# Patient Record
Sex: MALE | Race: WHITE | NOT HISPANIC OR LATINO | Employment: OTHER | URBAN - METROPOLITAN AREA
[De-identification: names, ages, dates, MRNs, and addresses within clinical notes are randomized per-mention and may not be internally consistent; named-entity substitution may affect disease eponyms.]

---

## 2017-01-12 ENCOUNTER — ALLSCRIPTS OFFICE VISIT (OUTPATIENT)
Dept: OTHER | Facility: OTHER | Age: 67
End: 2017-01-12

## 2017-01-16 ENCOUNTER — ALLSCRIPTS OFFICE VISIT (OUTPATIENT)
Dept: OTHER | Facility: OTHER | Age: 67
End: 2017-01-16

## 2017-01-23 ENCOUNTER — ALLSCRIPTS OFFICE VISIT (OUTPATIENT)
Dept: OTHER | Facility: OTHER | Age: 67
End: 2017-01-23

## 2017-01-23 DIAGNOSIS — M25.50 PAIN IN JOINT: ICD-10-CM

## 2017-01-30 ENCOUNTER — APPOINTMENT (OUTPATIENT)
Dept: LAB | Facility: CLINIC | Age: 67
End: 2017-01-30
Payer: MEDICARE

## 2017-01-30 DIAGNOSIS — M25.50 PAIN IN JOINT: ICD-10-CM

## 2017-01-30 LAB
CK SERPL-CCNC: 103 U/L (ref 39–308)
CRP SERPL QL: 40.3 MG/L
ERYTHROCYTE [SEDIMENTATION RATE] IN BLOOD: 81 MM/HOUR (ref 0–10)
URATE SERPL-MCNC: 6.1 MG/DL (ref 4.2–8)

## 2017-01-30 PROCEDURE — 84550 ASSAY OF BLOOD/URIC ACID: CPT

## 2017-01-30 PROCEDURE — 86430 RHEUMATOID FACTOR TEST QUAL: CPT

## 2017-01-30 PROCEDURE — 86140 C-REACTIVE PROTEIN: CPT

## 2017-01-30 PROCEDURE — 86038 ANTINUCLEAR ANTIBODIES: CPT

## 2017-01-30 PROCEDURE — 82550 ASSAY OF CK (CPK): CPT

## 2017-01-30 PROCEDURE — 85652 RBC SED RATE AUTOMATED: CPT

## 2017-01-30 PROCEDURE — 36415 COLL VENOUS BLD VENIPUNCTURE: CPT

## 2017-01-30 PROCEDURE — 86618 LYME DISEASE ANTIBODY: CPT

## 2017-01-31 LAB — RHEUMATOID FACT SER QL LA: NEGATIVE

## 2017-02-01 LAB
B BURGDOR IGG SER IA-ACNC: 0.61
B BURGDOR IGM SER IA-ACNC: 0.14
RYE IGE QN: NEGATIVE

## 2017-02-06 ENCOUNTER — ALLSCRIPTS OFFICE VISIT (OUTPATIENT)
Dept: OTHER | Facility: OTHER | Age: 67
End: 2017-02-06

## 2017-02-20 ENCOUNTER — ALLSCRIPTS OFFICE VISIT (OUTPATIENT)
Dept: OTHER | Facility: OTHER | Age: 67
End: 2017-02-20

## 2017-03-24 ENCOUNTER — ALLSCRIPTS OFFICE VISIT (OUTPATIENT)
Dept: OTHER | Facility: OTHER | Age: 67
End: 2017-03-24

## 2017-03-24 ENCOUNTER — GENERIC CONVERSION - ENCOUNTER (OUTPATIENT)
Dept: FAMILY MEDICINE CLINIC | Facility: CLINIC | Age: 67
End: 2017-03-24

## 2017-03-31 ENCOUNTER — ALLSCRIPTS OFFICE VISIT (OUTPATIENT)
Dept: OTHER | Facility: OTHER | Age: 67
End: 2017-03-31

## 2017-03-31 DIAGNOSIS — J20.9 ACUTE BRONCHITIS: ICD-10-CM

## 2017-06-14 ENCOUNTER — ALLSCRIPTS OFFICE VISIT (OUTPATIENT)
Dept: OTHER | Facility: OTHER | Age: 67
End: 2017-06-14

## 2017-06-14 DIAGNOSIS — I10 ESSENTIAL (PRIMARY) HYPERTENSION: ICD-10-CM

## 2017-06-14 DIAGNOSIS — M35.3 POLYMYALGIA RHEUMATICA (HCC): ICD-10-CM

## 2017-06-14 DIAGNOSIS — Z79.52 LONG TERM CURRENT USE OF SYSTEMIC STEROIDS: ICD-10-CM

## 2017-06-14 DIAGNOSIS — M15.0 PRIMARY GENERALIZED (OSTEO)ARTHRITIS: ICD-10-CM

## 2017-06-15 ENCOUNTER — TRANSCRIBE ORDERS (OUTPATIENT)
Dept: LAB | Facility: CLINIC | Age: 67
End: 2017-06-15

## 2017-06-15 ENCOUNTER — APPOINTMENT (OUTPATIENT)
Dept: LAB | Facility: CLINIC | Age: 67
End: 2017-06-15
Payer: MEDICARE

## 2017-06-15 DIAGNOSIS — I10 ESSENTIAL HYPERTENSION, BENIGN: ICD-10-CM

## 2017-06-15 DIAGNOSIS — M35.3 POLYMYALGIA RHEUMATICA (HCC): ICD-10-CM

## 2017-06-15 DIAGNOSIS — M15.0 PRIMARY GENERALIZED HYPERTROPHIC OSTEOARTHROSIS: ICD-10-CM

## 2017-06-15 DIAGNOSIS — M35.3 POLYMYALGIA RHEUMATICA (HCC): Primary | ICD-10-CM

## 2017-06-15 DIAGNOSIS — Z79.52 LONG TERM CURRENT USE OF SYSTEMIC STEROIDS: ICD-10-CM

## 2017-06-15 PROCEDURE — 80053 COMPREHEN METABOLIC PANEL: CPT | Performed by: INTERNAL MEDICINE

## 2017-06-15 PROCEDURE — 86140 C-REACTIVE PROTEIN: CPT | Performed by: INTERNAL MEDICINE

## 2017-06-15 PROCEDURE — 85652 RBC SED RATE AUTOMATED: CPT | Performed by: INTERNAL MEDICINE

## 2017-06-15 PROCEDURE — 84443 ASSAY THYROID STIM HORMONE: CPT | Performed by: INTERNAL MEDICINE

## 2017-06-15 PROCEDURE — 36415 COLL VENOUS BLD VENIPUNCTURE: CPT | Performed by: INTERNAL MEDICINE

## 2017-06-15 PROCEDURE — 82306 VITAMIN D 25 HYDROXY: CPT | Performed by: INTERNAL MEDICINE

## 2017-06-15 PROCEDURE — 85025 COMPLETE CBC W/AUTO DIFF WBC: CPT | Performed by: INTERNAL MEDICINE

## 2017-07-14 ENCOUNTER — APPOINTMENT (OUTPATIENT)
Dept: LAB | Facility: CLINIC | Age: 67
End: 2017-07-14
Payer: MEDICARE

## 2017-07-14 ENCOUNTER — TRANSCRIBE ORDERS (OUTPATIENT)
Dept: LAB | Facility: CLINIC | Age: 67
End: 2017-07-14

## 2017-07-14 DIAGNOSIS — M35.3 POLYMYALGIA RHEUMATICA (HCC): ICD-10-CM

## 2017-07-14 DIAGNOSIS — Z79.52 LONG TERM CURRENT USE OF SYSTEMIC STEROIDS: ICD-10-CM

## 2017-07-14 LAB
CRP SERPL QL: <3 MG/L
ERYTHROCYTE [SEDIMENTATION RATE] IN BLOOD: 11 MM/HOUR (ref 0–10)

## 2017-07-14 PROCEDURE — 36415 COLL VENOUS BLD VENIPUNCTURE: CPT

## 2017-07-14 PROCEDURE — 86140 C-REACTIVE PROTEIN: CPT

## 2017-07-14 PROCEDURE — 85652 RBC SED RATE AUTOMATED: CPT

## 2017-07-17 DIAGNOSIS — Z79.52 LONG TERM CURRENT USE OF SYSTEMIC STEROIDS: ICD-10-CM

## 2017-07-17 DIAGNOSIS — R01.1 CARDIAC MURMUR: ICD-10-CM

## 2017-07-17 DIAGNOSIS — M35.3 POLYMYALGIA RHEUMATICA (HCC): ICD-10-CM

## 2017-07-24 ENCOUNTER — ALLSCRIPTS OFFICE VISIT (OUTPATIENT)
Dept: OTHER | Facility: OTHER | Age: 67
End: 2017-07-24

## 2017-07-26 ENCOUNTER — GENERIC CONVERSION - ENCOUNTER (OUTPATIENT)
Dept: OTHER | Facility: OTHER | Age: 67
End: 2017-07-26

## 2017-08-15 ENCOUNTER — ALLSCRIPTS OFFICE VISIT (OUTPATIENT)
Dept: OTHER | Facility: OTHER | Age: 67
End: 2017-08-15

## 2017-08-18 ENCOUNTER — GENERIC CONVERSION - ENCOUNTER (OUTPATIENT)
Dept: OTHER | Facility: OTHER | Age: 67
End: 2017-08-18

## 2017-08-24 ENCOUNTER — ALLSCRIPTS OFFICE VISIT (OUTPATIENT)
Dept: OTHER | Facility: OTHER | Age: 67
End: 2017-08-24

## 2017-08-24 DIAGNOSIS — I25.5 ISCHEMIC CARDIOMYOPATHY: ICD-10-CM

## 2017-08-25 ENCOUNTER — GENERIC CONVERSION - ENCOUNTER (OUTPATIENT)
Dept: OTHER | Facility: OTHER | Age: 67
End: 2017-08-25

## 2017-09-01 ENCOUNTER — APPOINTMENT (OUTPATIENT)
Dept: LAB | Facility: CLINIC | Age: 67
End: 2017-09-01
Payer: MEDICARE

## 2017-09-01 ENCOUNTER — TRANSCRIBE ORDERS (OUTPATIENT)
Dept: LAB | Facility: CLINIC | Age: 67
End: 2017-09-01

## 2017-09-01 DIAGNOSIS — M35.3 POLYMYALGIA RHEUMATICA (HCC): ICD-10-CM

## 2017-09-01 DIAGNOSIS — M15.0 PRIMARY GENERALIZED (OSTEO)ARTHRITIS: ICD-10-CM

## 2017-09-01 DIAGNOSIS — Z79.52 LONG TERM CURRENT USE OF SYSTEMIC STEROIDS: ICD-10-CM

## 2017-09-01 LAB
CRP SERPL QL: 3.3 MG/L
ERYTHROCYTE [SEDIMENTATION RATE] IN BLOOD: 11 MM/HOUR (ref 0–10)

## 2017-09-01 PROCEDURE — 85652 RBC SED RATE AUTOMATED: CPT

## 2017-09-01 PROCEDURE — 36415 COLL VENOUS BLD VENIPUNCTURE: CPT

## 2017-09-01 PROCEDURE — 86140 C-REACTIVE PROTEIN: CPT

## 2017-09-06 ENCOUNTER — TELEPHONE (OUTPATIENT)
Dept: INPATIENT UNIT | Facility: HOSPITAL | Age: 67
End: 2017-09-06

## 2017-09-06 DIAGNOSIS — I35.0 NONRHEUMATIC AORTIC VALVE STENOSIS: ICD-10-CM

## 2017-09-06 PROBLEM — I25.5 CARDIOMYOPATHY, ISCHEMIC: Chronic | Status: ACTIVE | Noted: 2017-09-06

## 2017-09-06 PROBLEM — R06.00 DOE (DYSPNEA ON EXERTION): Chronic | Status: ACTIVE | Noted: 2017-09-06

## 2017-09-06 RX ORDER — CLOPIDOGREL BISULFATE 75 MG/1
600 TABLET ORAL ONCE
Status: CANCELLED | OUTPATIENT
Start: 2017-09-06 | End: 2017-09-06

## 2017-09-06 RX ORDER — SODIUM CHLORIDE 9 MG/ML
125 INJECTION, SOLUTION INTRAVENOUS CONTINUOUS
Status: CANCELLED | OUTPATIENT
Start: 2017-09-06

## 2017-09-06 RX ORDER — ASPIRIN 81 MG/1
324 TABLET, CHEWABLE ORAL ONCE
Status: CANCELLED | OUTPATIENT
Start: 2017-09-06 | End: 2017-09-06

## 2017-09-07 ENCOUNTER — GENERIC CONVERSION - ENCOUNTER (OUTPATIENT)
Dept: OTHER | Facility: OTHER | Age: 67
End: 2017-09-07

## 2017-09-07 ENCOUNTER — HOSPITAL ENCOUNTER (OUTPATIENT)
Dept: NON INVASIVE DIAGNOSTICS | Facility: HOSPITAL | Age: 67
Discharge: HOME | End: 2017-09-07
Attending: INTERNAL MEDICINE | Admitting: INTERNAL MEDICINE
Payer: MEDICARE

## 2017-09-07 VITALS
DIASTOLIC BLOOD PRESSURE: 57 MMHG | TEMPERATURE: 98 F | SYSTOLIC BLOOD PRESSURE: 98 MMHG | WEIGHT: 174 LBS | OXYGEN SATURATION: 96 % | RESPIRATION RATE: 18 BRPM | HEIGHT: 68 IN | BODY MASS INDEX: 26.37 KG/M2 | HEART RATE: 62 BPM

## 2017-09-07 DIAGNOSIS — I25.5 ISCHEMIC CARDIOMYOPATHY: ICD-10-CM

## 2017-09-07 LAB
ANION GAP SERPL CALCULATED.3IONS-SCNC: 9 MMOL/L (ref 4–13)
BUN SERPL-MCNC: 14 MG/DL (ref 5–25)
CALCIUM SERPL-MCNC: 9.3 MG/DL (ref 8.3–10.1)
CHLORIDE SERPL-SCNC: 104 MMOL/L (ref 100–108)
CHOLEST SERPL-MCNC: 147 MG/DL (ref 50–200)
CO2 SERPL-SCNC: 27 MMOL/L (ref 21–32)
CREAT SERPL-MCNC: 0.72 MG/DL (ref 0.6–1.3)
ERYTHROCYTE [DISTWIDTH] IN BLOOD BY AUTOMATED COUNT: 12.9 % (ref 11.6–15.1)
GFR SERPL CREATININE-BSD FRML MDRD: 95 ML/MIN/1.73SQ M
GLUCOSE P FAST SERPL-MCNC: 107 MG/DL (ref 65–99)
GLUCOSE SERPL-MCNC: 107 MG/DL (ref 65–140)
HCT VFR BLD AUTO: 42.3 % (ref 36.5–49.3)
HDLC SERPL-MCNC: 62 MG/DL (ref 40–60)
HGB BLD-MCNC: 14.8 G/DL (ref 12–17)
INR PPP: 0.91 (ref 0.86–1.16)
LDLC SERPL CALC-MCNC: 60 MG/DL (ref 0–100)
MAGNESIUM SERPL-MCNC: 2.4 MG/DL (ref 1.6–2.6)
MCH RBC QN AUTO: 31.8 PG (ref 26.8–34.3)
MCHC RBC AUTO-ENTMCNC: 35 G/DL (ref 31.4–37.4)
MCV RBC AUTO: 91 FL (ref 82–98)
PLATELET # BLD AUTO: 200 THOUSANDS/UL (ref 149–390)
PMV BLD AUTO: 9.6 FL (ref 8.9–12.7)
POTASSIUM SERPL-SCNC: 3.3 MMOL/L (ref 3.5–5.3)
PROTHROMBIN TIME: 12.2 SECONDS (ref 12.1–14.4)
RBC # BLD AUTO: 4.65 MILLION/UL (ref 3.88–5.62)
SODIUM SERPL-SCNC: 140 MMOL/L (ref 136–145)
TRIGL SERPL-MCNC: 124 MG/DL
WBC # BLD AUTO: 7.06 THOUSAND/UL (ref 4.31–10.16)

## 2017-09-07 PROCEDURE — C1769 GUIDE WIRE: HCPCS | Performed by: INTERNAL MEDICINE

## 2017-09-07 PROCEDURE — 83735 ASSAY OF MAGNESIUM: CPT | Performed by: INTERNAL MEDICINE

## 2017-09-07 PROCEDURE — 85610 PROTHROMBIN TIME: CPT | Performed by: INTERNAL MEDICINE

## 2017-09-07 PROCEDURE — 99152 MOD SED SAME PHYS/QHP 5/>YRS: CPT | Performed by: INTERNAL MEDICINE

## 2017-09-07 PROCEDURE — 99153 MOD SED SAME PHYS/QHP EA: CPT | Performed by: INTERNAL MEDICINE

## 2017-09-07 PROCEDURE — 80061 LIPID PANEL: CPT | Performed by: INTERNAL MEDICINE

## 2017-09-07 PROCEDURE — 80048 BASIC METABOLIC PNL TOTAL CA: CPT | Performed by: INTERNAL MEDICINE

## 2017-09-07 PROCEDURE — C1894 INTRO/SHEATH, NON-LASER: HCPCS | Performed by: INTERNAL MEDICINE

## 2017-09-07 PROCEDURE — 85027 COMPLETE CBC AUTOMATED: CPT | Performed by: INTERNAL MEDICINE

## 2017-09-07 PROCEDURE — 93005 ELECTROCARDIOGRAM TRACING: CPT | Performed by: INTERNAL MEDICINE

## 2017-09-07 PROCEDURE — 93458 L HRT ARTERY/VENTRICLE ANGIO: CPT | Performed by: INTERNAL MEDICINE

## 2017-09-07 RX ORDER — PREDNISONE 2.5 MG
5 TABLET ORAL DAILY
COMMUNITY
End: 2018-11-19 | Stop reason: SDUPTHER

## 2017-09-07 RX ORDER — AMLODIPINE BESYLATE 10 MG/1
10 TABLET ORAL DAILY
COMMUNITY
End: 2018-03-05 | Stop reason: SDUPTHER

## 2017-09-07 RX ORDER — CLOPIDOGREL BISULFATE 75 MG/1
600 TABLET ORAL ONCE
Status: COMPLETED | OUTPATIENT
Start: 2017-09-07 | End: 2017-09-07

## 2017-09-07 RX ORDER — TRIAMTERENE AND HYDROCHLOROTHIAZIDE 37.5; 25 MG/1; MG/1
1 CAPSULE ORAL DAILY
COMMUNITY
End: 2018-05-02 | Stop reason: SDUPTHER

## 2017-09-07 RX ORDER — HEPARIN SODIUM 1000 [USP'U]/ML
INJECTION, SOLUTION INTRAVENOUS; SUBCUTANEOUS CODE/TRAUMA/SEDATION MEDICATION
Status: COMPLETED | OUTPATIENT
Start: 2017-09-07 | End: 2017-09-07

## 2017-09-07 RX ORDER — AMLODIPINE BESYLATE 10 MG/1
10 TABLET ORAL DAILY
Status: DISCONTINUED | OUTPATIENT
Start: 2017-09-08 | End: 2017-09-07 | Stop reason: HOSPADM

## 2017-09-07 RX ORDER — MULTIVITAMIN
1 TABLET ORAL DAILY
COMMUNITY

## 2017-09-07 RX ORDER — CHOLECALCIFEROL (VITAMIN D3) 125 MCG
2000 CAPSULE ORAL DAILY
COMMUNITY

## 2017-09-07 RX ORDER — NITROGLYCERIN 20 MG/100ML
INJECTION INTRAVENOUS CODE/TRAUMA/SEDATION MEDICATION
Status: COMPLETED | OUTPATIENT
Start: 2017-09-07 | End: 2017-09-07

## 2017-09-07 RX ORDER — TRIAMTERENE AND HYDROCHLOROTHIAZIDE 37.5; 25 MG/1; MG/1
1 TABLET ORAL DAILY
Status: DISCONTINUED | OUTPATIENT
Start: 2017-09-08 | End: 2017-09-07 | Stop reason: HOSPADM

## 2017-09-07 RX ORDER — SODIUM CHLORIDE 9 MG/ML
125 INJECTION, SOLUTION INTRAVENOUS CONTINUOUS
Status: DISCONTINUED | OUTPATIENT
Start: 2017-09-07 | End: 2017-09-07

## 2017-09-07 RX ORDER — ASPIRIN 81 MG/1
324 TABLET, CHEWABLE ORAL ONCE
Status: COMPLETED | OUTPATIENT
Start: 2017-09-07 | End: 2017-09-07

## 2017-09-07 RX ORDER — ASPIRIN 325 MG
325 TABLET ORAL
Status: ON HOLD | COMMUNITY
End: 2019-08-15

## 2017-09-07 RX ORDER — FENTANYL CITRATE 50 UG/ML
INJECTION, SOLUTION INTRAMUSCULAR; INTRAVENOUS CODE/TRAUMA/SEDATION MEDICATION
Status: COMPLETED | OUTPATIENT
Start: 2017-09-07 | End: 2017-09-07

## 2017-09-07 RX ORDER — LIDOCAINE HYDROCHLORIDE 10 MG/ML
INJECTION, SOLUTION INFILTRATION; PERINEURAL CODE/TRAUMA/SEDATION MEDICATION
Status: COMPLETED | OUTPATIENT
Start: 2017-09-07 | End: 2017-09-07

## 2017-09-07 RX ORDER — ROSUVASTATIN CALCIUM 10 MG/1
10 TABLET, COATED ORAL
COMMUNITY
End: 2018-12-12

## 2017-09-07 RX ORDER — SODIUM CHLORIDE 9 MG/ML
100 INJECTION, SOLUTION INTRAVENOUS CONTINUOUS
Status: DISCONTINUED | OUTPATIENT
Start: 2017-09-07 | End: 2017-09-07 | Stop reason: HOSPADM

## 2017-09-07 RX ORDER — MIDAZOLAM HYDROCHLORIDE 1 MG/ML
INJECTION INTRAMUSCULAR; INTRAVENOUS CODE/TRAUMA/SEDATION MEDICATION
Status: COMPLETED | OUTPATIENT
Start: 2017-09-07 | End: 2017-09-07

## 2017-09-07 RX ADMIN — SODIUM CHLORIDE 125 ML/HR: 0.9 INJECTION, SOLUTION INTRAVENOUS at 09:48

## 2017-09-07 RX ADMIN — LIDOCAINE HYDROCHLORIDE 0.1 ML: 10 INJECTION, SOLUTION INFILTRATION; PERINEURAL at 13:43

## 2017-09-07 RX ADMIN — ASPIRIN 81 MG 324 MG: 81 TABLET ORAL at 09:38

## 2017-09-07 RX ADMIN — CLOPIDOGREL BISULFATE 600 MG: 75 TABLET ORAL at 09:38

## 2017-09-07 RX ADMIN — IOHEXOL 95 ML: 350 INJECTION, SOLUTION INTRAVENOUS at 14:29

## 2017-09-07 RX ADMIN — MIDAZOLAM 2 MG: 1 INJECTION INTRAMUSCULAR; INTRAVENOUS at 13:41

## 2017-09-07 RX ADMIN — MIDAZOLAM 1 MG: 1 INJECTION INTRAMUSCULAR; INTRAVENOUS at 14:18

## 2017-09-07 RX ADMIN — NITROGLYCERIN 200 MCG: 20 INJECTION INTRAVENOUS at 13:46

## 2017-09-07 RX ADMIN — FENTANYL CITRATE 50 MCG: 50 INJECTION, SOLUTION INTRAMUSCULAR; INTRAVENOUS at 13:41

## 2017-09-07 RX ADMIN — HEPARIN SODIUM 4000 UNITS: 1000 INJECTION INTRAVENOUS; SUBCUTANEOUS at 13:46

## 2017-09-07 RX ADMIN — FENTANYL CITRATE 25 MCG: 50 INJECTION, SOLUTION INTRAMUSCULAR; INTRAVENOUS at 14:17

## 2017-09-08 LAB
ATRIAL RATE: 70 BPM
P AXIS: 58 DEGREES
PR INTERVAL: 162 MS
QRS AXIS: -30 DEGREES
QRSD INTERVAL: 142 MS
QT INTERVAL: 418 MS
QTC INTERVAL: 451 MS
T WAVE AXIS: 67 DEGREES
VENTRICULAR RATE: 70 BPM

## 2017-09-14 ENCOUNTER — HOSPITAL ENCOUNTER (OUTPATIENT)
Dept: NON INVASIVE DIAGNOSTICS | Facility: HOSPITAL | Age: 67
Discharge: HOME | End: 2017-09-14
Attending: INTERNAL MEDICINE
Payer: MEDICARE

## 2017-09-14 ENCOUNTER — ANESTHESIA (OUTPATIENT)
Dept: NON INVASIVE DIAGNOSTICS | Facility: HOSPITAL | Age: 67
End: 2017-09-14

## 2017-09-14 ENCOUNTER — ANESTHESIA EVENT (OUTPATIENT)
Dept: NON INVASIVE DIAGNOSTICS | Facility: HOSPITAL | Age: 67
End: 2017-09-14

## 2017-09-14 ENCOUNTER — GENERIC CONVERSION - ENCOUNTER (OUTPATIENT)
Dept: OTHER | Facility: OTHER | Age: 67
End: 2017-09-14

## 2017-09-14 DIAGNOSIS — I35.0 NONRHEUMATIC AORTIC VALVE STENOSIS: ICD-10-CM

## 2017-09-14 PROCEDURE — 93312 ECHO TRANSESOPHAGEAL: CPT

## 2017-09-14 PROCEDURE — 76377 3D RENDER W/INTRP POSTPROCES: CPT

## 2017-09-14 RX ORDER — SODIUM CHLORIDE 9 MG/ML
INJECTION, SOLUTION INTRAVENOUS CONTINUOUS PRN
Status: DISCONTINUED | OUTPATIENT
Start: 2017-09-14 | End: 2017-09-14 | Stop reason: SURG

## 2017-09-14 RX ORDER — PROPOFOL 10 MG/ML
INJECTION, EMULSION INTRAVENOUS AS NEEDED
Status: DISCONTINUED | OUTPATIENT
Start: 2017-09-14 | End: 2017-09-14 | Stop reason: SURG

## 2017-09-14 RX ADMIN — TOPICAL ANESTHETIC 1 SPRAY: 200 SPRAY DENTAL; PERIODONTAL at 11:36

## 2017-09-14 RX ADMIN — PROPOFOL 50 MG: 10 INJECTION, EMULSION INTRAVENOUS at 11:38

## 2017-09-14 RX ADMIN — PROPOFOL 30 MG: 10 INJECTION, EMULSION INTRAVENOUS at 11:55

## 2017-09-14 RX ADMIN — SODIUM CHLORIDE: 0.9 INJECTION, SOLUTION INTRAVENOUS at 10:45

## 2017-09-14 RX ADMIN — PROPOFOL 30 MG: 10 INJECTION, EMULSION INTRAVENOUS at 11:42

## 2017-09-14 RX ADMIN — PROPOFOL 30 MG: 10 INJECTION, EMULSION INTRAVENOUS at 11:45

## 2017-09-14 RX ADMIN — PROPOFOL 30 MG: 10 INJECTION, EMULSION INTRAVENOUS at 11:52

## 2017-09-14 RX ADMIN — PROPOFOL 30 MG: 10 INJECTION, EMULSION INTRAVENOUS at 11:40

## 2017-09-14 RX ADMIN — PROPOFOL 30 MG: 10 INJECTION, EMULSION INTRAVENOUS at 11:49

## 2017-09-25 ENCOUNTER — ALLSCRIPTS OFFICE VISIT (OUTPATIENT)
Dept: OTHER | Facility: OTHER | Age: 67
End: 2017-09-25

## 2017-09-26 ENCOUNTER — TRANSCRIBE ORDERS (OUTPATIENT)
Dept: ADMINISTRATIVE | Facility: HOSPITAL | Age: 67
End: 2017-09-26

## 2017-09-26 DIAGNOSIS — I25.5 ISCHEMIC CARDIOMYOPATHY: Primary | ICD-10-CM

## 2017-10-05 ENCOUNTER — GENERIC CONVERSION - ENCOUNTER (OUTPATIENT)
Dept: OTHER | Facility: OTHER | Age: 67
End: 2017-10-05

## 2017-10-12 ENCOUNTER — ALLSCRIPTS OFFICE VISIT (OUTPATIENT)
Dept: OTHER | Facility: OTHER | Age: 67
End: 2017-10-12

## 2017-10-12 ENCOUNTER — APPOINTMENT (OUTPATIENT)
Dept: LAB | Facility: CLINIC | Age: 67
End: 2017-10-12
Payer: MEDICARE

## 2017-10-12 ENCOUNTER — HOSPITAL ENCOUNTER (OUTPATIENT)
Dept: MRI IMAGING | Facility: HOSPITAL | Age: 67
Discharge: HOME | End: 2017-10-12
Attending: INTERNAL MEDICINE
Payer: MEDICARE

## 2017-10-12 ENCOUNTER — TRANSCRIBE ORDERS (OUTPATIENT)
Dept: LAB | Facility: CLINIC | Age: 67
End: 2017-10-12

## 2017-10-12 DIAGNOSIS — Z79.52 LONG TERM CURRENT USE OF SYSTEMIC STEROIDS: ICD-10-CM

## 2017-10-12 DIAGNOSIS — M15.0 PRIMARY GENERALIZED (OSTEO)ARTHRITIS: ICD-10-CM

## 2017-10-12 DIAGNOSIS — I25.5 ISCHEMIC CARDIOMYOPATHY: ICD-10-CM

## 2017-10-12 DIAGNOSIS — M35.3 POLYMYALGIA RHEUMATICA (HCC): ICD-10-CM

## 2017-10-12 LAB
ALBUMIN SERPL BCP-MCNC: 4.1 G/DL (ref 3.5–5)
ALP SERPL-CCNC: 61 U/L (ref 46–116)
ALT SERPL W P-5'-P-CCNC: 24 U/L (ref 12–78)
ANION GAP SERPL CALCULATED.3IONS-SCNC: 6 MMOL/L (ref 4–13)
AST SERPL W P-5'-P-CCNC: 20 U/L (ref 5–45)
BASOPHILS # BLD AUTO: 0.04 THOUSANDS/ΜL (ref 0–0.1)
BASOPHILS NFR BLD AUTO: 1 % (ref 0–1)
BILIRUB SERPL-MCNC: 0.44 MG/DL (ref 0.2–1)
BUN SERPL-MCNC: 14 MG/DL (ref 5–25)
CALCIUM SERPL-MCNC: 9.7 MG/DL (ref 8.3–10.1)
CHLORIDE SERPL-SCNC: 100 MMOL/L (ref 100–108)
CO2 SERPL-SCNC: 28 MMOL/L (ref 21–32)
CREAT SERPL-MCNC: 0.7 MG/DL (ref 0.6–1.3)
CRP SERPL QL: <3 MG/L
EOSINOPHIL # BLD AUTO: 0.02 THOUSAND/ΜL (ref 0–0.61)
EOSINOPHIL NFR BLD AUTO: 0 % (ref 0–6)
ERYTHROCYTE [DISTWIDTH] IN BLOOD BY AUTOMATED COUNT: 12.9 % (ref 11.6–15.1)
ERYTHROCYTE [SEDIMENTATION RATE] IN BLOOD: 20 MM/HOUR (ref 0–10)
GFR SERPL CREATININE-BSD FRML MDRD: 96 ML/MIN/1.73SQ M
GLUCOSE SERPL-MCNC: 122 MG/DL (ref 65–140)
HCT VFR BLD AUTO: 43.3 % (ref 36.5–49.3)
HGB BLD-MCNC: 15.1 G/DL (ref 12–17)
LYMPHOCYTES # BLD AUTO: 1.16 THOUSANDS/ΜL (ref 0.6–4.47)
LYMPHOCYTES NFR BLD AUTO: 14 % (ref 14–44)
MCH RBC QN AUTO: 31.6 PG (ref 26.8–34.3)
MCHC RBC AUTO-ENTMCNC: 34.9 G/DL (ref 31.4–37.4)
MCV RBC AUTO: 91 FL (ref 82–98)
MONOCYTES # BLD AUTO: 0.41 THOUSAND/ΜL (ref 0.17–1.22)
MONOCYTES NFR BLD AUTO: 5 % (ref 4–12)
NEUTROPHILS # BLD AUTO: 6.45 THOUSANDS/ΜL (ref 1.85–7.62)
NEUTS SEG NFR BLD AUTO: 80 % (ref 43–75)
NRBC BLD AUTO-RTO: 0 /100 WBCS
PLATELET # BLD AUTO: 261 THOUSANDS/UL (ref 149–390)
PMV BLD AUTO: 10.1 FL (ref 8.9–12.7)
POTASSIUM SERPL-SCNC: 4.1 MMOL/L (ref 3.5–5.3)
PROT SERPL-MCNC: 7.8 G/DL (ref 6.4–8.2)
RBC # BLD AUTO: 4.78 MILLION/UL (ref 3.88–5.62)
SODIUM SERPL-SCNC: 134 MMOL/L (ref 136–145)
WBC # BLD AUTO: 8.11 THOUSAND/UL (ref 4.31–10.16)

## 2017-10-12 PROCEDURE — 36415 COLL VENOUS BLD VENIPUNCTURE: CPT

## 2017-10-12 PROCEDURE — 86140 C-REACTIVE PROTEIN: CPT

## 2017-10-12 PROCEDURE — 85652 RBC SED RATE AUTOMATED: CPT

## 2017-10-12 PROCEDURE — 80053 COMPREHEN METABOLIC PANEL: CPT

## 2017-10-12 PROCEDURE — 75561 CARDIAC MRI FOR MORPH W/DYE: CPT

## 2017-10-12 PROCEDURE — 85025 COMPLETE CBC W/AUTO DIFF WBC: CPT

## 2017-10-12 PROCEDURE — A9585 GADOBUTROL INJECTION: HCPCS | Performed by: INTERNAL MEDICINE

## 2017-10-12 RX ADMIN — GADOBUTROL 14 ML: 604.72 INJECTION INTRAVENOUS at 09:49

## 2017-10-23 ENCOUNTER — GENERIC CONVERSION - ENCOUNTER (OUTPATIENT)
Dept: OTHER | Facility: OTHER | Age: 67
End: 2017-10-23

## 2017-10-24 NOTE — PROGRESS NOTES
Assessment  Assessed    1  Ischemic cardiomyopathy (414 8) (I25 5)   2  Aortic stenosis (424 1) (I35 0)   3  Benign essential hypertension (401 1) (I10)   4  CAD (coronary artery disease) (414 00) (I25 10)    Plan  Ischemic cardiomyopathy    · MRI CARDIAC  W WO CONTRAST; Status:Need Information - Financial Authorization; Requested ZXZ:[id number];    Perform:[facility] Radiology; Order Comments:assess viability - specifically anterior wall; Due:51Zia0475; Last Updated By:Missael Cote; 9/25/2017 4:06:11 PM;Ordered; For:Ischemic cardiomyopathy; Ordered By:Moises Palacio;   · Follow-up Visit in 4 Weeks Evaluation and Treatment  Follow-up  Status: Complete   Done: 50WPP0281   Ordered; For: Ischemic cardiomyopathy; Ordered By: Ayala Nielson Performed:  Due: 04JQE2284; Last Updated By: Betito Demarco; 9/25/2017 4:06:11 PM    Discussion/Summary  Cardiology Discussion Summary Free Text Note Form [facility]:   He will need a cardiac MRI to evaluate the anterior wall for viability  If there is viability, I would recommend CABG and probably AVR  If no viability is seen, then PCI of the RCA would be an option  I have reviewed his medications and made no changes  I explained the situation to him in detail  Await cardiac MRI for further recommednations  Chief Complaint  Chief Complaint Free Text Note Form: Clarke Arellano is here for hospital f/u  He said he tires very easily and gets dizzy more often  He said with lots of exertion, like cutting the grass, he gets pain in his chest, almost like heartburn  He has numbness in his right hand all the time and gets it in his left hand on and off  History of Present Illness  Cardiology HPI Free Text Note Form [facility]: He underwent recent SANAZ and cardiac cath  EF is 30%  He has 100% LAD stenosis with collaterals to the distal vessel  There is also a critical distal RCA stenosis  SANAZ showed mild AS  continues to get exertional heartburn and SOB  He tires very easily        Review of Systems  Cardiology Male ROS:     Cardiac: as noted in HPI  Skin: No complaints of nonhealing sores or skin rash  Genitourinary: No complaints of recurrent urinary tract infections, frequent urination at night, difficult urination, blood in urine, kidney stones, loss of bladder control, no kidney or prostate problems, no erectile dysfunction  Psychological: No complaints of feeling depressed, anxiety, panic attacks, or difficulty concentrating  General: as noted in HPI  Respiratory: as noted in HPI  HEENT: No complaints of serious problems, hearing problems, nose problems, throat problems, or snoring  Gastrointestinal: No complaints of liver problems, nausea, vomiting, heartburn, constipation, bloody stools, diarrhea, problems swallowing, adbominal pain, or rectal bleeding  Hematologic: No complaints of bleeding disorders, anemia, blood clots, or excessive brusing  Neurological: No complaints of numbness, tingling, dizziness, weakness, seizures, headaches, syncope or fainting, AM fatigue, daytime sleepiness, no witnessed apnea episodes  Musculoskeletal: No complaints of arthritis, back pain, or painfull swelling  ROS Reviewed:   ROS reviewed  Active Problems   Problems    1  Abnormal stress echo (794 39) (R94 39)   2  Cardiac murmur (785 2) (R01 1)   3  Circulation problem (459 9) (I99 9)   4  Depression with anxiety (300 4) (F41 8)   5  RODRIGUEZ (dyspnea on exertion) (786 09) (R06 09)   6   Hearing problem (V41 2) (H91 90)    Benign essential hypertension (401 1) (I10)       Hypercholesterolemia (272 0) (E78 00)       Polymyalgia rheumatica (725) (M35 3)       Long term (current) use of systemic steroids (V58 65) (Z79 52)       Primary generalized (osteo)arthritis (715 09) (M15 0)       Aortic stenosis (424 1) (I35 0)       Cardiomyopathy, ischemic (414 8) (I25 5)       Activity intolerance related to fatigue (780 79) (R53 83)       Heart burn (787 1) (R12)       CAD (coronary artery disease) (414 00) (I25 10)          Past Medical History  Problems    1  History of Acute frontal sinusitis (461 1) (J01 10)   2  History of Diffuse arthralgia (719 40) (M25 50)   3  History of acute bronchitis (V12 69) (Z87 09)   4  History of back pain (V13 59) (Z87 39)   5  History of hyperlipidemia (V12 29) (Z86 39)   6  History of influenza vaccination (V49 89) (Z92 29)   7  History of Lyme disease (V12 09) (Z86 19)   8  History of rheumatic fever (V12 09) (Z86 79)   9  History of Screening for colon cancer (V76 51) (Z12 11)  Active Problems And Past Medical History Reviewed: The active problems and past medical history were reviewed and updated today  Surgical History  Problems    1  History of Tonsillectomy With Adenoidectomy  Surgical History Reviewed: The surgical history was reviewed and updated today  Family History  Mother    1  Family history of malignant neoplasm of cervix (V16 49) (Z80 49)  Father    2  Family history of CAD (coronary artery disease)   3  Family history of coronary artery disease (V17 3) (Z82 49)   4  Family history of myocardial infarction (V17 3) (Z82 49)  Brother    5  Family history of malignant neoplasm (V16 9) (Z80 9)  Grandfather    6  Family history of CAD (coronary artery disease)   7  Family history of coronary artery disease (V17 3) (Z82 49)  Paternal Grandfather    8  Family history of cerebrovascular accident (CVA) (V17 1) (Z82 3)   9  Family history of myocardial infarction (V17 3) (Z82 49)  Spouse    10  Family history of CAD (coronary artery disease)   6  Family history of coronary artery disease (V17 3) (Z82 49)  Family History Reviewed: The family history was reviewed and updated today  Social History  Problems    · Employed   · 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   · Former smoker (M41 30) (N53 027)   ·    · Social drinker  Social History Reviewed: The social history was reviewed and updated today  The social history was reviewed and is unchanged        Current Meds   1  AmLODIPine Besylate 10 MG Oral Tablet; take 1 tablet by mouth once daily; Therapy: 46ENJ6500 to (Reinier Eckert)  Requested for: 83Qht9050 Recorded   2  Aspirin 325 MG Oral Tablet; TAKE 1 TABLET EVERY MORNING; Therapy: (Recorded:32Fab9882) to Recorded   3  Calcium/Vitamin D/Minerals TABS; Take 1 tablet twice daily; Therapy: (Recorded:21Lli5350) to Recorded   4  PredniSONE 2 5 MG Oral Tablet; TAKE 3 TABLET DAILY WITH FOOD; Therapy: 23HED6987 to (Evaluate:07Hiy0614)  Requested for: 80Jey8878; Last   Rx:25Yhh4608 Ordered   5  Rosuvastatin Calcium 10 MG Oral Tablet; TAKE 1 TABLET AT BEDTIME; Therapy: 71Nzw7636 to (Evaluate:67Vjy5838)  Requested for: 46Qdb0066; Last   Rx:11Yxy0054 Ordered   6  Triamterene-HCTZ 37 5-25 MG Oral Capsule; TAKE 1 CAPSULE DAILY  Requested for:   56RIL5366; Last Rx:84Pcb0184 Ordered   7  Vitamin D 2000 UNIT Oral Capsule; Therapy: (Recorded:85Swj3100) to Recorded  Medication List Reviewed: The medication list was reviewed and updated today  Allergies  Medication    1  Avelox TABS   2  Crestor TABS   3  Lipitor TABS   4  Pravachol TABS   5  Zocor TABS    Vitals  Vital Signs    Recorded: 62VZU8088 03:10PM   Heart Rate 80, L Radial   Systolic 491, LUE   Diastolic 70, LUE   Height 5 ft 7 in   Weight 174 lb    BMI Calculated 27 25   BSA Calculated 1 91     Physical Exam    Constitutional   General appearance: No acute distress, well appearing and well nourished  Eyes   Conjunctiva and Sclera examination: Conjunctiva pink, sclera anicteric  Ears, Nose, Mouth, and Throat - Oropharynx: Clear, nares are clear, mucous membranes are moist    Neck   Neck and thyroid: Normal, supple, trachea midline, no thyromegaly  Pulmonary   Respiratory effort: No increased work of breathing or signs of respiratory distress  Auscultation of lungs: Clear to auscultation, no rales, no rhonchi, no wheezing, good air movement      Cardiovascular   Auscultation of heart: Abnormal  -- 3/6 systolic murmur  Carotid pulses: Normal, 2+ bilaterally  Peripheral vascular exam: Normal pulses throughout, no tenderness, erythema or swelling  Pedal pulses: Normal, 2+ bilaterally  Examination of extremities for edema and/or varicosities: Normal     Abdomen   Abdomen: Non-tender and no distention  Liver and spleen: No hepatomegaly or splenomegaly  Musculoskeletal Gait and station: Normal gait  -- Digits and nails: Normal without clubbing or cyanosis  -- Inspection/palpation of joints, bones, and muscles: Normal, ROM normal     Skin - Skin and subcutaneous tissue: Normal without rashes or lesions  Skin is warm and well perfused, normal turgor  Neurologic - Cranial nerves: II - XII intact  -- Speech: Normal     Psychiatric - Orientation to person, place, and time: Normal -- Mood and affect: Normal       Future Appointments    Date/Time Provider Specialty Site   12/15/2017 09:30 AM Melly Rader, [facility] Rheumatology [address]     Signatures   Electronically signed by : ALFRED Buchanan ; Oct 23 2017 11:54AM EST                       (Author)

## 2017-11-01 ENCOUNTER — ALLSCRIPTS OFFICE VISIT (OUTPATIENT)
Dept: OTHER | Facility: OTHER | Age: 67
End: 2017-11-01

## 2017-11-02 NOTE — CONSULTS
Assessment  Assessed    · Aortic stenosis (424 1) (I35 0)   · CAD (coronary artery disease) (414 00) (I25 10)    Discussion/Summary  Discussion Summary:   I had a discussion with Mr  Kristi Sarabia regarding his coronary artery disease and moderate aortic stenosis  His cardiac catheterization, echocardiogram, and cardiac MRI were reviewed and results are discussed with him  He is had a previous MI with total occlusion of his LAD  He has a 70% right coronary artery stenosis  His circumflex is no disease  In evaluating his graft ability and the application of his cardiac MRI, I feel there is no significant benefit to revascularization with surgery  Patient has scar from the mid to apex region and LAD  At most possible PCI to the RCA would be beneficial  Additionally his echocardiogram demonstrates moderate aortic stenosis and overall planimetry demonstrates a suitable valve area  Patient is also had lower extremity bypasses and is had a saphenous vein used for arterial revascularization of his lower extremities bilaterally  would continue with current medical therapy  We have helped the patient to have an appointment with Dr GEE [facility] for follow discussion, as well as our vascular surgical team for evaluation of his lower extremity claudication  Goals and Barriers: The patient has the current Goals: Improve exertional dyspnea and angina  Patient's Capacity to Self-Care: Patient is able to Self-Care  Chief Complaint  Chief Complaint Free Text Note Form: Coronary artery disease and aortic stenosis      History of Present Illness  HPI: Mr Brandi Ashraf is a 59-year-old patient who has been referred to our office by Dr GEE [facility]  The patient does not have longstanding history of cardiac disease  His current coronary disease and aortic stenosis were initially identified during rheumatology workup  During this physical examination he was found to have a murmur   Echocardiogram was performed and aortic stenosis with diminished ejection fraction were identified  Patient was referred for cardiology evaluation  ultimately additionally underwent cardiac catheterization which demonstrated severe 2 vessel disease  Additionally, he underwent cardiac MRI which demonstrated decreased viability of the anterior wall  He is now referred to our office for surgical evaluation  interview today he complains primarily of exertional dyspnea and heartburn  He also notes occasional exertional dizziness  His symptoms have been gradually increasing in severity over the last several months  It does take moderate level of exertion to elicit these symptoms  Of note, he denies prior known history of myocardial infarction  He does have a history of vascular disease having undergone bilateral femoral popliteal bypasses  He has a longstanding history of smoking, quitting many years ago  management has been initiated with the Ranexa  He notes mild improvement with this intervention  Review of Systems  Complete-Male:   Constitutional: feeling poorly-- and-- feeling tired  Eyes: No complaints of eye pain, no red eyes, no discharge from eyes, no itchy eyes  ENT: no complaints of earache, no hearing loss, no nosebleeds, no nasal discharge, no sore throat, no hoarseness  Cardiovascular: chest pain-- and-- intermittent leg claudication  Respiratory: shortness of breath-- and-- shortness of breath during exertion  Gastrointestinal: No complaints of abdominal pain, no constipation, no nausea or vomiting, no diarrhea or bloody stools  Musculoskeletal: No complaints of arthralgia, no myalgias, no joint swelling or stiffness, no limb pain or swelling  Integumentary: No complaints of skin rash or skin lesions, no itching, no skin wound, no dry skin  Neurological: No compliants of headache, no confusion, no convulsions, no numbness or tingling, no dizziness or fainting, no limb weakness, no difficulty walking     Psychiatric: Is not suicidal, no sleep disturbances, no anxiety or depression, no change in personality, no emotional problems  Endocrine: No complaints of proptosis, no hot flashes, no muscle weakness, no erectile dysfunction, no deepening of the voice, no feelings of weakness  Hematologic/Lymphatic: No complaints of swollen glands, no swollen glands in the neck, does not bleed easily, no easy bruising  ROS Reviewed:   ROS reviewed  Active Problems  1  Abnormal stress echo (794 39) (R94 39)   2  Activity intolerance related to fatigue (780 79) (R53 83)   3  Aortic stenosis (424 1) (I35 0)   4  Benign essential hypertension (401 1) (I10)   5  CAD (coronary artery disease) (414 00) (I25 10)   6  Cardiac murmur (785 2) (R01 1)   7  Cardiomyopathy, ischemic (414 8) (I25 5)   8  Circulation problem (459 9) (I99 9)   9  Depression with anxiety (300 4) (F41 8)   10  RODRIGUEZ (dyspnea on exertion) (786 09) (R06 09)   11  Hearing problem (V41 2) (H91 90)   12  Heart burn (787 1) (R12)   13  Hypercholesterolemia (272 0) (E78 00)   14  Ischemic cardiomyopathy (414 8) (I25 5)   15  Long term (current) use of systemic steroids (V58 65) (Z79 52)   16  Polymyalgia rheumatica (725) (M35 3)   17  Primary generalized (osteo)arthritis (715 09) (M15 0)    Past Medical History  1  History of Acute frontal sinusitis (461 1) (J01 10)   2  History of Diffuse arthralgia (719 40) (M25 50)   3  History of acute bronchitis (V12 69) (Z87 09)   4  History of back pain (V13 59) (Z87 39)   5  History of hyperlipidemia (V12 29) (Z86 39)   6  History of influenza vaccination (V49 89) (Z92 29)   7  History of Lyme disease (V12 09) (Z86 19)   8  History of rheumatic fever (V12 09) (Z86 79)   9  History of Screening for colon cancer (V76 51) (Z12 11)  Active Problems And Past Medical History Reviewed: The active problems and past medical history were reviewed and updated today  Surgical History  1   History of Tonsillectomy With Adenoidectomy  Surgical History Reviewed: The surgical history was reviewed and updated today  Family History  Mother    1  Family history of malignant neoplasm of cervix (V16 49) (Z80 49)  Father    2  Family history of CAD (coronary artery disease)   3  Family history of coronary artery disease (V17 3) (Z82 49)   4  Family history of myocardial infarction (V17 3) (Z82 49)  Brother    5  Family history of malignant neoplasm (V16 9) (Z80 9)  Grandfather    6  Family history of CAD (coronary artery disease)   7  Family history of coronary artery disease (V17 3) (Z82 49)  Paternal Grandfather    8  Family history of cerebrovascular accident (CVA) (V17 1) (Z82 3)   9  Family history of myocardial infarction (V17 3) (Z82 49)  Spouse    10  Family history of CAD (coronary artery disease)   6  Family history of coronary artery disease (V17 3) (Z82 49)  Family History Reviewed: The family history was reviewed and updated today  Social History   · Employed   · 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   · Former smoker (E05 34) (U05 749)   ·    · Social drinker  Social History Reviewed: The social history was reviewed and updated today  The social history was reviewed and is unchanged  Current Meds   1  AmLODIPine Besylate 10 MG Oral Tablet; take 1 tablet by mouth once daily; Therapy: 85SYG3159 to (Cameron Malik)  Requested for: 60Ojf1626 Recorded   2  Aspirin 325 MG Oral Tablet; TAKE 1 TABLET EVERY MORNING; Therapy: (Recorded:20Rxu3932) to Recorded   3  Multi Vitamin TABS; TAKE 1 TABLET DAILY; Therapy: (Recorded:01Nov2017) to Recorded   4  PredniSONE 2 5 MG Oral Tablet; TAKE 2 TABLET Daily With Food; Therapy: 08BRW7452 to (Evaluate:88Idr0918)  Requested for: 13Oct2017; Last   Rx:13Oct2017 Ordered   5  Ranexa 500 MG Oral Tablet Extended Release 12 Hour; Take one tablet twice daily x 3   weeks; Therapy: ([id number]) to Recorded   6  Rosuvastatin Calcium 10 MG Oral Tablet; take 1 tablet every other day;    Therapy: 63YZG0979 to (Evaluate:91Udx9902); Last Rx:23Oct2017 Ordered   7  Triamterene-HCTZ 37 5-25 MG Oral Capsule; TAKE 1 CAPSULE DAILY  Requested for:   57GUE6322; Last Rx:30Oct2017 Ordered   8  Vitamin D 2000 UNIT Oral Capsule; Therapy: (Recorded:66Pls7802) to Recorded  Medication List Reviewed: The medication list was reviewed and updated today  Allergies  1  Avelox TABS   Diarrhea; Updated By: Gale Pierre; 11/1/2017 8:53:16 AM   2  Crestor TABS   Muscle Pain; Updated By: Gale Pierre; 11/1/2017 8:53:16 AM  Joint pain, general pain   3  Lipitor TABS   Muscle Pain; Updated By: Gale Pierre; 11/1/2017 8:53:16 AM  Joint pain, general pain   4  Pravachol TABS   Recorded By: Virginia Patient; 5/11/2016 11:54:22 AM   5  Zocor TABS   Recorded By: [place] Patient; 5/11/2016 11:54:22 AM    Vitals  Vital Signs    Recorded: 32BVO0985 08:52AM Recorded: 33ZXP5316 08:51AM   Temperature  97 9 F, Oral   Heart Rate  64   Respiration  14   Systolic 604, , LUE   Diastolic 72, RUE 70, LUE   Height  5 ft 7 in   Weight  177 lb    BMI Calculated  27 72   BSA Calculated  1 92   O2 Saturation  98     Physical Exam    Constitutional   General appearance: No acute distress, well appearing and well nourished  Eyes   Conjunctiva and lids: No erythema, swelling or discharge  Ears, Nose, Mouth, and Throat   Lips, teeth, and gums: Normal, good dentition  Oropharynx: Normal with no erythema, edema, exudate or lesions  Neck   Jugular veins: Normal     Thyroid: Normal, no thyromegaly  Pulmonary   Respiratory effort: No increased work of breathing or signs of respiratory distress  Auscultation of lungs: Clear to auscultation  Cardiovascular   Palpation of heart: Normal PMI, no thrills  Auscultation of heart: Abnormal   The rhythm was regular  Heart sounds: normal S1-- and-- normal S2  A grade 4 systolic murmur was heard at the RUSB  Carotid pulses: Normal, 2+ bilaterally  Pedal pulses: Normal, 2+ bilaterally      Abdomen Abdomen: Non-tender, no masses  Liver and spleen: No hepatomegaly or splenomegaly  Musculoskeletal   Back: Normal     Psychiatric   Orientation to person, place and time: Normal     Mood and affect: Normal        Results/Data  MRI CARDIAC  W WO CONTRAST [id number] 07:18AM Imelda Levi     Test Name Result Flag Reference    Via Rody (Report)     69year old man for evaluation of cardiomyopathy  Technique:   1  3 plane SSFP localizers  2  SSFP cine imaging in long and short axis planes  3  T2 weighted DIR FSE in short axis plane  4  14 ml Gadavist power injected  5  2D inversion recovery FGRE for delayed myocardial enhancement  6  The patient tolerated the procedure well without complication  Measurements:    Juan-septal wall 12 mm   Postero-lateral wall 13 mm   Left ventricular end-diastolic dimension 64 mm   Left ventricular end-systolic dimension 56 mm   Left ventricular end-diastolic volume 603 ml   Left ventricular end-systolic volume 502 ml   Stroke volume 73 ml   Cardiac Output 5 3 L/min   Ejection fraction 26 %   Left atrium 37 mm   Aortic Root 33 mm     Findings:    1  The left ventricle is moderately dilated with mild concentric left ventricular hypertrophy, along with thinning of the apex  Severely reduced left ventricular systolic function with a measured ejection fraction of 26%  There is mild global    hypokinesis with severe hypokinesis of the septum and apex  2  The right ventricle is normal in size with normal right ventricular systolic function  3  The aortic, mitral, and tricuspid valves open without restriction  There is no significant valvular regurgitation, however cine MRI is inaccurate in the qualitative assessment of valvular regurgitation  4  The left atrium is normal in size  The aortic root is normal in size  5  There is no evidence of myocardial edema     6  Delayed post-gadolinium imaging demonstrates subendocardial hyperenhancement of the basal to mid interventricular septum, and near transmural hyperenhancement of the mid to distal interventricular septum and the apex  IMPRESSION:   Impression:   1  Moderately dilated left ventricle with severely reduced left ventricular systolic function  2  Normal right ventricular size and systolic function  3  No significant valvular abnormalities  4  Subendocardial scarring of the basal to mid septum, and near transmural scarring of the mid to distal septum and the apex  Workstation performed: [id number]     Signed by:   Yarelis Moody MD   10/19/17     SANAZ [id number] 09:40AM Lucrecia Smiling Order Number: [id number]    - Patient Instructions: To schedule this appointment, please contact Central Scheduling at [phone]  For Janet Gallo, please call [phone]  Test Name Result Flag Reference   SANAZ (Report)     [address]   [address], [address] [facility]   [phone]     Transesophageal Echocardiogram   2D, 3D, M-mode, Doppler, and Color Doppler     Study date: 14-Sep-2017     Patient: March Brand   MR number: [id number]   Account number: [de-identified]   : 1950   Age: 69 years   Gender: Male   Status: Outpatient   Location: Echo lab   Height: 67 in   Weight: 181 1 lb   BP: 148/ 70 mmHg     Indications: Assess the aortic valve  Murmur  Diagnoses: I35 0 - Nonrheumatic aortic (valve) stenosis     Sonographer: MESSI Zuluaga   Interpreting Physician: Dixie Stahl MD   Primary Physician: Sydney Dowd DO   Referring Physician: Minesh Galarza MD   Group: [facility]   Cardiology Fellow: Dr Girish Ro   RN: Bettye Zaldivar RN BSN   RN: [facility], RN     SUMMARY     LEFT VENTRICLE:   Size was at the upper limits of normal    Systolic function was markedly reduced  Ejection fraction was estimated to be 30 %     There was severe diffuse hypokinesis with distinct regional wall motion abnormalities involving the anteroseptal and apical regions  There was mild concentric hypertrophy  Doppler parameters were consistent with abnormal left ventricular relaxation (grade 1 diastolic dysfunction)  RIGHT VENTRICLE:   The size was normal    Systolic function was normal      LEFT ATRIUM:   The atrium was mildly dilated  ATRIAL SEPTUM:   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, predominantly within the right atrial cavity  No defect or patent foramen ovale was identified  Doppler evaluation was performed  There was no right-to-left shunt, in the baseline state  MITRAL VALVE:   There was mild annular calcification  There was trace regurgitation  AORTIC VALVE:   The valve was trileaflet  Leaflets exhibited mildly increased thickness, moderate calcification, and mildly reduced cuspal separation  Transaortic velocity was increased due to valvular stenosis  There was mild stenosis  Systolic area of 2 4 cm squared by 3D planimetry  Valve mean gradient was 12 mmHg  TRICUSPID VALVE:   There was trace regurgitation  HISTORY: PRIOR HISTORY: Risk factors: hypertension and hypercholesterolemia  PROCEDURE: The procedure was performed in the echo lab  This was a routine study  The risks and alternatives of the procedure were explained to the patient and informed consent was obtained  The transesophageal approach was used  The study   included complete 2D imaging, 3D imaging, M-mode, limited spectral Doppler, and color Doppler  The heart rate was 71 bpm, at the start of the study  An adult omniplane probe was inserted by the cardiology fellow under direct supervision of   the attending cardiologist  Intubated with ease  One intubation attempt(s)  There was no blood detected on the probe  Image quality was adequate  MEDICATIONS: SBE prophylaxis not indicated  Benzocaine spray, topical to oropharynx, prior to   procedure   Sedation administered by anesthesia team      LEFT VENTRICLE: Size was at the upper limits of normal  Systolic function was markedly reduced  Ejection fraction was estimated to be 30 %  There was severe diffuse hypokinesis with distinct regional wall motion abnormalities involving the   anteroseptal and apical regions  Wall thickness was mildly increased  There was mild concentric hypertrophy  No evidence of apical thrombus  DOPPLER: Doppler parameters were consistent with abnormal left ventricular relaxation (grade 1   diastolic dysfunction)  RIGHT VENTRICLE: The size was normal  Systolic function was normal  Wall thickness was normal      LEFT ATRIUM: The atrium was mildly dilated  No mass was present  No thrombus was identified  APPENDAGE: The size was normal  No thrombus was identified  DOPPLER: The function was normal (normal emptying velocity)  ATRIAL SEPTUM: 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, predominantly within the right atrial cavity  No defect or patent foramen ovale was identified  Doppler evaluation was performed  There was no right-to-left shunt, in the   baseline state  RIGHT ATRIUM: Size was normal      MITRAL VALVE: There was mild annular calcification  Valve structure was normal  There was normal leaflet separation  DOPPLER: The transmitral velocity was within the normal range  There was no evidence for stenosis  There was trace   regurgitation  AORTIC VALVE: The valve was trileaflet  Leaflets exhibited mildly increased thickness, moderate calcification, and mildly reduced cuspal separation  DOPPLER: Transaortic velocity was increased due to valvular stenosis  There was mild   stenosis  There was no significant regurgitation  TRICUSPID VALVE: The valve structure was normal  There was normal leaflet separation  DOPPLER: There was trace regurgitation  PULMONIC VALVE: Leaflets exhibited normal thickness, no calcification, and normal cuspal separation   DOPPLER: The transpulmonic velocity was within the normal range  There was no significant regurgitation  PERICARDIUM: There was no pericardial effusion  AORTA: The root exhibited normal size  PULMONARY VEINS: The pulmonary veins were normal sized  DOPPLER: Doppler flow pattern was normal in the left upper and left lower pulmonary vein(s)  MEASUREMENT TABLES     2D MEASUREMENTS   Aortic valve  (Reference normals)   Area sys, plan  2 4 cm squared  (--)     DOPPLER MEASUREMENTS   LVOT  (Reference normals)   Peak gwyn  90 cm/s  (--)   Mean gwyn  60 cm/s  (--)   VTI  17 cm  (--)   Peak gradient  3 mmHg  (--)   Mean gradient  2 mmHg  (--)   Aortic valve  (Reference normals)   Peak gwyn  270 cm/s  (--)   Mean gwyn  174 cm/s  (--)   VTI  55 cm  (--)   Peak gradient  29 mmHg  (--)   Mean gradient  12 mmHg  (--)   Obstr index, VTI  0 31  (--)   Obstr index, Vmax  0 33  (--)   Obstr index, Vmean  0 34  (--)     SYSTEM MEASUREMENT TABLES     Unspecified Scan Mode   Mean Grad; Antegrade Flow: 12 mm[Hg]   Mean Grad; Antegrade Flow: 12 mm[Hg]   Mean Grad; Mean value; Antegrade Flow: 12 mm[Hg]   Mean Gwyn; Antegrade Flow: 1600 mm/s   Mean Gwyn; Antegrade Flow: 1630 mm/s   Mean Gwyn; Mean value; Antegrade Flow: 1620 mm/s   Peak Grad; Mean; Antegrade Flow: 23 mm[Hg]   R Wave to Aortic Valve Closure Time; Most recent value chosen;: 324 ms   VTI; Antegrade Flow: 42 6 cm   VTI; Antegrade Flow: 52 5 cm   VTI; Mean; Antegrade Flow: 47 6 cm   Vmax; Antegrade Flow: 2340 mm/s   Vmax; Antegrade Flow: 2440 mm/s   Vmax; Mean;  Antegrade Flow: 2390 mm/s   LVOT Mean Grad: 2 mm[Hg]   LVOT Mean Grad; Mean value: 2 mm[Hg]   LVOT Mean Gwyn: 591 mm/s   LVOT Mean Gwyn; Mean value: 591 mm/s   LVOT Peak Grad; Mean: 3 mm[Hg]   LVOT VTI: 17 cm   LVOT VTI; Mean: 17 cm   LVOT Vmax: 887 mm/s   LVOT Vmax; Mean: 887 mm/s     [facility]     Prepared and electronically signed by     Rolly Santillan MD   Signed 18-Sep-2017 08:15:10     CARDIAC CATHETERIZATION [id number] 08:45AM Caroline Atkins Order Number: [id number]    - Patient Instructions: To schedule this appointment, please contact Central Scheduling at [phone]  For Natividad Dubois, please call [phone]  Test Name Result Flag Reference   CARDIAC CATHETERIZATION (Report)     [address]   [address]   [phone]     [facility]     Invasive Cardiovascular Lab Complete Report     Patient: Jignesh Dao   MR number: [id number]   Account number: [de-identified]   Study date: 2017   Gender: Male   : 1950   Height: 66 9 in   Weight: 173 6 lb   BSA: 1 9 m squared     Allergies: NO KNOWN ALLERGIES     Diagnostic Cardiologist: Caroline Waldrop MD   Primary Physician: Erin Lutz DO     SUMMARY     CORONARY CIRCULATION:   Left main: Normal    LAD: There was a 100 % proximal stenosis  Circumflex: The vessel was normal sized and gaive rise to two small, diffusely diseased OM branches and a large posterolateral branch  There were no flow-limiting lesions  RCA: The vessel was normal sized and dominant, giving rise to the PDA  There was an 80% distal stenosis  The RCA sent collateral flow to the LAD  CARDIAC STRUCTURES:   Ventriculogram quality was poor  There appeared to be severe hypokinesis of the distal anterior wall and apex  The EF was estimated at approximately 35 %  There was mild aortic stenosis (peak-peak gradient; 10mmHg)  REPORT ELEMENT SELECTION:   Right radial access was employed  Plan:   1) CMR for viability of the anterior wall  If there is anterior viability, CABG with LIMA to LAD and bypass of RCA would be appropriate  If there is no anterior viability, PCI of the distal RCA could be performed for treatment of anginal   symptoms  20 SANAZ to further asssess the aortic valve  The valve was extremely difficult to cross   Although the gradient was very modest, it is possible that this reflects the significantly reduced stroke volume  INDICATIONS:   -- Possible CAD: stable angina, CCS class III  -- Congestive heart failure with ischemic cardiomyopathy  -- Cardiac: aortic valve disease  PROCEDURES PERFORMED     -- Left heart catheterization with ventriculography  -- Left coronary angiography  -- Right coronary angiography  -- Outpatient  -- Coronary Catheterization (w/ [facility])  -- Mod Sedation Same Physician Initial 15min  -- Mod Sedation Same Physician Add 15min  -- Mod Sedation Same Physician Add 15min  -- Mod Sedation Same Physician Add 15min  PROCEDURE: The risks and alternatives of the procedures and conscious sedation were explained to the patient and informed consent was obtained  The patient was brought to the cath lab and placed on the table  The planned puncture sites   were prepped and draped in the usual sterile fashion  -- Right radial artery access  After performing an Shakir's test to verify adequate ulnar artery supply to the hand, the radial site was prepped  The puncture site was infiltrated with local anesthetic  The vessel was accessed using the   modified Seldinger technique, a wire was advanced into the vessel, and a sheath was advanced over the wire into the vessel  -- Left heart catheterization with ventriculography  A catheter was advanced over a guidewire into the ascending aorta  After recording ascending aortic pressure, the catheter was advanced across the aortic valve and left ventricular   pressure was recorded  Ventriculography was performed  The catheter was pulled back across the aortic valve and into the ascending aorta and pullback pressures were obtained  -- Left coronary artery angiography  A catheter was advanced over a guidewire into the aorta and positioned in the left coronary artery ostium under fluoroscopic guidance  Angiography was performed  -- Right coronary artery angiography   A catheter was advanced over a guidewire into the aorta and positioned in the right coronary artery ostium under fluoroscopic guidance  Angiography was performed  -- Outpatient  -- Coronary Catheterization (w/ [facility])  -- Mod Sedation Same Physician Initial 15min  -- Mod Sedation Same Physician Add 15min  -- Mod Sedation Same Physician Add 15min  -- Mod Sedation Same Physician Add 15min  PROCEDURE COMPLETION: The patient tolerated the procedure well and was discharged from the cath lab  TIMING: Test started at 13:43  Test concluded at 14:29  HEMOSTASIS: The sheath was removed  The site was compressed with a Hemoband   device  Hemostasis was obtained  MEDICATIONS GIVEN: Fentanyl (1OOmcg/2 ml), 50 mcg, IV, at 13:41  Versed (2mg/2ml), 2 mg, IV, at 13:41  1% Lidocaine, 0 1 ml, subcutaneously, at 13:43  Nitroglycerin (200mcg/ml), 200 mcg, at 13:46  Heparin   1000 units/ml, 4,000 units, IV, at 13:46  Fentanyl (1OOmcg/2 ml), 25 mcg, IV, at 14:17  Versed (2mg/2ml), 1 mg, IV, at 14:17  CONTRAST GIVEN: 40 ml Omnipaque (350mg I/ml)  55 ml Omnipaque (350mg I /ml)  RADIATION EXPOSURE: Fluoroscopy   time: 23 13 min  HEMODYNAMICS: Hemodynamic assessment demonstrated normal LVEDP  VENTRICLES:  -- Ventriculogram quality was poor  There appeared to be severe hypokinesis of the distal anterior wall and apex  The EF was estimated at approximately 35 %  VALVES:   AORTIC VALVE:  -- There was mild aortic stenosis (peak-peak gradient; 10mmHg)  CORONARY VESSELS:  -- Left main: Normal    -- LAD: There was a 100 % proximal stenosis  -- Circumflex: The vessel was normal sized and gaive rise to two small, diffusely diseased OM branches and a large posterolateral branch  There were no flow-limiting lesions  -- RCA: The vessel was normal sized and dominant, giving rise to the PDA  There was an 80% distal stenosis  The RCA sent collateral flow to the LAD  -- Distal RCA: There was a 80 % stenosis       NOTE: Right radial access was employed  Plan:   1) CMR for viability of the anterior wall  If there is anterior viability, CABG with LIMA to LAD and bypass of RCA would be appropriate  If there is no anterior viability, PCI of the distal RCA could be performed for treatment of anginal   symptoms  20 SANAZ to further asssess the aortic valve  The valve was extremely difficult to cross  Although the gradient was very modest, it is possible that this reflects the significantly reduced stroke volume  Prepared and signed by     Lisette Irizarry MD   Signed 2017 15:07:55     CC: Dr Yasmine Glass     Study diagram     Angiographic findings   Native coronary lesions:   ï¾·LAD: Lesion 1: 100 % stenosis  ï¾·Distal RCA: Lesion 1: 80 % stenosis  Hemodynamic tables     Pressures: Baseline   Pressures: - HR: 84   Pressures: - Rhythm:   Pressures: -- Aortic Pressure (S/D/M): 140/61/89   Pressures: -- Left Ventricle (s/edp): 148/8/--     Outputs: Baseline   Outputs: -- CALCULATIONS[age]   Outputs: -- CALCULATIONS: Body Surface Area: 1 90   Outputs: -- CALCULATIONS: Height in cm: 170 00   Outputs: -- CALCULATIONS: Sex: Male   Outputs: -- CALCULATIONS: Weight in k 90     Attending Note  Collaborating Physician Note: Collaborating Note: I interviewed and examined the patient-- and-- I agree with the Advanced Practitioner note        Future Appointments    Date/Time Provider Specialty Site   12/15/2017 09:30 AM Florentin Miranda [facility] [address] [facility]     Signatures   Electronically signed by : Allan High [facility]; 2017  9:35AM EST                       (Author)    Electronically signed by : Arias Gallo DO; 2017  9:58AM EST                       (Author)

## 2017-11-08 ENCOUNTER — GENERIC CONVERSION - ENCOUNTER (OUTPATIENT)
Dept: OTHER | Facility: OTHER | Age: 67
End: 2017-11-08

## 2017-11-14 ENCOUNTER — GENERIC CONVERSION - ENCOUNTER (OUTPATIENT)
Dept: OTHER | Facility: OTHER | Age: 67
End: 2017-11-14

## 2017-12-04 DIAGNOSIS — M15.0 PRIMARY GENERALIZED (OSTEO)ARTHRITIS: ICD-10-CM

## 2017-12-04 DIAGNOSIS — Z79.52 LONG TERM CURRENT USE OF SYSTEMIC STEROIDS: ICD-10-CM

## 2017-12-04 DIAGNOSIS — M35.3 POLYMYALGIA RHEUMATICA (HCC): ICD-10-CM

## 2017-12-05 ENCOUNTER — APPOINTMENT (OUTPATIENT)
Dept: LAB | Facility: CLINIC | Age: 67
End: 2017-12-05
Payer: MEDICARE

## 2017-12-05 ENCOUNTER — TRANSCRIBE ORDERS (OUTPATIENT)
Dept: LAB | Facility: CLINIC | Age: 67
End: 2017-12-05

## 2017-12-05 DIAGNOSIS — Z79.52 LONG TERM CURRENT USE OF SYSTEMIC STEROIDS: ICD-10-CM

## 2017-12-05 DIAGNOSIS — M15.0 PRIMARY GENERALIZED (OSTEO)ARTHRITIS: ICD-10-CM

## 2017-12-05 DIAGNOSIS — M35.3 POLYMYALGIA RHEUMATICA (HCC): ICD-10-CM

## 2017-12-05 LAB
ALBUMIN SERPL BCP-MCNC: 4 G/DL (ref 3.5–5)
ALP SERPL-CCNC: 58 U/L (ref 46–116)
ALT SERPL W P-5'-P-CCNC: 26 U/L (ref 12–78)
ANION GAP SERPL CALCULATED.3IONS-SCNC: 7 MMOL/L (ref 4–13)
AST SERPL W P-5'-P-CCNC: 23 U/L (ref 5–45)
BASOPHILS # BLD AUTO: 0.04 THOUSANDS/ΜL (ref 0–0.1)
BASOPHILS NFR BLD AUTO: 0 % (ref 0–1)
BILIRUB SERPL-MCNC: 0.51 MG/DL (ref 0.2–1)
BUN SERPL-MCNC: 14 MG/DL (ref 5–25)
CALCIUM SERPL-MCNC: 9.8 MG/DL (ref 8.3–10.1)
CHLORIDE SERPL-SCNC: 99 MMOL/L (ref 100–108)
CO2 SERPL-SCNC: 28 MMOL/L (ref 21–32)
CREAT SERPL-MCNC: 0.83 MG/DL (ref 0.6–1.3)
CRP SERPL QL: <3 MG/L
EOSINOPHIL # BLD AUTO: 0.02 THOUSAND/ΜL (ref 0–0.61)
EOSINOPHIL NFR BLD AUTO: 0 % (ref 0–6)
ERYTHROCYTE [DISTWIDTH] IN BLOOD BY AUTOMATED COUNT: 13.2 % (ref 11.6–15.1)
ERYTHROCYTE [SEDIMENTATION RATE] IN BLOOD: 11 MM/HOUR (ref 0–10)
GFR SERPL CREATININE-BSD FRML MDRD: 90 ML/MIN/1.73SQ M
GLUCOSE P FAST SERPL-MCNC: 119 MG/DL (ref 65–99)
HCT VFR BLD AUTO: 42.2 % (ref 36.5–49.3)
HGB BLD-MCNC: 14.8 G/DL (ref 12–17)
LYMPHOCYTES # BLD AUTO: 1.14 THOUSANDS/ΜL (ref 0.6–4.47)
LYMPHOCYTES NFR BLD AUTO: 13 % (ref 14–44)
MCH RBC QN AUTO: 31.4 PG (ref 26.8–34.3)
MCHC RBC AUTO-ENTMCNC: 35.1 G/DL (ref 31.4–37.4)
MCV RBC AUTO: 90 FL (ref 82–98)
MONOCYTES # BLD AUTO: 0.5 THOUSAND/ΜL (ref 0.17–1.22)
MONOCYTES NFR BLD AUTO: 6 % (ref 4–12)
NEUTROPHILS # BLD AUTO: 7.3 THOUSANDS/ΜL (ref 1.85–7.62)
NEUTS SEG NFR BLD AUTO: 81 % (ref 43–75)
NRBC BLD AUTO-RTO: 0 /100 WBCS
PLATELET # BLD AUTO: 268 THOUSANDS/UL (ref 149–390)
PMV BLD AUTO: 9.9 FL (ref 8.9–12.7)
POTASSIUM SERPL-SCNC: 4.4 MMOL/L (ref 3.5–5.3)
PROT SERPL-MCNC: 7.5 G/DL (ref 6.4–8.2)
RBC # BLD AUTO: 4.71 MILLION/UL (ref 3.88–5.62)
SODIUM SERPL-SCNC: 134 MMOL/L (ref 136–145)
WBC # BLD AUTO: 9.02 THOUSAND/UL (ref 4.31–10.16)

## 2017-12-05 PROCEDURE — 86140 C-REACTIVE PROTEIN: CPT

## 2017-12-05 PROCEDURE — 85652 RBC SED RATE AUTOMATED: CPT

## 2017-12-05 PROCEDURE — 80053 COMPREHEN METABOLIC PANEL: CPT

## 2017-12-05 PROCEDURE — 36415 COLL VENOUS BLD VENIPUNCTURE: CPT

## 2017-12-05 PROCEDURE — 85025 COMPLETE CBC W/AUTO DIFF WBC: CPT

## 2018-01-09 ENCOUNTER — ALLSCRIPTS OFFICE VISIT (OUTPATIENT)
Dept: OTHER | Facility: OTHER | Age: 68
End: 2018-01-09

## 2018-01-09 DIAGNOSIS — M35.3 POLYMYALGIA RHEUMATICA (HCC): ICD-10-CM

## 2018-01-09 DIAGNOSIS — M15.0 PRIMARY GENERALIZED (OSTEO)ARTHRITIS: ICD-10-CM

## 2018-01-10 NOTE — CONSULTS
Assessment    1  Aortic stenosis (424 1) (I35 0)   2  Cardiomyopathy, ischemic (414 8) (I25 5)   3  Benign essential hypertension (401 1) (I10)   4  RODRIGUEZ (dyspnea on exertion) (786 09) (R06 09)   5  Activity intolerance related to fatigue (780 79) (R53 83)    Plan  Cardiomyopathy, ischemic    · Rosuvastatin Calcium 10 MG Oral Tablet (Crestor); TAKE 1 TABLET AT BEDTIME   Rx By: Arabella Muhammad; Dispense: 30 Days ; #:30 Tablet; Refill: 3; For: Cardiomyopathy, ischemic; BATSHEVA = N; Verified Transmission to [facility] [place] [address] 434 ; Last Updated By: System, [employer]; 8/24/2017 9:47:08 AM   · CARDIAC CATHETERIZATION; Status:Hold For - Scheduling; Requested for:08Upo7159;    Perform:[facility]; Due:70Tgp2063; Ordered; For:Cardiomyopathy, ischemic; Ordered By:Moises Palacio;    Discussion/Summary    It is very likely that he has significant CAD causing heartburn and other anginal symptoms  His EF is 30%  The report of his ECHO states at least moderate AS  By auscultation, his AS is not severe  I have recommended cardiac catheterization to define his coronary anatomy and guide therapy  I have explained the procedure in detail to him including the risks and he is agreeable to proceed  I will start his on Crestor 10 mg daily  Chief Complaint  New patient consult for abnormal echo  History of Present Illness  Cardiology HPI Free Text Note Form [facility]: 69year old white male who denies any past cardiac history  He has known vascular disease with a aorto- bifemoral bypass in 2009  He has a history of rheumatic fever  He has noticed several months of decreased exercise tolerance, less energy, heartburn with activity relieved with rest, RODRIGUEZ  He is walking about 1/2 mile 2x a day  he has HTN, elevated cholesterols, FH of CAD  He did not tolerate Lipitor in the past   Recent ECHO showed global hypokinesis with an EF of 30% and at least moderate AS  he denies any history of myocardial infarction  Review of Systems      Cardiac: No complaints of chest pain, no palpitations, no fainiting  and as noted in HPI  Skin: No complaints of nonhealing sores or skin rash  Genitourinary: No complaints of recurrent urinary tract infections, frequent urination at night, difficult urination, blood in urine, kidney stones, loss of bladder control, no kidney or prostate problems, no erectile dysfunction  Psychological: No complaints of feeling depressed, anxiety, panic attacks, or difficulty concentrating  General: as noted in HPI  Respiratory: No complaints of shortness of breath, cough with sputum, or wheezing  HEENT: No complaints of serious problems, hearing problems, nose problems, throat problems, or snoring  Gastrointestinal: No complaints of liver problems, nausea, vomiting, heartburn, constipation, bloody stools, diarrhea, problems swallowing, adbominal pain, or rectal bleeding  Hematologic: No complaints of bleeding disorders, anemia, blood clots, or excessive brusing  Neurological: No complaints of numbness, tingling, dizziness, weakness, seizures, headaches, syncope or fainting, AM fatigue, daytime sleepiness, no witnessed apnea episodes  Musculoskeletal: No complaints of arthritis, back pain, or painfull swelling  ROS reviewed  Active Problems    1  Abnormal stress echo (794 39) (R94 39)   2  Aortic stenosis (424 1) (I35 0)   3  Benign essential hypertension (401 1) (I10)   4  Cardiac murmur (785 2) (R01 1)   5  Hypercholesterolemia (272 0) (E78 00)   6  Long term (current) use of systemic steroids (V58 65) (Z79 52)   7  Polymyalgia rheumatica (725) (M35 3)   8   Primary generalized (osteo)arthritis (715 09) (M15 0)    Past Medical History    · History of Acute frontal sinusitis (461 1) (J01 10)   · History of Diffuse arthralgia (719 40) (M25 50)   · History of acute bronchitis (V12 69) (Z87 09)   · History of hyperlipidemia (V12 29) (Z86 39)   · History of influenza vaccination (V49 89) (Z92 29)   · History of Lyme disease (V12 09) (Z86 19)   · History of rheumatic fever (V12 09) (Z86 79)   · History of Screening for colon cancer (V76 51) (Z12 11)    The active problems and past medical history were reviewed and updated today  Surgical History    · History of Tonsillectomy With Adenoidectomy    The surgical history was reviewed and updated today  Family History  Mother    · Family history of malignant neoplasm of cervix (V16 49) (Z80 51)  Father    · Family history of CAD (coronary artery disease)   · Family history of myocardial infarction (V17 3) (Z80 55)  Paternal Grandfather    · Family history of cerebrovascular accident (CVA) (V17 1) (Z82 3)  Family History Reviewed: The family history was reviewed and updated today  Social History    · Employed   · Environmental Supervisor   · 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   · Former smoker (L57 40) (R37 173)   · Quit 1993   ·    · Social drinker  The social history was reviewed and updated today  The social history was reviewed and is unchanged  Current Meds   1  AmLODIPine Besylate 10 MG Oral Tablet; take 1 tablet by mouth once daily; Therapy: 00NWN8447 to (Evaluate:10Aug2017)  Requested for: 00QSS8757; Last   Rx:97Zqz1489 Ordered   2  Aspirin 325 MG Oral Tablet; TAKE 1 TABLET EVERY MORNING; Therapy: (Recorded:01Dgo2790) to Recorded   3  Calcium/Vitamin D/Minerals TABS; Take 1 tablet twice daily; Therapy: (Recorded:71Voj9416) to Recorded   4  PredniSONE 2 5 MG Oral Tablet; TAKE 3 TABLET DAILY WITH FOOD; Therapy: 80WNS8582 to (Evaluate:67Meg2448)  Requested for: 63Nak4848; Last   Rx:85Bdd4803 Ordered   5  Triamterene-HCTZ 37 5-25 MG Oral Capsule; TAKE 1 CAPSULE DAILY  Requested for:   90FLI9136; Last Rx:46Mrf8190 Ordered   6  Vitamin D 2000 UNIT Oral Capsule; Therapy: (Recorded:74Apu9912) to Recorded    The medication list was reviewed and updated today  Allergies    1  Avelox TABS   2  Crestor TABS   3   Lipitor TABS 4  Pravachol TABS   5  Zocor TABS    Vitals  Signs    Heart Rate: 82  Systolic: 103, RUE, Sitting  Diastolic: 70, RUE, Sitting  Height: 5 ft 7 in  Weight: 178 lb   BMI Calculated: 27 88  BSA Calculated: 1 92    Physical Exam    Constitutional   General appearance: No acute distress, well appearing and well nourished  Eyes   Conjunctiva and Sclera examination: Conjunctiva pink, sclera anicteric  Ears, Nose, Mouth, and Throat - Oropharynx: Clear, nares are clear, mucous membranes are moist    Neck   Neck and thyroid: Normal, supple, trachea midline, no thyromegaly  Pulmonary   Respiratory effort: No increased work of breathing or signs of respiratory distress  Auscultation of lungs: Clear to auscultation, no rales, no rhonchi, no wheezing, good air movement  Cardiovascular   Auscultation of heart: Abnormal   3/6 systolic murmur with a preserved second heart sound  Carotid pulses: Normal, 2+ bilaterally  Peripheral vascular exam: Normal pulses throughout, no tenderness, erythema or swelling  Pedal pulses: Normal, 2+ bilaterally  Examination of extremities for edema and/or varicosities: Normal     Abdomen   Abdomen: Non-tender and no distention  Liver and spleen: No hepatomegaly or splenomegaly  Musculoskeletal Gait and station: Normal gait  Digits and nails: Normal without clubbing or cyanosis  Inspection/palpation of joints, bones, and muscles: Normal, ROM normal     Skin - Skin and subcutaneous tissue: Normal without rashes or lesions  Skin is warm and well perfused, normal turgor  Neurologic - Cranial nerves: II - XII intact  Speech: Normal     Psychiatric - Orientation to person, place, and time: Normal  Mood and affect: Normal       Future Appointments    Date/Time Provider Specialty Site   10/17/2017 10:20 AM CALLY Samuels [facility]     End of Encounter Meds    1   AmLODIPine Besylate 10 MG Oral Tablet (Norvasc); take 1 tablet by mouth once daily; Therapy: 24BWO4828 to (Evaluate:10Aug2017)  Requested for: 14QMB2986; Last   Rx:12May2017 Ordered   2  Triamterene-HCTZ 37 5-25 MG Oral Capsule; TAKE 1 CAPSULE DAILY  Requested for:   21XZZ6813; Last Rx:24Mar2017 Ordered    3  Rosuvastatin Calcium 10 MG Oral Tablet (Crestor); TAKE 1 TABLET AT BEDTIME; Therapy: 79Zqf0499 to (Evaluate:28Xds6149)  Requested for: 07Dqg7169; Last   Rx:87Ldh1697 Ordered    4  PredniSONE 2 5 MG Oral Tablet; TAKE 3 TABLET DAILY WITH FOOD; Therapy: 78DDE7578 to (Evaluate:29Goy0492)  Requested for: 70Blh8867; Last   Rx:95Qir4051 Ordered    5  Aspirin 325 MG Oral Tablet; TAKE 1 TABLET EVERY MORNING; Therapy: (Recorded:16Djl7327) to Recorded   6  Calcium/Vitamin D/Minerals TABS; Take 1 tablet twice daily; Therapy: (Recorded:67Nhk3541) to Recorded   7  Vitamin D 2000 UNIT Oral Capsule;    Therapy: ([id number] 21) to Recorded    Signatures   Electronically signed by : ALFRED Chan ; Aug 25 2017  2:47PM EST                       (Author)

## 2018-01-12 VITALS
BODY MASS INDEX: 26.41 KG/M2 | WEIGHT: 174.25 LBS | DIASTOLIC BLOOD PRESSURE: 64 MMHG | TEMPERATURE: 99 F | HEIGHT: 68 IN | SYSTOLIC BLOOD PRESSURE: 140 MMHG

## 2018-01-12 VITALS
TEMPERATURE: 97.1 F | SYSTOLIC BLOOD PRESSURE: 148 MMHG | BODY MASS INDEX: 28.41 KG/M2 | DIASTOLIC BLOOD PRESSURE: 70 MMHG | HEIGHT: 67 IN | WEIGHT: 181 LBS

## 2018-01-13 VITALS
DIASTOLIC BLOOD PRESSURE: 70 MMHG | SYSTOLIC BLOOD PRESSURE: 140 MMHG | BODY MASS INDEX: 28.02 KG/M2 | WEIGHT: 178.5 LBS | HEIGHT: 67 IN | HEART RATE: 80 BPM

## 2018-01-13 VITALS
TEMPERATURE: 97.9 F | HEIGHT: 68 IN | SYSTOLIC BLOOD PRESSURE: 140 MMHG | WEIGHT: 175.38 LBS | BODY MASS INDEX: 26.58 KG/M2 | DIASTOLIC BLOOD PRESSURE: 62 MMHG

## 2018-01-13 VITALS
SYSTOLIC BLOOD PRESSURE: 140 MMHG | TEMPERATURE: 97.2 F | BODY MASS INDEX: 26.25 KG/M2 | DIASTOLIC BLOOD PRESSURE: 70 MMHG | HEIGHT: 68 IN | WEIGHT: 173.2 LBS

## 2018-01-13 VITALS
HEIGHT: 67 IN | SYSTOLIC BLOOD PRESSURE: 170 MMHG | DIASTOLIC BLOOD PRESSURE: 84 MMHG | WEIGHT: 173.8 LBS | BODY MASS INDEX: 27.28 KG/M2 | HEART RATE: 73 BPM

## 2018-01-13 VITALS
WEIGHT: 174.5 LBS | DIASTOLIC BLOOD PRESSURE: 64 MMHG | HEIGHT: 68 IN | TEMPERATURE: 97.7 F | BODY MASS INDEX: 26.45 KG/M2 | SYSTOLIC BLOOD PRESSURE: 148 MMHG

## 2018-01-13 NOTE — PROGRESS NOTES
Assessment    1  Polymyalgia rheumatica (725) (M35 3)   2  Long term (current) use of systemic steroids (V58 65) (Z79 52)   3  Primary generalized (osteo)arthritis (715 09) (M15 0)   4  Benign essential hypertension (401 1) (I10)   5  Cardiac murmur (785 2) (R01 1)   6  Hypercholesterolemia (272 0) (E78 00)    Plan  Benign essential hypertension, Long term (current) use of systemic steroids,  Polymyalgia rheumatica, Primary generalized (osteo)arthritis    · (1) TSH WITH FT4 REFLEX; Status:Active; Requested BLS:[id number];   Long term (current) use of systemic steroids, Polymyalgia rheumatica, Primary  generalized (osteo)arthritis    · (1) CBC/PLT/DIFF; Status:Active; Requested OZV:17NPS7947;    · (1) COMPREHENSIVE METABOLIC PANEL; Status:Active; Requested AGX:38FEN5138;    · (1) C-REACTIVE PROTEIN; Status:Active; Requested RDM:51IVN3853;    · (1) SED RATE; Status:Active; Requested MOT:11UNI4393;    · (1) VITAMIN D 25-HYDROXY; Status:Active; Requested VRH:29CQB3122;    · Follow-up visit in 2 months Evaluation and Treatment  Follow-up  Status: Hold For -  Scheduling  Requested for: 08NMJ1213  Polymyalgia rheumatica    · Call [phone] if: The symptoms come back after the medications are finished ;  Status:Complete;   Done: 24QDY2720   · Call 911 if: You have a severe headache that will not go away ; Status:Complete;   Done:  09RMH1552   · Call 911 if: You have any symptoms of a stroke ; Status:Complete;   Done: 17EGY7389   · Call 911 if: You have pain around the ear or jaw ; Status:Complete;   Done: 52HET9331   · Call 911 if: You lose your vision for a short period of time ; Status:Complete;   Done:  60GNA7666    Discussion/Summary    1  Polymyalgia Rheumatica- Improved on prednisone 20mg daily  Will recheck ESR, CRP, CMP, CBC, Hep C, HIV  Will check TSH for hot/cold intolerance  Will taper prednisone based on labs  Patient is able to Self-Care  Counseling  Rheumatology Counseling Documentation:  The patient was counseled regarding diagnostic results, instructions for management, risk factor reductions, prognosis, patient and family education, impressions, risks and benefits of treatment options and importance of compliance with treatment  total time of encounter was 30 minutes and 15 minutes was spent counseling  Chief Complaint  Polymyalgia Rheumatica referral      History of Present Illness  Pt was told had PMR by PCP, started on steroids in March  Notes pain in the back of his head, down B/L shoulders, down across back, radiating into the hips  He had weakness associated with it in the shoulder, arms, hips  This pain has been present for years however, just worse recently  He has a remote hx of Lyme  He notes off/on fatigue that has been worse since March  He initially was on 10mg of prednisone and increased it to 20mg  Possible worsening of his vision over last 6-7 months, was getting headaches more frequently but resolved with prednisone  No jaw claudication  Notes heat and pain s/p b/l femoral bypass in 2009  Hx torn rotator cuff right side with some RUE weakness as a result  Review of Systems    Constitutional: no fever, no chills, no recent weight gain, no recent weight loss, no complaints of feeling tired, no anorexia  , no fever and no chills  HEENT: blurred vision, but as noted in HPI, no dryness of the eyes, no eye pain, no dryness mouth and no mouth sores  Cardiovascular:  no chest pain or pressure, no RODRIGUEZ, no palpitations, no syncope, no orthopnea, no edema  Respiratory:  no cough, no sputum, no shortness of breath, no pleurisy, no hemoptysis, no wheezing  Gastrointestinal: heartburn, but no dysphagia and no melena  Genitourinary: no hematuria and no increase in frequency  Musculoskeletal: as noted in HPI  Integumentary no rash, no alopecia and no nail changes  Endocrine heat or cold intolerance     Hematologic/Lymphatic: no unusual bleeding and no tendency for easy bruising  Neurological: headache and weakness    The patient presents with complaints of vertigo or dizziness (occasional lightheadedness)  Psychiatric: insomnia and anxiety, but no depression  ROS reviewed  Active Problems    1  Benign essential hypertension (401 1) (I10)   2  Hypercholesterolemia (272 0) (E78 00)   3  Polymyalgia rheumatica (725) (M35 3)    Past Medical History    1  History of Acute frontal sinusitis (461 1) (J01 10)   2  History of Diffuse arthralgia (719 40) (M25 50)   3  History of acute bronchitis (V12 69) (Z87 09)   4  History of hyperlipidemia (V12 29) (Z86 39)   5  History of influenza vaccination (V49 89) (Z92 29)   6  History of Lyme disease (V12 09) (Z86 19)   7  History of rheumatic fever (V12 09) (Z86 79)   8  History of Screening for colon cancer (V76 51) (Z12 11)    Surgical History    1  History of Tonsillectomy With Adenoidectomy    Family History  Mother    1  Family history of malignant neoplasm of cervix (V16 49) (Z80 49)  Father    2  Family history of CAD (coronary artery disease)   3  Family history of myocardial infarction (V17 3) (Z82 49)  Paternal Grandfather    4  Family history of cerebrovascular accident (CVA) (V17 1) (Z82 3)    Social History    · Employed   · 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   · Former smoker (V15 82) (U45 005)   ·    · Social drinker    Current Meds   1  AmLODIPine Besylate 10 MG Oral Tablet; take 1 tablet by mouth once daily; Therapy: 13LHG5276 to (Evaluate:10Aug2017)  Requested for: 57YIW7197; Last   Rx:12May2017 Ordered   2  Amoxicillin-Pot Clavulanate 875-125 MG Oral Tablet; TAKE 1 TABLET EVERY 12 HOURS   DAILY; Therapy: 65KAG4295 to (Evaluate:10Apr2017)  Requested for: 67FSB3623; Last   Rx:31Mar2017 Ordered   3  Aspirin 325 MG Oral Tablet; TAKE 1 TABLET EVERY MORNING; Therapy: (Recorded:11May2016) to Recorded   4  Calcium/Vitamin D/Minerals TABS; Take 1 tablet twice daily; Therapy: (Recorded:11May2016) to Recorded   5   PredniSONE 20 MG Oral Tablet; take 1 tablet by mouth once daily with food; Therapy: 96RZX5297 to (Evaluate:30Cpe6261)  Requested for: 30YJL7364; Last   Rx:12May2017 Ordered   6  Promethazine-Codeine 6 25-10 MG/5ML Oral Syrup; TAKE 5 ML EVERY 4 HOURS AS   NEEDED; Therapy: 72MGD1557 to (Evaluate:08Apr2017); Last Rx:31Mar2017 Ordered   7  Triamterene-HCTZ 37 5-25 MG Oral Capsule; TAKE 1 CAPSULE DAILY  Requested for:   62LVF9748; Last Rx:24Mar2017 Ordered    Allergies    1  Avelox TABS   2  Crestor TABS   3  Lipitor TABS   4  Pravachol TABS   5  Zocor TABS    Vitals  Signs   Recorded: 59LOT1855 09:45AM   Heart Rate: 80  Systolic: 552  Diastolic: 70  Height: 5 ft 7 in  Weight: 178 lb 8 oz  BMI Calculated: 27 96  BSA Calculated: 1 93    Physical Exam    Constitutional   General appearance: No acute distress, well appearing and well nourished  Ears, Nose, Mouth, and Throat   Oropharynx: Abnormal   erythema posterior oropharynx  Pulmonary   Respiratory effort: No increased work of breathing or signs of respiratory distress  Auscultation of lungs: Clear to auscultation  Cardiovascular   Auscultation of heart: Abnormal   systolic murmur heard  Examination of extremities for edema and/or varicosities: Normal     Psychiatric   Orientation to person, place, and time: Normal     Mood and affect: Normal         Right Upper Extremity: All normal  Right thumb MCP tenderness  Right 2nd Finger DIP tenderness  Right 2nd Finger PIP tenderness  Right 2nd MCP tenderness  Right 3rd Finger DIP tenderness  Right 3rd Finger PIP tenderness  Right 3rd MCP tenderness  Right 4th Finger DIP tenderness  Right 4th Finger PIP tenderness  Right 4th MCP tenderness  Right 5th Finger DIP tenderness  Right 5th Finger PIP tenderness  Right 5th MCP tenderness  Left Upper Extremity: All normal    Right Lower Extremity: All normal    Left Lower Extremity: All normal    Additional Findings - Right elbow crepitus        Results/Data  (1) MANA SCREEN W/REFLEX TO TITER/PATTERN [id number] 08:39AM [facility] Order Number: [id number]     Test Name Result Flag Reference   MANA SCREEN  Negative  Negative     (1) CK (CPK) 90XFP8814 08:39AM [facility] Order Number: [id number]     Test Name Result Flag Reference   CK (CPK) 103 U/L       (1) C-REACTIVE PROTEIN 58JGE2085 08:39AM [facility] Order Number: [id number]     Test Name Result Flag Reference   C-REACT PROTEIN 40 3 mg/L H <3 0     (1) LYME ANTIBODY PROFILE [facility] TO WESTERN BLOT 25FOZ1445 08:39AM [facility] Order Number: [id number]     Test Name Result Flag Reference   LYME IGG 0 61  0 00-0 79   NEGATIVE(0 00-0 79)-Absence of detectable Borrelia IgG Antibodies  A negative result does not exclude the possibility of Borrelia infection  If early Lyme disease is suspected,a second sample should be collected & tested 4 weeks after initial testing  LYME IGM 0 14  0 00-0 79   NEGATIVE (0 00-0 79)-Absence of detectable Borrelia IgM antibodies  A negative result does not exclude the possibility of Borrelia infection  If early lyme disease is suspected, a second sample should be collected & tested 4 weeks after initial testing      (1) RHEUMATOID FACTOR SCREEN 30Jan2017 08:39AM [facility] Order Number: [id number]     Test Name Result Flag Reference   RHEUMATOID FACTOR Negative  Negative     (1) SED RATE [id number] 08:39AM [facility] Order Number: [id number]     Test Name Result Flag Reference   SED RATE 81 mm/hour H 0-10     (1) URIC ACID [id number] 08:39[facility] Order Number: [id number]     Test Name Result Flag Reference   URIC ACID 6 1 mg/dL  4 2-8 0   Specimen collection should occur prior to Metamizole administration due to the potential for falsely depressed results         Attending Note  Attending Note: I interviewed and examined the patient, I discussed the case with the Resident and reviewed the Resident's note, I supervised the Resident and I agree with the Resident management plan as it was presented to me  Level of Participation: I was present in clinic and examined the patient  Patient's History: Mr Kimmie Moreno is a 73-year-old  male who presents the office with a known diagnosis of polymyalgia rheumatica which was diagnosed in March of this year  He was placed on prednisone 20 mg daily at that time, with resolution of his complaints  He was having pain at the back of his neck, bilateral shoulders, and across his back  He was also having radiating pain in his hips  He did note weakness in his shoulders, arms, and hips  He has had pain in these areas for many years, but it did worsen recently  He also carries a remote history of Lyme disease  He does report intermittent fatigue which has been worse since March as well  He was initially placed on prednisone 10 mg daily, but it was increased to 20 mg daily, which she has been on this March  He does report worsening of his vision over the last 6-7 months, as well as headaches, but this has resolved  He denies any symptoms of jaw claudication  He does report a sensation of warmth as well as pain in his bilateral lower extremities after having a femoral bypass bilaterally in 2009  He also reports some right upper extremity weakness due to a partially torn rotator cuff in the right shoulder  Key Parts of the Exam: On exam, there is no active synovitis  He does have osteoarthritic changes of the hands, as well as crepitus of the bilateral knees  There is tenderness to palpation decreased range of motion of the right shoulder  There is no tenderness to palpation of the left shoulder girdle her bilateral hip girdles  There is no tenderness to palpation of the bilateral temporal arteries  He has 2+ pulses in the bilateral temporal, carotid, and radial arteries   Review of laboratory studies from January 30, 2017 revealed a negative MANA, Lyme antibody, and rheumatoid factor  Uric acid was within normal limits  ESR and CRP were markedly elevated  Diagnosis and Plan: At this time, his history and exam, as well as his laboratory studies to appear most consistent with polymyalgia rheumatica which is relatively stable at this time on prednisone 20 mg daily  I would like to obtain an updated CBC, CMP, ESR, CRP, TSH, and vitamin D at this time  If his inflammatory markers have normalized, I would like to decrease his prednisone to 10 mg daily for the next month  If we do decrease his prednisone, I would like to check an updated ESR and CRP in approximately one month  If they remain normal and he remains asymptomatic, we may wean the prednisone further  I will reevaluate him in 2 months  He will call in the interim if he has any questions or concerns  I agree with the Resident's note        Future Appointments    Date/Time Provider Specialty Site   08/15/2017 10:20 AM Bethany Beauchamp DO Rheumatology [facility]   07/24/2017 10:30 AM Isra Franz DO Family Medicine [facility] 10     Signatures   Electronically signed by : Javier Chi DO; Jun 14 2017 10:52AM EST                       (Author)

## 2018-01-14 VITALS
TEMPERATURE: 98.6 F | SYSTOLIC BLOOD PRESSURE: 130 MMHG | HEIGHT: 68 IN | DIASTOLIC BLOOD PRESSURE: 62 MMHG | WEIGHT: 175.38 LBS | BODY MASS INDEX: 26.58 KG/M2

## 2018-01-14 VITALS
BODY MASS INDEX: 27.81 KG/M2 | SYSTOLIC BLOOD PRESSURE: 134 MMHG | DIASTOLIC BLOOD PRESSURE: 72 MMHG | WEIGHT: 177.2 LBS | TEMPERATURE: 99.2 F | HEIGHT: 67 IN

## 2018-01-14 VITALS
HEIGHT: 67 IN | TEMPERATURE: 97.9 F | DIASTOLIC BLOOD PRESSURE: 72 MMHG | OXYGEN SATURATION: 98 % | RESPIRATION RATE: 14 BRPM | BODY MASS INDEX: 27.78 KG/M2 | WEIGHT: 177 LBS | HEART RATE: 64 BPM | SYSTOLIC BLOOD PRESSURE: 140 MMHG

## 2018-01-14 VITALS
WEIGHT: 174 LBS | HEIGHT: 67 IN | BODY MASS INDEX: 27.31 KG/M2 | HEART RATE: 80 BPM | SYSTOLIC BLOOD PRESSURE: 150 MMHG | DIASTOLIC BLOOD PRESSURE: 70 MMHG

## 2018-01-14 VITALS
HEART RATE: 82 BPM | SYSTOLIC BLOOD PRESSURE: 130 MMHG | BODY MASS INDEX: 27.94 KG/M2 | WEIGHT: 178 LBS | DIASTOLIC BLOOD PRESSURE: 70 MMHG | HEIGHT: 67 IN

## 2018-01-15 NOTE — PROGRESS NOTES
Assessment    1  Polymyalgia rheumatica (725) (M35 3)   2  Primary generalized (osteo)arthritis (715 09) (M15 0)   3  Long term (current) use of systemic steroids (V58 65) (Z79 52)   4  Benign essential hypertension (401 1) (I10)   5  Hypercholesterolemia (272 0) (E78 00)   6  Aortic stenosis (424 1) (I35 0)    Plan    1  PredniSONE 5 MG Oral Tablet    2  PredniSONE 2 5 MG Oral Tablet; TAKE 3 TABLET DAILY WITH FOOD   3  (1) C-REACTIVE PROTEIN; Status:Active; Requested for:01Sep2017;    4  (1) SED RATE; Status:Active; Requested for:01Sep2017;    5  Follow-up visit in 2 months Evaluation and Treatment  Follow-up  Status: Complete    Done: 16WIZ2917    6  Call [phone] if: The symptoms come back after the medications are finished ;   Status:Complete;   Done: 09XRA5112   7  Call 911 if: You have a severe headache that will not go away ; Status:Complete;   Done:   58ZLM7350   8  Call 911 if: You have any symptoms of a stroke ; Status:Complete;   Done: 21RCW1582   9  Call 911 if: You have pain around the ear or jaw ; Status:Complete;   Done: 61HEM4066   10  Call 911 if: You lose your vision for a short period of time ; Status:Complete;   Done:    73ZHJ6448    Discussion/Summary    Ms Lillian Jones has been feeling better since his last visit  He has pain essentially all of the time, but most of it is tolerable  He denies any worsening of pain as he decreased his prednisone since the last visit  He complains of pain in his hips, and occasionally in his back and shoulders  He does report that his neck pain has essentially resolved at this time  He continues to have burning sensations in his legs which can vary in intensity  He does report that he occasionally takes Tylenol as needed for his pain with mild relief  He did have an episode of swelling in his hands which lasted for several days, but this is now completely resolved  He denies any other obvious joint swelling   He did follow-up with his primary care physician regarding his murmur that was noted on his exam here at the last visit  He did undergo an echocardiogram and was diagnosed with aortic stenosis and left ventricular dysfunction  He is scheduled to see cardiology in the near future  He reports morning stiffness typically lasting one hour before improvement  He also reports difficulty sleeping, but he does not attribute this to pain  He also reports nonrestorative sleep and occasional fatigue  On exam, there is no active synovitis  He does have osteoarthritic changes of the hands, as well as crepitus of the bilateral knees  There is tenderness to palpation decreased range of motion of the left shoulder  There is no tenderness to palpation of the right shoulder or bilateral hip girdles  There is no tenderness to palpation of the bilateral temporal arteries  He has 2+ pulses in the bilateral temporal, carotid, and radial arteries  Review of laboratory studies from July 14, 2017 revealed an essentially normal CBC, ESR, and CRP  Vitamin D was mildly decreased  At this time, his polymyalgia rheumatica does appear relatively stable on prednisone 10 mg daily  Given the fact that his inflammatory markers remain normal, I will decrease his prednisone to 7 5 mg daily  I advised to continue this dose for 1 month, and then obtain an updated ESR and CRP  If his inflammatory markers remain normal, we will decrease his prednisone further before his follow-up  I will reevaluate him in 2 months  He will call in the interim if there are any questions or concerns  Patient is able to Self-Care  Counseling  Rheumatology Counseling Documentation: The patient was counseled regarding diagnostic results, instructions for management, impressions and risks and benefits of treatment options  Chief Complaint  F/U PMR   Patient is here today for follow up of chronic conditions described in HPI  History of Present Illness  Pt  returns for F/U for PMR   Feeling better since last visit  Has pain essentially all of the time, but it is tolerable  c/o pain in hips, and occasionally in back and shoulders  Neck pain has essentially resolved  Still with burning sensation in legs which can vary in intensity  No issues with decreasing Prednisone since last visit  Occasionally takes Tylenol for pain with mild relief  + swelling in hands for several days, which has now resolved  No other obvious joint swelling  Had Echo for heart murmur -> Dx'ed with AS and LV dysfunction  To see Cardio  + AM stiffness x 1 hour  + difficulty sleeping -> not due to pain  + non-restorative sleep  + occasional fatigue  RAPID3: 13 7/30      Review of Systems    Constitutional: no fever, no recent weight gain, no chills, no recent weight loss and no anorexia    The patient presents with complaints of occasional episodes of fatigue  HEENT: no jaw claudication and feeling congested, but no blurred vision, no double vision, no amaurosis fugax, no dryness of the eyes, no eye pain, no erythema eye(s), no dryness mouth, no mouth sores and no sore throat  Cardiovascular: dyspnea on exertion and swelling in the arms or legs, but no chest pain or pressure  Respiratory: no unusual or persistent cough, no shortness of breath and no pleurisy  Gastrointestinal: heartburn, but no abdominal pain, no nausea, no vomiting, no diarrhea, no constipation, no melena and no BRBPR  Genitourinary: no foamy urine and increased frequency, but no dysuria and no hematuria  Musculoskeletal: as noted in HPI  Integumentary no rash, no Raynaud's, no alopecia, no nail changes and no photosensitivity  Endocrine no polyuria and no polydipsia  Hematologic/Lymphatic: no unusual bleeding and no tendency for easy bruising  Neurological: headache, tingling and weakness    The patient presents with complaints of occasional episodes of vertigo or dizziness, described as lightheadedness   Symptoms are made worse by bending over and getting up quickly  Psychiatric: insomnia and non-restorative sleep  Active Problems    1  Abnormal stress echo (794 39) (R94 39)   2  Benign essential hypertension (401 1) (I10)   3  Cardiac murmur (785 2) (R01 1)   4  Hypercholesterolemia (272 0) (E78 00)   5  Long term (current) use of systemic steroids (V58 65) (Z79 52)   6  Polymyalgia rheumatica (725) (M35 3)   7  Primary generalized (osteo)arthritis (715 09) (M15 0)    Past Medical History    1  History of Acute frontal sinusitis (461 1) (J01 10)   2  History of Diffuse arthralgia (719 40) (M25 50)   3  History of acute bronchitis (V12 69) (Z87 09)   4  History of hyperlipidemia (V12 29) (Z86 39)   5  History of influenza vaccination (V49 89) (Z92 29)   6  History of Lyme disease (V12 09) (Z86 19)   7  History of rheumatic fever (V12 09) (Z86 79)   8  History of Screening for colon cancer (V76 51) (Z12 11)    The active problems and past medical history were reviewed and updated today  Surgical History    1  History of Tonsillectomy With Adenoidectomy    The surgical history was reviewed and updated today  Family History  Mother    1  Family history of malignant neoplasm of cervix (V16 49) (Z80 49)  Father    2  Family history of CAD (coronary artery disease)   3  Family history of myocardial infarction (V17 3) (Z82 49)  Paternal Grandfather    4  Family history of cerebrovascular accident (CVA) (V17 1) (Z82 3)    The family history was reviewed and updated today  Social History    · Employed   · 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   · Former smoker (G12 81) (H00 678)   ·    · Social drinker  The social history was reviewed and updated today  The social history was reviewed and is unchanged  Current Meds   1  AmLODIPine Besylate 10 MG Oral Tablet; take 1 tablet by mouth once daily; Therapy: 26GDD3103 to (Evaluate:10Aug2017)  Requested for: 61BYU9161; Last   Rx:12May2017 Ordered   2  Aspirin 325 MG Oral Tablet; TAKE 1 TABLET EVERY MORNING;    Therapy: (Recorded:17Sbk6414) to Recorded   3  Calcium/Vitamin D/Minerals TABS; Take 1 tablet twice daily; Therapy: (Recorded:20Lid0179) to Recorded   4  PredniSONE 5 MG Oral Tablet; TAKE 2 TABLET Daily with food as directed; Therapy: 38ZFI7119 to (Evaluate:14Oct2017)  Requested for: 80FSP6920; Last   Rx:77Ldo0987 Ordered   5  Triamterene-HCTZ 37 5-25 MG Oral Capsule; TAKE 1 CAPSULE DAILY  Requested for:   67JNG5398; Last Rx:24Mar2017 Ordered   6  Vitamin D 2000 UNIT Oral Capsule; Therapy: (Recorded:16Yre5319) to Recorded    The medication list was reviewed and updated today  Immunizations  Influenza --- Curt Lower: 10-Oct-2016     Allergies    1  Avelox TABS   2  Crestor TABS   3  Lipitor TABS   4  Pravachol TABS   5  Zocor TABS    Vitals  Signs   Recorded: 54Wii6537 10:07AM   Heart Rate: 84  Systolic: 039  Diastolic: 76  Height: 5 ft 7 in  Weight: 181 lb   BMI Calculated: 28 35  BSA Calculated: 1 94    Physical Exam    Constitutional   General appearance: Abnormal   overweight  Eyes   Conjunctiva and lids: No swelling, erythema or discharge  Pupils and irises: Equal, round and reactive to light  Ears, Nose, Mouth, and Throat   External inspection of ears and nose: Normal     Oropharynx: Normal with no erythema, edema, exudate lesions, or ulcers  Pulmonary   Respiratory effort: No increased work of breathing or signs of respiratory distress  Auscultation of lungs: Clear to auscultation  Cardiovascular   Auscultation of heart: Normal rate and rhythm, normal S1 and S2, without murmurs  Examination of extremities for edema and/or varicosities: Normal     Carotid pulses: Normal   right 2+ and left 2+  Lymphatic   Palpation of lymph nodes in neck: No lymphadenopathy  Psychiatric   Orientation to person, place, and time: Normal     Mood and affect: Normal         Right glenohumeral joint tenderness and restricted ROM     Right Upper Extremity: Right Hand: Right Hand Appearance: Bobby's node at the all PIPs digit and Heberden's nodule at the all DIPs digit  Right Wrist: Right Elbow: Right Shoulder:   Left Upper Extremity: Left Hand: Appearance: all PIPs PIP Bobby's node(s) and all DIPs digit(s) Heberden's Node(s)  Left Wrist: Left Elbow: Left Shoulder:   Musculoskeletal - Joints, bones, and muscles: Abnormal  Palpation - bilateral ankle crepitus  Skin - Skin and subcutaneous tissue: Normal    Neurologic - Sensation: Normal    Additional Findings - No TTP B/L temporal arteries; 2+ pulses B/L temporal and radial arteries  Results/Data  (1) C-REACTIVE PROTEIN 55PYP5944 07:46AM Luis Mast    Order Number: [id number]     Test Name Result Flag Reference   C-REACT PROTEIN <3 0 mg/L  <3 0     (1) SED RATE [id number] 07:46AM Luis Mast    Order Number: [id number]     Test Name Result Flag Reference   SED RATE 11 mm/hour H 0-10     (1) CBC/PLT/DIFF [id number] 07:33AM Luis Mast    Order Number: [id number]     Test Name Result Flag Reference   WBC COUNT 10 13 Thousand/uL  4 31-10 16   RBC COUNT 4 62 Million/uL  3 88-5 62   HEMOGLOBIN 14 6 g/dL  12 0-17 0   HEMATOCRIT 42 9 %  36 5-49 3   MCV 93 fL  82-98   MCH 31 6 pg  26 8-34 3   MCHC 34 0 g/dL  31 4-37 4   RDW 14 1 %  11 6-15 1   MPV 10 5 fL  8 9-12 7   PLATELET COUNT 119 Thousands/uL  149-390   nRBC AUTOMATED 0 /100 WBCs     NEUTROPHILS RELATIVE PERCENT 77 % H 43-75   LYMPHOCYTES RELATIVE PERCENT 16 %  14-44   MONOCYTES RELATIVE PERCENT 6 %  4-12   EOSINOPHILS RELATIVE PERCENT 1 %  0-6   BASOPHILS RELATIVE PERCENT 0 %  0-1   NEUTROPHILS ABSOLUTE COUNT 7 80 Thousands/? ??L H 1 85-7 62   LYMPHOCYTES ABSOLUTE COUNT 1 59 Thousands/? ??L  0 60-4 47   MONOCYTES ABSOLUTE COUNT 0 63 Thousand/? ??L  0 17-1 22   EOSINOPHILS ABSOLUTE COUNT 0 07 Thousand/? ??L  0 00-0 61   BASOPHILS ABSOLUTE COUNT 0 03 Thousands/? ??L  0 00-0 10     (1) VITAMIN D 25-HYDROXY [id number] 07:33AM Luis JOHNSON Order Number: [id number]     Test Name Result Flag Reference   VIT D 25-HYDROX 18 0 ng/mL L 30 0-100 0   This assay is a certified procedure of the CDC Vitamin D Standardization Certification Program (VDSCP)     Deficiency <20ng/ml   Insufficiency 20-30ng/ml   Sufficient  ng/ml     *Patients undergoing fluorescein dye angiography may retain small amounts of fluorescein in the body for 48-72 hours post procedure  Samples containing fluorescein can produce falsely elevated Vitamin D values  If the patient had this procedure, a specimen should be resubmitted post fluorescein clearance  Future Appointments    Date/Time Provider Specialty Site   08/24/2017 09:20 AM ALFRED Pichardo   Cardiology [facility]   08/24/2017 09:15 AM Inocencio Aguirre Dr, DO Family Medicine [place] [address]   10/17/2017 10:20 AM Godwin Stack, [facility]     Signatures   Electronically signed by : Ritika Davenport DO; Aug 15 2017 12:06PM EST                       (Author)

## 2018-01-15 NOTE — PROGRESS NOTES
Assessment    1  Encounter for preventive health examination (V70 0) (Z00 00)    Discussion/Summary  Impression: Initial Annual Wellness Visit  Diabetes screening and counseling: screening is current  Colorectal cancer screening and counseling: due for a colonoscopy (low risk)  Prostate cancer screening and counseling: screening is current  Osteoporosis screening and counseling: the patient declines screening  Abdominal aortic aneurysm screening and counseling: the patient declines screening  Glaucoma screening and counseling: the risks and benefits of screening were discussed and ophthalmologist referral  Immunizations: influenza vaccine is up to date this year, pneumococcal vaccine due today, Td vaccine needed today and Tdap vaccine needed today  Advance Directive Planning: paperwork and instructions were given to the patient  Patient Discussion: plan discussed with the patient, follow-up visit needed in one year  Chief Complaint  ANNUAL MED WELL      History of Present Illness  Welcome to Medicare and Wellness Visits: The patient is being seen for the subsequent annual wellness visit  Medicare Screening and Risk Factors   Hospitalizations: no previous hospitalizations  Medicare Screening Tests Risk Questions   Abdominal aortic aneurysm risk assessment: none indicated  Osteoporosis risk assessment: none indicated  HIV risk assessment: none indicated  Drug and Alcohol Use: The patient is a former cigarette smoker  The patient reports frequent alcohol use and drinking 1 drinks per week  He has never used illicit drugs  Diet and Physical Activity: Current diet includes well balanced meals, servings of fruit per day, 1 servings of vegetables per day, 1 servings of dairy products per day and 4 cups of coffee per day  He exercises 4 times per week  Exercise: walking, strength training 30 minutes per day  Mood Disorder and Cognitive Impairment Screening:   Depression screening  negative for symptoms  He reports feeling down, depressed, or hopeless over the past two weeks  He denies feeling little interest or pleasure in doing things over the past two weeks  Cognitive impairment screening: denies difficulty learning/retaining new information, denies difficulty handling complex tasks, denies difficulty with reasoning, denies difficulty with spatial ability and orientation, denies difficulty with language and denies difficulty with behavior  Functional Ability/Level of Safety: Hearing is a hearing aid is used  He reports hearing difficulties  The patient is currently able to do activities of daily living without limitations, able to do instrumental activities of daily living without limitations, able to participate in social activities without limitations and able to drive without limitations  Activities of daily living details: does not need help using the phone, no transportation help needed, does not need help shopping, no meal preparation help needed, does not need help doing housework, does not need help doing laundry, does not need help managing medications and does not need help managing money  Fall risk factors: The patient fell 0 times in the past 12 months  Home safety risk factors:  no grab bars in the bathroom, but handrails on the stairs  Advance Directives: Advance directives: WORKING ON IT  Co-Managers and Medical Equipment/Suppliers: See Patient Care Team      Active Problems     1  Acute bronchitis (466 0) (J20 9)   2  Acute frontal sinusitis (461 1) (J01 10)   3  Diffuse arthralgia (719 40) (M25 50)   4  Hypercholesterolemia (272 0) (E78 00)   5  Need for influenza vaccination (V04 81) (Z23)   6  Polymyalgia rheumatica (725) (M35 3)   7   Screening for colon cancer (V76 51) (Z12 11)    Benign essential hypertension (401 1) (I10)          Past Medical History    · History of hyperlipidemia (V12 29) (Z86 39)   · History of Lyme disease (V12 09) (Z86 19)    The active problems and past medical history were reviewed and updated today  Surgical History    · History of Tonsillectomy With Adenoidectomy    The surgical history was reviewed and updated today  Family History  Mother    · Family history of malignant neoplasm of cervix (V16 49) (Z80 49)  Father    · Family history of CAD (coronary artery disease)   · Family history of myocardial infarction (V17 3) (Z80 55)  Paternal Grandfather    · Family history of cerebrovascular accident (CVA) (V17 1) (Z82 3)    The family history was reviewed and updated today  Social History    · Employed   · Environmental Supervisor   · 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   · Former smoker (M85 60) (P80 027)   · Quit 1993   ·    · Social drinker  The social history was reviewed and is unchanged  Current Meds   1  AmLODIPine Besylate 10 MG Oral Tablet; TAKE 1 TABLET DAILY  Requested for:   76HCR9441; Last Rx:09Jan2017 Ordered   2  Aspirin 325 MG Oral Tablet; TAKE 1 TABLET EVERY MORNING; Therapy: (Recorded:85Jvv0213) to Recorded   3  Calcium/Vitamin D/Minerals TABS; Take 1 tablet twice daily; Therapy: (Recorded:23Mhm1805) to Recorded   4  PredniSONE 20 MG Oral Tablet; TAKE 1 TABLET DAILY WITH FOOD; Therapy: 37RFP9710 to (Evaluate:21Apr2017)  Requested for: 47Egi6584; Last   Rx:29Ywg0317 Ordered   5  Triamterene-HCTZ 37 5-25 MG Oral Capsule; TAKE 1 CAPSULE DAILY  Requested for:   66YZC0672; Last Rx:09Jan2017 Ordered    The medication list was reviewed and updated today  Allergies    1  Avelox TABS   2  Crestor TABS   3  Lipitor TABS   4  Pravachol TABS   5  Zocor TABS    Immunizations   1    Influenza  10-Oct-2016     Physical Exam    Constitutional   General appearance: No acute distress, well appearing and well nourished         Future Appointments    Date/Time Provider Specialty Site   06/14/2017 09:40 AM Jalil Whitley DO Rheumatology [facility]     Signatures   Electronically signed by : Sarath Alegria DO; Mar 24 2017 11:12AM EST                       (Author)

## 2018-01-15 NOTE — PROGRESS NOTES
Assessment    1  Polymyalgia rheumatica (725) (M35 3)   2  Primary generalized (osteo)arthritis (715 09) (M15 0)   3  Long term (current) use of systemic steroids (V58 65) (Z79 52)   4  Benign essential hypertension (401 1) (I10)   5  Hypercholesterolemia (272 0) (E78 00)   6  Aortic stenosis (424 1) (I35 0)    Plan     1  Call [phone] if: The pain seems worse ; Status:Complete;   Done: 12Oct2017   2  Call [phone] if: The symptoms seem worse ; Status:Complete;   Done:   12Oct2017   3  Call [phone] if: You have questions or concerns about your problem ;   Status:Complete;   Done: 27YID1759   4  (1) CBC/PLT/DIFF; Status:Active; Requested for:01Ogh6388;    5  (1) COMPREHENSIVE METABOLIC PANEL; Status:Active; Requested for:94Gbj3587;    6  (1) C-REACTIVE PROTEIN; Status:Active; Requested for:79Cnk2221;    7  (1) SED RATE; Status:Active; Requested for:94Zei3937;    8  Follow-up visit in 2 months Evaluation and Treatment  Follow-up  Status: Complete    Done: 12Oct2017    PredniSONE 2 5 MG Oral Tablet; TAKE 3 TABLET DAILY WITH FOOD; Therapy: 25ZJQ9016 to (Evaluate:63Olc6405)  Requested for: 71JWE5824; Last Rx:74Qen4835; Status: ACTIVE Ordered  Rx By: Gee Garcia; Dispense: 30 Days ; #:90 Tablet; Refill: 5;   For: Long term (current) use of systemic steroids, Polymyalgia rheumatica, Primary generalized (osteo)arthritis; BATSHEVA = N; Verified Transmission to [place] [address] ; Last Updated By: Eben Mahoney; 10/13/2017 7:54:39 AM  (1) CBC/PLT/DIFF; Status:Resulted - Requires Verification;   Done: 53PYL4640 12:00AM  Due:12Oct2018; Ordered; For:Long term (current) use of systemic steroids, Polymyalgia rheumatica, Primary generalized (osteo)arthritis; Ordered By:Tri Alejo;   (1) COMPREHENSIVE METABOLIC PANEL; Status:Resulted - Requires Verification;   Done: 18FRJ8135 12:00AM  Due:12Oct2018; Ordered;     For:Long term (current) use of systemic steroids, Polymyalgia rheumatica, Primary generalized (osteo)arthritis; Ordered By:Tri Alejo;   (1) C-REACTIVE PROTEIN; Status:Resulted - Requires Verification;   Done: 02EYC0893 12:00AM  Due:12Oct2018; Ordered; For:Long term (current) use of systemic steroids, Polymyalgia rheumatica, Primary generalized (osteo)arthritis; Ordered By:Tri Alejo;   (1) SED RATE; Status:Resulted - Requires Verification;   Done: 23WVL3456 12:00AM  Due:12Oct2018; Ordered; For:Long term (current) use of systemic steroids, Polymyalgia rheumatica, Primary generalized (osteo)arthritis; Ordered By:Tri Alejo; Discussion/Summary    This is a 38-year-old gentleman presenting today for follow-up for polymyalgia rheumatica  Patient states he continues to have achiness all over  He states that this is present most of the time  Since last appointment he was placed on Crestor and states that he has had an issue with statin medication the past  He has since stopped Crestor and has noticed some improvement of his achiness  He does have plans to follow-up with his cardiologist on the 23rd for further evaluation and possible surgery  He states he has discomfort in his hips but does have a history of bypasses of both lower extremities for his vascular disease  The patient states that he continues to have a burning sensation in his legs  He also describes neck and low back pain as well as pain in his shoulders  He was told that he has a rotator cuff tear of the right shoulder  He notes numbness in his hands and states he does have dizziness when he extends his neck back  He notes swelling in his hands on one occasion but that did resolve  He denies any further obvious joint swelling  He does have morning stiffness lasting a few minutes and does describe some difficulty with sleep due to pain as well as nonrestorative sleep and fatigue  He currently utilizes 7 5 mg of prednisone daily  On physical examination, there is no active synovitis   Patient has no temporal or jaw tenderness  There is 2+ pulses of the temporal, carotid, and radial arteries  Patient does have tenderness and restricted range of motion of both shoulders as well as osteoarthritic changes of both hands  In addition he does have crepitus of the knees  Patient's most recent labs reveal an elevated CRP of 3 3 as well as a normal sedimentation rate of 11  At this time patient's history and physical examination is most consistent with polymyalgia rheumatica which appears to be mildly active but stable with prednisone 7 5 mg daily  Patient will continue on this dose until we receive updated blood work including a CBC, CMP, CRP, and ESR  Patient will contact the office after he has his blood work completed and we will consider further weaning his prednisone to 5 mg daily if his inflammatory markers remain stable  Patient will plan to return to the office in 2 months time for further evaluation however contact the office in the interim if he has any further questions or concerns  We may consider further treatment options in the future if patient's inflammatory markers are elevated including methotrexate  Counseling  Rheumatology Counseling Documentation: The patient was counseled regarding diagnostic results, instructions for management, prognosis, patient and family education, risks and benefits of treatment options and importance of compliance with treatment  Chief Complaint  F/U PMR   Patient is here today for follow up of chronic conditions described in HPI  History of Present Illness  Patient is in the office today for follow up for PMR  Aching all over most of the time  Since last appt, was placed on Crestor and had issue with statins in the past  Stopped Crestor due to achiness and notices improvement since stopping  Following up with Cardio on 23rd again  Discomfort in the hips and up but history of bypasses done in legs years ago   Also with low back, neck, and shoulder, right shoulder with RC cuff tear  Numbness in the hands  Dizziness with neck extension  Swelling in the hands but improving  No other obvious joint swelling  +Am stiffness x few minutes  +Some difficulty with sleep due to pain  +Some non restorative sleep  +Fatigue  Taking Prednisone 7 5 mg      RAPID3: 17 3 /30      Review of Systems    Constitutional: "hot feeling", fatigue and recent 4 lb weight loss, but no fever, no recent weight gain, no chills and no anorexia  HEENT: No jaw claudication, dryness mouth and feeling congested, but no blurred vision, no double vision, no amaurosis fugax, no dryness of the eyes, no eye pain, no erythema eye(s), no mouth sores and no sore throat  Cardiovascular: swelling in the arms or legs, but no dyspnea on exertion    The patient presents with complaints of chest pain or pressure (with activity)  Respiratory: pleurisy, but no unusual or persistent cough and no shortness of breath  Gastrointestinal: heartburn, but no abdominal pain, no nausea, no vomiting, no diarrhea, no constipation, no melena and no BRBPR  Genitourinary: +bubbly urine and increased frequency, but no dysuria and no hematuria  Musculoskeletal: as noted in HPI  Integumentary no rash, no Raynaud's, no alopecia, no nail changes and no photosensitivity  Endocrine no polyuria and no polydipsia  Hematologic/Lymphatic: no unusual bleeding and no tendency for easy bruising  Neurological: headache, tingling and weakness    The patient presents with complaints of vertigo or dizziness, described as lightheadedness  Symptoms are made worse by looking up, lying down and getting up quickly  Psychiatric: insomnia and non-restorative sleep  Active Problems     1  Abnormal stress echo (794 39) (R94 39)   2  Cardiac murmur (785 2) (R01 1)   3  Circulation problem (459 9) (I99 9)   4  Depression with anxiety (300 4) (F41 8)   5  RODRIGUEZ (dyspnea on exertion) (786 09) (R06 09)   6  Hearing problem (V41 2) (H91 90)   7  Hypercholesterolemia (272 0) (E78 00)   8  Ischemic cardiomyopathy (414 8) (I25 5)   9  Long term (current) use of systemic steroids (V58 65) (Z79 52)   10  Polymyalgia rheumatica (725) (M35 3)   11  Primary generalized (osteo)arthritis (715 09) (M15 0)    Benign essential hypertension (401 1) (I10)       Aortic stenosis (424 1) (I35 0)       Cardiomyopathy, ischemic (414 8) (I25 5)       Activity intolerance related to fatigue (780 79) (R53 83)       Heart burn (787 1) (R12)       CAD (coronary artery disease) (414 00) (I25 10)          Past Medical History    1  History of Acute frontal sinusitis (461 1) (J01 10)   2  History of Diffuse arthralgia (719 40) (M25 50)   3  History of acute bronchitis (V12 69) (Z87 09)   4  History of back pain (V13 59) (Z87 39)   5  History of hyperlipidemia (V12 29) (Z86 39)   6  History of influenza vaccination (V49 89) (Z92 29)   7  History of Lyme disease (V12 09) (Z86 19)   8  History of rheumatic fever (V12 09) (Z86 79)   9  History of Screening for colon cancer (V76 51) (Z12 11)    The active problems and past medical history were reviewed and updated today  Surgical History    1  History of Tonsillectomy With Adenoidectomy    The surgical history was reviewed and updated today  Family History  Mother    1  Family history of malignant neoplasm of cervix (V16 49) (Z80 49)  Father    2  Family history of CAD (coronary artery disease)   3  Family history of coronary artery disease (V17 3) (Z82 49)   4  Family history of myocardial infarction (V17 3) (Z82 49)  Brother    5  Family history of malignant neoplasm (V16 9) (Z80 9)  Grandfather    6  Family history of CAD (coronary artery disease)   7  Family history of coronary artery disease (V17 3) (Z82 49)  Paternal Grandfather    8  Family history of cerebrovascular accident (CVA) (V17 1) (Z82 3)   9  Family history of myocardial infarction (V17 3) (Z82 49)  Spouse    10   Family history of CAD (coronary artery disease) 6  Family history of coronary artery disease (V17 3) (Z82 49)    The family history was reviewed and updated today  Social History    · Employed   · 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   · Former smoker (J83 10) (R89 192)   ·    · Social drinker  The social history was reviewed and updated today  The social history was reviewed and is unchanged  Current Meds   1  AmLODIPine Besylate 10 MG Oral Tablet; take 1 tablet by mouth once daily; Therapy: 23MKL2511 to (Johanna Fosters)  Requested for: 30Rdg7377 Recorded   2  Aspirin 325 MG Oral Tablet; TAKE 1 TABLET EVERY MORNING; Therapy: (Recorded:20Rxp0025) to Recorded   3  Calcium/Vitamin D/Minerals TABS; Take 1 tablet twice daily; Therapy: (Recorded:35Cfe9742) to Recorded   4  PredniSONE 2 5 MG Oral Tablet; TAKE 3 TABLET DAILY WITH FOOD; Therapy: 32BXW8027 to (Evaluate:65Ncy0588)  Requested for: 63Zlq2321; Last   Rx:08Cjf8647 Ordered   5  Rosuvastatin Calcium 10 MG Oral Tablet; TAKE 1 TABLET AT BEDTIME; Therapy: 25Maw2775 to (Evaluate:92Nkv2893)  Requested for: 56Ajl0490; Last   Rx:01Aiw4058 Ordered   6  Triamterene-HCTZ 37 5-25 MG Oral Capsule; TAKE 1 CAPSULE DAILY  Requested for:   51TLD3387; Last Rx:56Eqt7109 Ordered   7  Vitamin D 2000 UNIT Oral Capsule; Therapy: (Recorded:82Rhv9996) to Recorded    The medication list was reviewed and updated today  Immunizations  Influenza --- Krystal Atkins: 44-Xse-4125Bwmof Lush: 24-Aug-2017     Allergies    1  Avelox TABS   2  Crestor TABS   3  Lipitor TABS   4  Pravachol TABS   5  Zocor TABS    Vitals  Signs   Recorded: 28ASY3758 10:26AM   Heart Rate: 74  Systolic: 464  Diastolic: 64  Height: 5 ft 7 in  Weight: 174 lb   BMI Calculated: 27 25  BSA Calculated: 1 91    Physical Exam    Constitutional   General appearance: No acute distress, well appearing and well nourished  Eyes   Conjunctiva and lids: No swelling, erythema or discharge  No temporal or jaw tenderness     Pupils and irises: Equal, round and reactive to light     Ears, Nose, Mouth, and Throat   External inspection of ears and nose: Normal     Oropharynx: Abnormal   +upper denture  Pulmonary   Respiratory effort: No increased work of breathing or signs of respiratory distress  Auscultation of lungs: Clear to auscultation  Cardiovascular   Auscultation of heart: Normal rate and rhythm, normal S1 and S2, without murmurs  Examination of extremities for edema and/or varicosities: Normal     Lymphatic   Palpation of lymph nodes in neck: No lymphadenopathy  Psychiatric   Orientation to person, place, and time: Normal     Mood and affect: Normal         Right glenohumeral joint tenderness and restricted ROM  Left glenohumeral joint tenderness and restricted ROM  Right Upper Extremity: (+OA changes of the hand)   Left Upper Extremity: (+OA changes of the hand)   Musculoskeletal - Joints, bones, and muscles: Abnormal  Palpation - bilateral knee crepitus  Right foot: All MTP, PIP and DIP joints are normal  Left foot: All MTP, PIP and DIP joints are normal       Results/Data  (1) C-REACTIVE PROTEIN 47Ivh9440 10:12AM [email or web address] Order Number: [id number]     Test Name Result Flag Reference   C-REACT PROTEIN 3 3 mg/L H <3 0     (1) SED RATE 02Omk7262 10:12AM [email or web address] Order Number: [id number]     Test Name Result Flag Reference   SED RATE 11 mm/hour H 0-10       Future Appointments    Date/Time Provider Specialty Site   11/01/2017 09:00 AM Joby Khan DO Cardiac Surgery [facility] [address] [place] [address]   12/15/2017 09:30 AM Anabell Cottrell [facility] Rheumatology [address] [facility]     Signatures   Electronically signed by : Bindu Sauceda [facility]; Oct 12 2017 11:16AM EST                       (Author)    Electronically signed by :  ALFRED Wood ; Oct 25 2017  8:00AM EST                       (Author)

## 2018-01-22 VITALS
SYSTOLIC BLOOD PRESSURE: 144 MMHG | HEART RATE: 60 BPM | BODY MASS INDEX: 27 KG/M2 | DIASTOLIC BLOOD PRESSURE: 76 MMHG | HEIGHT: 67 IN | WEIGHT: 172 LBS

## 2018-01-22 VITALS
WEIGHT: 181 LBS | DIASTOLIC BLOOD PRESSURE: 76 MMHG | HEART RATE: 84 BPM | BODY MASS INDEX: 28.41 KG/M2 | HEIGHT: 67 IN | SYSTOLIC BLOOD PRESSURE: 130 MMHG

## 2018-01-22 VITALS
WEIGHT: 174 LBS | DIASTOLIC BLOOD PRESSURE: 64 MMHG | SYSTOLIC BLOOD PRESSURE: 140 MMHG | BODY MASS INDEX: 27.31 KG/M2 | HEIGHT: 67 IN | HEART RATE: 74 BPM

## 2018-01-22 VITALS
SYSTOLIC BLOOD PRESSURE: 146 MMHG | HEART RATE: 66 BPM | HEIGHT: 67 IN | DIASTOLIC BLOOD PRESSURE: 70 MMHG | BODY MASS INDEX: 27.62 KG/M2 | WEIGHT: 176 LBS

## 2018-01-23 VITALS
BODY MASS INDEX: 27 KG/M2 | HEIGHT: 67 IN | HEART RATE: 62 BPM | SYSTOLIC BLOOD PRESSURE: 150 MMHG | WEIGHT: 172 LBS | DIASTOLIC BLOOD PRESSURE: 70 MMHG

## 2018-01-23 NOTE — PROGRESS NOTES
Assessment    1  Polymyalgia rheumatica (725) (M35 3)   2  Primary generalized (osteo)arthritis (715 09) (M15 0)   3  Long term (current) use of systemic steroids (V58 65) (Z79 52)   4  Benign essential hypertension (401 1) (I10)   5  Hypercholesterolemia (272 0) (E78 00)   6  Aortic stenosis (424 1) (I35 0)    Plan    1  PredniSONE 2 5 MG Oral Tablet; TAKE 2 TABLET Daily With Food    2  TraMADol HCl - 50 MG Oral Tablet; TAKE 1 TABLET 3 TIMES DAILY AS NEEDED   3  (1) CBC/PLT/DIFF; Status:Active; Requested [id number];    4  (1) COMPREHENSIVE METABOLIC PANEL; Status:Active; Requested [id number];    5  (1) C-REACTIVE PROTEIN; Status:Active; Requested [id number];    6  (1) SED RATE; Status:Active; Requested [id number];    7  Call [phone] if: The pain seems worse ; Status:Complete;   Done: 93GGX8811   8  Call [phone] if: The symptoms seem worse ; Status:Complete;   Done:   79JQX7104   9  Call [phone] if: You have questions or concerns about your problem ;   Status:Complete;   Done: 58BOQ6992   10  Follow-up visit in 3 months Evaluation and Treatment  Follow-up  Status: Hold For -    Scheduling  Requested for: 36AMP8889    Discussion/Summary    This is a 69-year-old gentleman presenting today for follow-up for polymyalgia rheumatica as well as osteoarthritis  He continues to have pain off and on  He describes pain primarily in the right hand, right elbow, and right shoulder  He also describes neck and low back pain as well as bilateral hip pain  He notes tingling and burning in both legs however has a history of vascular disease  He describes mild swelling in the ankles  He denies any further obvious joint swelling  He has morning stiffness lasting anywhere from a few minutes to a few hours  He has some difficulty with sleep due to multiple stressors  He also describes non restorative sleep as well as fatigue  He continues to utilize prednisone 5 mg daily   He also states that he does believe Crestor is contributing to his widespread pain  On physical examination, there is no active synovitis  Patient has no jaw or temporal tenderness  He has 2+ pulses of the temporal, carotid, and radial arteries  Patient does have tenderness or restricted range of motion of both shoulders  There are osteoarthritic changes of both hands with crepitus of the knees  His most recent labs reveal a CBC, CMP, CRP, ESR to be within normal range  At this time patient's history and physical examination is most consistent with polymyalgia rheumatica which appears to be stable at this time with the use of prednisone 5 mg daily  We will not make any further changes in patient's current prednisone dose however due to the fact the patient does have chronic osteoarthritis I did prescribe tramadol 50 mg to be taken 1 tablet 3 times a day as needed for pain as he is not a candidate for NSAIDs  He states that he finds limited to no relief with Tylenol  In addition I do believe that there is a component of neuropathy and he may benefit from following up with a vascular surgeon  If no acute vascular concerns are found we may consider trial of gabapentin for further treatment  Patient will plan to return to the office in 3 months time for further evaluation and obtain a CBC, CMP, CRP, ESR before that follow-up  In addition in the future if patient continues to have numbness and pain as well as pain in the neck and low back he may benefit from a referral to spine and pain for further evaluation  The patient has the current Goals: Improve joint pain  The patent has the current Barriers: None at this time  Patient is able to Self-Care  Counseling  Rheumatology Counseling Documentation: The patient was counseled regarding diagnostic results, instructions for management, prognosis, patient and family education, risks and benefits of treatment options and importance of compliance with treatment        Chief Complaint  F/U PMR Patient is here today for follow up of chronic conditions described in HPI  History of Present Illness  Patient is in the office today for follow up for PMR  Pain off and on at this time  Pain in the right hand, shoulder, and elbow  Pain in the neck and low back and hips  +Tingling and burning in the legs  +Mild swelling in the ankles  No other obvious joint swelling  +Am stiffness x minutes to hours  +Some difficulty with sleep due to multiple stressors  +Non restorative sleep  +Fatigue  Taking Prednisone 5 mg daily  RAPID3:16 0 /30      Review of Systems    Constitutional: "hot feeling", fatigue and recent 4 lb weight loss, but no fever, no recent weight gain, no chills and no anorexia  HEENT: No jaw claudication, dryness mouth and feeling congested, but no blurred vision, no double vision, no amaurosis fugax, no dryness of the eyes, no eye pain, no erythema eye(s), no mouth sores and no sore throat  Cardiovascular: swelling in the arms or legs, but no dyspnea on exertion    The patient presents with complaints of chest pain or pressure (with activity)  Respiratory: pleurisy, but no unusual or persistent cough and no shortness of breath  Gastrointestinal: heartburn, but no abdominal pain, no nausea, no vomiting, no diarrhea, no constipation, no melena and no BRBPR  Genitourinary: +bubbly urine and increased frequency, but no dysuria and no hematuria  Musculoskeletal: as noted in HPI  Integumentary no rash, no Raynaud's, no alopecia, no nail changes and no photosensitivity  Endocrine no polyuria and no polydipsia  Hematologic/Lymphatic: no unusual bleeding and no tendency for easy bruising  Neurological: headache, tingling and weakness    The patient presents with complaints of vertigo or dizziness, described as lightheadedness  Symptoms are made worse by looking up, lying down and getting up quickly  Psychiatric: insomnia and non-restorative sleep  Active Problems    1  Abnormal stress echo (794 39) (R94 39)   2  Activity intolerance related to fatigue (780 79) (R53 83)   3  Aortic stenosis (424 1) (I35 0)   4  Atherosclerosis of native artery of both lower extremities with intermittent claudication   (440 21) (I70 213)   5  Benign essential hypertension (401 1) (I10)   6  CAD (coronary artery disease) (414 00) (I25 10)   7  Cardiac murmur (785 2) (R01 1)   8  Cardiomyopathy, ischemic (414 8) (I25 5)   9  Chest pain, unspecified type (786 50) (R07 9)   10  Circulation problem (459 9) (I99 9)   11  Depression with anxiety (300 4) (F41 8)   12  RODRIGUEZ (dyspnea on exertion) (786 09) (R06 09)   13  Hearing problem (V41 2) (H91 90)   14  Heart burn (787 1) (R12)   15  Hypercholesterolemia (272 0) (E78 00)   16  Ischemic cardiomyopathy (414 8) (I25 5)   17  Long term (current) use of systemic steroids (V58 65) (Z79 52)   18  Polymyalgia rheumatica (725) (M35 3)   19  Primary generalized (osteo)arthritis (715 09) (M15 0)    Past Medical History    1  History of Acute frontal sinusitis (461 1) (J01 10)   2  History of Diffuse arthralgia (719 40) (M25 50)   3  History of acute bronchitis (V12 69) (Z87 09)   4  History of back pain (V13 59) (Z87 39)   5  History of hyperlipidemia (V12 29) (Z86 39)   6  History of influenza vaccination (V49 89) (Z92 29)   7  History of Lyme disease (V12 09) (Z86 19)   8  History of rheumatic fever (V12 09) (Z86 79)   9  History of Screening for colon cancer (V76 51) (Z12 11)    The active problems and past medical history were reviewed and updated today  Surgical History    1  History of Tonsillectomy With Adenoidectomy    The surgical history was reviewed and updated today  Family History  Mother    1  Family history of malignant neoplasm of cervix (V16 49) (Z80 49)  Father    2  Family history of CAD (coronary artery disease)   3  Family history of coronary artery disease (V17 3) (Z82 49)   4   Family history of myocardial infarction (V17 3) (Z82 49)  Brother    5  Family history of malignant neoplasm (V16 9) (Z80 9)  Grandfather    6  Family history of CAD (coronary artery disease)   7  Family history of coronary artery disease (V17 3) (Z82 49)  Paternal Grandfather    8  Family history of cerebrovascular accident (CVA) (V17 1) (Z82 3)   9  Family history of myocardial infarction (V17 3) (Z82 49)  Spouse    10  Family history of CAD (coronary artery disease)   6  Family history of coronary artery disease (V17 3) (Z82 49)    The family history was reviewed and updated today  Social History    · Employed   · 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   · Former smoker (J13 14) (X14 482)   ·    · Social drinker  The social history was reviewed and updated today  The social history was reviewed and is unchanged  Current Meds   1  AmLODIPine Besylate 10 MG Oral Tablet; take 1 tablet by mouth once daily; Therapy: 38YRU0926 to (Azul Smith)  Requested for: 64Cam5578 Recorded   2  Aspirin 325 MG Oral Tablet; TAKE 1 TABLET EVERY MORNING; Therapy: (Recorded:72Fcs8356) to Recorded   3  Isosorbide Mononitrate ER 30 MG Oral Tablet Extended Release 24 Hour; Take 30 mg   twice daily  Requested for: 17BLE9816; Last Rx:14Nov2017 Ordered   4  Multi Vitamin TABS; TAKE 1 TABLET DAILY; Therapy: (Recorded:01Nov2017) to Recorded   5  PredniSONE 2 5 MG Oral Tablet; TAKE 2 TABLET Daily With Food; Therapy: 47ORR8065 to (Evaluate:56Cjo1545)  Requested for: 13Oct2017; Last   Rx:13Oct2017 Ordered   6  Rosuvastatin Calcium 10 MG Oral Tablet; take 1 tablet every other day; Therapy: 09BCM6264 to (Evaluate:22Nov2017); Last Rx:23Oct2017 Ordered   7  Triamterene-HCTZ 37 5-25 MG Oral Capsule; TAKE 1 CAPSULE DAILY  Requested for:   96ICZ8739; Last Rx:30Oct2017 Ordered   8  Vitamin D 2000 UNIT Oral Capsule; Therapy: (Recorded:90Pik2797) to Recorded    The medication list was reviewed and updated today        Immunizations  Influenza --- Malia Mishra: 10/[id number] Dessert: 24-Aug-2017 PCV --- Amanda Akron: 13-Nov-2017     Allergies    1  Avelox TABS   2  Crestor TABS   3  Lipitor TABS   4  Pravachol TABS   5  Zocor TABS    Vitals  Signs   Recorded: 01ORI8750 12:03PM   Heart Rate: 62  Systolic: 216  Diastolic: 70  Height: 5 ft 7 in  Weight: 172 lb   BMI Calculated: 26 94  BSA Calculated: 1 9    Physical Exam    Constitutional   General appearance: No acute distress, well appearing and well nourished  Eyes   Conjunctiva and lids: No swelling, erythema or discharge  No temporal or jaw tenderness  Pupils and irises: Equal, round and reactive to light  Ears, Nose, Mouth, and Throat   External inspection of ears and nose: Normal     Oropharynx: Abnormal   +upper denture  Pulmonary   Respiratory effort: No increased work of breathing or signs of respiratory distress  Auscultation of lungs: Clear to auscultation  Cardiovascular   Auscultation of heart: Normal rate and rhythm, normal S1 and S2, without murmurs  Examination of extremities for edema and/or varicosities: Normal     Lymphatic   Palpation of lymph nodes in neck: No lymphadenopathy  Psychiatric   Orientation to person, place, and time: Normal     Mood and affect: Normal         Right glenohumeral joint tenderness and restricted ROM  Left glenohumeral joint tenderness and restricted ROM  Right Upper Extremity: (+OA changes of the hand)   Left Upper Extremity: (+OA changes of the hand)   Musculoskeletal - Joints, bones, and muscles: Abnormal  Palpation - bilateral knee crepitus       Right foot: All MTP, PIP and DIP joints are normal  Left foot: All MTP, PIP and DIP joints are normal       Results/Data  (1) CBC/PLT/DIFF 03XBE2399 11:37AM Rolly Petit    Order Number: [id number]     Test Name Result Flag Reference   WBC COUNT 9 02 Thousand/uL  4 31-10 16   RBC COUNT 4 71 Million/uL  3 88-5 62   HEMOGLOBIN 14 8 g/dL  12 0-17 0   HEMATOCRIT 42 2 %  36 5-49 3   MCV 90 fL  82-98   MCH 31 4 pg  26 8-34 3   MCHC 35 1 g/dL  31 4-37 4   RDW 13 2 %  11 6-15 1   MPV 9 9 fL  8 9-12 7   PLATELET COUNT 039 Thousands/uL  149-390   nRBC AUTOMATED 0 /100 WBCs     NEUTROPHILS RELATIVE PERCENT 81 % H 43-75   LYMPHOCYTES RELATIVE PERCENT 13 % L 14-44   MONOCYTES RELATIVE PERCENT 6 %  4-12   EOSINOPHILS RELATIVE PERCENT 0 %  0-6   BASOPHILS RELATIVE PERCENT 0 %  0-1   NEUTROPHILS ABSOLUTE COUNT 7 30 Thousands/? ??L  1 85-7 62   LYMPHOCYTES ABSOLUTE COUNT 1 14 Thousands/? ??L  0 60-4 47   MONOCYTES ABSOLUTE COUNT 0 50 Thousand/? ??L  0 17-1 22   EOSINOPHILS ABSOLUTE COUNT 0 02 Thousand/? ??L  0 00-0 61   BASOPHILS ABSOLUTE COUNT 0 04 Thousands/? ??L  0 00-0 10     (1) COMPREHENSIVE METABOLIC PANEL 87AHF5304 34:30CH Linus Sanders    Order Number: [id number]     Test Name Result Flag Reference   SODIUM 134 mmol/L L 136-145   POTASSIUM 4 4 mmol/L  3 5-5 3   CHLORIDE 99 mmol/L L 100-108   CARBON DIOXIDE 28 mmol/L  21-32   ANION GAP (CALC) 7 mmol/L  4-13   BLOOD UREA NITROGEN 14 mg/dL  5-25   CREATININE 0 83 mg/dL  0 60-1 30   Standardized to IDMS reference method   CALCIUM 9 8 mg/dL  8 3-10 1   BILI, TOTAL 0 51 mg/dL  0 20-1 00   ALK PHOSPHATAS 58 U/L     ALT (SGPT) 26 U/L  12-78   Specimen collection should occur prior to Sulfasalazine and/or Sulfapyridine administration due to the potential for falsely depressed results  AST(SGOT) 23 U/L  5-45   Specimen collection should occur prior to Sulfasalazine administration due to the potential for falsely depressed results  ALBUMIN 4 0 g/dL  3 5-5 0   TOTAL PROTEIN 7 5 g/dL  6 4-8 2   eGFR 90 ml/min/1 73sq m     [facility] Disease Education Program recommendations are as follows:  GFR calculation is accurate only with a steady state creatinine  Chronic Kidney disease less than 60 ml/min/1 73 sq  meters  Kidney failure less than 15 ml/min/1 73 sq  meters     GLUCOSE FASTING 119 mg/dL H 65-99   Specimen collection should occur prior to Sulfasalazine administration due to the potential for falsely depressed results  Specimen collection should occur prior to Sulfapyridine administration due to the potential for falsely elevated results  (1) C-REACTIVE PROTEIN 51PFS3444 11:37AM Toribio Benitez    Order Number: [id number]     Test Name Result Flag Reference   C-REACT PROTEIN <3 0 mg/L  <3 0     (1) SED RATE 99EZV4356 11:37AM Toribio Benitez    Order Number: [id number]     Test Name Result Flag Reference   SED RATE 11 mm/hour H 0-10       Signatures   Electronically signed by : CALLY Jurado; Jan 9 2018 12:47PM EST                       (Author)    Electronically signed by :  ALFRED Mejia ; Jan 11 2018 11:04AM EST                       (Author)

## 2018-01-30 DIAGNOSIS — I25.10 CORONARY ARTERIOSCLEROSIS: Primary | ICD-10-CM

## 2018-01-30 RX ORDER — ISOSORBIDE MONONITRATE 30 MG/1
TABLET, EXTENDED RELEASE ORAL
Qty: 180 TABLET | Refills: 0 | Status: SHIPPED | OUTPATIENT
Start: 2018-01-30 | End: 2018-02-07 | Stop reason: SDUPTHER

## 2018-02-07 DIAGNOSIS — I25.10 CORONARY ARTERIOSCLEROSIS: ICD-10-CM

## 2018-02-07 RX ORDER — ISOSORBIDE MONONITRATE 30 MG/1
30 TABLET, EXTENDED RELEASE ORAL 2 TIMES DAILY
Qty: 180 TABLET | Refills: 3 | Status: SHIPPED | OUTPATIENT
Start: 2018-02-07 | End: 2018-06-27 | Stop reason: ALTCHOICE

## 2018-02-07 NOTE — TELEPHONE ENCOUNTER
Patient is requesting a refill on Isosorbide MN 30mg twice daily  It is on the med list but is not in any of your notes  Is this ok to fill?

## 2018-03-02 RX ORDER — MULTIVITAMIN
1 TABLET ORAL DAILY
COMMUNITY
End: 2018-03-02

## 2018-03-02 RX ORDER — TRAMADOL HYDROCHLORIDE 50 MG/1
1 TABLET ORAL 3 TIMES DAILY PRN
COMMUNITY
Start: 2018-01-09 | End: 2018-06-06 | Stop reason: SDUPTHER

## 2018-03-02 RX ORDER — MULTIVIT-MIN/IRON/FOLIC ACID/K 18-600-40
CAPSULE ORAL
COMMUNITY
End: 2018-03-02 | Stop reason: SDUPTHER

## 2018-03-05 DIAGNOSIS — I10 ESSENTIAL HYPERTENSION: Primary | ICD-10-CM

## 2018-03-05 RX ORDER — AMLODIPINE BESYLATE 10 MG/1
TABLET ORAL
Qty: 30 TABLET | Refills: 2 | Status: SHIPPED | OUTPATIENT
Start: 2018-03-05 | End: 2018-06-01 | Stop reason: SDUPTHER

## 2018-03-23 ENCOUNTER — OFFICE VISIT (OUTPATIENT)
Dept: CARDIOLOGY CLINIC | Facility: CLINIC | Age: 68
End: 2018-03-23
Payer: MEDICARE

## 2018-03-23 VITALS
WEIGHT: 172 LBS | SYSTOLIC BLOOD PRESSURE: 168 MMHG | HEIGHT: 68 IN | HEART RATE: 64 BPM | DIASTOLIC BLOOD PRESSURE: 60 MMHG | BODY MASS INDEX: 26.07 KG/M2

## 2018-03-23 DIAGNOSIS — I10 ESSENTIAL HYPERTENSION: ICD-10-CM

## 2018-03-23 DIAGNOSIS — I25.5 CARDIOMYOPATHY, ISCHEMIC: Primary | Chronic | ICD-10-CM

## 2018-03-23 DIAGNOSIS — I20.8 STABLE ANGINA (HCC): ICD-10-CM

## 2018-03-23 DIAGNOSIS — I35.0 NONRHEUMATIC AORTIC VALVE STENOSIS: Chronic | ICD-10-CM

## 2018-03-23 DIAGNOSIS — I10 ESSENTIAL HYPERTENSION: Primary | ICD-10-CM

## 2018-03-23 PROCEDURE — 99214 OFFICE O/P EST MOD 30 MIN: CPT | Performed by: INTERNAL MEDICINE

## 2018-03-25 PROBLEM — I20.8 STABLE ANGINA (HCC): Status: ACTIVE | Noted: 2018-03-25

## 2018-03-25 PROBLEM — I10 ESSENTIAL HYPERTENSION: Status: ACTIVE | Noted: 2018-03-25

## 2018-03-26 ENCOUNTER — TRANSCRIBE ORDERS (OUTPATIENT)
Dept: LAB | Facility: CLINIC | Age: 68
End: 2018-03-26

## 2018-03-26 ENCOUNTER — APPOINTMENT (OUTPATIENT)
Dept: LAB | Facility: CLINIC | Age: 68
End: 2018-03-26
Payer: MEDICARE

## 2018-03-26 DIAGNOSIS — I10 ESSENTIAL HYPERTENSION: Primary | ICD-10-CM

## 2018-03-26 DIAGNOSIS — M15.0 PRIMARY GENERALIZED (OSTEO)ARTHRITIS: ICD-10-CM

## 2018-03-26 DIAGNOSIS — I10 ESSENTIAL HYPERTENSION: ICD-10-CM

## 2018-03-26 DIAGNOSIS — M35.3 POLYMYALGIA RHEUMATICA (HCC): ICD-10-CM

## 2018-03-26 LAB
25(OH)D3 SERPL-MCNC: 28.6 NG/ML (ref 30–100)
ALBUMIN SERPL BCP-MCNC: 4 G/DL (ref 3.5–5)
ALP SERPL-CCNC: 56 U/L (ref 46–116)
ALT SERPL W P-5'-P-CCNC: 28 U/L (ref 12–78)
ANION GAP SERPL CALCULATED.3IONS-SCNC: 3 MMOL/L (ref 4–13)
AST SERPL W P-5'-P-CCNC: 29 U/L (ref 5–45)
BASOPHILS # BLD AUTO: 0.06 THOUSANDS/ΜL (ref 0–0.1)
BASOPHILS NFR BLD AUTO: 1 % (ref 0–1)
BILIRUB SERPL-MCNC: 0.6 MG/DL (ref 0.2–1)
BILIRUB UR QL STRIP: NEGATIVE
BUN SERPL-MCNC: 11 MG/DL (ref 5–25)
CALCIUM SERPL-MCNC: 9.1 MG/DL (ref 8.3–10.1)
CHLORIDE SERPL-SCNC: 100 MMOL/L (ref 100–108)
CHOLEST SERPL-MCNC: 165 MG/DL (ref 50–200)
CLARITY UR: CLEAR
CO2 SERPL-SCNC: 31 MMOL/L (ref 21–32)
COLOR UR: YELLOW
CREAT SERPL-MCNC: 0.84 MG/DL (ref 0.6–1.3)
CRP SERPL QL: <3 MG/L
EOSINOPHIL # BLD AUTO: 0.23 THOUSAND/ΜL (ref 0–0.61)
EOSINOPHIL NFR BLD AUTO: 4 % (ref 0–6)
ERYTHROCYTE [DISTWIDTH] IN BLOOD BY AUTOMATED COUNT: 13.4 % (ref 11.6–15.1)
ERYTHROCYTE [SEDIMENTATION RATE] IN BLOOD: 12 MM/HOUR (ref 0–10)
GFR SERPL CREATININE-BSD FRML MDRD: 89 ML/MIN/1.73SQ M
GLUCOSE P FAST SERPL-MCNC: 90 MG/DL (ref 65–99)
GLUCOSE UR STRIP-MCNC: NEGATIVE MG/DL
HCT VFR BLD AUTO: 44.4 % (ref 36.5–49.3)
HDLC SERPL-MCNC: 53 MG/DL (ref 40–60)
HGB BLD-MCNC: 15.5 G/DL (ref 12–17)
HGB UR QL STRIP.AUTO: NEGATIVE
KETONES UR STRIP-MCNC: NEGATIVE MG/DL
LDLC SERPL CALC-MCNC: 88 MG/DL (ref 0–100)
LEUKOCYTE ESTERASE UR QL STRIP: NEGATIVE
LYMPHOCYTES # BLD AUTO: 1.43 THOUSANDS/ΜL (ref 0.6–4.47)
LYMPHOCYTES NFR BLD AUTO: 24 % (ref 14–44)
MCH RBC QN AUTO: 31.5 PG (ref 26.8–34.3)
MCHC RBC AUTO-ENTMCNC: 34.9 G/DL (ref 31.4–37.4)
MCV RBC AUTO: 90 FL (ref 82–98)
MONOCYTES # BLD AUTO: 0.64 THOUSAND/ΜL (ref 0.17–1.22)
MONOCYTES NFR BLD AUTO: 11 % (ref 4–12)
NEUTROPHILS # BLD AUTO: 3.63 THOUSANDS/ΜL (ref 1.85–7.62)
NEUTS SEG NFR BLD AUTO: 60 % (ref 43–75)
NITRITE UR QL STRIP: NEGATIVE
NRBC BLD AUTO-RTO: 0 /100 WBCS
PH UR STRIP.AUTO: 7 [PH] (ref 4.5–8)
PLATELET # BLD AUTO: 212 THOUSANDS/UL (ref 149–390)
PMV BLD AUTO: 10.3 FL (ref 8.9–12.7)
POTASSIUM SERPL-SCNC: 4.5 MMOL/L (ref 3.5–5.3)
PROT SERPL-MCNC: 7.4 G/DL (ref 6.4–8.2)
PROT UR STRIP-MCNC: NEGATIVE MG/DL
RBC # BLD AUTO: 4.92 MILLION/UL (ref 3.88–5.62)
SODIUM SERPL-SCNC: 134 MMOL/L (ref 136–145)
SP GR UR STRIP.AUTO: 1.01 (ref 1–1.03)
TRIGL SERPL-MCNC: 119 MG/DL
TSH SERPL DL<=0.05 MIU/L-ACNC: 1.74 UIU/ML
TSH SERPL DL<=0.05 MIU/L-ACNC: 1.74 UIU/ML (ref 0.36–3.74)
UROBILINOGEN UR QL STRIP.AUTO: 0.2 E.U./DL
WBC # BLD AUTO: 6 THOUSAND/UL (ref 4.31–10.16)

## 2018-03-26 PROCEDURE — 80061 LIPID PANEL: CPT

## 2018-03-26 PROCEDURE — 36415 COLL VENOUS BLD VENIPUNCTURE: CPT

## 2018-03-26 PROCEDURE — 85025 COMPLETE CBC W/AUTO DIFF WBC: CPT

## 2018-03-26 PROCEDURE — 81003 URINALYSIS AUTO W/O SCOPE: CPT

## 2018-03-26 PROCEDURE — 82306 VITAMIN D 25 HYDROXY: CPT

## 2018-03-26 PROCEDURE — 85652 RBC SED RATE AUTOMATED: CPT

## 2018-03-26 PROCEDURE — 84443 ASSAY THYROID STIM HORMONE: CPT

## 2018-03-26 PROCEDURE — 86140 C-REACTIVE PROTEIN: CPT

## 2018-03-26 PROCEDURE — 80053 COMPREHEN METABOLIC PANEL: CPT

## 2018-05-02 DIAGNOSIS — I10 ESSENTIAL HYPERTENSION: Primary | ICD-10-CM

## 2018-05-02 RX ORDER — TRIAMTERENE AND HYDROCHLOROTHIAZIDE 37.5; 25 MG/1; MG/1
CAPSULE ORAL
Qty: 90 CAPSULE | Refills: 1 | Status: SHIPPED | OUTPATIENT
Start: 2018-05-02 | End: 2018-10-23 | Stop reason: SDUPTHER

## 2018-05-04 DIAGNOSIS — I20.8 ANGINA AT REST (HCC): Primary | ICD-10-CM

## 2018-05-04 RX ORDER — RANOLAZINE 500 MG/1
500 TABLET, EXTENDED RELEASE ORAL 2 TIMES DAILY
Qty: 60 TABLET | Refills: 5 | Status: SHIPPED | COMMUNITY
Start: 2018-05-04 | End: 2018-06-01 | Stop reason: SDUPTHER

## 2018-05-04 NOTE — TELEPHONE ENCOUNTER
P/C asking for Ranexa 500mg BID  Pt stated Dr Yohannes Garcia provided pt with both strengths to determine which one he felt better on  Pt prefers the 500mg instead   Provided samples

## 2018-06-01 DIAGNOSIS — I20.8 ANGINA AT REST (HCC): ICD-10-CM

## 2018-06-01 DIAGNOSIS — I10 ESSENTIAL HYPERTENSION: ICD-10-CM

## 2018-06-01 RX ORDER — AMLODIPINE BESYLATE 10 MG/1
10 TABLET ORAL DAILY
Qty: 90 TABLET | Refills: 0 | Status: SHIPPED | OUTPATIENT
Start: 2018-06-01 | End: 2018-11-19 | Stop reason: SDUPTHER

## 2018-06-01 RX ORDER — RANOLAZINE 500 MG/1
500 TABLET, EXTENDED RELEASE ORAL 2 TIMES DAILY
Qty: 56 TABLET | Refills: 3 | Status: SHIPPED | COMMUNITY
Start: 2018-06-01 | End: 2018-06-28 | Stop reason: SDUPTHER

## 2018-06-06 ENCOUNTER — OFFICE VISIT (OUTPATIENT)
Dept: FAMILY MEDICINE CLINIC | Facility: CLINIC | Age: 68
End: 2018-06-06
Payer: MEDICARE

## 2018-06-06 VITALS
HEIGHT: 67 IN | WEIGHT: 163 LBS | DIASTOLIC BLOOD PRESSURE: 98 MMHG | TEMPERATURE: 97.7 F | SYSTOLIC BLOOD PRESSURE: 160 MMHG | BODY MASS INDEX: 25.58 KG/M2

## 2018-06-06 VITALS — WEIGHT: 163 LBS | BODY MASS INDEX: 25.72 KG/M2

## 2018-06-06 DIAGNOSIS — I10 ESSENTIAL HYPERTENSION: Primary | ICD-10-CM

## 2018-06-06 DIAGNOSIS — Z12.11 SCREENING FOR COLON CANCER: ICD-10-CM

## 2018-06-06 DIAGNOSIS — Z00.00 MEDICARE ANNUAL WELLNESS VISIT, SUBSEQUENT: Primary | ICD-10-CM

## 2018-06-06 DIAGNOSIS — E55.9 VITAMIN D DEFICIENCY: ICD-10-CM

## 2018-06-06 DIAGNOSIS — I25.5 CARDIOMYOPATHY, ISCHEMIC: Chronic | ICD-10-CM

## 2018-06-06 DIAGNOSIS — Z86.79 H/O INTERMITTENT CLAUDICATION: ICD-10-CM

## 2018-06-06 DIAGNOSIS — M81.0 AGE RELATED OSTEOPOROSIS, UNSPECIFIED PATHOLOGICAL FRACTURE PRESENCE: ICD-10-CM

## 2018-06-06 DIAGNOSIS — E87.1 HYPONATREMIA: ICD-10-CM

## 2018-06-06 DIAGNOSIS — Z01.00 EYE EXAM, ROUTINE: ICD-10-CM

## 2018-06-06 DIAGNOSIS — M35.3 POLYMYALGIA RHEUMATICA (HCC): ICD-10-CM

## 2018-06-06 DIAGNOSIS — Z12.5 SCREENING FOR PROSTATE CANCER: ICD-10-CM

## 2018-06-06 PROCEDURE — G0439 PPPS, SUBSEQ VISIT: HCPCS | Performed by: FAMILY MEDICINE

## 2018-06-06 PROCEDURE — 99214 OFFICE O/P EST MOD 30 MIN: CPT | Performed by: FAMILY MEDICINE

## 2018-06-06 RX ORDER — TRAMADOL HYDROCHLORIDE 50 MG/1
50 TABLET ORAL 3 TIMES DAILY PRN
Qty: 30 TABLET | Refills: 0 | Status: SHIPPED | OUTPATIENT
Start: 2018-06-06 | End: 2019-02-15

## 2018-06-06 NOTE — PROGRESS NOTES
Assessment/Plan:    No problem-specific Assessment & Plan notes found for this encounter  Diagnoses and all orders for this visit:    Essential hypertension  Comments:  pt is getting good numbers at home  Orders:  -     CBC and differential; Future  -     Comprehensive metabolic panel; Future  -     Lipid panel; Future  -     TSH, 3rd generation with T4 reflex; Future    Vitamin D deficiency  Comments:  OTC repleation    Polymyalgia rheumatica ([address] )  Comments:  continue with rheumatology  Orders:  -     traMADol (ULTRAM) 50 mg tablet; Take 1 tablet (50 mg total) by mouth 3 (three) times a day as needed for moderate pain    Cardiomyopathy, ischemic  Comments:  continue with cardiology    Screening for colon cancer  -     Ambulatory referral to Gastroenterology; Future    Hyponatremia    Screening for prostate cancer  -     PSA, Total Screen; Future    H/O intermittent claudication  -     VAS lower limb arterial duplex, complete bilateral; Future          Subjective:      Patient ID: Tiera Husain is a 77 y o  male  Pt is checking Bps at home and seeing an average of 120 over 70, follow up for polymyalgia rheumatica pt states he is on his 3rd rheumatologist secondary to frequent change in the doctors at that office, pt uses tramadol for pain and is on chronic prednisone and renexa      Hypertension   This is a chronic problem  The current episode started more than 1 year ago  The problem is controlled  Pertinent negatives include no chest pain, palpitations or shortness of breath  There are no associated agents to hypertension  Risk factors for coronary artery disease include male gender and sedentary lifestyle  Past treatments include calcium channel blockers  Compliance problems include diet and exercise          The following portions of the patient's history were reviewed and updated as appropriate: allergies, current medications, past family history, past medical history, past social history, past surgical history and problem list     Review of Systems   Constitutional: Negative  Negative for chills, fatigue and fever  HENT: Negative  Eyes: Negative  Respiratory: Negative for shortness of breath and wheezing  Cardiovascular: Negative for chest pain and palpitations  Gastrointestinal: Negative for abdominal pain, blood in stool, constipation, diarrhea, nausea and vomiting  Endocrine: Negative  Genitourinary: Negative for difficulty urinating and dysuria  Musculoskeletal: Positive for arthralgias  Negative for myalgias  Skin: Negative  Allergic/Immunologic: Negative  Neurological: Negative for seizures and syncope  Hematological: Negative for adenopathy  Psychiatric/Behavioral: Negative  Objective: There were no vitals taken for this visit  Physical Exam   Constitutional: He is oriented to person, place, and time  He appears well-developed and well-nourished  No distress  HENT:   Head: Normocephalic and atraumatic  Right Ear: External ear normal    Left Ear: External ear normal    Nose: Nose normal    Mouth/Throat: Oropharynx is clear and moist    Eyes: Conjunctivae and EOM are normal  Pupils are equal, round, and reactive to light  No scleral icterus  Neck: Normal range of motion  Neck supple  Cardiovascular: Normal rate and regular rhythm  Exam reveals no gallop and no friction rub  Murmur heard  Pulmonary/Chest: Effort normal and breath sounds normal  No respiratory distress  He has no wheezes  He has no rales  Abdominal: Soft  Bowel sounds are normal  He exhibits no distension and no mass  There is no tenderness  There is no rebound and no guarding  Musculoskeletal: Normal range of motion  He exhibits no edema  Lymphadenopathy:     He has no cervical adenopathy  Neurological: He is alert and oriented to person, place, and time  He has normal reflexes  He exhibits normal muscle tone  Skin: Skin is warm and dry  No rash noted   He is not diaphoretic  No erythema  No pallor  Psychiatric: He has a normal mood and affect   His behavior is normal  Judgment and thought content normal

## 2018-06-06 NOTE — PROGRESS NOTES
Assessment and Plan:    Problem List Items Addressed This Visit     None      Visit Diagnoses     Medicare annual wellness visit, subsequent    -  Primary        Health Maintenance Due   Topic Date Due    Hepatitis C Screening  1950    Depression Screening PHQ-9  1950    Annual Wellnes Visit (AWV)  1950    CRC Screening: Colonoscopy  1950    DTaP,Tdap,and Td Vaccines (1 - Tdap) 1969    Fall Risk  2013    ABDOMINAL AORTIC ANEURYSM (AAA) SCREEN  2013    PNEUMOCOCCAL POLYSACCHARIDE VACCINE AGE 72 AND OVER  2013    GLAUCOMA SCREENING 67+ YR  2015         HPI:  Alyse Singh is a 77 y o  male here for his Subsequent Wellness Visit      Patient Active Problem List   Diagnosis    Nonrheumatic aortic valve stenosis    RODRIGUEZ (dyspnea on exertion)    Cardiomyopathy, ischemic    [facility] [facility]    Essential hypertension     Past Medical History:   Diagnosis Date    Benign essential hypertension     Cardiomyopathy [facility] [facility]     Coronary artery disease     Depression with anxiety     Diffuse arthralgia     Last Assessed: 2017    Hypercholesterolemia     Hyperlipidemia     Lyme disease     Polymyalgia rheumatica [facility] [address] [facility]     Rheumatic fever      Past Surgical History:   Procedure Laterality Date    TONSILLECTOMY AND ADENOIDECTOMY       Family History   Problem Relation Age of Onset    Cancer Mother      cervix    Coronary artery disease Father     Heart attack Father     Cancer Brother     Coronary artery disease Maternal Grandfather     Heart disease Paternal Grandfather     Stroke Paternal Grandfather      CVA    Heart attack Paternal Grandfather     Coronary artery disease Other       at age 78 per [employer]     History   Smoking Status    Former Smoker    Quit date:    Smokeless Tobacco    Never Used     History   Alcohol Use    Yes     Comment: social      History   Drug use: Unknown       Current Outpatient Prescriptions Medication Sig Dispense Refill    amLODIPine (NORVASC) 10 mg tablet Take 1 tablet (10 mg total) by mouth daily 90 tablet 0    aspirin 325 mg tablet Take 325 mg by mouth daily at bedtime      Cholecalciferol (VITAMIN D3) 2000 units TABS Take 2,000 Units by mouth daily      isosorbide mononitrate (IMDUR) 30 mg 24 hr tablet Take 1 tablet (30 mg total) by mouth 2 (two) times a day 180 tablet 3    Multiple Vitamin (MULTIVITAMIN) tablet Take 1 tablet by mouth daily      predniSONE 2 5 mg tablet Take 5 mg by mouth daily        ranolazine (RANEXA) 500 mg 12 hr tablet Take 1 tablet (500 mg total) by mouth 2 (two) times a day 56 tablet 3    triamterene-hydrochlorothiazide (DYAZIDE) 37 5-25 mg per capsule take 1 capsule by mouth once daily 90 capsule 1    rosuvastatin (CRESTOR) 10 MG tablet Take 10 mg by mouth daily at bedtime      traMADol (ULTRAM) 50 mg tablet Take 1 tablet by mouth 3 (three) times a day as needed       No current facility-administered medications for this visit  Allergies   Allergen Reactions    Atorvastatin Myalgia     Annotation - [id number]: Joint pain, general pain    Avelox [Moxifloxacin] Diarrhea    Pravastatin     Rosuvastatin Calcium Myalgia     Annotation - [id number]: Joint pain, general pain    Simvastatin      Immunization History   Administered Date(s) Administered    Influenza Split High Dose Preservative Free IM 10/10/2016    Influenza TIV (IM) 08/24/2017    Pneumococcal Conjugate PCV 7 11/13/2017       Patient Care Team:  Melisa Quiles DO as PCP - [address] [facility] [address], [place]  Kathy Kline DO  Physical Exam   Constitutional: He is oriented to person, place, and time  He appears well-developed and well-nourished  No distress  Neurological: He is alert and oriented to person, place, and time  He exhibits normal muscle tone  Skin: He is not diaphoretic  Psychiatric: He has a normal mood and affect   His behavior is normal  Judgment and thought content normal    Nursing note and vitals reviewed  Medicare Screening Tests and Risk Assessments:  AWV Clinical     ISAR:   Previous hospitalizations?:  No       Once in a Lifetime Medicare Screening:   EKG performed?:  Yes Results:  normal   AAA screening performed? (if performed, please add date to Health Maintenance):  No       Medicare Screening Tests and Risk Assessment:   AAA Risk Assessment     Tobacco use (males only):   Yes   Age over 72 (males only):  Yes    Osteoporosis Risk Assessment    Age over 48:  Yes    Tobacco use:  Yes    HIV Risk Assessment    None indicated:  Yes        Drug and Alcohol Use:   Tobacco use    Cigarettes:  former smoker    Smokeless:  never used smokeless tobacco    Tobacco use duration    Tobacco Cessation Readiness    Alcohol use    Alcohol use:  never    Alcohol Treatment Readiness   Illicit Drug Use    Drug use:  never        Diet & Exercise:   Diet   What is your diet?:  Regular   How many servings a day of the following:   Fruits and Vegetables:  1-2    Dairy:  1 Soda:  0   Coffee:  4 Tea:  0   Exercise    Do you currently exercise?:  yes    Frequency:  daily    Minutes per day:  15    Type of exercise:  cardio, weights       Cognitive Impairment Screening:   Depression screening preformed:  Yes    Depression screening results:  mild to moderate symptoms   Cognitive Impairment Screening    Do you have difficulty learning or retaining new information?:  No Do you have difficulty handling new tasks?:  No   Do you have difficulty with reasoning?:  No Do you have difficulty with spatial ability and orientation?:  No   Do you have difficulty with language?:  No Do you have difficulty with behavior?:  No       Functional Ability/Level of Safety:   Hearing    Hearing difficulties:  Yes Bilateral:  slightly decreased   Left:  slightly decreased Right:  slightly decreased   Hearing aid:  Yes    Hearing Impairment Assessment    Do your family members ever complain that you turn on the radio or T V  too loudly?:  Yes Do you find that other people have to repeat themselves when talking to you?:  Yes   Do you have difficulty hearing while talking on the phone?:  Yes Has anyone ever told you that you are speaking too loudly when talking with them?:  No   Do you have trouble hearing the doorbell or phone ringing?:  No Do you have difficulty hearing such that you feel frustrated talking to people?:  No   Do you feel sad because you cannot hear well?:  No Do you feel inconvienced due to your hearing problem?:  No   Do you think you would be a happier person if you could hear better?:  No    Current Activities    Status:  unlimited ADL's, unlimited driving, unlimited IADL's, unlimited social activities   Help needed with the folllowing:    Using the phone:  No Transportation:  No   Shopping:  No Preparing Meals:  No   Doing Housework:  No Doing Laundry:  No   Managing Medications:  No Managing Money:  No   ADL    Fall Risk   Have you fallen in the last 12 months?:  No Are you unsteady on your feet?:  No    Are you taking any medications that may cause fatigue or dizziness?:  No    Do you rush to the bathroom potentially risking a fall?:  No   Injury History       Home Safety:   Are there hazards in your environment?:  No   If you fell, would you need help to get back up from the ground?:  Yes Do you have problems or concerns getting in/out of a bed, chair, tub, or toilet?:  No   Do you feel unsteady when walking?:  No Is your activity limited by pain?:  No   Do you have handrails and grab-bars in the home?:  Yes Are emergency numbers kept by the phone and regularly updated?:  Yes   Are you and/or family members aware of the dangers of smoking in bed?:  Yes Are firearms stored securely?:  Yes   Do you have working smoke alarms and fire extinguisher?:  Yes Do all household members know how to use them?:  Yes   Have you left the stove on unsupervised?:  No    Home Safety Risk Factors   Unfamilar with surroundings:  No Uneven floors:  No   Stairs or handrail saftey risk:  No Loose rugs:  No   Household clutter:  No Poor household lighting:  No   No grab bars in bathroom:  No Further evaluation needed:  No       Advanced Directives:   Advanced Directives    Living Will:  No Durable POA for healthcare:  No   Advanced directive:  No    Patient's End of Life Decisions        Urinary Incontinence:   Do you have urinary incontinence?:  No    Do you urinate frequently?:  No    Do you have nocturia (waking up to urinate)?:  Yes    Do you dribble urine after finishing?:  No        Glaucoma:            Provider Screening     Preventative Screening/Counseling:   Cardiovascular Screening/Counseling:   (Labs Q5 years, EKG optional one-time)         Diabetes Screening/Counseling:   (2 tests/year if Pre-Diabetes or 1 test/year if no Diabetes)         Colorectal Cancer Screening/Counseling:   (FOBT Q1 yr; Flex Sig Q4 yrs or Q10 yrs after Screening Colonoscopy; Screening Colonoscpy Q2 yrs High Risk or Q10 yrs Low Risk; Barium Enema Q2 yrs High Risk or Q4 yrs Low Risk)         Prostate Cancer Screening/Counseling:   (Annual)          Breast Cancer Screening/Counseling:   (Baseline Age 28 - 43; Annual Age 36+)         Cervical Cancer Screening/Counseling:   (Annual for High Risk or Childbearing Age with Abnormal Pap in Last 3 yrs; Every 2 all others)         Osteoporosis Screening/Counseling:   (Every 2 Yrs if at risk or more if medically necessary)         AAA Screening/Counseling:   (Once per Lifetime with risk factors)     Age over 72 (males only):  Yes Tobacco use (males only):  Yes         Glaucoma Screening/Counseling:   (Annual)         HIV Screening/Counseling:   (Voluntary; Once annually for high risk OR 3 times for Pregnancy at diagnosis of IUP; 3rd trimester; and at Labor         Hepatitis C Screening:             Immunizations:        Other Preventative Couseling (Non-Medicare Wellness Visit Required):       Referrals (Non-Medicare Wellness Visit Required):       Medical Equipment/Suppliers:

## 2018-06-27 ENCOUNTER — OFFICE VISIT (OUTPATIENT)
Dept: CARDIOLOGY CLINIC | Facility: CLINIC | Age: 68
End: 2018-06-27
Payer: MEDICARE

## 2018-06-27 VITALS
WEIGHT: 167 LBS | HEIGHT: 67 IN | DIASTOLIC BLOOD PRESSURE: 70 MMHG | SYSTOLIC BLOOD PRESSURE: 154 MMHG | HEART RATE: 64 BPM | BODY MASS INDEX: 26.21 KG/M2

## 2018-06-27 DIAGNOSIS — I25.118 CORONARY ARTERY DISEASE OF NATIVE ARTERY OF NATIVE HEART WITH STABLE ANGINA PECTORIS (HCC): ICD-10-CM

## 2018-06-27 DIAGNOSIS — I10 ESSENTIAL HYPERTENSION: ICD-10-CM

## 2018-06-27 DIAGNOSIS — I35.0 NONRHEUMATIC AORTIC VALVE STENOSIS: Chronic | ICD-10-CM

## 2018-06-27 DIAGNOSIS — I25.5 CARDIOMYOPATHY, ISCHEMIC: Primary | Chronic | ICD-10-CM

## 2018-06-27 PROBLEM — I20.8 STABLE ANGINA (HCC): Status: RESOLVED | Noted: 2018-03-25 | Resolved: 2018-06-27

## 2018-06-27 PROCEDURE — 99214 OFFICE O/P EST MOD 30 MIN: CPT | Performed by: INTERNAL MEDICINE

## 2018-06-27 RX ORDER — BENAZEPRIL HYDROCHLORIDE 10 MG/1
10 TABLET ORAL DAILY
Qty: 90 TABLET | Refills: 3 | Status: SHIPPED | OUTPATIENT
Start: 2018-06-27 | End: 2018-12-14

## 2018-06-27 NOTE — PROGRESS NOTES
Cardiology Follow Up    Florinda Vides  1950  [id number]  [facility]  [address] [place] [address] [address]    1  Cardiomyopathy, ischemic  benazepril (LOTENSIN) 10 mg tablet   2  Nonrheumatic aortic valve stenosis     3  Essential hypertension     4  Coronary artery disease of native artery of native heart with stable angina pectoris ([facility] [address] )           Discussion/Summary: I will stop his Imdur as he is not having any anginal symptoms  With his low EF and elevated BP, I will start Benazepril 1o mg daily  No further cardiac testing is needed at this time  RTO 6 months  Interval History: He has not had any cardiac problems since his last OV  He is busy caring for his wife  He denies any SOB  He did have one brief episode of CP  He has an ICM with EF about 35%  He was evaluated for CABG but MRI study showed limited viable myocardium  He has mild AS  His BP is elevated      Patient Active Problem List   Diagnosis    Nonrheumatic aortic valve stenosis    RODRIGUEZ (dyspnea on exertion)    Cardiomyopathy, ischemic    Essential hypertension    Coronary artery disease of native artery of native heart with stable angina pectoris ([facility] [address] )     Past Medical History:   Diagnosis Date    Benign essential hypertension     Cardiomyopathy ([facility] [address] [facility]     Coronary artery disease     Depression with anxiety     Diffuse arthralgia     Last Assessed: 1/23/2017    Hypercholesterolemia     Hyperlipidemia     Lyme disease     [facility]     Rheumatic fever      Social History     Social History    Marital status: /Civil Union     Spouse name: N/A    Number of children: 5    Years of education: N/A     Occupational History    Environmental Supervisor      Social History Main Topics    Smoking status: Former Smoker     Quit date: 1993    Smokeless tobacco: Never Used    Alcohol use Yes      Comment: social    Drug use: Unknown    Sexual activity: Not on file     Other Topics Concern    Not on file     Social History Narrative    No narrative on file      Family History   Problem Relation Age of Onset    Cancer Mother         cervix    Coronary artery disease Father     Heart attack Father     Cancer Brother     Coronary artery disease Maternal Grandfather     Heart disease Paternal Grandfather     Stroke Paternal Grandfather         CVA    Heart attack Paternal Grandfather     Coronary artery disease Other          at age 78 per [employer]     Past Surgical History:   Procedure Laterality Date    TONSILLECTOMY AND ADENOIDECTOMY         Current Outpatient Prescriptions:     amLODIPine (NORVASC) 10 mg tablet, Take 1 tablet (10 mg total) by mouth daily, Disp: 90 tablet, Rfl: 0    aspirin 325 mg tablet, Take 325 mg by mouth daily at bedtime, Disp: , Rfl:     Cholecalciferol (VITAMIN D3) 2000 units TABS, Take 2,000 Units by mouth daily, Disp: , Rfl:     Multiple Vitamin (MULTIVITAMIN) tablet, Take 1 tablet by mouth daily, Disp: , Rfl:     predniSONE 2 5 mg tablet, Take 5 mg by mouth daily  , Disp: , Rfl:     ranolazine (RANEXA) 500 mg 12 hr tablet, Take 1 tablet (500 mg total) by mouth 2 (two) times a day, Disp: 56 tablet, Rfl: 3    triamterene-hydrochlorothiazide (DYAZIDE) 37 5-25 mg per capsule, take 1 capsule by mouth once daily, Disp: 90 capsule, Rfl: 1    benazepril (LOTENSIN) 10 mg tablet, Take 1 tablet (10 mg total) by mouth daily, Disp: 90 tablet, Rfl: 3    rosuvastatin (CRESTOR) 10 MG tablet, Take 10 mg by mouth daily at bedtime, Disp: , Rfl:     traMADol (ULTRAM) 50 mg tablet, Take 1 tablet (50 mg total) by mouth 3 (three) times a day as needed for moderate pain, Disp: 30 tablet, Rfl: 0  Allergies   Allergen Reactions    Atorvastatin Myalgia     Annotation - [id number]: Joint pain, general pain    Avelox [Moxifloxacin] Diarrhea    Pravastatin     Rosuvastatin Calcium Myalgia Annotation - [id number]: Joint pain, general pain    Simvastatin      Vitals:    06/27/18 1014   BP: 154/70   BP Location: Left arm   Patient Position: Sitting   Cuff Size: Standard   Pulse: 64   Weight: 75 8 kg (167 lb)   Height: 5' 6 75" (1 695 m)     Weight (last 2 days)     Date/Time   Weight    06/27/18 1014  75 8 (167)             Blood pressure 154/70, pulse 64, height 5' 6 75" (1 695 m), weight 75 8 kg (167 lb)  , Body mass index is 26 35 kg/m²      Labs:  Appointment on 03/26/2018   Component Date Value    CRP 03/26/2018 <3 0     Sed Rate 03/26/2018 12*    TSH 3RD GENERATON 03/26/2018 1 740    Orders Only on 03/23/2018   Component Date Value    Sodium 03/26/2018 134*    Potassium 03/26/2018 4 5     Chloride 03/26/2018 100     CO2 03/26/2018 31     Anion Gap 03/26/2018 3*    BUN 03/26/2018 11     Creatinine 03/26/2018 0 84     Glucose, Fasting 03/26/2018 90     Calcium 03/26/2018 9 1     AST 03/26/2018 29     ALT 03/26/2018 28     Alkaline Phosphatase 03/26/2018 56     Total Protein 03/26/2018 7 4     Albumin 03/26/2018 4 0     Total Bilirubin 03/26/2018 0 60     eGFR 03/26/2018 89     WBC 03/26/2018 6 00     RBC 03/26/2018 4 92     Hemoglobin 03/26/2018 15 5     Hematocrit 03/26/2018 44 4     MCV 03/26/2018 90     MCH 03/26/2018 31 5     MCHC 03/26/2018 34 9     RDW 03/26/2018 13 4     MPV 03/26/2018 10 3     Platelets 63/64/4199 212     nRBC 03/26/2018 0     Neutrophils Relative 03/26/2018 60     Lymphocytes Relative 03/26/2018 24     Monocytes Relative 03/26/2018 11     Eosinophils Relative 03/26/2018 4     Basophils Relative 03/26/2018 1     Neutrophils Absolute 03/26/2018 3 63     Lymphocytes Absolute 03/26/2018 1 43     Monocytes Absolute 03/26/2018 0 64     Eosinophils Absolute 03/26/2018 0 23     Basophils Absolute 03/26/2018 0 06     TSH Baseline 03/26/2018 1 740     Cholesterol 03/26/2018 165     Triglycerides 03/26/2018 119     HDL, Direct 03/26/2018 53  LDL Calculated 03/26/2018 88     Vit D, 25-Hydroxy 03/26/2018 28 6*    Color, UA 03/26/2018 Yellow     Clarity, UA 03/26/2018 Clear     Specific Gravity, UA 03/26/2018 1 006     pH, UA 03/26/2018 7 0     Leukocytes, UA 03/26/2018 Negative     Nitrite, UA 03/26/2018 Negative     Protein, UA 03/26/2018 Negative     Glucose, UA 03/26/2018 Negative     Ketones, UA 03/26/2018 Negative     Urobilinogen, UA 03/26/2018 0 2     Bilirubin, UA 03/26/2018 Negative     Blood, UA 03/26/2018 Negative      Imaging: No results found  Review of Systems:  Review of Systems   Constitutional: Negative for diaphoresis, fatigue, fever and unexpected weight change  HENT: Negative  Respiratory: Negative for cough, shortness of breath and wheezing  Cardiovascular: Negative for chest pain, palpitations and leg swelling  Gastrointestinal: Negative for abdominal pain, diarrhea and nausea  Musculoskeletal: Negative for gait problem and myalgias  Skin: Negative for rash  Neurological: Negative for dizziness and numbness  Psychiatric/Behavioral: Negative  Physical Exam:  Physical Exam   Constitutional: He is oriented to person, place, and time  He appears well-developed and well-nourished  HENT:   Head: Normocephalic and atraumatic  Eyes: Pupils are equal, round, and reactive to light  Neck: Normal range of motion  Neck supple  No JVD present  Cardiovascular: Regular rhythm, S1 normal, S2 normal and normal pulses  Pulses:       Carotid pulses are 2+ on the right side, and 2+ on the left side  Pulmonary/Chest: Effort normal and breath sounds normal  He has no wheezes  He has no rales  Abdominal: Soft  Bowel sounds are normal  There is no tenderness  Musculoskeletal: Normal range of motion  He exhibits no edema or tenderness  Neurological: He is alert and oriented to person, place, and time  He has normal reflexes  No cranial nerve deficit  Skin: Skin is warm     Psychiatric: He has a normal mood and affect

## 2018-06-28 DIAGNOSIS — I20.8 ANGINA AT REST (HCC): ICD-10-CM

## 2018-06-28 RX ORDER — RANOLAZINE 500 MG/1
500 TABLET, EXTENDED RELEASE ORAL 2 TIMES DAILY
Qty: 60 TABLET | Refills: 11 | Status: SHIPPED | OUTPATIENT
Start: 2018-06-28 | End: 2019-06-15 | Stop reason: SDUPTHER

## 2018-07-06 ENCOUNTER — TELEPHONE (OUTPATIENT)
Dept: CARDIOLOGY CLINIC | Facility: CLINIC | Age: 68
End: 2018-07-06

## 2018-07-06 NOTE — TELEPHONE ENCOUNTER
Ranexa is no longer going to be an in office sample  Is there an assistance program he can apply for?   Thank you

## 2018-10-23 DIAGNOSIS — I10 ESSENTIAL HYPERTENSION: ICD-10-CM

## 2018-10-23 RX ORDER — TRIAMTERENE AND HYDROCHLOROTHIAZIDE 37.5; 25 MG/1; MG/1
CAPSULE ORAL
Qty: 90 CAPSULE | Refills: 1 | Status: SHIPPED | OUTPATIENT
Start: 2018-10-23 | End: 2019-04-24 | Stop reason: SDUPTHER

## 2018-11-19 ENCOUNTER — TELEPHONE (OUTPATIENT)
Dept: RHEUMATOLOGY | Facility: CLINIC | Age: 68
End: 2018-11-19

## 2018-11-19 DIAGNOSIS — M35.3 POLYMYALGIA RHEUMATICA (HCC): Primary | ICD-10-CM

## 2018-11-19 DIAGNOSIS — I10 ESSENTIAL HYPERTENSION: ICD-10-CM

## 2018-11-19 RX ORDER — PREDNISONE 2.5 MG
5 TABLET ORAL DAILY
Qty: 60 TABLET | Refills: 0 | Status: SHIPPED | OUTPATIENT
Start: 2018-11-19 | End: 2018-12-20 | Stop reason: ALTCHOICE

## 2018-11-19 NOTE — TELEPHONE ENCOUNTER
Caller: patient   Cb#: [phone]      Patient called in requesting med refill predniSONE 2 5 mg  Patient states he ran out   Please send to pharmacy on file, thanks

## 2018-11-19 NOTE — TELEPHONE ENCOUNTER
Patient has not been seen since January  He needs a follow up scheduled please  I will send a month supply to pharmacy, thanks

## 2018-11-20 RX ORDER — AMLODIPINE BESYLATE 10 MG/1
10 TABLET ORAL DAILY
Qty: 90 TABLET | Refills: 2 | Status: SHIPPED | OUTPATIENT
Start: 2018-11-20 | End: 2019-08-19 | Stop reason: SDUPTHER

## 2018-12-11 PROBLEM — M35.3 POLYMYALGIA RHEUMATICA (HCC): Status: ACTIVE | Noted: 2017-02-06

## 2018-12-11 PROBLEM — M15.0 PRIMARY GENERALIZED (OSTEO)ARTHRITIS: Status: ACTIVE | Noted: 2017-06-14

## 2018-12-11 NOTE — PROGRESS NOTES
Assessment and Plan: This is a 66-year-old gentleman presenting today for follow-up for polymyalgia rheumatica  Patient was last seen in the office almost a year ago  He states that over the last several months he has not been feeling too well  He states he has had increased fatigue  He notes pain primarily in his hands, shoulders, as well as his hips bilaterally  He denies significant morning stiffness however does describe more stiffness than usual   He notes occasional swelling in his hands  He also has difficulty walking long distances related to hip pain however he does have a significant cardiac, as well as vascular history  Patient's exam today does not reveal any evidence of synovitis  He does however have tenderness primarily of his shoulders bilaterally  He does have osteoarthritic changes of his hands, as well as his knees bilaterally  Patient's exam today does reveal a possible flare of his polymyalgia rheumatica  I would like patient to obtain blood work at this time to further evaluate his inflammatory markers  Patient continues to utilize prednisone 5 mg daily at this time  If there does appear to be significant inflammation on blood work and patient does have ongoing joint stiffness, as well as overall fatigue and joint pain, we will plan to escalate his dose of prednisone  I did discuss this at length with the patient  In addition, I would like to obtain further labs including a vitamin-D level and TSH level, as abnormalities of either of these could mimic his current symptoms  Patient does not have any symptoms consistent with GCA as he denies jaw claudication, visual loss, or associated temporal headaches  He was advised to call the office if he develops any of these symptoms    If we do have to increased patient's prednisone again, we will plan to have a long weaning course as he has done in the past   He may require a steroid sparing medication such as methotrexate in the future therefore I will order a chronic hepatitis panel as well as HIV at this time  Patient will plan to return to the office in 2 months time however he was asked to contact our office after he has blood work this afternoon  We will discuss his lab findings with him over the phone and make any further treatment changes necessary  Plan:  Diagnoses and all orders for this visit:    Polymyalgia rheumatica ([address] )    Primary generalized (osteo)arthritis          Rheumatic Disease Summary:  PMR  Established care- June, 2017- dx with PMR by PCP and placed on steroids the March prior to consult  Presented on Prednisone 20 mg daily    -Weaned Prednisone over several months and current on Prednisone 5 mg daily (12/2018)    OA  Tylenol with Tramadol      HPI  Merna Hidalgo is a 77 y o   male who presents today for follow up for PMR  Feeling not too great  Wife in and out of the hospital   Saw cardiologist recently and had medications changes to combat increased fatigue  Started on Crestor, having more symptoms of temp changes and numbness in the hands  Pain in the hands at this time with right shoulder due to injury and occasional the left shoulder  Right hand and elbow  +Occasional swelling in the right hand and knees, but they do not hurt  Difficulty with walking due to leg pain  History of vascular surgery of the low extremities in 2009  +AM stiffness x dependent on activity  Taking Prednisone 5 mg daily and Tylenol with improvement  The following portions of the patient's history were reviewed and updated as appropriate: allergies, current medications, past family history, past medical history, past social history, past surgical history and problem list         Review of Systems:   Review of Systems   Constitutional: Positive for chills and fatigue  Negative for appetite change, fever and unexpected weight change  HENT: Positive for congestion  Negative for mouth sores and sore throat  No jaw claudication    Eyes: Negative for pain, redness and visual disturbance  No complete loss of vision  Prescription is getting worse and having more floaters  Recent eye exam   +Dry  Mouth and occasional dry eyes  Respiratory: Negative for cough, chest tightness and shortness of breath (over exertion)  Cardiovascular: Negative for chest pain and leg swelling  Gastrointestinal: Positive for constipation  Negative for abdominal pain, blood in stool, diarrhea, nausea and vomiting  Endocrine: Negative for polydipsia and polyuria  Genitourinary: Negative for frequency and hematuria  Skin: Negative for color change and rash  No hair loss or nail changes  +Raynauds    Neurological: Positive for weakness (generalized ) and headaches (occipital, occasional frontal)  Negative for light-headedness and numbness (Hands and the feet )  Hematological: Negative for adenopathy  Psychiatric/Behavioral: Negative for behavioral problems  The patient is not nervous/anxious          Home Medications:     Current Outpatient Prescriptions:     amLODIPine (NORVASC) 10 mg tablet, Take 1 tablet (10 mg total) by mouth daily, Disp: 90 tablet, Rfl: 2    aspirin 325 mg tablet, Take 325 mg by mouth daily at bedtime, Disp: , Rfl:     benazepril (LOTENSIN) 10 mg tablet, Take 1 tablet (10 mg total) by mouth daily, Disp: 90 tablet, Rfl: 3    Cholecalciferol (VITAMIN D3) 2000 units TABS, Take 2,000 Units by mouth daily, Disp: , Rfl:     Multiple Vitamin (MULTIVITAMIN) tablet, Take 1 tablet by mouth daily, Disp: , Rfl:     predniSONE 2 5 mg tablet, Take 2 tablets (5 mg total) by mouth daily, Disp: 60 tablet, Rfl: 0    ranolazine (RANEXA) 500 mg 12 hr tablet, Take 1 tablet (500 mg total) by mouth 2 (two) times a day, Disp: 60 tablet, Rfl: 11    rosuvastatin (CRESTOR) 10 MG tablet, Take 10 mg by mouth daily at bedtime, Disp: , Rfl:     traMADol (ULTRAM) 50 mg tablet, Take 1 tablet (50 mg total) by mouth 3 (three) times a day as needed for moderate pain, Disp: 30 tablet, Rfl: 0    triamterene-hydrochlorothiazide (DYAZIDE) 37 5-25 mg per capsule, take 1 capsule by mouth once daily, Disp: 90 capsule, Rfl: 1    Objective:    Vitals:    12/14/18 0945   BP: 122/76   BP Location: Left arm   Patient Position: Sitting   Cuff Size: Standard   Pulse: 70   Weight: 75 3 kg (166 lb)   Height: 5' 6 75" (1 695 m)       Physical Exam   Constitutional: He is oriented to person, place, and time  He appears well-developed and well-nourished  HENT:   Head: Normocephalic  Mouth/Throat: Oropharynx is clear and moist    No temporal or jaw tenderness    Eyes: Pupils are equal, round, and reactive to light  Conjunctivae are normal    Neck: Normal range of motion  Neck supple  Cardiovascular: Normal rate, regular rhythm, normal heart sounds and intact distal pulses  2+ pulses of the temporal, carotid, and radial arteries  Pulmonary/Chest: Effort normal and breath sounds normal    Musculoskeletal:   +OA changes of the hands  +Restricted ROM of the right shoulder  +Crepitus of the knees  Neurological: He is alert and oriented to person, place, and time  Skin: Skin is warm and dry  Psychiatric: He has a normal mood and affect   His behavior is normal      Musculoskeletal--Peripheral Joint Exam:  Physical Exam     Tenderness:   RUE: glenohumeral  LUE: glenohumeral    OCASIO-28 tender joint count: 2  OCASIO-28 swollen joint count: 0

## 2018-12-12 ENCOUNTER — OFFICE VISIT (OUTPATIENT)
Dept: CARDIOLOGY CLINIC | Facility: CLINIC | Age: 68
End: 2018-12-12
Payer: MEDICARE

## 2018-12-12 VITALS
SYSTOLIC BLOOD PRESSURE: 140 MMHG | DIASTOLIC BLOOD PRESSURE: 70 MMHG | HEIGHT: 67 IN | BODY MASS INDEX: 26.06 KG/M2 | WEIGHT: 166 LBS

## 2018-12-12 DIAGNOSIS — R06.00 DOE (DYSPNEA ON EXERTION): Chronic | ICD-10-CM

## 2018-12-12 DIAGNOSIS — I25.118 CORONARY ARTERY DISEASE OF NATIVE ARTERY OF NATIVE HEART WITH STABLE ANGINA PECTORIS (HCC): ICD-10-CM

## 2018-12-12 DIAGNOSIS — I10 ESSENTIAL HYPERTENSION: ICD-10-CM

## 2018-12-12 DIAGNOSIS — I35.0 NONRHEUMATIC AORTIC VALVE STENOSIS: Primary | Chronic | ICD-10-CM

## 2018-12-12 DIAGNOSIS — I25.5 CARDIOMYOPATHY, ISCHEMIC: Chronic | ICD-10-CM

## 2018-12-12 PROCEDURE — 99214 OFFICE O/P EST MOD 30 MIN: CPT | Performed by: INTERNAL MEDICINE

## 2018-12-12 RX ORDER — ISOSORBIDE MONONITRATE 30 MG/1
30 TABLET, EXTENDED RELEASE ORAL DAILY
Refills: 0 | COMMUNITY
Start: 2018-10-31 | End: 2019-09-09 | Stop reason: SDUPTHER

## 2018-12-12 RX ORDER — ROSUVASTATIN CALCIUM 5 MG/1
5 TABLET, COATED ORAL DAILY
Qty: 90 TABLET | Refills: 3 | Status: SHIPPED | OUTPATIENT
Start: 2018-12-12 | End: 2019-12-07 | Stop reason: SDUPTHER

## 2018-12-12 RX ORDER — A/SINGAPORE/GP1908/2015 IVR-180 (H1N1) (AN A/MICHIGAN/45/2015 (H1N1)PDM09-LIKE VIRUS), A/HONG KONG/4801/2014, NYMC X-263B (H3N2) (AN A/HONG KONG/4801/2014-LIKE VIRUS), AND B/BRISBANE/60/2008, WILD TYPE (A B/BRISBANE/60/2008-LIKE VIRUS) 15; 15; 15 UG/.5ML; UG/.5ML; UG/.5ML
INJECTION, SUSPENSION INTRAMUSCULAR
Refills: 0 | COMMUNITY
Start: 2018-09-07 | End: 2019-02-15

## 2018-12-12 NOTE — PROGRESS NOTES
Cardiology Follow Up    Kamlesh Dave  1950  [id number]  [facility]  [id number] [id number] [phone]    1  Nonrheumatic aortic valve stenosis     2  Cardiomyopathy, ischemic  rosuvastatin (CRESTOR) 5 mg tablet   3  Essential hypertension     4  Coronary artery disease of native artery of native heart with stable angina pectoris (HCC)  rosuvastatin (CRESTOR) 5 mg tablet   5  RODRIGUEZ (dyspnea on exertion)           Discussion/Summary: I have given him 3 weeks of Ranexa samples at 1000 mg BID to see it this higher dose improves his exercise tolerance  I will also have him restart Crestor at 5 mg QOD  RTO 6 months- repeat echo at that time  Interval History: He has not had any significant cardiac problems since his last OV  He became dizzy and hypotensive when he took the Lisinopril which was prescribed at his last OV  He has a severe ICM with EF of about 35%  Last echo also showed mild AS  He c/o lack of energy and RODRIGUEZ  He denies CP or chest pressure  BP is well controlled at home  He is on Ranexa 500 mg BID  He is also having a lot of arthritic pain especially hip pain  He has not been taking the Crestor due to myalgias      Patient Active Problem List   Diagnosis    Nonrheumatic aortic valve stenosis    RODRIGUEZ (dyspnea on exertion)    Cardiomyopathy, ischemic    Essential hypertension    Coronary artery disease of native artery of native heart with stable angina pectoris ([address] )    Polymyalgia rheumatica ([address] )    Primary generalized (osteo)arthritis     Past Medical History:   Diagnosis Date    Benign essential hypertension     Cardiomyopathy ([address] )     Coronary artery disease     Depression with anxiety     Diffuse arthralgia     Last Assessed: 1/23/2017    Hypercholesterolemia     Hyperlipidemia     Lyme disease     Polymyalgia rheumatica (HCC)     Rheumatic fever      Social History     Social History    Marital status: /Civil Union     Spouse name: N/A    Number of children: 11    Years of education: N/A     Occupational History    Environmental Supervisor      Social History Main Topics    Smoking status: Former Smoker     Quit date:     Smokeless tobacco: Never Used    Alcohol use Yes      Comment: social    Drug use: Unknown    Sexual activity: Not on file     Other Topics Concern    Not on file     Social History Narrative    No narrative on file      Family History   Problem Relation Age of Onset    Cancer Mother         cervix    Coronary artery disease Father     Heart attack Father     Cancer Brother     Coronary artery disease Maternal Grandfather     Heart disease Paternal Grandfather     Stroke Paternal Grandfather         CVA    Heart attack Paternal Grandfather     Coronary artery disease Other          at age 80 per [employer]     Past Surgical History:   Procedure Laterality Date    TONSILLECTOMY AND ADENOIDECTOMY         Current Outpatient Prescriptions:     amLODIPine (NORVASC) 10 mg tablet, Take 1 tablet (10 mg total) by mouth daily, Disp: 90 tablet, Rfl: 2    aspirin 325 mg tablet, Take 325 mg by mouth daily at bedtime, Disp: , Rfl:     Cholecalciferol (VITAMIN D3) 2000 units TABS, Take 2,000 Units by mouth daily, Disp: , Rfl:     FLUAD 0 5 ML COLLEEN, inject 0 5 milliliter intramuscularly, Disp: , Rfl: 0    isosorbide mononitrate (IMDUR) 30 mg 24 hr tablet, Take 30 mg by mouth daily  , Disp: , Rfl: 0    Multiple Vitamin (MULTIVITAMIN) tablet, Take 1 tablet by mouth daily, Disp: , Rfl:     predniSONE 2 5 mg tablet, Take 2 tablets (5 mg total) by mouth daily, Disp: 60 tablet, Rfl: 0    ranolazine (RANEXA) 500 mg 12 hr tablet, Take 1 tablet (500 mg total) by mouth 2 (two) times a day, Disp: 60 tablet, Rfl: 11    triamterene-hydrochlorothiazide (DYAZIDE) 37 5-25 mg per capsule, take 1 capsule by mouth once daily, Disp: 90 capsule, Rfl: 1    benazepril (LOTENSIN) 10 mg tablet, Take 1 tablet (10 mg total) by mouth daily (Patient not taking: Reported on 12/12/2018 ), Disp: 90 tablet, Rfl: 3    rosuvastatin (CRESTOR) 5 mg tablet, Take 1 tablet (5 mg total) by mouth daily, Disp: 90 tablet, Rfl: 3    traMADol (ULTRAM) 50 mg tablet, Take 1 tablet (50 mg total) by mouth 3 (three) times a day as needed for moderate pain (Patient not taking: Reported on 12/12/2018 ), Disp: 30 tablet, Rfl: 0  Allergies   Allergen Reactions    Atorvastatin Myalgia     Annotation - [id number]: Joint pain, general pain    Avelox [Moxifloxacin] Diarrhea    Pravastatin     Rosuvastatin Calcium Myalgia     Annotation - [id number]: Joint pain, general pain    Simvastatin      Vitals:    12/12/18 1013   BP: 140/70   BP Location: Left arm   Patient Position: Sitting   Cuff Size: Standard   Weight: 75 3 kg (166 lb)   Height: 5' 6 75" (1 695 m)     Weight (last 2 days)     Date/Time   Weight    12/12/18 1013  75 3 (166)             Blood pressure 140/70, height 5' 6 75" (1 695 m), weight 75 3 kg (166 lb)  , Body mass index is 26 19 kg/m²  Labs:  No visits with results within 6 Month(s) from this visit  Latest known visit with results is:   Appointment on 03/26/2018   Component Date Value    CRP 03/26/2018 <3 0     Sed Rate 03/26/2018 12*    TSH 3RD GENERATON 03/26/2018 1 740      Imaging: No results found  Review of Systems:  Review of Systems   Constitutional: Positive for fatigue  Negative for diaphoresis, fever and unexpected weight change  HENT: Negative  Respiratory: Positive for shortness of breath  Negative for cough and wheezing  Cardiovascular: Negative for chest pain, palpitations and leg swelling  Gastrointestinal: Negative for abdominal pain, diarrhea and nausea  Musculoskeletal: Negative for gait problem and myalgias  Skin: Negative for rash  Neurological: Negative for dizziness and numbness  Psychiatric/Behavioral: Negative  Physical Exam:  Physical Exam   Constitutional: He is oriented to person, place, and time  He appears well-developed and well-nourished  HENT:   Head: Normocephalic and atraumatic  Eyes: Pupils are equal, round, and reactive to light  Neck: Normal range of motion  Neck supple  No JVD present  Cardiovascular: Regular rhythm, S1 normal, S2 normal and normal pulses  Pulses:       Carotid pulses are 2+ on the right side, and 2+ on the left side  Pulmonary/Chest: Effort normal and breath sounds normal  He has no wheezes  He has no rales  Abdominal: Soft  Bowel sounds are normal  There is no tenderness  Musculoskeletal: Normal range of motion  He exhibits no edema or tenderness  Neurological: He is alert and oriented to person, place, and time  He has normal reflexes  No cranial nerve deficit  Skin: Skin is warm  Psychiatric: He has a normal mood and affect

## 2018-12-14 ENCOUNTER — APPOINTMENT (OUTPATIENT)
Dept: LAB | Facility: CLINIC | Age: 68
End: 2018-12-14
Payer: MEDICARE

## 2018-12-14 ENCOUNTER — OFFICE VISIT (OUTPATIENT)
Dept: RHEUMATOLOGY | Facility: CLINIC | Age: 68
End: 2018-12-14
Payer: MEDICARE

## 2018-12-14 VITALS
HEIGHT: 67 IN | SYSTOLIC BLOOD PRESSURE: 122 MMHG | BODY MASS INDEX: 26.06 KG/M2 | DIASTOLIC BLOOD PRESSURE: 76 MMHG | HEART RATE: 70 BPM | WEIGHT: 166 LBS

## 2018-12-14 DIAGNOSIS — M35.3 POLYMYALGIA RHEUMATICA (HCC): Primary | ICD-10-CM

## 2018-12-14 DIAGNOSIS — M15.0 PRIMARY GENERALIZED (OSTEO)ARTHRITIS: ICD-10-CM

## 2018-12-14 DIAGNOSIS — D89.89 OTHER SPECIFIED DISORDERS INVOLVING THE IMMUNE MECHANISM, NOT ELSEWHERE CLASSIFIED (HCC): ICD-10-CM

## 2018-12-14 LAB
25(OH)D3 SERPL-MCNC: 22.3 NG/ML (ref 30–100)
ALBUMIN SERPL BCP-MCNC: 4.3 G/DL (ref 3.5–5)
ALP SERPL-CCNC: 54 U/L (ref 46–116)
ALT SERPL W P-5'-P-CCNC: 25 U/L (ref 12–78)
ANION GAP SERPL CALCULATED.3IONS-SCNC: 9 MMOL/L (ref 4–13)
AST SERPL W P-5'-P-CCNC: 20 U/L (ref 5–45)
BASOPHILS # BLD AUTO: 0.03 THOUSANDS/ΜL (ref 0–0.1)
BASOPHILS NFR BLD AUTO: 0 % (ref 0–1)
BILIRUB SERPL-MCNC: 0.55 MG/DL (ref 0.2–1)
BUN SERPL-MCNC: 18 MG/DL (ref 5–25)
CALCIUM SERPL-MCNC: 9.5 MG/DL (ref 8.3–10.1)
CHLORIDE SERPL-SCNC: 99 MMOL/L (ref 100–108)
CO2 SERPL-SCNC: 29 MMOL/L (ref 21–32)
CREAT SERPL-MCNC: 0.85 MG/DL (ref 0.6–1.3)
CRP SERPL QL: 4.6 MG/L
EOSINOPHIL # BLD AUTO: 0.05 THOUSAND/ΜL (ref 0–0.61)
EOSINOPHIL NFR BLD AUTO: 1 % (ref 0–6)
ERYTHROCYTE [DISTWIDTH] IN BLOOD BY AUTOMATED COUNT: 12.4 % (ref 11.6–15.1)
ERYTHROCYTE [SEDIMENTATION RATE] IN BLOOD: 12 MM/HOUR (ref 0–10)
GFR SERPL CREATININE-BSD FRML MDRD: 88 ML/MIN/1.73SQ M
GLUCOSE SERPL-MCNC: 102 MG/DL (ref 65–140)
HCT VFR BLD AUTO: 44.8 % (ref 36.5–49.3)
HGB BLD-MCNC: 15.3 G/DL (ref 12–17)
IMM GRANULOCYTES # BLD AUTO: 0.02 THOUSAND/UL (ref 0–0.2)
IMM GRANULOCYTES NFR BLD AUTO: 0 % (ref 0–2)
LYMPHOCYTES # BLD AUTO: 1.23 THOUSANDS/ΜL (ref 0.6–4.47)
LYMPHOCYTES NFR BLD AUTO: 15 % (ref 14–44)
MCH RBC QN AUTO: 31.9 PG (ref 26.8–34.3)
MCHC RBC AUTO-ENTMCNC: 34.2 G/DL (ref 31.4–37.4)
MCV RBC AUTO: 93 FL (ref 82–98)
MONOCYTES # BLD AUTO: 0.39 THOUSAND/ΜL (ref 0.17–1.22)
MONOCYTES NFR BLD AUTO: 5 % (ref 4–12)
NEUTROPHILS # BLD AUTO: 6.56 THOUSANDS/ΜL (ref 1.85–7.62)
NEUTS SEG NFR BLD AUTO: 79 % (ref 43–75)
NRBC BLD AUTO-RTO: 0 /100 WBCS
PLATELET # BLD AUTO: 229 THOUSANDS/UL (ref 149–390)
PMV BLD AUTO: 9.8 FL (ref 8.9–12.7)
POTASSIUM SERPL-SCNC: 4.4 MMOL/L (ref 3.5–5.3)
PROT SERPL-MCNC: 7.5 G/DL (ref 6.4–8.2)
RBC # BLD AUTO: 4.8 MILLION/UL (ref 3.88–5.62)
SODIUM SERPL-SCNC: 137 MMOL/L (ref 136–145)
TSH SERPL DL<=0.05 MIU/L-ACNC: 0.78 UIU/ML (ref 0.36–3.74)
WBC # BLD AUTO: 8.28 THOUSAND/UL (ref 4.31–10.16)

## 2018-12-14 PROCEDURE — 86803 HEPATITIS C AB TEST: CPT | Performed by: PHYSICIAN ASSISTANT

## 2018-12-14 PROCEDURE — 86140 C-REACTIVE PROTEIN: CPT | Performed by: PHYSICIAN ASSISTANT

## 2018-12-14 PROCEDURE — 80053 COMPREHEN METABOLIC PANEL: CPT | Performed by: PHYSICIAN ASSISTANT

## 2018-12-14 PROCEDURE — 85025 COMPLETE CBC W/AUTO DIFF WBC: CPT | Performed by: PHYSICIAN ASSISTANT

## 2018-12-14 PROCEDURE — 86705 HEP B CORE ANTIBODY IGM: CPT | Performed by: PHYSICIAN ASSISTANT

## 2018-12-14 PROCEDURE — 86704 HEP B CORE ANTIBODY TOTAL: CPT | Performed by: PHYSICIAN ASSISTANT

## 2018-12-14 PROCEDURE — 87389 HIV-1 AG W/HIV-1&-2 AB AG IA: CPT

## 2018-12-14 PROCEDURE — 87340 HEPATITIS B SURFACE AG IA: CPT | Performed by: PHYSICIAN ASSISTANT

## 2018-12-14 PROCEDURE — 82306 VITAMIN D 25 HYDROXY: CPT | Performed by: PHYSICIAN ASSISTANT

## 2018-12-14 PROCEDURE — 36415 COLL VENOUS BLD VENIPUNCTURE: CPT | Performed by: PHYSICIAN ASSISTANT

## 2018-12-14 PROCEDURE — 99214 OFFICE O/P EST MOD 30 MIN: CPT | Performed by: PHYSICIAN ASSISTANT

## 2018-12-14 PROCEDURE — 84443 ASSAY THYROID STIM HORMONE: CPT | Performed by: PHYSICIAN ASSISTANT

## 2018-12-14 PROCEDURE — 85652 RBC SED RATE AUTOMATED: CPT | Performed by: PHYSICIAN ASSISTANT

## 2018-12-15 LAB
HBV CORE AB SER QL: NORMAL
HBV CORE IGM SER QL: NORMAL
HBV SURFACE AG SER QL: NORMAL
HCV AB SER QL: NORMAL

## 2018-12-17 ENCOUNTER — TELEPHONE (OUTPATIENT)
Dept: OBGYN CLINIC | Facility: HOSPITAL | Age: 68
End: 2018-12-17

## 2018-12-17 DIAGNOSIS — E55.9 VITAMIN D DEFICIENCY: Primary | ICD-10-CM

## 2018-12-17 LAB — HIV 1+2 AB+HIV1 P24 AG SERPL QL IA: NORMAL

## 2018-12-17 RX ORDER — ERGOCALCIFEROL 1.25 MG/1
50000 CAPSULE ORAL WEEKLY
Qty: 12 CAPSULE | Refills: 0 | Status: SHIPPED | OUTPATIENT
Start: 2018-12-17 | End: 2019-06-12

## 2018-12-17 NOTE — TELEPHONE ENCOUNTER
Caller: patient  Call back number: [phone]    Patient is questioning the amount of vitamin  D he is supposed to take   Please advise

## 2018-12-17 NOTE — PROGRESS NOTES
Can you let patient know that his inflammatory markers were elevated as we discussed at his appt, I think he should increase his dose of Prednisone to at least 10 mg daily and see if he feels better  Also, his vitamin D level was low so I'd like him to start vitamin D 50,000 IUs weekly x 12 weeks  Let me know where he would like this script sent to  Thanks

## 2018-12-20 ENCOUNTER — TELEPHONE (OUTPATIENT)
Dept: OBGYN CLINIC | Facility: HOSPITAL | Age: 68
End: 2018-12-20

## 2018-12-20 DIAGNOSIS — M35.3 POLYMYALGIA RHEUMATICA (HCC): Primary | ICD-10-CM

## 2018-12-20 RX ORDER — PREDNISONE 1 MG/1
10 TABLET ORAL DAILY
Qty: 60 TABLET | Refills: 2 | Status: ON HOLD | OUTPATIENT
Start: 2018-12-20 | End: 2019-08-15

## 2018-12-20 NOTE — TELEPHONE ENCOUNTER
Spoke with patient, he stated he only has 2 5mg tablets and he only has a couple left  Please send 5mg tablets to the pharmacy on file for him   I also advised him it was okay to take his Multivitamin along with his Vitamin D script

## 2018-12-20 NOTE — TELEPHONE ENCOUNTER
If patient has 5 mg tablets, which he should, he should begin taking 2 tablets daily  I can send a script for the 5 mg tablets to the pharmacy but we will plan to wean this dose in the future so I'd like him to use 5 mg tablets  Thanks

## 2018-12-20 NOTE — TELEPHONE ENCOUNTER
I sent Prednisone 5 mg, 2 tablet po daily to [facility] and he should take weekly vitamin D in addition to his multivitamin  Thanks

## 2018-12-20 NOTE — TELEPHONE ENCOUNTER
Gordon Mei  [phone]    Patient states he was told the Prednisone is increasing from 5 mg to 10 mg daily  No RX in system  Please return his call   thanks

## 2019-02-15 ENCOUNTER — OFFICE VISIT (OUTPATIENT)
Dept: RHEUMATOLOGY | Facility: CLINIC | Age: 69
End: 2019-02-15
Payer: MEDICARE

## 2019-02-15 ENCOUNTER — TRANSCRIBE ORDERS (OUTPATIENT)
Dept: LAB | Facility: CLINIC | Age: 69
End: 2019-02-15

## 2019-02-15 ENCOUNTER — APPOINTMENT (OUTPATIENT)
Dept: LAB | Facility: CLINIC | Age: 69
End: 2019-02-15
Payer: MEDICARE

## 2019-02-15 VITALS
HEIGHT: 67 IN | WEIGHT: 165 LBS | DIASTOLIC BLOOD PRESSURE: 84 MMHG | HEART RATE: 74 BPM | BODY MASS INDEX: 25.9 KG/M2 | SYSTOLIC BLOOD PRESSURE: 126 MMHG

## 2019-02-15 DIAGNOSIS — M35.3 POLYMYALGIA RHEUMATICA (HCC): Primary | ICD-10-CM

## 2019-02-15 DIAGNOSIS — M15.0 PRIMARY GENERALIZED (OSTEO)ARTHRITIS: ICD-10-CM

## 2019-02-15 LAB
ALBUMIN SERPL BCP-MCNC: 4.3 G/DL (ref 3.5–5)
ALP SERPL-CCNC: 48 U/L (ref 46–116)
ALT SERPL W P-5'-P-CCNC: 37 U/L (ref 12–78)
ANION GAP SERPL CALCULATED.3IONS-SCNC: 9 MMOL/L (ref 4–13)
AST SERPL W P-5'-P-CCNC: 34 U/L (ref 5–45)
BASOPHILS # BLD AUTO: 0.05 THOUSANDS/ΜL (ref 0–0.1)
BASOPHILS NFR BLD AUTO: 1 % (ref 0–1)
BILIRUB SERPL-MCNC: 0.6 MG/DL (ref 0.2–1)
BUN SERPL-MCNC: 15 MG/DL (ref 5–25)
CALCIUM SERPL-MCNC: 9.8 MG/DL (ref 8.3–10.1)
CHLORIDE SERPL-SCNC: 98 MMOL/L (ref 100–108)
CO2 SERPL-SCNC: 30 MMOL/L (ref 21–32)
CREAT SERPL-MCNC: 0.82 MG/DL (ref 0.6–1.3)
CRP SERPL QL: 3.2 MG/L
EOSINOPHIL # BLD AUTO: 0.02 THOUSAND/ΜL (ref 0–0.61)
EOSINOPHIL NFR BLD AUTO: 0 % (ref 0–6)
ERYTHROCYTE [DISTWIDTH] IN BLOOD BY AUTOMATED COUNT: 12.9 % (ref 11.6–15.1)
ERYTHROCYTE [SEDIMENTATION RATE] IN BLOOD: 6 MM/HOUR (ref 0–10)
GFR SERPL CREATININE-BSD FRML MDRD: 90 ML/MIN/1.73SQ M
GLUCOSE SERPL-MCNC: 136 MG/DL (ref 65–140)
HCT VFR BLD AUTO: 43.2 % (ref 36.5–49.3)
HGB BLD-MCNC: 15.2 G/DL (ref 12–17)
IMM GRANULOCYTES # BLD AUTO: 0.03 THOUSAND/UL (ref 0–0.2)
IMM GRANULOCYTES NFR BLD AUTO: 0 % (ref 0–2)
LYMPHOCYTES # BLD AUTO: 1.04 THOUSANDS/ΜL (ref 0.6–4.47)
LYMPHOCYTES NFR BLD AUTO: 12 % (ref 14–44)
MCH RBC QN AUTO: 32.9 PG (ref 26.8–34.3)
MCHC RBC AUTO-ENTMCNC: 35.2 G/DL (ref 31.4–37.4)
MCV RBC AUTO: 94 FL (ref 82–98)
MONOCYTES # BLD AUTO: 0.34 THOUSAND/ΜL (ref 0.17–1.22)
MONOCYTES NFR BLD AUTO: 4 % (ref 4–12)
NEUTROPHILS # BLD AUTO: 6.89 THOUSANDS/ΜL (ref 1.85–7.62)
NEUTS SEG NFR BLD AUTO: 83 % (ref 43–75)
NRBC BLD AUTO-RTO: 0 /100 WBCS
PLATELET # BLD AUTO: 246 THOUSANDS/UL (ref 149–390)
PMV BLD AUTO: 9.6 FL (ref 8.9–12.7)
POTASSIUM SERPL-SCNC: 4.7 MMOL/L (ref 3.5–5.3)
PROT SERPL-MCNC: 7.6 G/DL (ref 6.4–8.2)
RBC # BLD AUTO: 4.62 MILLION/UL (ref 3.88–5.62)
SODIUM SERPL-SCNC: 137 MMOL/L (ref 136–145)
WBC # BLD AUTO: 8.37 THOUSAND/UL (ref 4.31–10.16)

## 2019-02-15 PROCEDURE — 99214 OFFICE O/P EST MOD 30 MIN: CPT | Performed by: PHYSICIAN ASSISTANT

## 2019-02-15 PROCEDURE — 80053 COMPREHEN METABOLIC PANEL: CPT | Performed by: PHYSICIAN ASSISTANT

## 2019-02-15 PROCEDURE — 36415 COLL VENOUS BLD VENIPUNCTURE: CPT | Performed by: PHYSICIAN ASSISTANT

## 2019-02-15 PROCEDURE — 86140 C-REACTIVE PROTEIN: CPT | Performed by: PHYSICIAN ASSISTANT

## 2019-02-15 PROCEDURE — 85025 COMPLETE CBC W/AUTO DIFF WBC: CPT | Performed by: PHYSICIAN ASSISTANT

## 2019-02-15 PROCEDURE — 85652 RBC SED RATE AUTOMATED: CPT | Performed by: PHYSICIAN ASSISTANT

## 2019-02-15 RX ORDER — PREDNISONE 10 MG/1
30 TABLET ORAL DAILY
Qty: 90 TABLET | Refills: 2 | Status: SHIPPED | OUTPATIENT
Start: 2019-02-15 | End: 2019-05-03 | Stop reason: ALTCHOICE

## 2019-02-15 NOTE — PROGRESS NOTES
Assessment and Plan: This is a 70-year-old gentleman presenting today for follow-up for polymyalgia rheumatica currently utilizing prednisone 10 mg daily which was increased at the last appointment from 5 mg daily  He states that he has noticed some improvement with the increase of prednisone since last appointment  In addition, he was placed on vitamin-D 60662 International Units use weekly with some improvement  He does note a burning, aching sensation in the upper and lower extremities  He describes discomfort in his shoulders and hips but does note pain radiating all the way down his legs  He has had a history of right shoulder pain due to rotator cuff injury, as well as lower extremity vascular disease and occlusion  Patient states that he does however have discomfort similar to the discomfort he was experiencing when he initially presented with polymyalgia rheumatica  He describes morning stiffness to last several hours and he does note generalized weakness which is worse with prolonged sitting  He denies any jaw claudication, visual changes or loss, or temporal headaches  His exam today does reveal tenderness in some restricted range of motion of the right shoulder, as well as tenderness of the left greater trochanteric hip bursa  There is no active synovitis  He has 2+ pulses of the temporal, carotid, and radial arteries  He has no jaw or temporal tenderness  At this time, it appears that patient may be experiencing a flare of his polymyalgia rheumatica  I did recommend that he escalate his dose of Prednisone to 30 mg daily and contact our office next week to let us know how he is feeling  If the symptoms are a result of polymyalgia rheumatica, I would expect his symptoms to improve significantly  If there is improvement, he will remain on this dose until he seen at his next appointment    If there is no improvement with his current joint complaints with the higher dose of prednisone, I do believe that osteoarthritis in combination with rotator cuff dysfunction and vascular disease are contributing to his current joint complaints, and we will wean him back to 10 mg daily  Patient will obtain blood work at this time before starting the higher dose of Prednisone and return to the office in 2 months time  He was advised to contact the office if he develops any jaw claudication, visual changes, or temporal headaches  Plan:  Diagnoses and all orders for this visit:    Polymyalgia rheumatica ([address] )    Primary generalized (osteo)arthritis            Rheumatic Disease Summary:  PMR  Established care- June, 2017- dx with PMR by PCP and placed on steroids the March prior to consult  Presented on Prednisone 20 mg daily    -Weaned Prednisone over several months and current on Prednisone 5 mg daily (12/2018)     OA  Tylenol with Tramadol         HPI  Ino Roberts is a 77 y o   male who presents today for follow up for PMR/OA  Feeling not great  Burning pain in the shoulders and back into the hips  Increased Prednisone last appt  and there was improvement with this  Worse with activity  Cardiologist increased Ranexa, no change with this  And was supplemented with vitamin D 53978 IUs weekly  +AM stiffness x several hours  +Difficulty getting out of car  +Blockages in the legs due to vascular issues  Feels he is stressed as well  The following portions of the patient's history were reviewed and updated as appropriate: allergies, current medications, past family history, past medical history, past social history, past surgical history and problem list     Review of Systems:   Review of Systems   Constitutional: Positive for chills and fatigue  Negative for appetite change, fever and unexpected weight change  HENT: Positive for congestion  Negative for mouth sores and sore throat  No jaw claudication   No recent infxn    Eyes: Negative for pain, redness and visual disturbance  No loss of vision    Respiratory: Positive for shortness of breath  Negative for cough and chest tightness  Cardiovascular: Positive for leg swelling  Negative for chest pain  Gastrointestinal: Negative for abdominal pain, blood in stool, constipation, diarrhea, nausea and vomiting  Endocrine: Negative for polydipsia and polyuria  Genitourinary: Negative for frequency and hematuria  Skin: Negative for color change and rash  Neurological: Positive for dizziness, numbness and headaches (intermittently, frontal, occipatal  more frequent )  Negative for weakness and light-headedness  Hematological: Negative for adenopathy  Psychiatric/Behavioral: Negative for behavioral problems  The patient is nervous/anxious          Home Medications:     Current Outpatient Medications:     amLODIPine (NORVASC) 10 mg tablet, Take 1 tablet (10 mg total) by mouth daily, Disp: 90 tablet, Rfl: 2    aspirin 325 mg tablet, Take 325 mg by mouth daily at bedtime, Disp: , Rfl:     Cholecalciferol (VITAMIN D3) 2000 units TABS, Take 2,000 Units by mouth daily, Disp: , Rfl:     ergocalciferol (VITAMIN D2) 50,000 units, Take 1 capsule (50,000 Units total) by mouth once a week, Disp: 12 capsule, Rfl: 0    isosorbide mononitrate (IMDUR) 30 mg 24 hr tablet, Take 30 mg by mouth daily  , Disp: , Rfl: 0    Multiple Vitamin (MULTIVITAMIN) tablet, Take 1 tablet by mouth daily, Disp: , Rfl:     predniSONE 5 mg tablet, Take 2 tablets (10 mg total) by mouth daily, Disp: 60 tablet, Rfl: 2    ranolazine (RANEXA) 500 mg 12 hr tablet, Take 1 tablet (500 mg total) by mouth 2 (two) times a day (Patient taking differently: Take 1,000 mg by mouth 2 (two) times a day  ), Disp: 60 tablet, Rfl: 11    rosuvastatin (CRESTOR) 5 mg tablet, Take 1 tablet (5 mg total) by mouth daily, Disp: 90 tablet, Rfl: 3    triamterene-hydrochlorothiazide (DYAZIDE) 37 5-25 mg per capsule, take 1 capsule by mouth once daily, Disp: 90 capsule, Rfl: 1    Objective:    Vitals:    02/15/19 0950   BP: 126/84   BP Location: Left arm   Patient Position: Sitting   Cuff Size: Standard   Pulse: 74   Weight: 74 8 kg (165 lb)   Height: 5' 6 75" (1 695 m)       Physical Exam   Constitutional: He is oriented to person, place, and time  He appears well-developed and well-nourished  HENT:   Head: Normocephalic  Mouth/Throat: Oropharynx is clear and moist    No temporal or jaw tenderness  Eyes: Pupils are equal, round, and reactive to light  Conjunctivae are normal    Neck: Normal range of motion  Neck supple  Cardiovascular: Normal rate, regular rhythm, normal heart sounds and intact distal pulses  2+ pulses of the temporal, carotid, and radial arteries  Pulmonary/Chest: Effort normal and breath sounds normal    Musculoskeletal:   +Restricted ROM of the right shoulder with abduction  +Crepitus of the knees  Neurological: He is alert and oriented to person, place, and time  Skin: Skin is warm and dry  Psychiatric: He has a normal mood and affect   His behavior is normal      Musculoskeletal--Peripheral Joint Exam:  Physical Exam     Tenderness:   RUE: glenohumeral  LLE: acetabulofemoral    OCASIO-28 tender joint count: 1  OCASIO-28 swollen joint count: 0          Labs:   Component      Latest Ref Rng & Units 12/14/2018   WBC      4 31 - 10 16 Thousand/uL 8 28   Red Blood Cell Count      3 88 - 5 62 Million/uL 4 80   Hemoglobin      12 0 - 17 0 g/dL 15 3   HCT      36 5 - 49 3 % 44 8   MCV      82 - 98 fL 93   MCH      26 8 - 34 3 pg 31 9   MCHC      31 4 - 37 4 g/dL 34 2   RDW      11 6 - 15 1 % 12 4   MPV      8 9 - 12 7 fL 9 8   Platelet Count      254 - 390 Thousands/uL 229   nRBC      /100 WBCs 0   Neutrophils %      43 - 75 % 79 (H)   Immat GRANS %      0 - 2 % 0   Lymphocytes Relative      14 - 44 % 15   Monocytes Relative      4 - 12 % 5   Eosinophils      0 - 6 % 1   Basophils Relative      0 - 1 % 0   Absolute Neutrophils      1 85 - 7 62 Thousands/µL 6 56   Immature Grans Absolute      0 00 - 0 20 Thousand/uL 0 02   Lymphocytes Absolute      0 60 - 4 47 Thousands/µL 1 23   Absolute Monocytes      0 17 - 1 22 Thousand/µL 0 39   Absolute Eosinophils      0 00 - 0 61 Thousand/µL 0 05   Basophils Absolute      0 00 - 0 10 Thousands/µL 0 03   Sodium      136 - 145 mmol/L 137   Potassium      3 5 - 5 3 mmol/L 4 4   Chloride      100 - 108 mmol/L 99 (L)   CO2      21 - 32 mmol/L 29   Anion Gap      4 - 13 mmol/L 9   BUN      5 - 25 mg/dL 18   Creatinine      0 60 - 1 30 mg/dL 0 85   Glucose, Random      65 - 140 mg/dL 102   Calcium      8 3 - 10 1 mg/dL 9 5   AST      5 - 45 U/L 20   ALT      12 - 78 U/L 25   Alkaline Phosphatase      46 - 116 U/L 54   Total Protein      6 4 - 8 2 g/dL 7 5   Albumin      3 5 - 5 0 g/dL 4 3   TOTAL BILIRUBIN      0 20 - 1 00 mg/dL 0 55   eGFR      ml/min/1 73sq m 88   HEPATITIS B SURFACE ANTIGEN      Non-reactive, NonReactive - Confirmed Non-reactive   HEPATITIS C ANTIBODY      Non-reactive Non-reactive   HEPATITIS B CORE IGM ANTIBODY      Non-reactive Non-reactive   HEPATITIS B CORE TOTAL ANTIBODY      Non-reactive Non-reactive   C-REACTIVE PROTEIN      <3 0 mg/L 4 6 (H)   Sed Rate      0 - 10 mm/hour 12 (H)   TSH 3RD GENERATON      0 358 - 3 740 uIU/mL 0 777   Vit D, 25-Hydroxy      30 0 - 100 0 ng/mL 22 3 (L)   HIV-1/2 AB-AG      Non-Reactive Non-Reactive

## 2019-02-19 ENCOUNTER — TELEPHONE (OUTPATIENT)
Dept: OBGYN CLINIC | Facility: HOSPITAL | Age: 69
End: 2019-02-19

## 2019-02-19 NOTE — TELEPHONE ENCOUNTER
I called patient back and message a voicemail regarding his lab results  I also let him know that if he only had some relief with the Prednisone, as stated in the above message, he should start weaning off his Prednisone at this time to 20 mg daily x 1 week, then 10 mg daily, back to his normal dose  At his last appt, I was uncertain if he was having a flare of PMR or if he was just experiencing symptoms of OA/fatigue  If this truly was a flare of PMR,  we would expect Prednisone to completely resolve his joint complaints, AM stiffness, and generalized weakness  His blood work was not consistent with a PMR flare as he had a significantly elevated CRP during past flares  Recent CRP was essentially normal   I did ask him to call us back if he has further questions

## 2019-02-19 NOTE — TELEPHONE ENCOUNTER
The patient called in and said that he wants to let Patricia Elise know that he did get some relief from increasing the medication: PredniSone 10 mg  Patient also wants to find out about the results of his blood work he had done on Friday 2/15/19  Please advise        Callback# [phone]

## 2019-04-08 PROBLEM — Z79.52 LONG TERM SYSTEMIC STEROID USER: Status: ACTIVE | Noted: 2019-04-08

## 2019-04-24 DIAGNOSIS — I10 ESSENTIAL HYPERTENSION: ICD-10-CM

## 2019-04-24 RX ORDER — TRIAMTERENE AND HYDROCHLOROTHIAZIDE 37.5; 25 MG/1; MG/1
CAPSULE ORAL
Qty: 90 CAPSULE | Refills: 1 | Status: SHIPPED | OUTPATIENT
Start: 2019-04-24 | End: 2019-10-26 | Stop reason: SDUPTHER

## 2019-05-03 ENCOUNTER — TRANSCRIBE ORDERS (OUTPATIENT)
Dept: RADIOLOGY | Facility: CLINIC | Age: 69
End: 2019-05-03

## 2019-05-03 ENCOUNTER — APPOINTMENT (OUTPATIENT)
Dept: LAB | Facility: CLINIC | Age: 69
End: 2019-05-03
Payer: MEDICARE

## 2019-05-03 ENCOUNTER — APPOINTMENT (OUTPATIENT)
Dept: RADIOLOGY | Facility: CLINIC | Age: 69
End: 2019-05-03
Payer: MEDICARE

## 2019-05-03 ENCOUNTER — TELEPHONE (OUTPATIENT)
Dept: RHEUMATOLOGY | Facility: CLINIC | Age: 69
End: 2019-05-03

## 2019-05-03 ENCOUNTER — OFFICE VISIT (OUTPATIENT)
Dept: RHEUMATOLOGY | Facility: CLINIC | Age: 69
End: 2019-05-03
Payer: MEDICARE

## 2019-05-03 VITALS
HEART RATE: 68 BPM | DIASTOLIC BLOOD PRESSURE: 80 MMHG | HEIGHT: 66 IN | BODY MASS INDEX: 27.16 KG/M2 | WEIGHT: 169 LBS | SYSTOLIC BLOOD PRESSURE: 126 MMHG

## 2019-05-03 DIAGNOSIS — Z79.52 LONG TERM SYSTEMIC STEROID USER: ICD-10-CM

## 2019-05-03 DIAGNOSIS — M35.3 POLYMYALGIA RHEUMATICA (HCC): Primary | ICD-10-CM

## 2019-05-03 DIAGNOSIS — M15.0 PRIMARY GENERALIZED (OSTEO)ARTHRITIS: ICD-10-CM

## 2019-05-03 DIAGNOSIS — M35.3 POLYMYALGIA RHEUMATICA (HCC): ICD-10-CM

## 2019-05-03 LAB
ALBUMIN SERPL BCP-MCNC: 4.3 G/DL (ref 3.5–5)
ALP SERPL-CCNC: 49 U/L (ref 46–116)
ALT SERPL W P-5'-P-CCNC: 33 U/L (ref 12–78)
ANION GAP SERPL CALCULATED.3IONS-SCNC: 6 MMOL/L (ref 4–13)
AST SERPL W P-5'-P-CCNC: 36 U/L (ref 5–45)
BASOPHILS # BLD AUTO: 0.06 THOUSANDS/ΜL (ref 0–0.1)
BASOPHILS NFR BLD AUTO: 1 % (ref 0–1)
BILIRUB SERPL-MCNC: 0.65 MG/DL (ref 0.2–1)
BUN SERPL-MCNC: 16 MG/DL (ref 5–25)
CALCIUM SERPL-MCNC: 9.3 MG/DL (ref 8.3–10.1)
CHLORIDE SERPL-SCNC: 101 MMOL/L (ref 100–108)
CO2 SERPL-SCNC: 28 MMOL/L (ref 21–32)
CREAT SERPL-MCNC: 0.82 MG/DL (ref 0.6–1.3)
CRP SERPL QL: <3 MG/L
EOSINOPHIL # BLD AUTO: 0.03 THOUSAND/ΜL (ref 0–0.61)
EOSINOPHIL NFR BLD AUTO: 0 % (ref 0–6)
ERYTHROCYTE [DISTWIDTH] IN BLOOD BY AUTOMATED COUNT: 12.5 % (ref 11.6–15.1)
ERYTHROCYTE [SEDIMENTATION RATE] IN BLOOD: 8 MM/HOUR (ref 0–10)
GFR SERPL CREATININE-BSD FRML MDRD: 89 ML/MIN/1.73SQ M
GLUCOSE SERPL-MCNC: 122 MG/DL (ref 65–140)
HCT VFR BLD AUTO: 46.9 % (ref 36.5–49.3)
HGB BLD-MCNC: 15.9 G/DL (ref 12–17)
IMM GRANULOCYTES # BLD AUTO: 0.03 THOUSAND/UL (ref 0–0.2)
IMM GRANULOCYTES NFR BLD AUTO: 0 % (ref 0–2)
LYMPHOCYTES # BLD AUTO: 1.2 THOUSANDS/ΜL (ref 0.6–4.47)
LYMPHOCYTES NFR BLD AUTO: 13 % (ref 14–44)
MCH RBC QN AUTO: 32.6 PG (ref 26.8–34.3)
MCHC RBC AUTO-ENTMCNC: 33.9 G/DL (ref 31.4–37.4)
MCV RBC AUTO: 96 FL (ref 82–98)
MONOCYTES # BLD AUTO: 0.46 THOUSAND/ΜL (ref 0.17–1.22)
MONOCYTES NFR BLD AUTO: 5 % (ref 4–12)
NEUTROPHILS # BLD AUTO: 7.16 THOUSANDS/ΜL (ref 1.85–7.62)
NEUTS SEG NFR BLD AUTO: 81 % (ref 43–75)
NRBC BLD AUTO-RTO: 0 /100 WBCS
PLATELET # BLD AUTO: 233 THOUSANDS/UL (ref 149–390)
PMV BLD AUTO: 10.2 FL (ref 8.9–12.7)
POTASSIUM SERPL-SCNC: 5 MMOL/L (ref 3.5–5.3)
PROT SERPL-MCNC: 7.6 G/DL (ref 6.4–8.2)
RBC # BLD AUTO: 4.88 MILLION/UL (ref 3.88–5.62)
SODIUM SERPL-SCNC: 135 MMOL/L (ref 136–145)
WBC # BLD AUTO: 8.94 THOUSAND/UL (ref 4.31–10.16)

## 2019-05-03 PROCEDURE — 80053 COMPREHEN METABOLIC PANEL: CPT | Performed by: PHYSICIAN ASSISTANT

## 2019-05-03 PROCEDURE — 86430 RHEUMATOID FACTOR TEST QUAL: CPT

## 2019-05-03 PROCEDURE — 85652 RBC SED RATE AUTOMATED: CPT | Performed by: PHYSICIAN ASSISTANT

## 2019-05-03 PROCEDURE — 86235 NUCLEAR ANTIGEN ANTIBODY: CPT | Performed by: PHYSICIAN ASSISTANT

## 2019-05-03 PROCEDURE — 99214 OFFICE O/P EST MOD 30 MIN: CPT | Performed by: PHYSICIAN ASSISTANT

## 2019-05-03 PROCEDURE — 86200 CCP ANTIBODY: CPT | Performed by: PHYSICIAN ASSISTANT

## 2019-05-03 PROCEDURE — 36415 COLL VENOUS BLD VENIPUNCTURE: CPT | Performed by: PHYSICIAN ASSISTANT

## 2019-05-03 PROCEDURE — 73130 X-RAY EXAM OF HAND: CPT

## 2019-05-03 PROCEDURE — 85025 COMPLETE CBC W/AUTO DIFF WBC: CPT | Performed by: PHYSICIAN ASSISTANT

## 2019-05-03 PROCEDURE — 73630 X-RAY EXAM OF FOOT: CPT

## 2019-05-03 PROCEDURE — 86140 C-REACTIVE PROTEIN: CPT | Performed by: PHYSICIAN ASSISTANT

## 2019-05-03 RX ORDER — GABAPENTIN 300 MG/1
300 CAPSULE ORAL DAILY
Qty: 90 CAPSULE | Refills: 2 | OUTPATIENT
Start: 2019-05-03 | End: 2019-06-12

## 2019-05-04 LAB
ENA SS-A AB SER-ACNC: <0.2 AI (ref 0–0.9)
ENA SS-B AB SER-ACNC: <0.2 AI (ref 0–0.9)

## 2019-05-05 LAB — CCP IGA+IGG SERPL IA-ACNC: 4 UNITS (ref 0–19)

## 2019-05-06 LAB — RHEUMATOID FACT SER QL LA: NEGATIVE

## 2019-05-20 ENCOUNTER — APPOINTMENT (OUTPATIENT)
Dept: LAB | Facility: CLINIC | Age: 69
End: 2019-05-20
Payer: MEDICARE

## 2019-05-20 DIAGNOSIS — Z12.5 SCREENING FOR PROSTATE CANCER: ICD-10-CM

## 2019-05-20 DIAGNOSIS — I10 ESSENTIAL HYPERTENSION: ICD-10-CM

## 2019-05-20 LAB
ALBUMIN SERPL BCP-MCNC: 3.6 G/DL (ref 3.5–5)
ALP SERPL-CCNC: 44 U/L (ref 46–116)
ALT SERPL W P-5'-P-CCNC: 24 U/L (ref 12–78)
ANION GAP SERPL CALCULATED.3IONS-SCNC: 6 MMOL/L (ref 4–13)
AST SERPL W P-5'-P-CCNC: 20 U/L (ref 5–45)
BASOPHILS # BLD AUTO: 0.07 THOUSANDS/ΜL (ref 0–0.1)
BASOPHILS NFR BLD AUTO: 1 % (ref 0–1)
BILIRUB SERPL-MCNC: 0.51 MG/DL (ref 0.2–1)
BUN SERPL-MCNC: 18 MG/DL (ref 5–25)
CALCIUM SERPL-MCNC: 8.8 MG/DL (ref 8.3–10.1)
CHLORIDE SERPL-SCNC: 104 MMOL/L (ref 100–108)
CHOLEST SERPL-MCNC: 173 MG/DL (ref 50–200)
CO2 SERPL-SCNC: 27 MMOL/L (ref 21–32)
CREAT SERPL-MCNC: 0.8 MG/DL (ref 0.6–1.3)
EOSINOPHIL # BLD AUTO: 0.15 THOUSAND/ΜL (ref 0–0.61)
EOSINOPHIL NFR BLD AUTO: 2 % (ref 0–6)
ERYTHROCYTE [DISTWIDTH] IN BLOOD BY AUTOMATED COUNT: 12.4 % (ref 11.6–15.1)
GFR SERPL CREATININE-BSD FRML MDRD: 90 ML/MIN/1.73SQ M
GLUCOSE P FAST SERPL-MCNC: 117 MG/DL (ref 65–99)
HCT VFR BLD AUTO: 43.2 % (ref 36.5–49.3)
HDLC SERPL-MCNC: 56 MG/DL (ref 40–60)
HGB BLD-MCNC: 14.5 G/DL (ref 12–17)
IMM GRANULOCYTES # BLD AUTO: 0.02 THOUSAND/UL (ref 0–0.2)
IMM GRANULOCYTES NFR BLD AUTO: 0 % (ref 0–2)
LDLC SERPL CALC-MCNC: 96 MG/DL (ref 0–100)
LYMPHOCYTES # BLD AUTO: 1.48 THOUSANDS/ΜL (ref 0.6–4.47)
LYMPHOCYTES NFR BLD AUTO: 23 % (ref 14–44)
MCH RBC QN AUTO: 32.3 PG (ref 26.8–34.3)
MCHC RBC AUTO-ENTMCNC: 33.6 G/DL (ref 31.4–37.4)
MCV RBC AUTO: 96 FL (ref 82–98)
MONOCYTES # BLD AUTO: 0.66 THOUSAND/ΜL (ref 0.17–1.22)
MONOCYTES NFR BLD AUTO: 10 % (ref 4–12)
NEUTROPHILS # BLD AUTO: 4.12 THOUSANDS/ΜL (ref 1.85–7.62)
NEUTS SEG NFR BLD AUTO: 64 % (ref 43–75)
NONHDLC SERPL-MCNC: 117 MG/DL
NRBC BLD AUTO-RTO: 0 /100 WBCS
PLATELET # BLD AUTO: 205 THOUSANDS/UL (ref 149–390)
PMV BLD AUTO: 10.4 FL (ref 8.9–12.7)
POTASSIUM SERPL-SCNC: 4 MMOL/L (ref 3.5–5.3)
PROT SERPL-MCNC: 6.5 G/DL (ref 6.4–8.2)
PSA SERPL-MCNC: 0.6 NG/ML (ref 0–4)
RBC # BLD AUTO: 4.49 MILLION/UL (ref 3.88–5.62)
SODIUM SERPL-SCNC: 137 MMOL/L (ref 136–145)
TRIGL SERPL-MCNC: 104 MG/DL
TSH SERPL DL<=0.05 MIU/L-ACNC: 1.71 UIU/ML (ref 0.36–3.74)
WBC # BLD AUTO: 6.5 THOUSAND/UL (ref 4.31–10.16)

## 2019-05-20 PROCEDURE — 84443 ASSAY THYROID STIM HORMONE: CPT

## 2019-05-20 PROCEDURE — 80053 COMPREHEN METABOLIC PANEL: CPT

## 2019-05-20 PROCEDURE — 85025 COMPLETE CBC W/AUTO DIFF WBC: CPT

## 2019-05-20 PROCEDURE — G0103 PSA SCREENING: HCPCS

## 2019-05-20 PROCEDURE — 80061 LIPID PANEL: CPT

## 2019-05-20 PROCEDURE — 36415 COLL VENOUS BLD VENIPUNCTURE: CPT

## 2019-06-12 ENCOUNTER — HOSPITAL ENCOUNTER (OUTPATIENT)
Dept: NON INVASIVE DIAGNOSTICS | Facility: HOSPITAL | Age: 69
Discharge: HOME | End: 2019-06-12
Payer: MEDICARE

## 2019-06-12 ENCOUNTER — OFFICE VISIT (OUTPATIENT)
Dept: CARDIOLOGY CLINIC | Facility: CLINIC | Age: 69
End: 2019-06-12
Payer: MEDICARE

## 2019-06-12 ENCOUNTER — HOSPITAL ENCOUNTER (OUTPATIENT)
Dept: BONE DENSITY | Facility: HOSPITAL | Age: 69
Discharge: HOME | End: 2019-06-12
Payer: MEDICARE

## 2019-06-12 VITALS
SYSTOLIC BLOOD PRESSURE: 140 MMHG | WEIGHT: 169 LBS | HEART RATE: 63 BPM | DIASTOLIC BLOOD PRESSURE: 72 MMHG | BODY MASS INDEX: 26.53 KG/M2 | HEIGHT: 67 IN

## 2019-06-12 DIAGNOSIS — Z86.79 H/O INTERMITTENT CLAUDICATION: ICD-10-CM

## 2019-06-12 DIAGNOSIS — I10 ESSENTIAL HYPERTENSION: ICD-10-CM

## 2019-06-12 DIAGNOSIS — I25.118 CORONARY ARTERY DISEASE OF NATIVE ARTERY OF NATIVE HEART WITH STABLE ANGINA PECTORIS (HCC): ICD-10-CM

## 2019-06-12 DIAGNOSIS — I25.5 CARDIOMYOPATHY, ISCHEMIC: Primary | Chronic | ICD-10-CM

## 2019-06-12 DIAGNOSIS — I35.0 NONRHEUMATIC AORTIC VALVE STENOSIS: Chronic | ICD-10-CM

## 2019-06-12 DIAGNOSIS — M81.0 AGE RELATED OSTEOPOROSIS, UNSPECIFIED PATHOLOGICAL FRACTURE PRESENCE: ICD-10-CM

## 2019-06-12 PROCEDURE — 77080 DXA BONE DENSITY AXIAL: CPT

## 2019-06-12 PROCEDURE — 99214 OFFICE O/P EST MOD 30 MIN: CPT | Performed by: INTERNAL MEDICINE

## 2019-06-12 PROCEDURE — 93000 ELECTROCARDIOGRAM COMPLETE: CPT | Performed by: INTERNAL MEDICINE

## 2019-06-12 PROCEDURE — 93925 LOWER EXTREMITY STUDY: CPT

## 2019-06-12 PROCEDURE — 93923 UPR/LXTR ART STDY 3+ LVLS: CPT

## 2019-06-13 PROCEDURE — 93925 LOWER EXTREMITY STUDY: CPT | Performed by: SURGERY

## 2019-06-13 PROCEDURE — 93922 UPR/L XTREMITY ART 2 LEVELS: CPT | Performed by: SURGERY

## 2019-06-15 DIAGNOSIS — I20.8 ANGINA AT REST (HCC): ICD-10-CM

## 2019-06-15 RX ORDER — RANOLAZINE 500 MG/1
TABLET, EXTENDED RELEASE ORAL
Qty: 180 TABLET | Refills: 3 | Status: SHIPPED | OUTPATIENT
Start: 2019-06-15 | End: 2020-07-14

## 2019-06-17 ENCOUNTER — OFFICE VISIT (OUTPATIENT)
Dept: FAMILY MEDICINE CLINIC | Facility: CLINIC | Age: 69
End: 2019-06-17
Payer: MEDICARE

## 2019-06-17 VITALS
SYSTOLIC BLOOD PRESSURE: 132 MMHG | HEART RATE: 67 BPM | DIASTOLIC BLOOD PRESSURE: 78 MMHG | BODY MASS INDEX: 26.37 KG/M2 | OXYGEN SATURATION: 98 % | WEIGHT: 168 LBS | TEMPERATURE: 97.8 F | HEIGHT: 67 IN

## 2019-06-17 DIAGNOSIS — Z00.00 MEDICARE ANNUAL WELLNESS VISIT, SUBSEQUENT: Primary | ICD-10-CM

## 2019-06-17 DIAGNOSIS — M85.80 OSTEOPENIA, UNSPECIFIED LOCATION: ICD-10-CM

## 2019-06-17 DIAGNOSIS — I70.203 BILATERAL FEMORAL ARTERY STENOSIS (HCC): ICD-10-CM

## 2019-06-17 DIAGNOSIS — R73.9 HYPERGLYCEMIA: ICD-10-CM

## 2019-06-17 DIAGNOSIS — E66.3 OVERWEIGHT (BMI 25.0-29.9): ICD-10-CM

## 2019-06-17 DIAGNOSIS — I73.9 INTERMITTENT CLAUDICATION (HCC): ICD-10-CM

## 2019-06-17 DIAGNOSIS — I25.118 CORONARY ARTERY DISEASE OF NATIVE ARTERY OF NATIVE HEART WITH STABLE ANGINA PECTORIS (HCC): ICD-10-CM

## 2019-06-17 DIAGNOSIS — F32.A DEPRESSION, UNSPECIFIED DEPRESSION TYPE: ICD-10-CM

## 2019-06-17 DIAGNOSIS — E78.00 HYPERCHOLESTEROLEMIA: ICD-10-CM

## 2019-06-17 DIAGNOSIS — Z12.11 SCREENING FOR COLON CANCER: ICD-10-CM

## 2019-06-17 DIAGNOSIS — I10 ESSENTIAL HYPERTENSION: ICD-10-CM

## 2019-06-17 PROCEDURE — 99214 OFFICE O/P EST MOD 30 MIN: CPT | Performed by: FAMILY MEDICINE

## 2019-06-17 PROCEDURE — G0439 PPPS, SUBSEQ VISIT: HCPCS | Performed by: FAMILY MEDICINE

## 2019-06-17 RX ORDER — SERTRALINE HYDROCHLORIDE 25 MG/1
50 TABLET, FILM COATED ORAL DAILY
Qty: 30 TABLET | Refills: 6 | Status: ON HOLD | OUTPATIENT
Start: 2019-06-17 | End: 2019-09-17

## 2019-06-26 ENCOUNTER — HOSPITAL ENCOUNTER (OUTPATIENT)
Dept: NON INVASIVE DIAGNOSTICS | Facility: HOSPITAL | Age: 69
Discharge: HOME | End: 2019-06-26
Payer: MEDICARE

## 2019-06-26 DIAGNOSIS — I35.0 NONRHEUMATIC AORTIC VALVE STENOSIS: Chronic | ICD-10-CM

## 2019-06-26 DIAGNOSIS — I25.5 CARDIOMYOPATHY, ISCHEMIC: Chronic | ICD-10-CM

## 2019-06-26 PROCEDURE — 93306 TTE W/DOPPLER COMPLETE: CPT | Performed by: INTERNAL MEDICINE

## 2019-06-26 PROCEDURE — C8929 TTE W OR WO FOL WCON,DOPPLER: HCPCS

## 2019-06-26 RX ADMIN — PERFLUTREN 0.6 ML/MIN: 6.52 INJECTION, SUSPENSION INTRAVENOUS at 10:05

## 2019-06-27 ENCOUNTER — TELEPHONE (OUTPATIENT)
Dept: OBGYN CLINIC | Facility: HOSPITAL | Age: 69
End: 2019-06-27

## 2019-07-01 NOTE — PROGRESS NOTES
PAD (peripheral artery disease) ([facility] [address] )  69 y/o M former smoker w/hx HTN, HLD, PMR on chronic steriod therapy, CAD, ICM w/EF 20% on recent TTE, severe AS (TA 0 8 cm2) and PAD, s/p bilateral fem-BK pop bypasses w/in situ GSV '09 by Dr Mikhail Roy with delayed postop L thigh abscess requiring debridement '09 and s/p embolization of large L graft AVF 4/9/10 who presents w/BLE lifestyle limiting calf claudication x 1 year without rest pain or tissue loss  -CTA abd/pelvis w/bilateral iliofemoral runoff for more detailed evaluation of graft disease  -BMP prior to CTA  Last creatinine 0 89 with GFR 90 on 5/20/19  -continue ASA and statin therapy  -return to office with surgeon for review of CTA and discussion of treatment options  -instructed to contact the office in the interim with any questions, concerns or new symptoms or wounds  -the patient is the primary caregiver for his wife who is multiple comorbidities and requires daily care  The patient expresses his desire to expedite management of his symptoms  Nonrheumatic aortic valve stenosis  Severe AS (TA 0 8 cm2) and dilated cardiomyopathy with EF 20% on most recent TTE 6/26/2019  -follow-up with cardiology  -continue to optimize medical management    Other hyperlipidemia  -stable  -continue statin therapy  -management per PCP and Cardiology    Long term systemic steroid user  Hx polymyalgia rheumatica on chronic steroid therapy  -continue steroids as directed  -at risk of poor wound healing secondary to chronic immunosuppression      Assessment/Plan   Diagnoses and all orders for this visit:    PAD (peripheral artery disease) ([facility] [address] )  -     CTA abdominal w run off w wo contrast; Future  -     Basic metabolic panel;  Future    Nonrheumatic aortic valve stenosis    Long term systemic steroid user    Other hyperlipidemia    Cardiomyopathy, ischemic    Bilateral femoral artery stenosis ([facility] [address] )  -     Ambulatory referral to Vascular Surgery    Intermittent claudication [facility])  -     Ambulatory referral to Vascular Surgery        Chief Complaint   Patient presents with    Claudication     Bilateral       Subjective   Patient ID: Esther Macedo is a 68 y o  male  Pt is new to our practice and was referred by Dr Farhad Stratton DO for evaluation of bilateral leg pain with walking  Pt can walk approximately 60 feet before his get claudication in both legs  Pt starting noticing this since last summer  Pt then rests for 2-5 minutes for relief  Pt get numbness and tingling in both legs  Pt advised his legs are feeling very heavy lately  Pt get night time pain in both leg when sleeping  Pt had a JFEFREY on 6/13/19  Pt is currently taking ASA and Crestor  69 y/o M former smoker w/hx HTN, HLD, PMR on chronic steriod therapy, CAD, ICM w/EF 20% on recent TTE, severe AS (TA 0 8 cm2) and PAD, s/p bilateral fem-BK pop bypasses w/in situ GSV '09 by Dr Alejandro Calderón with delayed postop L thigh abscess requiring debridement '09 and s/p embolization of large L graft AVF 4/9/10 who presents w/BLE short distance calf claudication x 1 year without rest pain or tissue loss who is referred by his PCP for evaluation of bypass stenosis found on noninvasive imaging  Patient is s/p R SFA-BK popliteal bypass w/in situ GSV 6/18/09 and L CFA-BK popliteal bypass w/in situ GSV 8/13/2009 by Dr Alejandro Calderón for short distance claudication with subsequent LLE bypass graft AVF embolization 4/9/10  Patient was followed until 2013 and then lost to follow-up  The patient's symptoms resolved after revascularization but he developed recurrent bilateral calf claudication 1 year ago which has now been significantly progressive since March with inability to walk more than 60 feet  His symptoms appear to be equal bilaterally  He denies rest pain or tissue loss    JEFFREY on 6/12/2019 has been reviewed and demonstrates >75% proximal R SFA stenosis, >75% L CFA stenosis, bilateral tibioperoneal disease and patent bilateral bypass grafts but decreased velocities mid graft  R ZACH 0 64/51/48, L ZACH 0 80/58/64 with last ABIs 1 0 in 2013  Patient complains of chronic bilateral feet burning consistent with neuropathy and chronic decreased sensation left foot  Motor intact  The following portions of the patient's history were reviewed and updated as appropriate: allergies, current medications, past family history, past medical history, past social history, past surgical history and problem list     Review of Systems   Constitutional: Positive for activity change  HENT: Positive for congestion  Eyes: Positive for itching  Respiratory: Negative  Cardiovascular: Negative  Gastrointestinal: Negative  Endocrine: Negative  Genitourinary: Negative  Musculoskeletal: Positive for back pain and neck pain  Skin: Negative  Allergic/Immunologic: Positive for environmental allergies  Neurological: Positive for numbness  Hematological: Negative  Psychiatric/Behavioral: Negative  I have personally reviewed the ROS entered by MA and agree as documented      Patient Active Problem List   Diagnosis    Nonrheumatic aortic valve stenosis    RODRIGUEZ (dyspnea on exertion)    Cardiomyopathy, ischemic    Essential hypertension    Coronary artery disease of native artery of native heart with stable angina pectoris ([facility] [address] )    Polymyalgia rheumatica ([facility] [address] )    Primary generalized (osteo)arthritis    Long term systemic steroid user    Depression    PAD (peripheral artery disease) ([facility] [address] )    Other hyperlipidemia       Past Surgical History:   Procedure Laterality Date    TONSILLECTOMY AND ADENOIDECTOMY         Family History   Problem Relation Age of Onset    Cancer Mother         cervix    Coronary artery disease Father     Heart attack Father     Cancer Brother     Coronary artery disease Maternal Grandfather     Heart disease Paternal Grandfather     Stroke Paternal Grandfather         CVA    Heart attack Paternal Grandfather     Coronary artery disease Other          at age 78 per [employer]       Social History     Socioeconomic History    Marital status: /Civil Union     Spouse name: Not on file    Number of children: 11    Years of education: Not on file    Highest education level: Not on file   Occupational History    Occupation: Environmental Supervisor   Social Needs    Financial resource strain: Not on file    Food insecurity:     Worry: Not on file     Inability: Not on file   [facility] needs:     Medical: Not on file     Non-medical: Not on file   Tobacco Use    Smoking status: Former Smoker     Last attempt to quit:      Years since quittin 5    Smokeless tobacco: Never Used   Substance and Sexual Activity    Alcohol use: Yes     Comment: social    Drug use: Not on file    Sexual activity: Not on file   Lifestyle    Physical activity:     Days per week: Not on file     Minutes per session: Not on file    Stress: Not on file   Relationships    Social connections:     Talks on phone: Not on file     Gets together: Not on file     Attends Islam service: Not on file     Active member of club or organization: Not on file     Attends meetings of clubs or organizations: Not on file     Relationship status: Not on file    Intimate partner violence:     Fear of current or ex partner: Not on file     Emotionally abused: Not on file     Physically abused: Not on file     Forced sexual activity: Not on file   Other Topics Concern    Not on file   Social History Narrative    Not on file       Allergies   Allergen Reactions    Atorvastatin Myalgia     Annotation - [id number]: Joint pain, general pain    Avelox [Moxifloxacin] Diarrhea    Pravastatin     Rosuvastatin Calcium Myalgia     Annotation - [id number]: Joint pain, general pain    Simvastatin          Current Outpatient Medications:     amLODIPine (NORVASC) 10 mg tablet, Take 1 tablet (10 mg total) by mouth daily, Disp: 90 tablet, Rfl: 2    aspirin 325 mg tablet, Take 325 mg by mouth daily at bedtime, Disp: , Rfl:     Cholecalciferol (VITAMIN D3) 2000 units TABS, Take 2,000 Units by mouth daily, Disp: , Rfl:     isosorbide mononitrate (IMDUR) 30 mg 24 hr tablet, Take 30 mg by mouth daily  , Disp: , Rfl: 0    Multiple Vitamin (MULTIVITAMIN) tablet, Take 1 tablet by mouth daily, Disp: , Rfl:     predniSONE 5 mg tablet, Take 2 tablets (10 mg total) by mouth daily (Patient taking differently: Take 5 mg by mouth daily ), Disp: 60 tablet, Rfl: 2    ranolazine (RANEXA) 500 mg 12 hr tablet, take 1 tablet by mouth twice a day, Disp: 180 tablet, Rfl: 3    rosuvastatin (CRESTOR) 5 mg tablet, Take 1 tablet (5 mg total) by mouth daily, Disp: 90 tablet, Rfl: 3    sertraline (ZOLOFT) 25 mg tablet, Take 2 tablets (50 mg total) by mouth daily, Disp: 30 tablet, Rfl: 6    triamterene-hydrochlorothiazide (DYAZIDE) 37 5-25 mg per capsule, take 1 capsule by mouth once daily, Disp: 90 capsule, Rfl: 1    Objective      Imaging studies:  JEFFREY 6/12/2019:  Imaging study reviewed and as described above  See full report below:  Segment                Right                        Left                                            Impression  Waveform    PSV  Impression  Waveform    PSV    Post  Tibial                       Monophasic                   Monophasic         Ant   Tibial                        Monophasic                   Monophasic         Inflow Anastomosis                              60                          149    High Thigh (Graft)                                                           27    Mid Thigh (Graft)                               30                           33    Low Thigh (Graft)                               36                           22    Near Knee (Graft)                               28                           14    Outflow Anastomosis                             59                           96    Common Femoral Artery                           83  >75%                    429    Prox Profunda                                   18                          142    Prox SFA               >75%                    345                           17    Prox Post Tibial                                35                           23    Dist Post Tibial                                13                           15    Dist  Ant  Tibial                               24                           35    Prox Peroneal                                   15                           18             CONCLUSION:  Impression:  RIGHT LOWER LIMB:  There is a 75% stenosis noted in the proximal superficial femoral artery  The superficial to distal popliteal artery bypass graft appears patent with  atherosclerotic disease present and decreased velocities mid graft  Findings  also suggest tibio-peroneal arterial disease  Ankle/Brachial index 0 64: Prior: 0 99  Metatarsal pressure of  51mmHg  Great toe pressure of 48 mmHg, within the healing range  LEFT LOWER LIMB:  There is a >75% stenosis noted in the common femoral artery  The common femoral to distal popliteal artery bypass graft appears patent with  atherosclerotic disease present and decreased velocities noted in the mid graft  compared to previous study  Findings also suggest tibio-peroneal arterial disease  Ankle/Brachial index :0 80      , Prior: 1 09  Metatarsal pressure of 58 mmHg  Great toe pressure of 64  mmHg, within the healing range  Compared to previous study on 09/16/2013 , there is progression of the  atherosclerotic disease in the bypass grafts bilaterally   Recommend evaluation  with vascular surgeon      Physical Exam:    General appearance: alert and oriented, in no acute distress  Skin: Skin color, texture, turgor normal  No rashes or lesions  Neurologic: Grossly normal  Head: Normocephalic, without obvious abnormality, atraumatic  Eyes: PERRL,, sclerae nonicteric  Throat: lips, mucosa, and tongue normal; teeth and gums normal  Neck: no adenopathy, no JVD, supple, symmetrical, trachea midline, thyroid not enlarged, symmetric, no tenderness/mass/nodules and Radiation of cardiac murmur to bilateral carotids  Back: symmetric, no curvature  ROM normal  No CVA tenderness  Lungs: clear to auscultation bilaterally  Chest wall: no tenderness  Heart: regular rate and rhythm, S1, S2 normal, no S3 or S4, no rub and III/VI systolic murmur loudest at right sternal border  Abdomen: soft, non-tender; bowel sounds normal; no masses,  no organomegaly and Nondistended  No abdominal bruits  Extremities: extremities normal, warm and well-perfused; no cyanosis, clubbing, or edema, no ulcers, gangrene or trophic changes and Old well-healed bilateral thigh to above knee surgical scars  2+ palpable graft pulse bilateral knees  Pulse exam:  Radial: Right: 2+ Left[de-identified] 2+   Femoral: Right: 1+ Left: 2+  Popliteal: Right: non-palpable Left: non-palpable  DP: Right: doppler signal Left: doppler signal  PT: Right: doppler signal Left: doppler signal   Doppler signals:  Right:  Monophasic PT (attenuates with graft compression), AT, DP signals    No dopplerable peroneal   Left:  Monophasic faint PT, DP, AT and peroneal signals

## 2019-07-04 PROBLEM — I73.9 PAD (PERIPHERAL ARTERY DISEASE) (HCC): Status: ACTIVE | Noted: 2019-07-04

## 2019-07-04 PROBLEM — E78.49 OTHER HYPERLIPIDEMIA: Status: ACTIVE | Noted: 2019-07-04

## 2019-07-05 ENCOUNTER — CONSULT (OUTPATIENT)
Dept: VASCULAR SURGERY | Facility: CLINIC | Age: 69
End: 2019-07-05
Payer: MEDICARE

## 2019-07-05 ENCOUNTER — APPOINTMENT (OUTPATIENT)
Dept: LAB | Facility: CLINIC | Age: 69
End: 2019-07-05
Payer: MEDICARE

## 2019-07-05 VITALS
HEIGHT: 66 IN | SYSTOLIC BLOOD PRESSURE: 126 MMHG | DIASTOLIC BLOOD PRESSURE: 74 MMHG | TEMPERATURE: 96.3 F | WEIGHT: 172 LBS | BODY MASS INDEX: 27.64 KG/M2 | RESPIRATION RATE: 16 BRPM | HEART RATE: 66 BPM

## 2019-07-05 DIAGNOSIS — I73.9 PAD (PERIPHERAL ARTERY DISEASE) (HCC): Primary | ICD-10-CM

## 2019-07-05 DIAGNOSIS — I73.9 INTERMITTENT CLAUDICATION (HCC): ICD-10-CM

## 2019-07-05 DIAGNOSIS — I73.9 PAD (PERIPHERAL ARTERY DISEASE) (HCC): ICD-10-CM

## 2019-07-05 DIAGNOSIS — I35.0 NONRHEUMATIC AORTIC VALVE STENOSIS: Chronic | ICD-10-CM

## 2019-07-05 DIAGNOSIS — Z79.52 LONG TERM SYSTEMIC STEROID USER: ICD-10-CM

## 2019-07-05 DIAGNOSIS — I25.5 CARDIOMYOPATHY, ISCHEMIC: Chronic | ICD-10-CM

## 2019-07-05 DIAGNOSIS — E78.49 OTHER HYPERLIPIDEMIA: ICD-10-CM

## 2019-07-05 DIAGNOSIS — I70.203 BILATERAL FEMORAL ARTERY STENOSIS (HCC): ICD-10-CM

## 2019-07-05 LAB
ANION GAP SERPL CALCULATED.3IONS-SCNC: 10 MMOL/L (ref 4–13)
BUN SERPL-MCNC: 16 MG/DL (ref 5–25)
CALCIUM SERPL-MCNC: 9.5 MG/DL (ref 8.3–10.1)
CHLORIDE SERPL-SCNC: 104 MMOL/L (ref 100–108)
CO2 SERPL-SCNC: 24 MMOL/L (ref 21–32)
CREAT SERPL-MCNC: 0.76 MG/DL (ref 0.6–1.3)
GFR SERPL CREATININE-BSD FRML MDRD: 92 ML/MIN/1.73SQ M
GLUCOSE P FAST SERPL-MCNC: 101 MG/DL (ref 65–99)
POTASSIUM SERPL-SCNC: 4.1 MMOL/L (ref 3.5–5.3)
SODIUM SERPL-SCNC: 138 MMOL/L (ref 136–145)

## 2019-07-05 PROCEDURE — 36415 COLL VENOUS BLD VENIPUNCTURE: CPT

## 2019-07-05 PROCEDURE — 99204 OFFICE O/P NEW MOD 45 MIN: CPT | Performed by: PHYSICIAN ASSISTANT

## 2019-07-05 PROCEDURE — 80048 BASIC METABOLIC PNL TOTAL CA: CPT

## 2019-07-05 NOTE — ASSESSMENT & PLAN NOTE
Severe AS (TA 0 8 cm2) and dilated cardiomyopathy with EF 20% on most recent TTE 6/26/2019  -follow-up with cardiology  -continue to optimize medical management

## 2019-07-05 NOTE — ASSESSMENT & PLAN NOTE
Hx polymyalgia rheumatica on chronic steroid therapy  -continue steroids as directed  -at risk of poor wound healing secondary to chronic immunosuppression

## 2019-07-05 NOTE — ASSESSMENT & PLAN NOTE
67 y/o M former smoker w/hx HTN, HLD, PMR on chronic steriod therapy, CAD, ICM w/EF 20% on recent TTE, severe AS (TA 0 8 cm2) and PAD, s/p bilateral fem-BK pop bypasses w/in situ GSV '09 by Dr Ailyn Warner with delayed postop L thigh abscess requiring debridement '09 and s/p embolization of large L graft AVF 4/9/10 who presents w/BLE lifestyle limiting calf claudication x 1 year without rest pain or tissue loss  -CTA abd/pelvis w/bilateral iliofemoral runoff for more detailed evaluation of graft disease  -BMP prior to CTA  Last creatinine 0 89 with GFR 90 on 5/20/19  -continue ASA and statin therapy  -return to office with surgeon for review of CTA and discussion of treatment options  -instructed to contact the office in the interim with any questions, concerns or new symptoms or wounds  -the patient is the primary caregiver for his wife who is multiple comorbidities and requires daily care  The patient expresses his desire to expedite management of his symptoms

## 2019-07-05 NOTE — PATIENT INSTRUCTIONS
Peripheral Artery Disease   WHAT YOU NEED TO KNOW:   What is peripheral artery disease? Peripheral artery disease (PAD) is narrow, weak, or blocked arteries  It may affect any arteries outside of your heart and brain  PAD is usually the result of a buildup of fat and cholesterol, also called plaque, along your artery walls  Inflammation, a blood clot, or abnormal cell growth could also block your arteries  PAD prevents normal blood flow to your legs and arms  You are at risk of an amputation if poor blood flow keeps wounds from healing or causes gangrene (tissue death)  Without treatment, PAD can also cause a heart attack or stroke  What increases my risk for PAD? · Smoking cigarettes     · Diabetes     · High blood pressure     · High cholesterol     · Age older than 40 years    · Heart disease or a family history of heart disease  What are the signs and symptoms of PAD? Mild PAD usually does not cause symptoms  As the disease worsens over time, you may have the following:  · Pain or cramps in your leg or hip while you walk     · A numb, weak, or heavy feeling in your legs     · Dry, scaly, red, or pale skin on your legs     · Thick or brittle nails, or hair loss on your arms and legs     · Foot sores that will not heal     · Burning or aching in your feet and toes while resting (this may be worse when you lie down)  How is PAD diagnosed? · Angiography  is a test that shows pictures of the arteries in your arms and legs  You will be given contrast liquid to help the arteries show up better on the pictures  The pictures will be taken with an MRI or CT scan  Tell the healthcare provider if you have ever had an allergic reaction to contrast liquid  Do not enter the MRI room with anything metal  Metal can cause serious injury  Tell a healthcare provider if you have any metal in or on your body      · Doppler ankle brachial index (ZACH)  is a test that compares blood pressure in your ankles to blood pressure in your arms  This tells your healthcare provider how well blood is flowing through the arteries in your legs  How is PAD treated? Treatment can help reduce your risk of a heart attack, stroke, or amputation  You may need more than one of the following:  · Medicines  may be given  Your healthcare provider may give you any of the following:     ¨ Antiplatelet medicine,  such as aspirin, helps prevent blood clots and reduces the risk of a heart attack or stroke  ¨ Statin medicine  helps lower your cholesterol and prevents your PAD from getting worse  · A supervised exercise program  helps you stay active in normal daily activities and may prevent disability  Healthcare providers will help you safely walk or do strength training exercises 3 times a week for 30 to 60 minutes  You will do this for several months, then transition to walking on your own  · Angioplasty  is a procedure to open your artery so blood can flow through normally  A thin tube called a catheter is used to insert a small balloon into your artery  The balloon is inflated to open your blocked artery, and then removed  A tube called a stent may be placed in your artery to hold it open  · Bypass surgery  is used to make a new connection to your artery with a vein from another part of your body, or an artificial graft  The vein or graft is attached to your artery above and below your blockage  This allows blood to flow around the blocked portion of your artery  How can I manage PAD? · Do not smoke  Nicotine and other chemicals in cigarettes and cigars can worsen PAD  They can also increase your risk for a heart attack or stroke  Ask your healthcare provider for information if you currently smoke and need help to quit  E-cigarettes or smokeless tobacco still contain nicotine  Talk to your healthcare provider before you use these products  · Manage other health conditions  Take your medicines as directed   Follow your healthcare provider's instructions if you have high blood pressure or high cholesterol  Perform foot care and check your blood sugar levels as directed if you have diabetes  · Eat heart healthy foods  Eat whole grains, fruits, and vegetables every day  Limit salt and high-fat foods  Ask your healthcare provider for more information on a heart healthy diet  Ask if you need to lose weight  Your healthcare provider can help you create a healthy weight-loss plan  Call 911 for the following:   · You have any of the following signs of a heart attack:      ¨ Squeezing, pressure, or pain in your chest that lasts longer than 5 minutes or returns    ¨ Discomfort or pain in your back, neck, jaw, stomach, or arm     ¨ Trouble breathing    ¨ Nausea or vomiting    ¨ Lightheadedness or a sudden cold sweat, especially with chest pain or trouble breathing    · You have any of the following signs of a stroke:      ¨ Numbness or drooping on one side of your face     ¨ Weakness in an arm or leg    ¨ Confusion or difficulty speaking    ¨ Dizziness, a severe headache, or vision loss  When should I seek immediate care? · You have sores or wounds that will not heal      · You notice black or discolored skin on your arm or leg  · Your skin is cool to the touch  When should I contact my healthcare provider? · You have leg pain when you walk 1/8 mile (200 meters) or less, even with treatment  · Your legs are red, dry, or pale, even with treatment  · You have questions or concerns about your condition or care  CARE AGREEMENT:   You have the right to help plan your care  Learn about your health condition and how it may be treated  Discuss treatment options with your caregivers to decide what care you want to receive  You always have the right to refuse treatment  The above information is an  only  It is not intended as medical advice for individual conditions or treatments   Talk to your doctor, nurse or pharmacist before following any medical regimen to see if it is safe and effective for you  © 2017 [employer] Information is for End User's use only and may not be sold, redistributed or otherwise used for commercial purposes  All illustrations and images included in CareNotes® are the copyrighted property of [employer]  or [employer]      -recommend CT angiogram of the abdomen and pelvis with bilateral leg runoff for more detailed evaluation of your arterial disease to help guide best treatment options  -you will be required to get blood work prior to CT angiogram  -return to office with surgeon after CT scan for review results and discussion of treatment plan  -continue aspirin and Crestor  -please contact the office in the interim with any questions, concerns, change in symptoms or new wounds

## 2019-07-11 ENCOUNTER — HOSPITAL ENCOUNTER (OUTPATIENT)
Dept: CT IMAGING | Facility: HOSPITAL | Age: 69
Discharge: HOME | End: 2019-07-11
Payer: MEDICARE

## 2019-07-11 DIAGNOSIS — I73.9 PAD (PERIPHERAL ARTERY DISEASE) (HCC): ICD-10-CM

## 2019-07-11 PROCEDURE — 75635 CT ANGIO ABDOMINAL ARTERIES: CPT

## 2019-07-11 RX ADMIN — IOHEXOL 120 ML: 350 INJECTION, SOLUTION INTRAVENOUS at 11:47

## 2019-07-16 ENCOUNTER — OFFICE VISIT (OUTPATIENT)
Dept: VASCULAR SURGERY | Facility: CLINIC | Age: 69
End: 2019-07-16
Payer: MEDICARE

## 2019-07-16 VITALS
DIASTOLIC BLOOD PRESSURE: 80 MMHG | BODY MASS INDEX: 26.84 KG/M2 | HEART RATE: 81 BPM | SYSTOLIC BLOOD PRESSURE: 142 MMHG | WEIGHT: 167 LBS | HEIGHT: 66 IN

## 2019-07-16 DIAGNOSIS — I73.9 PAD (PERIPHERAL ARTERY DISEASE) (HCC): Primary | ICD-10-CM

## 2019-07-16 PROCEDURE — 99213 OFFICE O/P EST LOW 20 MIN: CPT | Performed by: SURGERY

## 2019-07-16 RX ORDER — CLOPIDOGREL BISULFATE 75 MG/1
75 TABLET ORAL DAILY
Qty: 180 TABLET | Refills: 3 | Status: SHIPPED | OUTPATIENT
Start: 2019-07-16 | End: 2020-10-15 | Stop reason: SDUPTHER

## 2019-07-16 NOTE — PROGRESS NOTES
Assessment/Plan:    PAD (peripheral artery disease) ([facility])  Severe plaque burden the bilateral common femoral arteries  Due to patient's significant cardiac dysfunction I do not believe he is a good candidate for open endarterectomy  Therefore will proceed with percutaneous atherectomy  Will proceed with left groin approach for right common femoral endarterectomy as he is most symptomatic in the right lower extremity  Will initiate clopidogrel therapy  Patient is to continue aspirin and statin therapy  Diagnoses and all orders for this visit:    PAD (peripheral artery disease) ([facility])  -     clopidogrel (PLAVIX) 75 mg tablet; Take 1 tablet (75 mg total) by mouth daily    Other orders  -     Notify Physician in PT/INR greater than 1 8 and/or Creatine Clearance (gFR)  is less than 60  ml/zaria/1 73sq m; Standing  -     Height and Weight Upon Arrival; Standing  -     Nursing communcation Apply snapless gown prior to procedure; Standing  -     Have Patient Void On Call to Procedure Room; Standing  -     Insert and Maintain IV; Standing  -     IR abdominal angiography; Standing  -     IR lower extremity; Standing  -     Basic metabolic panel; Standing  -     CBC; Standing  -     APTT; Standing  -     Protime-INR; Standing  -     IR abdominal angiography  -     IR lower extremity          Subjective:      Patient ID: Yaakov Montalvo is a 68 y o  male  Pt had CTA abd 7/11  Pt is c/o pain in the bilat legs with walking short distances  Symptoms relived with rest L>R  Symptoms travel from his back down the back of his legs  He has numbness and tingling in the bilat legs that has been getting worse  He denies open wounds  He denies abdominal pain  Patient here to review his CTA  CTA shows profound plaque burden in the bilateral common femoral arteries  The patient has patent bilateral femoral to below knee popliteal artery bypass grafts  Patient now has bilateral severe disabling claudication with rest pain  Presently there is no tissue loss  This is secondary to the significant inflow obstruction from the common femoral artery plaque  Patient is a 69 y/o M former smoker w/hx HTN, HLD, PMR on chronic steriod therapy, CAD, ICM w/EF 20% on recent TTE, severe AS (TA 0 8 cm2) and PAD, s/p bilateral fem-BK pop bypasses w/in situ GSV '09 by Dr Xu Garner with delayed postop L thigh abscess requiring debridement '09 and s/p embolization of large L graft AVF 4/9/10 who presents w/BLE lifestyle limiting calf claudication x 1 year without rest pain or tissue loss  -CTA abd/pelvis w/bilateral iliofemoral runoff for more detailed evaluation of graft disease  -BMP prior to CTA  Last creatinine 0 89 with GFR 90 on 5/20/19  -continue ASA and statin therapy  Will start clopidogrel  The following portions of the patient's history were reviewed and updated as appropriate: allergies, current medications, past family history, past medical history, past social history, past surgical history and problem list     Review of Systems   Constitutional: Positive for fatigue  HENT: Positive for hearing loss  Eyes: Negative  Respiratory: Positive for shortness of breath (with activity)  Cardiovascular: Positive for leg swelling  Gastrointestinal: Negative  Endocrine: Negative  Genitourinary: Negative  Musculoskeletal: Positive for arthralgias and back pain  Leg pain and cramping with walking   Skin: Negative  Allergic/Immunologic: Negative  Neurological: Positive for numbness  Hematological: Bruises/bleeds easily  Psychiatric/Behavioral: Negative  Objective:      /80 (BP Location: Left arm, Patient Position: Sitting, Cuff Size: Adult)   Pulse 81   Ht 5' 6" (1 676 m)   Wt 75 8 kg (167 lb)   BMI 26 95 kg/m²          Physical Exam   Constitutional: He is oriented to person, place, and time  He appears well-developed and well-nourished  HENT:   Head: Normocephalic and atraumatic  Mouth/Throat: Oropharynx is clear and moist    Eyes: Pupils are equal, round, and reactive to light  Conjunctivae and EOM are normal    Neck: Normal range of motion  Neck supple  No JVD present  Cardiovascular: Normal rate, regular rhythm and normal heart sounds  Weakly palpable bilateral common femoral arteries   Pulmonary/Chest: Effort normal and breath sounds normal  No stridor  No respiratory distress  He has no wheezes  He has no rales  He exhibits no tenderness  Abdominal: Soft  He exhibits no distension and no mass  There is no tenderness  There is no rebound and no guarding  Musculoskeletal: Normal range of motion  He exhibits no tenderness or deformity  Neurological: He is alert and oriented to person, place, and time  Skin: Skin is warm and dry  No rash noted  No erythema  Psychiatric: He has a normal mood and affect  His behavior is normal  Thought content normal    Nursing note and vitals reviewed      Operative Scheduling Information:    Hospital:  [address] [place] [address]    Physician:  Me    Surgery: angio, intervent, left groin approach, need jetstream rep    Urgency:  Standard    Case Length:  Normal    Post-op Bed:  Outpatient    [place]  [facility]    Equipment Needs:  Rep: jetstream    Medication Instructions:  Aspirin:   Continue (do not hold)  Plavix:  Continue (do not hold)    Hydration:  No

## 2019-07-16 NOTE — LETTER
July 16, 2019     Sydney Dowd, [address] [place] [id number]    Patient: Pedro Gutiérrez   YOB: 1950   Date of Visit: 7/16/2019       Dear Dr Juanito Torres:    Thank you for referring Naliniarthur Stevensalbertoedna to me for evaluation  Below are the relevant portions of my assessment and plan of care  Patient here to review his CTA  CTA shows profound plaque burden in the bilateral common femoral arteries  The patient has patent bilateral femoral to below knee popliteal artery bypass grafts  Patient now has bilateral severe disabling claudication with rest pain  Presently there is no tissue loss  This is secondary to the significant inflow obstruction from the common femoral artery plaque  Patient is a 67 y/o M former smoker w/hx HTN, HLD, PMR on chronic steriod therapy, CAD, ICM w/EF 20% on recent TTE, severe AS (TA 0 8 cm2) and PAD, s/p bilateral fem-BK pop bypasses w/in situ GSV '09 by Dr Dominick Colin with delayed postop L thigh abscess requiring debridement '09 and s/p embolization of large L graft AVF 4/9/10 who presents w/BLE lifestyle limiting calf claudication x 1 year without rest pain or tissue loss  -CTA abd/pelvis w/bilateral iliofemoral runoff for more detailed evaluation of graft disease  -BMP prior to CTA  Last creatinine 0 89 with GFR 90 on 5/20/19  -continue ASA and statin therapy  Will start clopidogrel  Assessment/Plan:    PAD (peripheral artery disease) ([facility])  Severe plaque burden the bilateral common femoral arteries  Due to patient's significant cardiac dysfunction I do not believe he is a good candidate for open endarterectomy  Therefore will proceed with percutaneous atherectomy  Will proceed with left groin approach for right common femoral endarterectomy as he is most symptomatic in the right lower extremity  Will initiate clopidogrel therapy  Patient is to continue aspirin and statin therapy  If you have questions, please do not hesitate to call me  I look forward to following Jonelle Rodriguez along with you           Sincerely,        Cody Baptiste MD        CC: Angel Mcmanus, DO iWl Gomez, MD Everette Marc PA-C

## 2019-07-16 NOTE — ASSESSMENT & PLAN NOTE
Severe plaque burden the bilateral common femoral arteries  Due to patient's significant cardiac dysfunction I do not believe he is a good candidate for open endarterectomy  Therefore will proceed with percutaneous atherectomy  Will proceed with left groin approach for right common femoral endarterectomy as he is most symptomatic in the right lower extremity  Will initiate clopidogrel therapy  Patient is to continue aspirin and statin therapy

## 2019-07-17 ENCOUNTER — TELEPHONE (OUTPATIENT)
Dept: VASCULAR SURGERY | Facility: CLINIC | Age: 69
End: 2019-07-17

## 2019-07-17 ENCOUNTER — PREP FOR PROCEDURE (OUTPATIENT)
Dept: VASCULAR SURGERY | Facility: CLINIC | Age: 69
End: 2019-07-17

## 2019-07-17 ENCOUNTER — OFFICE VISIT (OUTPATIENT)
Dept: FAMILY MEDICINE CLINIC | Facility: CLINIC | Age: 69
End: 2019-07-17
Payer: MEDICARE

## 2019-07-17 VITALS
DIASTOLIC BLOOD PRESSURE: 76 MMHG | WEIGHT: 169 LBS | SYSTOLIC BLOOD PRESSURE: 126 MMHG | BODY MASS INDEX: 27.16 KG/M2 | HEIGHT: 66 IN | TEMPERATURE: 96.5 F

## 2019-07-17 DIAGNOSIS — I73.9 PAD (PERIPHERAL ARTERY DISEASE) (HCC): ICD-10-CM

## 2019-07-17 DIAGNOSIS — I25.5 CARDIOMYOPATHY, ISCHEMIC: ICD-10-CM

## 2019-07-17 DIAGNOSIS — I35.0 SEVERE AORTIC STENOSIS: ICD-10-CM

## 2019-07-17 DIAGNOSIS — I50.9 CHRONIC CONGESTIVE HEART FAILURE, UNSPECIFIED HEART FAILURE TYPE (HCC): ICD-10-CM

## 2019-07-17 DIAGNOSIS — F32.A DEPRESSION, UNSPECIFIED DEPRESSION TYPE: Primary | ICD-10-CM

## 2019-07-17 DIAGNOSIS — I73.9 PAD (PERIPHERAL ARTERY DISEASE) (HCC): Primary | ICD-10-CM

## 2019-07-17 PROCEDURE — 99214 OFFICE O/P EST MOD 30 MIN: CPT | Performed by: FAMILY MEDICINE

## 2019-07-17 NOTE — PROGRESS NOTES
Assessment/Plan:    No problem-specific Assessment & Plan notes found for this encounter  Diagnoses and all orders for this visit:    Depression, unspecified depression type  Comments:  no change in the medication    PAD (peripheral artery disease) ([facility] )  Comments:  per vascular    Cardiomyopathy, ischemic    Severe aortic stenosis  Comments:  referral back to cardiology    Chronic congestive heart failure, unspecified heart failure type [facility])  Comments:  per cardiology          Subjective:      Patient ID: Neela Lopez is a 68 y o  male  Follow up for intermittant claudication in both legs, pt can only walk 60 feet before he has muscle pain, it goes away with rest, pt was sent to vascular for evaluation  Depression   This is a chronic problem  The current episode started more than 1 month ago  The problem occurs constantly  Pertinent negatives include no chest pain  Treatments tried: zoloft  The treatment provided mild relief  The following portions of the patient's history were reviewed and updated as appropriate: allergies, current medications, past family history, past medical history, past social history, past surgical history and problem list     Review of Systems   Respiratory: Negative for shortness of breath and wheezing  Cardiovascular: Negative for chest pain and palpitations  Psychiatric/Behavioral: Positive for depression  Objective:      /76   Temp (!) 96 5 °F (35 8 °C)   Ht 5' 6" (1 676 m)   Wt 76 7 kg (169 lb)   BMI 27 28 kg/m²          Physical Exam   Constitutional: He is oriented to person, place, and time  He appears well-developed and well-nourished  No distress  HENT:   Head: Normocephalic and atraumatic  Eyes: Conjunctivae are normal  No scleral icterus  Neck: Normal range of motion  Neck supple  Cardiovascular: Normal rate, regular rhythm and normal heart sounds  No murmur heard    Pulmonary/Chest: Effort normal and breath sounds normal  No respiratory distress  He has no wheezes  He has no rales  Abdominal: Soft  Bowel sounds are normal  He exhibits no distension and no mass  There is no tenderness  There is no rebound and no guarding  Musculoskeletal: He exhibits no edema  Lymphadenopathy:     He has no cervical adenopathy  Neurological: He is alert and oriented to person, place, and time  Skin: Skin is warm and dry  He is not diaphoretic  Psychiatric: He has a normal mood and affect  His behavior is normal  Judgment and thought content normal    Nursing note and vitals reviewed

## 2019-07-17 NOTE — TELEPHONE ENCOUNTER
Spoke to patient to schedule procedure for 8-15-19 [facility]/Hybrid with Dr Phil Sr patient as told nothing to eat or drink after midnight Patient was told to go for blood work before the procedure  Patient confirmed and understood instructions   LLF

## 2019-07-25 ENCOUNTER — TRANSCRIBE ORDERS (OUTPATIENT)
Dept: LAB | Facility: HOSPITAL | Age: 69
End: 2019-07-25

## 2019-07-25 ENCOUNTER — APPOINTMENT (OUTPATIENT)
Dept: LAB | Facility: HOSPITAL | Age: 69
End: 2019-07-25
Payer: MEDICARE

## 2019-07-25 DIAGNOSIS — I73.9 PAD (PERIPHERAL ARTERY DISEASE) (HCC): ICD-10-CM

## 2019-07-25 LAB
ANION GAP SERPL CALCULATED.3IONS-SCNC: 13 MMOL/L (ref 4–13)
BUN SERPL-MCNC: 15 MG/DL (ref 7–25)
CALCIUM SERPL-MCNC: 9.9 MG/DL (ref 8.6–10.5)
CHLORIDE SERPL-SCNC: 102 MMOL/L (ref 98–107)
CO2 SERPL-SCNC: 25 MMOL/L (ref 21–31)
CREAT SERPL-MCNC: 0.87 MG/DL (ref 0.7–1.3)
ERYTHROCYTE [DISTWIDTH] IN BLOOD BY AUTOMATED COUNT: 13.1 % (ref 11.5–14.5)
GFR SERPL CREATININE-BSD FRML MDRD: 87 ML/MIN/1.73SQ M
GLUCOSE SERPL-MCNC: 86 MG/DL (ref 65–99)
HCT VFR BLD AUTO: 39.7 % (ref 42–47)
HGB BLD-MCNC: 13.9 G/DL (ref 14–18)
MCH RBC QN AUTO: 32.3 PG (ref 26–34)
MCHC RBC AUTO-ENTMCNC: 35 G/DL (ref 31–37)
MCV RBC AUTO: 92 FL (ref 81–99)
PLATELET # BLD AUTO: 223 THOUSANDS/UL (ref 149–390)
PMV BLD AUTO: 8.1 FL (ref 8.6–11.7)
POTASSIUM SERPL-SCNC: 3.8 MMOL/L (ref 3.5–5.5)
RBC # BLD AUTO: 4.3 MILLION/UL (ref 4.3–5.9)
SODIUM SERPL-SCNC: 140 MMOL/L (ref 134–143)
WBC # BLD AUTO: 8 THOUSAND/UL (ref 4.8–10.8)

## 2019-07-25 PROCEDURE — 80048 BASIC METABOLIC PNL TOTAL CA: CPT

## 2019-07-25 PROCEDURE — 85027 COMPLETE CBC AUTOMATED: CPT

## 2019-07-25 PROCEDURE — 36415 COLL VENOUS BLD VENIPUNCTURE: CPT

## 2019-08-01 ENCOUNTER — TELEPHONE (OUTPATIENT)
Dept: CARDIOLOGY CLINIC | Facility: CLINIC | Age: 69
End: 2019-08-01

## 2019-08-11 DIAGNOSIS — I10 ESSENTIAL HYPERTENSION: ICD-10-CM

## 2019-08-14 ENCOUNTER — ANESTHESIA EVENT (OUTPATIENT)
Dept: PERIOP | Facility: HOSPITAL | Age: 69
End: 2019-08-14
Payer: MEDICARE

## 2019-08-15 ENCOUNTER — HOSPITAL ENCOUNTER (OUTPATIENT)
Facility: HOSPITAL | Age: 69
Setting detail: OUTPATIENT SURGERY
Discharge: HOME | End: 2019-08-15
Attending: SURGERY | Admitting: SURGERY
Payer: MEDICARE

## 2019-08-15 ENCOUNTER — APPOINTMENT (OUTPATIENT)
Dept: RADIOLOGY | Facility: HOSPITAL | Age: 69
End: 2019-08-15
Attending: SURGERY
Payer: MEDICARE

## 2019-08-15 ENCOUNTER — ANESTHESIA (OUTPATIENT)
Dept: PERIOP | Facility: HOSPITAL | Age: 69
End: 2019-08-15
Payer: MEDICARE

## 2019-08-15 VITALS
SYSTOLIC BLOOD PRESSURE: 125 MMHG | DIASTOLIC BLOOD PRESSURE: 80 MMHG | WEIGHT: 165 LBS | TEMPERATURE: 97.4 F | HEIGHT: 67 IN | OXYGEN SATURATION: 96 % | BODY MASS INDEX: 25.9 KG/M2 | HEART RATE: 78 BPM | RESPIRATION RATE: 16 BRPM

## 2019-08-15 DIAGNOSIS — I73.9 PERIPHERAL VASCULAR DISEASE, UNSPECIFIED (HCC): ICD-10-CM

## 2019-08-15 LAB
KCT BLD-ACNC: 230 SEC (ref 89–137)
KCT BLD-ACNC: 236 SEC (ref 89–137)
SPECIMEN SOURCE: ABNORMAL
SPECIMEN SOURCE: ABNORMAL

## 2019-08-15 PROCEDURE — C1887 CATHETER, GUIDING: HCPCS

## 2019-08-15 PROCEDURE — C1887 CATHETER, GUIDING: HCPCS | Performed by: SURGERY

## 2019-08-15 PROCEDURE — C1769 GUIDE WIRE: HCPCS | Performed by: SURGERY

## 2019-08-15 PROCEDURE — 76937 US GUIDE VASCULAR ACCESS: CPT

## 2019-08-15 PROCEDURE — 85347 COAGULATION TIME ACTIVATED: CPT

## 2019-08-15 PROCEDURE — C1884 EMBOLIZATION PROTECT SYST: HCPCS | Performed by: SURGERY

## 2019-08-15 PROCEDURE — 75716 ARTERY X-RAYS ARMS/LEGS: CPT

## 2019-08-15 PROCEDURE — C1760 CLOSURE DEV, VASC: HCPCS | Performed by: SURGERY

## 2019-08-15 PROCEDURE — C1894 INTRO/SHEATH, NON-LASER: HCPCS | Performed by: SURGERY

## 2019-08-15 PROCEDURE — C1725 CATH, TRANSLUMIN NON-LASER: HCPCS | Performed by: SURGERY

## 2019-08-15 PROCEDURE — NC001 PR NO CHARGE: Performed by: SURGERY

## 2019-08-15 PROCEDURE — 75625 CONTRAST EXAM ABDOMINL AORTA: CPT

## 2019-08-15 PROCEDURE — 37225 PR REVSC OPN/PRQ FEM/POP W/ATHRC/ANGIOP SM VSL: CPT | Performed by: SURGERY

## 2019-08-15 DEVICE — DEVICE CLOSURE MYNX CONTROL 6F/7FR: Type: IMPLANTABLE DEVICE | Site: ARTERIAL | Status: FUNCTIONAL

## 2019-08-15 RX ORDER — SODIUM CHLORIDE 9 MG/ML
75 INJECTION, SOLUTION INTRAVENOUS CONTINUOUS
Status: CANCELLED | OUTPATIENT
Start: 2019-08-15 | End: 2019-08-15

## 2019-08-15 RX ORDER — CLOPIDOGREL BISULFATE 75 MG/1
75 TABLET ORAL DAILY
Status: DISCONTINUED | OUTPATIENT
Start: 2019-08-15 | End: 2019-08-15 | Stop reason: HOSPADM

## 2019-08-15 RX ORDER — ONDANSETRON 2 MG/ML
4 INJECTION INTRAMUSCULAR; INTRAVENOUS ONCE AS NEEDED
Status: DISCONTINUED | OUTPATIENT
Start: 2019-08-15 | End: 2019-08-15 | Stop reason: HOSPADM

## 2019-08-15 RX ORDER — LIDOCAINE HYDROCHLORIDE 10 MG/ML
INJECTION, SOLUTION INFILTRATION; PERINEURAL AS NEEDED
Status: DISCONTINUED | OUTPATIENT
Start: 2019-08-15 | End: 2019-08-15 | Stop reason: HOSPADM

## 2019-08-15 RX ORDER — MIDAZOLAM HYDROCHLORIDE 1 MG/ML
INJECTION INTRAMUSCULAR; INTRAVENOUS AS NEEDED
Status: DISCONTINUED | OUTPATIENT
Start: 2019-08-15 | End: 2019-08-15 | Stop reason: SURG

## 2019-08-15 RX ORDER — SODIUM CHLORIDE, SODIUM LACTATE, POTASSIUM CHLORIDE, CALCIUM CHLORIDE 600; 310; 30; 20 MG/100ML; MG/100ML; MG/100ML; MG/100ML
125 INJECTION, SOLUTION INTRAVENOUS CONTINUOUS
Status: DISCONTINUED | OUTPATIENT
Start: 2019-08-15 | End: 2019-08-15 | Stop reason: HOSPADM

## 2019-08-15 RX ORDER — ASPIRIN 81 MG/1
325 TABLET, CHEWABLE ORAL DAILY
Status: DISCONTINUED | OUTPATIENT
Start: 2019-08-15 | End: 2019-08-15

## 2019-08-15 RX ORDER — PROTAMINE SULFATE 10 MG/ML
INJECTION, SOLUTION INTRAVENOUS AS NEEDED
Status: DISCONTINUED | OUTPATIENT
Start: 2019-08-15 | End: 2019-08-15 | Stop reason: SURG

## 2019-08-15 RX ORDER — LIDOCAINE HYDROCHLORIDE 10 MG/ML
0.5 INJECTION, SOLUTION EPIDURAL; INFILTRATION; INTRACAUDAL; PERINEURAL ONCE AS NEEDED
Status: DISCONTINUED | OUTPATIENT
Start: 2019-08-15 | End: 2019-08-15 | Stop reason: HOSPADM

## 2019-08-15 RX ORDER — FENTANYL CITRATE 50 UG/ML
INJECTION, SOLUTION INTRAMUSCULAR; INTRAVENOUS AS NEEDED
Status: DISCONTINUED | OUTPATIENT
Start: 2019-08-15 | End: 2019-08-15 | Stop reason: SURG

## 2019-08-15 RX ORDER — ASPIRIN 325 MG
325 TABLET ORAL DAILY
Status: DISCONTINUED | OUTPATIENT
Start: 2019-08-15 | End: 2019-08-15 | Stop reason: HOSPADM

## 2019-08-15 RX ORDER — FENTANYL CITRATE/PF 50 MCG/ML
25 SYRINGE (ML) INJECTION
Status: DISCONTINUED | OUTPATIENT
Start: 2019-08-15 | End: 2019-08-15 | Stop reason: HOSPADM

## 2019-08-15 RX ORDER — HEPARIN SODIUM 1000 [USP'U]/ML
INJECTION, SOLUTION INTRAVENOUS; SUBCUTANEOUS AS NEEDED
Status: DISCONTINUED | OUTPATIENT
Start: 2019-08-15 | End: 2019-08-15 | Stop reason: SURG

## 2019-08-15 RX ORDER — SODIUM CHLORIDE, SODIUM LACTATE, POTASSIUM CHLORIDE, CALCIUM CHLORIDE 600; 310; 30; 20 MG/100ML; MG/100ML; MG/100ML; MG/100ML
50 INJECTION, SOLUTION INTRAVENOUS CONTINUOUS
Status: DISCONTINUED | OUTPATIENT
Start: 2019-08-15 | End: 2019-08-15 | Stop reason: HOSPADM

## 2019-08-15 RX ORDER — SODIUM CHLORIDE, SODIUM LACTATE, POTASSIUM CHLORIDE, CALCIUM CHLORIDE 600; 310; 30; 20 MG/100ML; MG/100ML; MG/100ML; MG/100ML
INJECTION, SOLUTION INTRAVENOUS CONTINUOUS PRN
Status: DISCONTINUED | OUTPATIENT
Start: 2019-08-15 | End: 2019-08-15

## 2019-08-15 RX ORDER — ACETAMINOPHEN 325 MG/1
650 TABLET ORAL EVERY 4 HOURS PRN
Status: DISCONTINUED | OUTPATIENT
Start: 2019-08-15 | End: 2019-08-15 | Stop reason: HOSPADM

## 2019-08-15 RX ADMIN — HEPARIN SODIUM 1000 UNITS: 1000 INJECTION, SOLUTION INTRAVENOUS; SUBCUTANEOUS at 08:35

## 2019-08-15 RX ADMIN — HEPARIN SODIUM 1000 UNITS: 1000 INJECTION, SOLUTION INTRAVENOUS; SUBCUTANEOUS at 08:05

## 2019-08-15 RX ADMIN — FENTANYL CITRATE 25 MCG: 50 INJECTION, SOLUTION INTRAMUSCULAR; INTRAVENOUS at 07:48

## 2019-08-15 RX ADMIN — SODIUM CHLORIDE, SODIUM LACTATE, POTASSIUM CHLORIDE, AND CALCIUM CHLORIDE: .6; .31; .03; .02 INJECTION, SOLUTION INTRAVENOUS at 07:42

## 2019-08-15 RX ADMIN — CLOPIDOGREL BISULFATE 75 MG: 75 TABLET ORAL at 13:09

## 2019-08-15 RX ADMIN — HEPARIN SODIUM 6000 UNITS: 1000 INJECTION, SOLUTION INTRAVENOUS; SUBCUTANEOUS at 08:23

## 2019-08-15 RX ADMIN — FENTANYL CITRATE 25 MCG: 50 INJECTION, SOLUTION INTRAMUSCULAR; INTRAVENOUS at 08:24

## 2019-08-15 RX ADMIN — FENTANYL CITRATE 25 MCG: 50 INJECTION, SOLUTION INTRAMUSCULAR; INTRAVENOUS at 09:13

## 2019-08-15 RX ADMIN — MIDAZOLAM 1 MG: 1 INJECTION INTRAMUSCULAR; INTRAVENOUS at 07:52

## 2019-08-15 RX ADMIN — FENTANYL CITRATE 25 MCG: 50 INJECTION, SOLUTION INTRAMUSCULAR; INTRAVENOUS at 08:15

## 2019-08-15 RX ADMIN — ACETAMINOPHEN 650 MG: 325 TABLET ORAL at 14:45

## 2019-08-15 RX ADMIN — MIDAZOLAM 1 MG: 1 INJECTION INTRAMUSCULAR; INTRAVENOUS at 07:48

## 2019-08-15 RX ADMIN — FENTANYL CITRATE 25 MCG: 50 INJECTION, SOLUTION INTRAMUSCULAR; INTRAVENOUS at 08:36

## 2019-08-15 RX ADMIN — ASPIRIN 325 MG ORAL TABLET 325 MG: 325 PILL ORAL at 13:10

## 2019-08-15 RX ADMIN — HEPARIN SODIUM 1000 UNITS: 1000 INJECTION, SOLUTION INTRAVENOUS; SUBCUTANEOUS at 08:49

## 2019-08-15 RX ADMIN — PROTAMINE SULFATE 10 MG: 10 INJECTION, SOLUTION INTRAVENOUS at 09:12

## 2019-08-15 NOTE — ANESTHESIA POSTPROCEDURE EVALUATION
Post-Op Assessment Note    CV Status:  Stable    Pain management: adequate     Mental Status:  Alert and awake   Hydration Status:  Euvolemic   PONV Controlled:  Controlled   Airway Patency:  Patent   Post Op Vitals Reviewed: Yes      Staff: CRNA           /66 (08/15/19 0945)    Temp 98 3 °F (36 8 °C) (08/15/19 0945)    Pulse 86 (08/15/19 0945)   Resp 20 (08/15/19 0945)    SpO2 97 % (08/15/19 0945)

## 2019-08-15 NOTE — ANESTHESIA PREPROCEDURE EVALUATION
Review of Systems/Medical History          Cardiovascular  Hyperlipidemia, Hypertension , CAD , Angina , CHF , RODRIGUEZ,    Pulmonary  Shortness of breath,        GI/Hepatic            Endo/Other     GYN       Hematology   Musculoskeletal    Arthritis     Neurology   Psychology   Anxiety, Depression ,              Physical Exam    Airway    Mallampati score: II         Dental   No notable dental hx     Cardiovascular      Pulmonary      Other Findings        Anesthesia Plan  ASA Score- 3     Anesthesia Type- IV sedation with anesthesia with ASA Monitors  Additional Monitors:   Airway Plan:     Comment: I, Dr Albert Leyva, the attending physician, have personally seen and evaluated the patient prior to anesthetic care  I have reviewed the pre-anesthetic record, and other medical records if appropriate to the anesthetic care  If a CRNA is involved in the case, I have reviewed the CRNA assessment, if present, and agree  The patient is in a suitable condition to proceed with my formulated anesthetic plan        Plan Factors-    Induction- intravenous  Postoperative Plan-     Informed Consent- Anesthetic plan and risks discussed with patient  I personally reviewed this patient with the CRNA  Discussed and agreed on the Anesthesia Plan with the CRNA  Deon Peoples

## 2019-08-15 NOTE — H&P
History and Physical    Assessment/Plan:     PAD (peripheral artery disease) ([facility])  Severe plaque burden the bilateral common femoral arteries  Due to patient's significant cardiac dysfunction I do not believe he is a good candidate for open endarterectomy  Therefore will proceed with percutaneous atherectomy  Will proceed with left groin approach for right common femoral endarterectomy as he is most symptomatic in the right lower extremity  Will initiate clopidogrel therapy  Patient is to continue aspirin and statin therapy          Diagnoses and all orders for this visit:     PAD (peripheral artery disease) ([facility])  -     clopidogrel (PLAVIX) 75 mg tablet; Take 1 tablet (75 mg total) by mouth daily     Other orders  -     Notify Physician in PT/INR greater than 1 8 and/or Creatine Clearance (gFR)  is less than 60  ml/zaria/1 73sq m; Standing  -     Height and Weight Upon Arrival; Standing  -     Nursing communcation Apply snapless gown prior to procedure; Standing  -     Have Patient Void On Call to Procedure Room; Standing  -     Insert and Maintain IV; Standing  -     IR abdominal angiography; Standing  -     IR lower extremity; Standing  -     Basic metabolic panel; Standing  -     CBC; Standing  -     APTT; Standing  -     Protime-INR; Standing  -     IR abdominal angiography  -     IR lower extremity            Subjective:       Patient ID: Rylee Hobson is a 68 y o  male      Pt had CTA abd 7/11  Pt is c/o pain in the bilat legs with walking short distances  Symptoms relived with rest L>R  Symptoms travel from his back down the back of his legs  He has numbness and tingling in the bilat legs that has been getting worse  He denies open wounds  He denies abdominal pain      Patient here to review his CTA  CTA shows profound plaque burden in the bilateral common femoral arteries  The patient has patent bilateral femoral to below knee popliteal artery bypass grafts    Patient now has bilateral severe disabling claudication with rest pain  Presently there is no tissue loss  This is secondary to the significant inflow obstruction from the common femoral artery plaque      Patient is a 67 y/o M former smoker w/hx HTN, HLD, PMR on chronic steriod therapy, CAD, ICM w/EF 20% on recent TTE, severe AS (TA 0 8 cm2) and PAD, s/p bilateral fem-BK pop bypasses w/in situ GSV '09 by Dr Brandon Sepulveda with delayed postop L thigh abscess requiring debridement '09 and s/p embolization of large L graft AVF 4/9/10 who presents w/BLE lifestyle limiting calf claudication x 1 year without rest pain or tissue loss  -CTA abd/pelvis w/bilateral iliofemoral runoff for more detailed evaluation of graft disease  -BMP prior to CTA  Last creatinine 0 89 with GFR 90 on 5/20/19  -continue ASA and statin therapy  Will start clopidogrel            The following portions of the patient's history were reviewed and updated as appropriate: allergies, current medications, past family history, past medical history, past social history, past surgical history and problem list      Review of Systems   Constitutional: Positive for fatigue  HENT: Positive for hearing loss  Eyes: Negative  Respiratory: Positive for shortness of breath (with activity)  Cardiovascular: Positive for leg swelling  Gastrointestinal: Negative  Endocrine: Negative  Genitourinary: Negative  Musculoskeletal: Positive for arthralgias and back pain  Leg pain and cramping with walking   Skin: Negative  Allergic/Immunologic: Negative  Neurological: Positive for numbness  Hematological: Bruises/bleeds easily  Psychiatric/Behavioral: Negative            Objective:        /84   Pulse 84   Temp (!) 95 8 °F (35 4 °C) (Tympanic)   Resp 16   SpO2 98%        Physical Exam   Constitutional: He is oriented to person, place, and time  He appears well-developed and well-nourished  HENT:   Head: Normocephalic and atraumatic     Mouth/Throat: Oropharynx is clear and moist    Eyes: Pupils are equal, round, and reactive to light  Conjunctivae and EOM are normal    Neck: Normal range of motion  Neck supple  No JVD present  Cardiovascular: Normal rate, regular rhythm and normal heart sounds  Weakly palpable bilateral common femoral arteries   Pulmonary/Chest: Effort normal and breath sounds normal  No stridor  No respiratory distress  He has no wheezes  He has no rales  He exhibits no tenderness  Abdominal: Soft  He exhibits no distension and no mass  There is no tenderness  There is no rebound and no guarding  Musculoskeletal: Normal range of motion  He exhibits no tenderness or deformity  Neurological: He is alert and oriented to person, place, and time  Skin: Skin is warm and dry  No rash noted  No erythema  Psychiatric: He has a normal mood and affect   His behavior is normal  Thought content normal

## 2019-08-15 NOTE — OP NOTE
OPERATIVE REPORT  PATIENT NAME: Abraham Medeiros    :  1950  MRN: [id number]  Pt Location: [facility] HYBRID OR ROOM 02    SURGERY DATE: 8/15/2019    Surgeon(s) and Role:     Beti Patel MD - Primary    INDICATIONS:  Severe disabling claudication of bilateral lower extremities right greater than left  Patient has failed conservative measures with walking exercise program   Patient noted to have significant femoral arterial occlusive disease by noninvasive vascular testing  PROCEDURE PERFORMED:  1  Ultrasound-guided access of the left common femoral artery  2  Aortogram with pelvic runoff  3  Left lower extremity angiogram  4  Right lower extremity angiogram  5  Placement of 6 mm spider  6    Jet stream atherectomy using 2 3/ 3 4 device  7  Angioplasty right common femoral/superficial femoral artery using 6 x 60 balloon angioplasty catheter  8  Mynx closure left common femoral artery     SURGEON:  Everardo Ríos MD    ANESTHESIA:  Local with sedation, total sedation under the direction of the anesthesia service  OPERATIVE PROCEDURE:  After patient identification and informed consent, the patient was taken to the hybrid operating suite and placed in a supine position  Adequate sedation was administered via IV route  The patients bilateral groins were cleaned and draped in sterile surgical fashion using Chlorhexidine  1% local Lidocaine was injected into the skin and subcutaneous tissue overlying the right femoral pulsation  Under ultrasound guidance a micro access needle was used to access the left common femoral artery  The common femoral artery was patent  There were significant calcifications  After obtaining pulsatile flow, a micro guidewire was inserted through the needle and the access site  A micro sheath angiogram was performed to ensure we were in the proper portion of the common femoral artery and proximal to the origin of the bypass graft: this was ensured    Bentson wire was advanced under fluoroscopic guidance through the micro sheath and parked in the proximal abdominal aorta under fluoroscopic guidance  The micro sheath was removed and a then a  5 French sheath was inserted again using Seldinger technique over the wire  An Omni Flush catheter was next advanced over the guidewire and fluoroscopic guidance was used to park the catheter in the proximal abdominal aorta  Patient was then given 1000 units of IV heparin  Aortogram was performed  Using a Glidewire and Omni flush catheter we went up and over and selected the right external iliac artery  A [place] catheter was required to enable us to pass the catheter into the distal external iliac artery and perform the left lower extremity runoff  A pre-occlusive lesion was noted at the distal common femoral/superficial femoral artery junction  Therefore a stiff straight Glidewire was required to cross this pre occlusive, calcified lesion  A 7 French 45 cm destination sheath was then passed over the stiff wire and placed into the distal external iliac artery  A glide catheter was then advanced into the bypass  The patient then received an additional 6000 units of heparin  A CTs were monitored throughout the case, with the goal to maintain greater than 250 seconds and additional heparin was given as needed  A 6 mm spider was then brought to the table and passed through the glide catheter into the proximal bypass graft  A jet stream 2 4/3 4 atherectomy device was then passed over the wire and atherectomy was performed of the common femoral and superficial femoral arteries  Significant debris was noted in the retrieval bag  A 6 mm x 6 cm length  balloon angioplasty catheter was then placed across the atherectomy my eyes lesions  Balloon inflation occurred to 12 atmospheres and was left in place for 2 minutes  Completion angiogram now showed an excellent result with approximately 20% residual stenosis    This was felt to be an adequate result and no further intervention was deemed necessary  The Spyder retrieval basket was noted to be full  Therefore a 6 [place] guide catheter was used to retrieve the spider basket  Due to the tremendous debris burden the spider could not be passed through the guide catheter therefore the spider was retrieved intact with the guide catheter  Completion angiogram of the entire right lower extremity showed no evidence of embolization with the bypass remaining widely patent and no evidence of embolization to the foot  The 7 [place] 45 cm destination sheath was then exchanged for a short 7 [place] sheath  Angiography was performed of the of the left lower extremity, please see findings  A 6/7 [place] Mynx device was then used to seal the left common femoral artery  A small hematoma was noted at the left groin  The patient was partially reversed with 10 mg protamine  At the end of the case patient's bilateral feet were well perfused and had good cap refill  RADIOGRAPHIC SUPERVISION AND INTERPRETATION OF TEST:    1  Abdominal aortogram findings -   Abdominal aorta - widely patent    2  Pelvic runoff findings -   Left common iliac artery-widely patent  Left internal iliac artery-atretic  Left external iliac artery-widely patent, with significant plaque burden    Right common iliac artery-patent  Right internal iliac artery-occluded  Right external iliac artery-widely patent with significant plaque burden    2  Left lower extremity angiogram findings-    common femoral artery- profound plaque burden noted with severe stenosis of approximately 90% extending into the proximal superficial femoral artery  Profunda femorals artery- patent  Superficial femoral artery-patent however is noted above severe plaque burden resulting in severe stenosis proximal to the origin of the SFA to below knee popliteal artery bypass  The proximal anastomosis of the bypass is noted to be widely patent    The bypass itself is noted to be widely patent  Popliteal artery-patent with significant plaque burden  Anterior tibial artery-patent throughout extending into the foot forming dorsalis pedis  Tibioperoneal trunk-patent  Posterior tibial artery-occluded  Peroneal artery-atretic, however patent  Plantar arch-does not form    3  Right lower extremity angiogram findings-    common femoral artery- profound plaque burden causing severe stenosis extending into the proximal superficial femoral artery  There is approximately and 95-99% stenosis proximal to the origin of the bypass graft  The bypass graft proximal anastomosis from the superficial femoral artery is widely patent  The bypass graft itself to the below-knee popliteal artery is widely patent  Profunda femorals artery- patent  Superficial femoral artery-reveals severe occlusion proximal to the origin of the bypass graft  Popliteal artery-distal anastomosis to the below-knee popliteal artery is patent  The popliteal artery is patent  Anterior tibial artery-patent extending into the foot forming dorsalis pedis  Tibioperoneal trunk-patent  Posterior tibial artery-occluded  Peroneal artery-atretic, however patent  Plantar arch-does not form      PLAN:    Patient will resume aspirin and Plavix  Patient will return for intervention to the left common femoral/superficial femoral artery in 2 weeks      Preop Diagnosis:  [facility] [I73 9]    Post-Op Diagnosis Codes:     * PAD (peripheral artery disease) ([facility]) [H79 9]    Complications:   None     I was present for the entire procedure    Patient Disposition:  PACU     SIGNATURE: Carmel Jin MD  DATE: August 15, 2019  TIME: 9:44 AM

## 2019-08-15 NOTE — DISCHARGE INSTRUCTIONS
Angiogram   AMBULATORY CARE:   What you need to know about an angiogram:  An angiogram is used to examine blood flow through your arteries  Arteries carry blood from your heart to your body  How to prepare for the procedure: Your healthcare provider will tell you how to prepare for your procedure  He may tell you not to eat or drink anything after midnight on the day of your procedure  He will tell you what medicines to take or not take on the day of your procedure  Contrast liquid will be used during the procedure to help your arteries show up better in the pictures  Tell your healthcare provider if you have ever had an allergic reaction to contrast liquid  Arrange to have someone drive you home after your procedure  What will happen during the procedure:   · You may be given medicine to help you relax or make you drowsy  You may get local anesthesia to numb the area where the angiogram catheter will go in  Hair may be removed from the procedure site  · A catheter will be put into an artery in your leg near your groin, or in your arm  The catheter travels through the artery to the area being studied  Contrast liquid is put through the catheter to help your blood vessels and organs show up better in the x-ray pictures  You may feel warm as the liquid is put into the catheter  You may get a headache or feel nauseated  These feelings should go away quickly  · Your healthcare providers will remove the catheter and apply pressure to the wound for several minutes  They may place a pressure bandage or other pressure device over the wound to help stop any bleeding  What will happen after the procedure: You will be on a heart monitor until you are fully awake  A heart monitor continuously records the electrical activity of your heart  Healthcare providers will frequently monitor your vital signs and pulses  They will also frequently check your wound for bleeding  Keep your leg straight   Do not  get out of bed until your healthcare provider says it is okay  After you are monitored for several hours, you may be able to go home  Risks of the procedure:   · You may bleed heavily after your catheter is removed  The catheter may damage your artery, and you may need surgery to fix the damage  You could have kidney problems from the contrast liquid  You could have an allergic reaction to the contrast liquid or numbing medicine  · You may develop a blood clot that causes pain and swelling, and stops blood from flowing  A blood clot in your leg can break loose and travel to your lungs and become life-threatening  Call 911 for any of the following:   · You have any of the following signs of a heart attack:      ¨ Squeezing, pressure, or pain in your chest that lasts longer than 5 minutes or returns    ¨ Discomfort or pain in your back, neck, jaw, stomach, or arm     ¨ Trouble breathing    ¨ Nausea or vomiting    ¨ Lightheadedness or a sudden cold sweat, especially with chest pain or trouble breathing    · You have any of the following signs of a stroke:      ¨ Numbness or drooping on one side of your face     ¨ Weakness in an arm or leg    ¨ Confusion or difficulty speaking    ¨ Dizziness, a severe headache, or vision loss  Seek care immediately if:   · Your arm or leg feels warm, tender, and painful  It may look swollen and red  · The leg or arm used for your angiogram is numb, painful, or changes color  · The bruise at your catheter site gets bigger or becomes swollen  · Your wound does not stop bleeding even after you apply firm pressure for 10 minutes  · You have weakness in an arm or leg  · You become confused or have difficulty speaking  · You have dizziness, a severe headache, or vision loss  Contact your healthcare provider if:   · You have a fever  · Your catheter site is red, leaks pus, or smells bad  · You have increasing pain at your catheter site       · You have questions or concerns about your condition or care  Follow up with your healthcare provider as directed:  Write down your questions so you remember to ask them during your visits  Self-care:   · Do not smoke  Nicotine and other chemicals in cigarettes and cigars can damage your blood vessels  Ask your healthcare provider for information if you currently smoke and need help to quit  E-cigarettes or smokeless tobacco still contain nicotine  Talk to your healthcare provider before you use these products  · Watch for bleeding and bruising  It is normal to have a bruise and soreness where the catheter went in  Contact your healthcare provider if your bruise gets larger  If your wound bleeds, use your hand to put pressure on the bandage  If you do not have a bandage, use a clean cloth to put pressure over and just above the puncture site  Seek care immediately if the bleeding does not stop within 10 minutes  · Protect your leg after your procedure  Rest for the remainder of the day of your procedure  Keep your arm or leg straight as much as possible  If the angiogram catheter was put in your leg, do not use stairs for a few days after your procedure  When you must use stairs, step up with the leg that was not used for the angiogram  Straighten this leg to move the other leg up to the next step without putting stress on it  You may be told not to lift more than 15 pounds for 5 days after your procedure  Your healthcare provider will tell you when it is safe to drive and start doing your other normal daily activities  Go slowly at first      · Keep your wound clean and dry  Ask your healthcare provider when you can bathe  You will need to keep the bandage in place and dry for a few days after your procedure  Cover the bandage with a plastic bag and tape the opening around your skin to keep water out  When you are allowed to bathe without a bandage, carefully wash the wound with soap and water   Dry the area and put on new, clean bandages as directed  Change your bandage if it gets wet or dirty  Check for signs of infection every day, such as redness, swelling, or pus  · Drink liquids as directed  Ask your healthcare provider how much liquid to drink each day and which liquids are best for you  Liquids will help your body flush out the contrast liquid used during the procedure  · Limit alcohol  Do not drink alcohol for 24 hours after your procedure  Then limit alcohol  Women should limit alcohol to 1 drink a day  Men should limit alcohol to 2 drinks a day  A drink of alcohol is 12 ounces of beer, 5 ounces of wine, or 1½ ounces of liquor  Follow up with your healthcare provider as directed:  Write down your questions so you remember to ask them during your visits  © 2017 [employer] Information is for End User's use only and may not be sold, redistributed or otherwise used for commercial purposes  All illustrations and images included in CareNotes® are the copyrighted property of [employer]  or [employer]  The above information is an  only  It is not intended as medical advice for individual conditions or treatments  Talk to your doctor, nurse or pharmacist before following any medical regimen to see if it is safe and effective for you

## 2019-08-19 DIAGNOSIS — I10 ESSENTIAL HYPERTENSION: ICD-10-CM

## 2019-08-20 RX ORDER — AMLODIPINE BESYLATE 10 MG/1
TABLET ORAL
Qty: 90 TABLET | Refills: 2 | Status: SHIPPED | OUTPATIENT
Start: 2019-08-20 | End: 2019-08-23

## 2019-08-20 RX ORDER — AMLODIPINE BESYLATE 10 MG/1
10 TABLET ORAL DAILY
Qty: 90 TABLET | Refills: 2 | Status: SHIPPED | OUTPATIENT
Start: 2019-08-20 | End: 2019-11-14 | Stop reason: HOSPADM

## 2019-08-22 ENCOUNTER — TELEPHONE (OUTPATIENT)
Dept: VASCULAR SURGERY | Facility: CLINIC | Age: 69
End: 2019-08-22

## 2019-08-22 NOTE — PROGRESS NOTES
Assessment and Plan: This is a 33-year-old gentleman presenting today for follow-up for polymyalgia rheumatica currently off of prednisone with osteoarthritis  Since last appointment, patient did wean off of prednisone about 6 weeks ago  He did initially noticed some worsening of his joint pain with stopping this medication however his joint pain became significantly worse after stopping a full-strength aspirin  He currently describes pain in the neck with radiation to the shoulders with the arms  He also has chronic low back pain into the hips bilaterally  He did undergo an angiogram to improve blood flow of the right lower extremity and has noticed improvement of his symptoms since then  He does have plans to proceed with the left lower extremity through his vascular surgeon in the near future  He denies any obvious joint swelling but does describe morning stiffness to last several hours to all day  He states he does have fatigue and occasionally will experience difficulty getting out of bed  He describes chronic headaches but denies any visual changes or loss and has not had any symptoms of jaw claudication  His exam today does not reveal any evidence of synovitis or joint swelling  He has no temporal or jaw tenderness with 2+ pulses of the temporal, carotid, and radial arteries  He does have multiple tender joints with myofascial tender points throughout the spine specifically along the cervical and lumbar paraspinal muscles  He also has restricted range of motion of both shoulders with abduction  Currently, I am uncertain whether patient's symptoms are result of a PMR flares since stopping prednisone or chronic osteoarthritic changes  I would like to obtain labs at this time to recheck his inflammatory markers and if they are now elevated, his symptoms are most likely related to PMR    Patient has however had joint pain and overall fatigue in the past when his inflammatory markers are normal therefore his symptoms may all be related to chronic pain from degenerative joint disease  Nonetheless, since patient is feeling much worse at this time, I recommended that he return back to prednisone 5 mg daily after he has labs are completed this morning as this is such a low dose of prednisone and has helped with his symptoms  For his chronic arthritic pain, he was unable to tolerate gabapentin therefore we may consider further alternatives in the future or referral to spine and pain for further evaluation  His most recently autoimmune workup with serologies and imaging was negative  For now, he will return to the office in 2 months time with Dr Jo Wong however contact the office in the interim if he has any further questions or concerns  Plan:  Diagnoses and all orders for this visit:    Polymyalgia rheumatica ([facility] [address] )  -     CBC and differential  -     Comprehensive metabolic panel  -     C-reactive protein  -     Sedimentation rate, automated  -     predniSONE 5 mg tablet; Take 1 tablet (5 mg total) by mouth daily    Primary generalized (osteo)arthritis    Long term systemic steroid user        Follow-up plan: F/U in 2 months with Dr Jo Wong       Rheumatic Disease Summary:  1  PMR  Established care- June, 2017- dx with PMR by PCP and placed on steroids the March prior to consult  Presented on Prednisone 20 mg daily    -Weaned Prednisone over several months and current on Prednisone 5 mg daily (12/2018)  2  OA  Tylenol with Tramadol          HPI  [place] Patient is a 68 y o   male who presents today for follow up for PMR/OA  Started on gabapentin but has side effects of dizziness  Pain in the neck into shoulders  Right shoulder is a "bummed" shoulder  Carpal tunnel brace on the right helps  Radiation all the way down arms  Tingling in the hands worse on the right  With weakness in the arms  Also low back pain into the hips     Some change and worsening of symptoms with stopping Prednisone but more so with stopping full strength ASA  Now on Plavix and ASA 81 mg  Had vascular surgery angiogram for right LE  Improvement with surgery  Less muscle pain with walking  Plans do treat left LE as well  +AM stiffness x hours and all day  Improvement with activity  Joint symptoms are worse with weather but not always  No joint swelling  Taking Tylenol with some improvement  +Fatigue, sleeping better since leg surgery  Off of Prednisone 5/6 weeks  Headaches, frontal and temporal, but no visual changes/loss or jaw claudication  The following portions of the patient's history were reviewed and updated as appropriate: allergies, current medications, past family history, past medical history, past social history, past surgical history and problem list     Review of Systems:   Review of Systems   Constitutional: Positive for fatigue  Negative for appetite change, chills, fever and unexpected weight change  HENT: Negative for congestion, mouth sores and sore throat  No recent infxn    Eyes: Negative for pain, redness and visual disturbance  No sicca sx   Respiratory: Negative for cough, chest tightness and shortness of breath  Cardiovascular: Negative for chest pain and leg swelling (R LE)  Gastrointestinal: Positive for abdominal pain and constipation  Negative for blood in stool, diarrhea, nausea and vomiting  Endocrine: Negative for polydipsia and polyuria  Genitourinary: Negative for frequency and hematuria  Skin: Negative for color change and rash  Neurological: Positive for weakness, numbness and headaches  Negative for light-headedness  Hematological: Negative for adenopathy  Psychiatric/Behavioral: Negative for behavioral problems  The patient is nervous/anxious          Home Medications:     Current Outpatient Medications:     amLODIPine (NORVASC) 10 mg tablet, take 1 tablet by mouth once daily, Disp: 90 tablet, Rfl: 2    amLODIPine (NORVASC) 10 mg tablet, Take 1 tablet (10 mg total) by mouth daily, Disp: 90 tablet, Rfl: 2    aspirin (ECOTRIN LOW STRENGTH) 81 mg EC tablet, Take 81 mg by mouth daily, Disp: , Rfl:     Cholecalciferol (VITAMIN D3) 2000 units TABS, Take 2,000 Units by mouth daily, Disp: , Rfl:     clopidogrel (PLAVIX) 75 mg tablet, Take 1 tablet (75 mg total) by mouth daily, Disp: 180 tablet, Rfl: 3    isosorbide mononitrate (IMDUR) 30 mg 24 hr tablet, Take 30 mg by mouth daily  , Disp: , Rfl: 0    Multiple Vitamin (MULTIVITAMIN) tablet, Take 1 tablet by mouth daily, Disp: , Rfl:     ranolazine (RANEXA) 500 mg 12 hr tablet, take 1 tablet by mouth twice a day, Disp: 180 tablet, Rfl: 3    rosuvastatin (CRESTOR) 5 mg tablet, Take 1 tablet (5 mg total) by mouth daily, Disp: 90 tablet, Rfl: 3    sertraline (ZOLOFT) 25 mg tablet, Take 2 tablets (50 mg total) by mouth daily, Disp: 30 tablet, Rfl: 6    triamterene-hydrochlorothiazide (DYAZIDE) 37 5-25 mg per capsule, take 1 capsule by mouth once daily, Disp: 90 capsule, Rfl: 1    Objective:    Vitals:    08/23/19 0911   BP: 138/72   BP Location: Right arm   Patient Position: Sitting   Cuff Size: Adult   Pulse: 68   Weight: 76 7 kg (169 lb 3 2 oz)       Physical Exam   Constitutional: He is oriented to person, place, and time  He appears well-developed and well-nourished  HENT:   Head: Normocephalic  Mouth/Throat: Oropharynx is clear and moist    No temporal or jaw claudication    Eyes: Pupils are equal, round, and reactive to light  Conjunctivae are normal    Neck: Normal range of motion  Neck supple  Cardiovascular: Normal rate, regular rhythm, normal heart sounds and intact distal pulses  2+ pulses of the temporal, carotid, and radial arteries  Pulmonary/Chest: Effort normal and breath sounds normal    Musculoskeletal:   No synovitis or joint swelling   +Multiple tender joints including the right elbow, B/L shoulders with restricted ROM of both, and greater trochanteric hip bursae  Tenderness of the cervical and lumbar spine  +Crepitus of the knees  Non pitting edema of RLE secondary to recent vascular surgery    Neurological: He is alert and oriented to person, place, and time  Skin: Skin is warm and dry  Psychiatric: He has a normal mood and affect  His behavior is normal      Musculoskeletal--Peripheral Joint Exam:  Physical Exam        Imaging:   XR Right Hand 5/3/2019  IMPRESSION:  No acute osseous abnormality  Degenerative changes as described  XR Left Hand 5/3/2019  IMPRESSION:  No acute osseous abnormality  Degenerative changes as described  XR Right Foot 5/3/2019  IMPRESSION:  No acute osseous abnormality  Degenerative changes as described  XR Left Foot 5/3/2019  IMPRESSION:  No acute osseous abnormality  Degenerative changes as described          Labs:   Component      Latest Ref Rng & Units 5/3/2019   WBC      4 31 - 10 16 Thousand/uL 8 94   Red Blood Cell Count      3 88 - 5 62 Million/uL 4 88   Hemoglobin      12 0 - 17 0 g/dL 15 9   HCT      36 5 - 49 3 % 46 9   MCV      82 - 98 fL 96   MCH      26 8 - 34 3 pg 32 6   MCHC      31 4 - 37 4 g/dL 33 9   RDW      11 6 - 15 1 % 12 5   MPV      8 9 - 12 7 fL 10 2   Platelet Count      485 - 390 Thousands/uL 233   nRBC      /100 WBCs 0   Neutrophils %      43 - 75 % 81 (H)   Immat GRANS %      0 - 2 % 0   Lymphocytes Relative      14 - 44 % 13 (L)   Monocytes Relative      4 - 12 % 5   Eosinophils      0 - 6 % 0   Basophils Relative      0 - 1 % 1   Absolute Neutrophils      1 85 - 7 62 Thousands/µL 7 16   Immature Grans Absolute      0 00 - 0 20 Thousand/uL 0 03   Lymphocytes Absolute      0 60 - 4 47 Thousands/µL 1 20   Absolute Monocytes      0 17 - 1 22 Thousand/µL 0 46   Absolute Eosinophils      0 00 - 0 61 Thousand/µL 0 03   Basophils Absolute      0 00 - 0 10 Thousands/µL 0 06   Sodium      136 - 145 mmol/L 135 (L)   Potassium      3 5 - 5 3 mmol/L 5 0   Chloride      100 - 108 mmol/L 101   CO2      21 - 32 mmol/L 28   Anion Gap      4 - 13 mmol/L 6   BUN      5 - 25 mg/dL 16   Creatinine      0 60 - 1 30 mg/dL 0 82   Glucose, Random      65 - 140 mg/dL 122   Calcium      8 3 - 10 1 mg/dL 9 3   AST      5 - 45 U/L 36   ALT      12 - 78 U/L 33   Alkaline Phosphatase      46 - 116 U/L 49   Total Protein      6 4 - 8 2 g/dL 7 6   Albumin      3 5 - 5 0 g/dL 4 3   TOTAL BILIRUBIN      0 20 - 1 00 mg/dL 0 65   eGFR      ml/min/1 73sq m 89   SSA (RO) ANTIBODY      0 0 - 0 9 AI <0 2   SSB (LA) ANTIBODY      0 0 - 0 9 AI <0 2   Activated Clotting Time, i-STAT      89 - 137 sec    SPECIMEN TYPE          C-REACTIVE PROTEIN      <0 5 mg/L <5 3   CYCLIC CITRULLINATED PEPTIDE ANTIBODY      0 - 19 units 4   Sed Rate      0 - 10 mm/hour 8   RHEUMATOID FACTOR      Negative Negative

## 2019-08-22 NOTE — TELEPHONE ENCOUNTER
Spoke to patient about upcoming OV with Dr Carlos Cormier at this appointment Dr Carlos Cormier will discuss results of Agram and if any other procedures will be needed in future   LLF

## 2019-08-23 ENCOUNTER — APPOINTMENT (OUTPATIENT)
Dept: LAB | Facility: CLINIC | Age: 69
End: 2019-08-23
Payer: MEDICARE

## 2019-08-23 ENCOUNTER — OFFICE VISIT (OUTPATIENT)
Dept: RHEUMATOLOGY | Facility: CLINIC | Age: 69
End: 2019-08-23
Payer: MEDICARE

## 2019-08-23 VITALS
WEIGHT: 169.2 LBS | SYSTOLIC BLOOD PRESSURE: 138 MMHG | HEART RATE: 68 BPM | BODY MASS INDEX: 26.9 KG/M2 | DIASTOLIC BLOOD PRESSURE: 72 MMHG

## 2019-08-23 DIAGNOSIS — M35.3 POLYMYALGIA RHEUMATICA (HCC): Primary | ICD-10-CM

## 2019-08-23 DIAGNOSIS — M15.0 PRIMARY GENERALIZED (OSTEO)ARTHRITIS: ICD-10-CM

## 2019-08-23 DIAGNOSIS — Z79.52 LONG TERM SYSTEMIC STEROID USER: ICD-10-CM

## 2019-08-23 LAB
ALBUMIN SERPL BCP-MCNC: 3.9 G/DL (ref 3.5–5)
ALP SERPL-CCNC: 60 U/L (ref 46–116)
ALT SERPL W P-5'-P-CCNC: 25 U/L (ref 12–78)
ANION GAP SERPL CALCULATED.3IONS-SCNC: 9 MMOL/L (ref 4–13)
AST SERPL W P-5'-P-CCNC: 20 U/L (ref 5–45)
BASOPHILS # BLD AUTO: 0.06 THOUSANDS/ΜL (ref 0–0.1)
BASOPHILS NFR BLD AUTO: 1 % (ref 0–1)
BILIRUB SERPL-MCNC: 0.4 MG/DL (ref 0.2–1)
BUN SERPL-MCNC: 15 MG/DL (ref 5–25)
CALCIUM SERPL-MCNC: 9.8 MG/DL (ref 8.3–10.1)
CHLORIDE SERPL-SCNC: 100 MMOL/L (ref 100–108)
CO2 SERPL-SCNC: 27 MMOL/L (ref 21–32)
CREAT SERPL-MCNC: 0.8 MG/DL (ref 0.6–1.3)
CRP SERPL QL: <3 MG/L
EOSINOPHIL # BLD AUTO: 0.13 THOUSAND/ΜL (ref 0–0.61)
EOSINOPHIL NFR BLD AUTO: 2 % (ref 0–6)
ERYTHROCYTE [DISTWIDTH] IN BLOOD BY AUTOMATED COUNT: 12.7 % (ref 11.6–15.1)
ERYTHROCYTE [SEDIMENTATION RATE] IN BLOOD: 15 MM/HOUR (ref 0–10)
GFR SERPL CREATININE-BSD FRML MDRD: 90 ML/MIN/1.73SQ M
GLUCOSE SERPL-MCNC: 80 MG/DL (ref 65–140)
HCT VFR BLD AUTO: 41.1 % (ref 36.5–49.3)
HGB BLD-MCNC: 13.8 G/DL (ref 12–17)
IMM GRANULOCYTES # BLD AUTO: 0.02 THOUSAND/UL (ref 0–0.2)
IMM GRANULOCYTES NFR BLD AUTO: 0 % (ref 0–2)
LYMPHOCYTES # BLD AUTO: 1.26 THOUSANDS/ΜL (ref 0.6–4.47)
LYMPHOCYTES NFR BLD AUTO: 19 % (ref 14–44)
MCH RBC QN AUTO: 31.5 PG (ref 26.8–34.3)
MCHC RBC AUTO-ENTMCNC: 33.6 G/DL (ref 31.4–37.4)
MCV RBC AUTO: 94 FL (ref 82–98)
MONOCYTES # BLD AUTO: 0.61 THOUSAND/ΜL (ref 0.17–1.22)
MONOCYTES NFR BLD AUTO: 9 % (ref 4–12)
NEUTROPHILS # BLD AUTO: 4.52 THOUSANDS/ΜL (ref 1.85–7.62)
NEUTS SEG NFR BLD AUTO: 69 % (ref 43–75)
NRBC BLD AUTO-RTO: 0 /100 WBCS
PLATELET # BLD AUTO: 240 THOUSANDS/UL (ref 149–390)
PMV BLD AUTO: 9.6 FL (ref 8.9–12.7)
POTASSIUM SERPL-SCNC: 4 MMOL/L (ref 3.5–5.3)
PROT SERPL-MCNC: 7.3 G/DL (ref 6.4–8.2)
RBC # BLD AUTO: 4.38 MILLION/UL (ref 3.88–5.62)
SODIUM SERPL-SCNC: 136 MMOL/L (ref 136–145)
WBC # BLD AUTO: 6.6 THOUSAND/UL (ref 4.31–10.16)

## 2019-08-23 PROCEDURE — 36415 COLL VENOUS BLD VENIPUNCTURE: CPT | Performed by: PHYSICIAN ASSISTANT

## 2019-08-23 PROCEDURE — 85025 COMPLETE CBC W/AUTO DIFF WBC: CPT | Performed by: PHYSICIAN ASSISTANT

## 2019-08-23 PROCEDURE — 86140 C-REACTIVE PROTEIN: CPT | Performed by: PHYSICIAN ASSISTANT

## 2019-08-23 PROCEDURE — 99214 OFFICE O/P EST MOD 30 MIN: CPT | Performed by: PHYSICIAN ASSISTANT

## 2019-08-23 PROCEDURE — 80053 COMPREHEN METABOLIC PANEL: CPT | Performed by: PHYSICIAN ASSISTANT

## 2019-08-23 PROCEDURE — 85652 RBC SED RATE AUTOMATED: CPT | Performed by: PHYSICIAN ASSISTANT

## 2019-08-23 RX ORDER — PREDNISONE 1 MG/1
5 TABLET ORAL DAILY
Qty: 30 TABLET | Refills: 2
Start: 2019-08-23 | End: 2019-11-08

## 2019-08-27 ENCOUNTER — TELEPHONE (OUTPATIENT)
Dept: CARDIOLOGY CLINIC | Facility: CLINIC | Age: 69
End: 2019-08-27

## 2019-08-27 ENCOUNTER — OFFICE VISIT (OUTPATIENT)
Dept: VASCULAR SURGERY | Facility: CLINIC | Age: 69
End: 2019-08-27
Payer: MEDICARE

## 2019-08-27 VITALS
HEART RATE: 72 BPM | HEIGHT: 67 IN | DIASTOLIC BLOOD PRESSURE: 70 MMHG | BODY MASS INDEX: 26.53 KG/M2 | WEIGHT: 169 LBS | SYSTOLIC BLOOD PRESSURE: 130 MMHG

## 2019-08-27 DIAGNOSIS — I73.9 PAD (PERIPHERAL ARTERY DISEASE) (HCC): Primary | ICD-10-CM

## 2019-08-27 PROCEDURE — 99213 OFFICE O/P EST LOW 20 MIN: CPT | Performed by: SURGERY

## 2019-08-27 NOTE — LETTER
August 27, 2019     [facility], [address] [place] [address]    Patient: Yaakov Montalvo   YOB: 1950   Date of Visit: 8/27/2019       Dear Dr Greyson Reyes:    Thank you for referring Merle Winchester to me for evaluation  Below are the relevant portions of my assessment and plan of care  Pt is s/p RLE angiogram with atherectomy and angioplasty of right common femoral and proximal superficial femoral arteries  Angiogram revealed pre-occlusive lesions of the common femoral artery and superficial femoral arteries secondary to profound plaque calcification  The completion angiogram showed an excellent result  Patient regained a palpable dorsalis pedis pulse and graft pulse  Angiogram of the left lower extremity showed essentially a mirror image in the left common femoral artery  Therefore, will schedule patient for left lower extremity angiogram with atherectomy/angioplasty  Of note patient also has a bypass graft in the left lower extremity  Mr Pastor Lofton has noted complete resolution of the claudication to the right lower extremity  Patient continues to have significant disabling claudication of the left lower extremity, therefore will proceed with angiogram/intervention to the left lower extremity lesions  Patient is on Plavix/asa/statin  Assessment/Plan:    PAD (peripheral artery disease) (HCC)  Successful percutaneous treatment of pre occlusive calcifications of right common femoral/superficial femoral arteries, with complete resolution of symptoms  Will schedule for left lower extremity intervention for disabling claudication and also preservation of the left femoral to below knee popliteal artery bypass  Patient is to continue Plavix/aspirin/statin  Patient to continue walking exercise program      If you have questions, please do not hesitate to call me  I look forward to following Guerline Real along with you      Sincerely,    Yousuf Mckeon MD        CC: Abe Diaz MD Gerardo Faustin PA-C

## 2019-08-27 NOTE — ASSESSMENT & PLAN NOTE
Successful percutaneous treatment of pre occlusive calcifications of right common femoral/superficial femoral arteries, with complete resolution of symptoms  Will schedule for left lower extremity intervention for disabling claudication and also preservation of the left femoral to below knee popliteal artery bypass  Patient is to continue Plavix/aspirin/statin    Patient to continue walking exercise program

## 2019-08-27 NOTE — PROGRESS NOTES
Assessment/Plan:    PAD (peripheral artery disease) (HCC)  Successful percutaneous treatment of pre occlusive calcifications of right common femoral/superficial femoral arteries, with complete resolution of symptoms  Will schedule for left lower extremity intervention for disabling claudication and also preservation of the left femoral to below knee popliteal artery bypass  Patient is to continue Plavix/aspirin/statin  Patient to continue walking exercise program          Diagnoses and all orders for this visit:    PAD (peripheral artery disease) ([facility] [address] )          Subjective:      Patient ID: Neela Lopez is a 68 y o  male  Pt is s/p RLE A-gram with angioplasty R CFA/SFA  He has noticed significant improvement in his symptoms since the procedure  He is here to schedule A-gram LLE  Pt is s/p RLE angiogram with atherectomy and angioplasty of right common femoral and proximal superficial femoral arteries  Angiogram revealed pre-occlusive lesions of the common femoral artery and superficial femoral arteries secondary to profound plaque calcification  The completion angiogram showed an excellent result  Patient regained a palpable dorsalis pedis pulse and graft pulse  Angiogram of the left lower extremity showed essentially a mirror image in the left common femoral artery  Therefore, will schedule patient for left lower extremity angiogram with atherectomy/angioplasty  Of note patient also has a bypass graft in the left lower extremity  Mr Shea Pel has noted complete resolution of the claudication to the right lower extremity  Patient continues to have significant disabling claudication of the left lower extremity, therefore will proceed with angiogram/intervention to the left lower extremity lesions  Patient is on Plavix/asa/statin        The following portions of the patient's history were reviewed and updated as appropriate: allergies, current medications, past family history, past medical history, past social history, past surgical history and problem list     Review of Systems   Constitutional: Negative  HENT: Positive for congestion and hearing loss  Eyes: Negative  Respiratory: Positive for shortness of breath  Cardiovascular: Positive for chest pain and leg swelling  Gastrointestinal: Positive for constipation  Endocrine: Negative  Genitourinary: Negative  Musculoskeletal: Positive for arthralgias, back pain, gait problem, neck pain and neck stiffness  L leg cramping and pain with walking   Skin: Negative  Allergic/Immunologic: Negative  Neurological: Positive for dizziness and numbness  Hematological: Bruises/bleeds easily  Psychiatric/Behavioral:        Depression         Objective:      /70 (BP Location: Left arm, Patient Position: Sitting, Cuff Size: Adult)   Pulse 72   Ht 5' 6 5" (1 689 m)   Wt 76 7 kg (169 lb)   BMI 26 87 kg/m²          Physical Exam   Constitutional: He is oriented to person, place, and time  He appears well-developed and well-nourished  HENT:   Head: Normocephalic and atraumatic  Mouth/Throat: Oropharynx is clear and moist    Eyes: Pupils are equal, round, and reactive to light  Conjunctivae and EOM are normal    Neck: Normal range of motion  Neck supple  No JVD present  Cardiovascular: Normal rate, regular rhythm and normal heart sounds  Now with palpable graft pulse and palpable right dorsalis pedis  Left groin with ecchymosis, no mass no bruit or thrill  Pulmonary/Chest: Effort normal and breath sounds normal  No stridor  No respiratory distress  He has no wheezes  He has no rales  He exhibits no tenderness  Abdominal: Soft  He exhibits no distension and no mass  There is no tenderness  There is no rebound and no guarding  Musculoskeletal: Normal range of motion  He exhibits no tenderness or deformity  Neurological: He is alert and oriented to person, place, and time  Skin: Skin is warm and dry  No rash noted   No erythema  Psychiatric: He has a normal mood and affect  His behavior is normal  Thought content normal    Nursing note and vitals reviewed      Operative Scheduling Information:    Hospital:  [facility] [facility] or IR    Physician:  Me    Surgery: anio/intervent right groin approach    Urgency:  Standard    Case Length:  Normal    Post-op Bed:  Outpatient    OR Table:  [facility] or IR   Equipment Needs:  Product/Implant: Jet stream, 7mm spider   Rep: Jet stream rep    Medication Instructions:  Aspirin:   Continue (do not hold)  Plavix:  Continue (do not hold)    Hydration:  No

## 2019-08-28 ENCOUNTER — TELEPHONE (OUTPATIENT)
Dept: VASCULAR SURGERY | Facility: CLINIC | Age: 69
End: 2019-08-28

## 2019-08-28 DIAGNOSIS — I73.9 PAD (PERIPHERAL ARTERY DISEASE) (HCC): Primary | ICD-10-CM

## 2019-08-28 NOTE — TELEPHONE ENCOUNTER
Spoke with pt to schedule agram on 9-17-19 at [facility] with Dr Alberto Nieto  Pt was told NPO from midnight the night before procedure, hospital will call with arrival time, no hold on ASA or plavix  SJ

## 2019-09-09 DIAGNOSIS — I25.118 CORONARY ARTERY DISEASE OF NATIVE ARTERY OF NATIVE HEART WITH STABLE ANGINA PECTORIS (HCC): Primary | ICD-10-CM

## 2019-09-10 ENCOUNTER — TELEPHONE (OUTPATIENT)
Dept: RADIOLOGY | Facility: HOSPITAL | Age: 69
End: 2019-09-10

## 2019-09-10 RX ORDER — ISOSORBIDE MONONITRATE 30 MG/1
30 TABLET, EXTENDED RELEASE ORAL DAILY
Qty: 90 TABLET | Refills: 3 | Status: SHIPPED | OUTPATIENT
Start: 2019-09-10 | End: 2020-08-26

## 2019-09-10 RX ORDER — SODIUM CHLORIDE 9 MG/ML
75 INJECTION, SOLUTION INTRAVENOUS CONTINUOUS
Status: CANCELLED | OUTPATIENT
Start: 2019-09-10

## 2019-09-16 ENCOUNTER — TELEPHONE (OUTPATIENT)
Dept: INPATIENT UNIT | Facility: HOSPITAL | Age: 69
End: 2019-09-16

## 2019-09-17 ENCOUNTER — HOSPITAL ENCOUNTER (OUTPATIENT)
Dept: RADIOLOGY | Facility: HOSPITAL | Age: 69
Discharge: HOME | End: 2019-09-17
Attending: SURGERY | Admitting: SURGERY
Payer: MEDICARE

## 2019-09-17 VITALS
TEMPERATURE: 97.8 F | SYSTOLIC BLOOD PRESSURE: 119 MMHG | DIASTOLIC BLOOD PRESSURE: 62 MMHG | BODY MASS INDEX: 27 KG/M2 | HEIGHT: 66 IN | HEART RATE: 68 BPM | RESPIRATION RATE: 18 BRPM | WEIGHT: 168 LBS | OXYGEN SATURATION: 97 %

## 2019-09-17 DIAGNOSIS — I73.9 PAD (PERIPHERAL ARTERY DISEASE) (HCC): ICD-10-CM

## 2019-09-17 LAB
INR PPP: 0.94 (ref 0.84–1.19)
KCT BLD-ACNC: 230 SEC (ref 89–137)
PROTHROMBIN TIME: 12.2 SECONDS (ref 11.6–14.5)
SPECIMEN SOURCE: ABNORMAL

## 2019-09-17 PROCEDURE — 37225 PR REVSC OPN/PRQ FEM/POP W/ATHRC/ANGIOP SM VSL: CPT | Performed by: SURGERY

## 2019-09-17 PROCEDURE — C1894 INTRO/SHEATH, NON-LASER: HCPCS

## 2019-09-17 PROCEDURE — 99152 MOD SED SAME PHYS/QHP 5/>YRS: CPT

## 2019-09-17 PROCEDURE — 85610 PROTHROMBIN TIME: CPT | Performed by: SURGERY

## 2019-09-17 PROCEDURE — C1769 GUIDE WIRE: HCPCS

## 2019-09-17 PROCEDURE — 37225 HB FEM/POPL REVAS W/ATHER: CPT

## 2019-09-17 PROCEDURE — 99153 MOD SED SAME PHYS/QHP EA: CPT

## 2019-09-17 PROCEDURE — C1725 CATH, TRANSLUMIN NON-LASER: HCPCS

## 2019-09-17 PROCEDURE — NC001 PR NO CHARGE: Performed by: SURGERY

## 2019-09-17 PROCEDURE — 76937 US GUIDE VASCULAR ACCESS: CPT

## 2019-09-17 PROCEDURE — 0238T TRLUML PERIP ATHRC ILIAC ART: CPT | Performed by: SURGERY

## 2019-09-17 PROCEDURE — C1884 EMBOLIZATION PROTECT SYST: HCPCS

## 2019-09-17 PROCEDURE — 99152 MOD SED SAME PHYS/QHP 5/>YRS: CPT | Performed by: SURGERY

## 2019-09-17 PROCEDURE — C1760 CLOSURE DEV, VASC: HCPCS

## 2019-09-17 PROCEDURE — C1887 CATHETER, GUIDING: HCPCS

## 2019-09-17 PROCEDURE — 85347 COAGULATION TIME ACTIVATED: CPT

## 2019-09-17 RX ORDER — SODIUM CHLORIDE 9 MG/ML
50 INJECTION, SOLUTION INTRAVENOUS CONTINUOUS
Status: DISCONTINUED | OUTPATIENT
Start: 2019-09-17 | End: 2019-09-17 | Stop reason: HOSPADM

## 2019-09-17 RX ORDER — FENTANYL CITRATE 50 UG/ML
INJECTION, SOLUTION INTRAMUSCULAR; INTRAVENOUS CODE/TRAUMA/SEDATION MEDICATION
Status: DISCONTINUED | OUTPATIENT
Start: 2019-09-17 | End: 2019-09-17 | Stop reason: HOSPADM

## 2019-09-17 RX ORDER — HEPARIN SODIUM 1000 [USP'U]/ML
INJECTION, SOLUTION INTRAVENOUS; SUBCUTANEOUS CODE/TRAUMA/SEDATION MEDICATION
Status: DISCONTINUED | OUTPATIENT
Start: 2019-09-17 | End: 2019-09-17 | Stop reason: HOSPADM

## 2019-09-17 RX ORDER — ACETAMINOPHEN 325 MG/1
650 TABLET ORAL EVERY 4 HOURS PRN
Status: DISCONTINUED | OUTPATIENT
Start: 2019-09-17 | End: 2019-09-17 | Stop reason: HOSPADM

## 2019-09-17 RX ORDER — OXYCODONE HYDROCHLORIDE AND ACETAMINOPHEN 5; 325 MG/1; MG/1
1 TABLET ORAL EVERY 4 HOURS PRN
Status: DISCONTINUED | OUTPATIENT
Start: 2019-09-17 | End: 2019-09-17 | Stop reason: HOSPADM

## 2019-09-17 RX ORDER — ASPIRIN 325 MG
325 TABLET ORAL ONCE
Status: COMPLETED | OUTPATIENT
Start: 2019-09-17 | End: 2019-09-17

## 2019-09-17 RX ORDER — SODIUM CHLORIDE 9 MG/ML
75 INJECTION, SOLUTION INTRAVENOUS CONTINUOUS
Status: DISCONTINUED | OUTPATIENT
Start: 2019-09-17 | End: 2019-09-17 | Stop reason: HOSPADM

## 2019-09-17 RX ORDER — CLOPIDOGREL BISULFATE 75 MG/1
75 TABLET ORAL DAILY
Status: DISCONTINUED | OUTPATIENT
Start: 2019-09-17 | End: 2019-09-17 | Stop reason: HOSPADM

## 2019-09-17 RX ORDER — MIDAZOLAM HYDROCHLORIDE 1 MG/ML
INJECTION INTRAMUSCULAR; INTRAVENOUS CODE/TRAUMA/SEDATION MEDICATION
Status: DISCONTINUED | OUTPATIENT
Start: 2019-09-17 | End: 2019-09-17 | Stop reason: HOSPADM

## 2019-09-17 RX ADMIN — HEPARIN SODIUM 1000 UNITS: 1000 INJECTION INTRAVENOUS; SUBCUTANEOUS at 11:29

## 2019-09-17 RX ADMIN — HEPARIN SODIUM 2000 UNITS: 1000 INJECTION INTRAVENOUS; SUBCUTANEOUS at 11:36

## 2019-09-17 RX ADMIN — FENTANYL CITRATE 50 MCG: 50 INJECTION, SOLUTION INTRAMUSCULAR; INTRAVENOUS at 12:06

## 2019-09-17 RX ADMIN — MIDAZOLAM 1 MG: 1 INJECTION INTRAMUSCULAR; INTRAVENOUS at 12:06

## 2019-09-17 RX ADMIN — FENTANYL CITRATE 50 MCG: 50 INJECTION, SOLUTION INTRAMUSCULAR; INTRAVENOUS at 11:34

## 2019-09-17 RX ADMIN — FENTANYL CITRATE 50 MCG: 50 INJECTION, SOLUTION INTRAMUSCULAR; INTRAVENOUS at 11:10

## 2019-09-17 RX ADMIN — MIDAZOLAM 1 MG: 1 INJECTION INTRAMUSCULAR; INTRAVENOUS at 12:11

## 2019-09-17 RX ADMIN — HEPARIN SODIUM 4000 UNITS: 1000 INJECTION INTRAVENOUS; SUBCUTANEOUS at 11:48

## 2019-09-17 RX ADMIN — CLOPIDOGREL BISULFATE 75 MG: 75 TABLET ORAL at 17:29

## 2019-09-17 RX ADMIN — MIDAZOLAM 1 MG: 1 INJECTION INTRAMUSCULAR; INTRAVENOUS at 11:33

## 2019-09-17 RX ADMIN — HEPARIN SODIUM 1000 UNITS: 1000 INJECTION INTRAVENOUS; SUBCUTANEOUS at 11:58

## 2019-09-17 RX ADMIN — MIDAZOLAM 1 MG: 1 INJECTION INTRAMUSCULAR; INTRAVENOUS at 11:10

## 2019-09-17 RX ADMIN — ASPIRIN 325 MG ORAL TABLET 325 MG: 325 PILL ORAL at 17:29

## 2019-09-17 RX ADMIN — SODIUM CHLORIDE 75 ML/HR: 0.9 INJECTION, SOLUTION INTRAVENOUS at 09:24

## 2019-09-17 RX ADMIN — IOHEXOL 50 ML: 300 INJECTION, SOLUTION INTRAVENOUS at 13:15

## 2019-09-17 RX ADMIN — FENTANYL CITRATE 50 MCG: 50 INJECTION, SOLUTION INTRAMUSCULAR; INTRAVENOUS at 12:11

## 2019-09-17 NOTE — OP NOTE
INDICATIONS:  Severe claudication of LLE    PROCEDURE PERFORMED:  1  Ultrasound-guided access of the right common femoral artery  2  Left lower extremity angiogram  3  Atherectomy with 2 3 mm jet stream  4  Angioplasty of left external iliac artery /common femoral artery with a 6 x 80 mm balloon angioplasty catheter followed by a 7 x 80 mm balloon angioplasty catheter  5  Mynx closure right common femoral artery      SURGEON:  Marcella Alvarado MD    ANESTHESIA:  Local with sedation, total sedation was administered, I supervised the RN in administration of medications (IV versed and fentanyl) and performed cardiopulmonary monitoring during the duration of the procedure  OPERATIVE PROCEDURE:  After patient identification and informed consent, the patient was taken to the procedure room in the interventional radiology suite and placed in a supine position  Adequate sedation was administered via IV route  The patients bilateral groins were cleaned and draped in sterile surgical fashion using Chlorhexidine  1% local Lidocaine was injected into the skin and subcutaneous tissue overlying the right femoral pulsation  Under ultrasound guidance a micro access needle was used to access the right common femoral artery  The common femoral artery was patent  There were significant posterior calcifications  After obtaining pulsatile flow, a micro guidewire was inserted through the needle and the needle was removed and a 4 Sinhala micro access sheath was inserted over the guidewire using Seldinger technique  The micro wire was removed micro sheath angiogram was performed to ensure we were in the proper portion of the common femoral artery, this was ensured  A Bentson wire was advanced under fluoroscopic guidance through the micro sheath and parked in the proximal abdominal aorta under fluoroscopic guidance    The micro sheath was removed and a then a  5 Sinhala sheath was inserted again using Seldinger technique over the wire       An Omni Flush catheter was next advanced over the guidewire and fluoroscopic guidance was used to park the catheter in the proximal abdominal aorta  Patient was then given 7000 units of IV heparin  An Omni flush catheter we went up and over and selected the left external iliac artery  left lower extremity runoff was performed  Under roadmap technique and using a stiff straight Glidewire and 100 cm angled glide catheter, the bypass graft was selectively cannulated  Angiogram was performed through the glide catheter to ensure we were  within the true lumen  This was ensured  A 7 French 45 cm destination sheath was then passed over the stiff Glidewire and placed into the proximal external iliac artery  The glide catheter was placed over the Glidewire into the bypass graft  A 7 mm spider embolic protection device was then placed into the bypass graft  The jet stream 2 3 mm atherectomy device was then passed over the wire  Atherectomy was performed with continuous aspiration of the distal external iliac artery and common femoral artery  Three passes were made  Two with blades down, 1 with blades up  A 6 mm x 8 cm length balloon angioplasty catheter was then used to angioplasty across the treated segments  This was followed by 7 mm x 8 cm length balloon angioplasty catheter  Completion angiogram showed no significant residual stenosis  There was now rapid flow through the external iliac, common femoral and into the bypass graft  Completion angiogram showed no evidence of embolization  The 4 French glide catheter was then used to capture the spider embolic protection basket  There was significant debris noted within the basket  Should be noted ACTs were monitored throughout the case and maintained greater than 230 seconds  Additional heparin was administered as needed  The 7 French 45 cm destination sheath was then removed  A short 7 French sheath was placed      We then successfully deployed the Mynx as per 's instructions to achieve hemostasis at the puncture site  At the end of the case patient's bilateral feet were well perfused and had good cap refill  A palpable dorsalis pedis pulse was no appreciated at the left foot  RADIOGRAPHIC SUPERVISION AND INTERPRETATION OF TEST:    1  Pelvic runoff findings -   Left external iliac artery-  Patent with severe irregularity due to profound calcified plaque    2  Left lower extremity angiogram findings-    common femoral artery-  Severe stenosis secondary to profound calcified plaque  Profunda femorals artery-  atretic  Superficial femoral artery- the bypass graft appears to come from the proximal superficial femoral artery  The proximal superficial femoral artery shows profound calcified plaque  The bypass is then patent into the below-knee popliteal artery  Popliteal artery-  Below-knee portion patent with patent distal anastomosis of bypass  Anterior tibial artery-  Significant irregularity however no severe stenosis  Is patent throughout the leg  Tibioperoneal trunk- occludes  Posterior tibial artery- pain approximately then occludes  Peroneal artery- occluded    Devices - MYNX     PLAN:     patient has now been treated for bilateral external iliac, common femoral artery severe stenosis  The bilateral femoral to below-knee popliteal artery bypass is remain patent  Patient will be followed as an outpatient

## 2019-09-17 NOTE — PROGRESS NOTES
Patient ambulated to the rest room and around the unit has no complaints no hematoma noted no bleeding discharge instructions provided

## 2019-09-17 NOTE — DISCHARGE INSTRUCTIONS
ARTERIOGRAM    WHAT YOU SHOULD KNOW:   An angiogram is a procedure to look at arteries in your body  Arteries are the blood vessels that carry blood from your heart to your body  AFTER YOU LEAVE:     Self-care:   · Limit activity: Rest for the remainder of the day of your procedure  Have some one with you until the next morning  Keep your arm or leg straight as much as possible  Rest as much as possible, sitting lying or reclining  Walk only to go to the bathroom, to bed or to eat  If the angiogram catheter was put in your leg, use the stairs as little as possible  No driving  · Keep your wound clean and dry  You may shower 24 hours after your procedure  The bandage you have on should fall off in 2-3 days  If there is any drainage from the puncture site, you should put on a clean bandage  · Watch for bleeding and bruising: It is normal to have a bruise and soreness where the angiogram catheter went in  · Diet:   · You may resume your regular diet, Sips of flat soda will help with mild nausea  · Drink more liquids than usual for the next 24 hours      · IMMEDIATELY Contact Interventional Radiology at [phone] [place] PATIENTS: Contact Interventional Radiology at [phone]) Tabitha Puente PATIENTS: Contact Interventional Radiology at [phone]) if any of the following occur:  · If your bruise gets larger or if you notice any active bleeding  APPLY DIRECT PRESSURE TO THE BLEEDING SITE  · If you notice increased swelling or have increased pain at the puncture site   · If you have any numbness or pain in the extremity of the puncture site   · If that extremity seems cold or pale      · You have fever greater than 101  · Persistent nausea or vomitting    Follow up with your primary healthcare provider  as directed: Write down your questions so you remember to ask them during your visits

## 2019-10-02 ENCOUNTER — OFFICE VISIT (OUTPATIENT)
Dept: CARDIOLOGY CLINIC | Facility: CLINIC | Age: 69
End: 2019-10-02
Payer: MEDICARE

## 2019-10-02 VITALS
WEIGHT: 166 LBS | SYSTOLIC BLOOD PRESSURE: 140 MMHG | HEIGHT: 66 IN | DIASTOLIC BLOOD PRESSURE: 60 MMHG | BODY MASS INDEX: 26.68 KG/M2 | HEART RATE: 68 BPM

## 2019-10-02 DIAGNOSIS — I35.0 NONRHEUMATIC AORTIC VALVE STENOSIS: Chronic | ICD-10-CM

## 2019-10-02 DIAGNOSIS — I25.5 CARDIOMYOPATHY, ISCHEMIC: Primary | Chronic | ICD-10-CM

## 2019-10-02 DIAGNOSIS — I73.9 PAD (PERIPHERAL ARTERY DISEASE) (HCC): ICD-10-CM

## 2019-10-02 DIAGNOSIS — I10 ESSENTIAL HYPERTENSION: ICD-10-CM

## 2019-10-02 DIAGNOSIS — I25.118 CORONARY ARTERY DISEASE OF NATIVE ARTERY OF NATIVE HEART WITH STABLE ANGINA PECTORIS (HCC): ICD-10-CM

## 2019-10-02 PROBLEM — E78.49 OTHER HYPERLIPIDEMIA: Status: RESOLVED | Noted: 2019-07-04 | Resolved: 2019-10-02

## 2019-10-02 PROCEDURE — 99214 OFFICE O/P EST MOD 30 MIN: CPT | Performed by: INTERNAL MEDICINE

## 2019-10-02 NOTE — H&P (VIEW-ONLY)
Cardiology Follow Up    Johana Reza  1950  [id number]  [address] [address] [phone]    1  Cardiomyopathy, ischemic     2  Coronary artery disease of native artery of native heart with stable angina pectoris ([address] )     3  Essential hypertension     4  Nonrheumatic aortic valve stenosis     5  PAD (peripheral artery disease) ([facility])           Discussion/Summary: We had a long discussion concerning his AS  This will progress and his symptoms will get worse  We will proceed with TAVR evaluation although his main concern right now is the health of his wife  I have reviewed his medications and made no changes  RTO 3 months  Interval History: Echo is 6/2019 reads EF of 20% with severe AS  He had mild AS by SANAZ in 2017 with EF 35% and 100 % LAD occlusion  MRI did not show anterior wall viability  He recently had b/l LE PV surgery  He is able to ambulate better  He denies Cp or chest pressure  He is getting more exertional fatigue and SOB  Renal function  Is normal     His wife recently had TAVR   She is a chronic HD patient, and is presently in a ventilator in [facility]     Patient Active Problem List   Diagnosis    Nonrheumatic aortic valve stenosis    RODRIGUEZ (dyspnea on exertion)    Cardiomyopathy, ischemic    Essential hypertension    Coronary artery disease of native artery of native heart with stable angina pectoris ([address] )    Polymyalgia rheumatica ([address] )    Primary generalized (osteo)arthritis    Long term systemic steroid user    Depression    PAD (peripheral artery disease) ([address] )     Past Medical History:   Diagnosis Date    Benign essential hypertension     Cardiomyopathy ([address] )     Coronary artery disease     Depression with anxiety     Diffuse arthralgia     Last Assessed: 1/23/2017    Hypercholesterolemia     Hyperlipidemia     Lyme disease  Polymyalgia rheumatica (HCC)     Rheumatic fever      Social History     Socioeconomic History    Marital status: /Civil Union     Spouse name: Not on file    Number of children: 11    Years of education: Not on file    Highest education level: Not on file   Occupational History    Occupation: Environmental Supervisor   Social Needs    Financial resource strain: Not on file    Food insecurity:     Worry: Not on file     Inability: Not on file   [employer] needs:     Medical: Not on file     Non-medical: Not on file   Tobacco Use    Smoking status: Former Smoker     Last attempt to quit:      Years since quittin 7    Smokeless tobacco: Never Used   Substance and Sexual Activity    Alcohol use: Never     Frequency: Never     Comment:      Drug use: Never    Sexual activity: Not on file   Lifestyle    Physical activity:     Days per week: Not on file     Minutes per session: Not on file    Stress: Not on file   Relationships    Social connections:     Talks on phone: Not on file     Gets together: Not on file     Attends L[employer]-day service: Not on file     Active member of club or organization: Not on file     Attends meetings of clubs or organizations: Not on file     Relationship status: Not on file    Intimate partner violence:     Fear of current or ex partner: Not on file     Emotionally abused: Not on file     Physically abused: Not on file     Forced sexual activity: Not on file   Other Topics Concern    Not on file   Social History Narrative    Not on file      Family History   Problem Relation Age of Onset    Cancer Mother         cervix    Coronary artery disease Father     Heart attack Father     Cancer Brother     Coronary artery disease Maternal Grandfather     Heart disease Paternal Grandfather     Stroke Paternal Grandfather         CVA    Heart attack Paternal Edra Flank     Coronary artery disease Other          at age 78 per [employer]     Past Surgical History:   Procedure Laterality Date    FEMORAL BYPASS      IR ABDOMINAL ANGIOGRAPHY / INTERVENTION  8/15/2019    IR ABDOMINAL ANGIOGRAPHY / INTERVENTION  9/17/2019    OR SLCTV CATHJ 3RD+ ORD SLCTV ABDL PEL/LXTR [facility] Right 8/15/2019    Procedure: LEFT GROIN ACCESS RIGHT LEG ARTERIOGRAM WITH ARTHRECTOMY, LEFT LEG RUNOFF;  Surgeon: Humaira Mishra MD;  Location: [facility];  Service: Vascular    TONSILLECTOMY AND ADENOIDECTOMY         Current Outpatient Medications:     amLODIPine (NORVASC) 10 mg tablet, Take 1 tablet (10 mg total) by mouth daily, Disp: 90 tablet, Rfl: 2    aspirin (ECOTRIN LOW STRENGTH) 81 mg EC tablet, Take 81 mg by mouth daily, Disp: , Rfl:     Cholecalciferol (VITAMIN D3) 2000 units TABS, Take 2,000 Units by mouth daily, Disp: , Rfl:     clopidogrel (PLAVIX) 75 mg tablet, Take 1 tablet (75 mg total) by mouth daily, Disp: 180 tablet, Rfl: 3    isosorbide mononitrate (IMDUR) 30 mg 24 hr tablet, Take 1 tablet (30 mg total) by mouth daily, Disp: 90 tablet, Rfl: 3    Multiple Vitamin (MULTIVITAMIN) tablet, Take 1 tablet by mouth daily, Disp: , Rfl:     predniSONE 5 mg tablet, Take 1 tablet (5 mg total) by mouth daily, Disp: 30 tablet, Rfl: 2    ranolazine (RANEXA) 500 mg 12 hr tablet, take 1 tablet by mouth twice a day, Disp: 180 tablet, Rfl: 3    rosuvastatin (CRESTOR) 5 mg tablet, Take 1 tablet (5 mg total) by mouth daily, Disp: 90 tablet, Rfl: 3    triamterene-hydrochlorothiazide (DYAZIDE) 37 5-25 mg per capsule, take 1 capsule by mouth once daily, Disp: 90 capsule, Rfl: 1  Allergies   Allergen Reactions    Atorvastatin Myalgia     Annotation - [id number]: Joint pain, general pain    Avelox [Moxifloxacin] Diarrhea    Pravastatin     Rosuvastatin Calcium Myalgia     Annotation - [id number]: Joint pain, general pain    Simvastatin      Vitals:    10/02/19 0828   BP: 140/60   BP Location: Left arm   Cuff Size: Standard   Pulse: 68   Weight: 75 3 kg (166 lb)   Height: 5' 6" (1 676 m)     Weight (last 2 days)     Date/Time   Weight    10/02/19 0828   75 3 (166)             Blood pressure 140/60, pulse 68, height 5' 6" (1 676 m), weight 75 3 kg (166 lb)  , Body mass index is 26 79 kg/m²      Labs:  Admission on 09/17/2019, Discharged on 09/17/2019   Component Date Value    Protime 09/17/2019 12 2     INR 09/17/2019 0 94     Activated Clotting Time,* 09/17/2019 230*    Specimen Type 09/17/2019 VENOUS    Office Visit on 08/23/2019   Component Date Value    WBC 08/23/2019 6 60     RBC 08/23/2019 4 38     Hemoglobin 08/23/2019 13 8     Hematocrit 08/23/2019 41 1     MCV 08/23/2019 94     MCH 08/23/2019 31 5     MCHC 08/23/2019 33 6     RDW 08/23/2019 12 7     MPV 08/23/2019 9 6     Platelets 05/96/2503 240     nRBC 08/23/2019 0     Neutrophils Relative 08/23/2019 69     Immat GRANS % 08/23/2019 0     Lymphocytes Relative 08/23/2019 19     Monocytes Relative 08/23/2019 9     Eosinophils Relative 08/23/2019 2     Basophils Relative 08/23/2019 1     Neutrophils Absolute 08/23/2019 4 52     Immature Grans Absolute 08/23/2019 0 02     Lymphocytes Absolute 08/23/2019 1 26     Monocytes Absolute 08/23/2019 0 61     Eosinophils Absolute 08/23/2019 0 13     Basophils Absolute 08/23/2019 0 06     Sodium 08/23/2019 136     Potassium 08/23/2019 4 0     Chloride 08/23/2019 100     CO2 08/23/2019 27     ANION GAP 08/23/2019 9     BUN 08/23/2019 15     Creatinine 08/23/2019 0 80     Glucose 08/23/2019 80     Calcium 08/23/2019 9 8     AST 08/23/2019 20     ALT 08/23/2019 25     Alkaline Phosphatase 08/23/2019 60     Total Protein 08/23/2019 7 3     Albumin 08/23/2019 3 9     Total Bilirubin 08/23/2019 0 40     eGFR 08/23/2019 90     CRP 08/23/2019 <3 0     Sed Rate 08/23/2019 15*   Admission on 08/15/2019, Discharged on 08/15/2019   Component Date Value    Activated Clotting Time,* 08/15/2019 236*    Specimen Type 08/15/2019 ARTERIAL     Activated Clotting Time,* 08/15/2019 230*    Specimen Type 08/15/2019 ARTERIAL    Appointment on 07/25/2019   Component Date Value    Sodium 07/25/2019 140     Potassium 07/25/2019 3 8     Chloride 07/25/2019 102     CO2 07/25/2019 25     ANION GAP 07/25/2019 13     BUN 07/25/2019 15     Creatinine 07/25/2019 0 87     Glucose 07/25/2019 86     Calcium 07/25/2019 9 9     eGFR 07/25/2019 87     WBC 07/25/2019 8 00     RBC 07/25/2019 4 30     Hemoglobin 07/25/2019 13 9*    Hematocrit 07/25/2019 39 7*    MCV 07/25/2019 92     MCH 07/25/2019 32 3     MCHC 07/25/2019 35 0     RDW 07/25/2019 13 1     Platelets 46/77/1909 223     MPV 07/25/2019 8 1*   Appointment on 07/05/2019   Component Date Value    Sodium 07/05/2019 138     Potassium 07/05/2019 4 1     Chloride 07/05/2019 104     CO2 07/05/2019 24     ANION GAP 07/05/2019 10     BUN 07/05/2019 16     Creatinine 07/05/2019 0 76     Glucose, Fasting 07/05/2019 101*    Calcium 07/05/2019 9 5     eGFR 07/05/2019 92    Office Visit on 06/12/2019   Component Date Value    INTERPRETATIONS 06/12/2019     Appointment on 05/20/2019   Component Date Value    WBC 05/20/2019 6 50     RBC 05/20/2019 4 49     Hemoglobin 05/20/2019 14 5     Hematocrit 05/20/2019 43 2     MCV 05/20/2019 96     MCH 05/20/2019 32 3     MCHC 05/20/2019 33 6     RDW 05/20/2019 12 4     MPV 05/20/2019 10 4     Platelets 02/07/4782 205     nRBC 05/20/2019 0     Neutrophils Relative 05/20/2019 64     Immat GRANS % 05/20/2019 0     Lymphocytes Relative 05/20/2019 23     Monocytes Relative 05/20/2019 10     Eosinophils Relative 05/20/2019 2     Basophils Relative 05/20/2019 1     Neutrophils Absolute 05/20/2019 4 12     Immature Grans Absolute 05/20/2019 0 02     Lymphocytes Absolute 05/20/2019 1 48     Monocytes Absolute 05/20/2019 0 66     Eosinophils Absolute 05/20/2019 0 15     Basophils Absolute 05/20/2019 0 07     Sodium 05/20/2019 137     Potassium 05/20/2019 4 0     Chloride 05/20/2019 104     CO2 05/20/2019 27     ANION GAP 05/20/2019 6     BUN 05/20/2019 18     Creatinine 05/20/2019 0 80     Glucose, Fasting 05/20/2019 117*    Calcium 05/20/2019 8 8     AST 05/20/2019 20     ALT 05/20/2019 24     Alkaline Phosphatase 05/20/2019 44*    Total Protein 05/20/2019 6 5     Albumin 05/20/2019 3 6     Total Bilirubin 05/20/2019 0 51     eGFR 05/20/2019 90     Cholesterol 05/20/2019 173     Triglycerides 05/20/2019 104     HDL, Direct 05/20/2019 56     LDL Calculated 05/20/2019 96     Non-HDL-Chol (CHOL-HDL) 05/20/2019 117     TSH 3RD GENERATON 05/20/2019 1 710     PSA 05/20/2019 0 6    Appointment on 05/03/2019   Component Date Value    Rheumatoid Factor 05/03/2019 Negative    Office Visit on 05/03/2019   Component Date Value    WBC 05/03/2019 8 94     RBC 05/03/2019 4 88     Hemoglobin 05/03/2019 15 9     Hematocrit 05/03/2019 46 9     MCV 05/03/2019 96     MCH 05/03/2019 32 6     MCHC 05/03/2019 33 9     RDW 05/03/2019 12 5     MPV 05/03/2019 10 2     Platelets 37/54/0729 233     nRBC 05/03/2019 0     Neutrophils Relative 05/03/2019 81*    Immat GRANS % 05/03/2019 0     Lymphocytes Relative 05/03/2019 13*    Monocytes Relative 05/03/2019 5     Eosinophils Relative 05/03/2019 0     Basophils Relative 05/03/2019 1     Neutrophils Absolute 05/03/2019 7 16     Immature Grans Absolute 05/03/2019 0 03     Lymphocytes Absolute 05/03/2019 1 20     Monocytes Absolute 05/03/2019 0 46     Eosinophils Absolute 05/03/2019 0 03     Basophils Absolute 05/03/2019 0 06     Sodium 05/03/2019 135*    Potassium 05/03/2019 5 0     Chloride 05/03/2019 101     CO2 05/03/2019 28     ANION GAP 05/03/2019 6     BUN 05/03/2019 16     Creatinine 05/03/2019 0 82     Glucose 05/03/2019 122     Calcium 05/03/2019 9 3     AST 05/03/2019 36     ALT 05/03/2019 33     Alkaline Phosphatase 05/03/2019 49     Total Protein 05/03/2019 7 6     Albumin 05/03/2019 4  3     Total Bilirubin 05/03/2019 0 65     eGFR 05/03/2019 89     CRP 90/06/8649 <1 0     Cyclic Citrullin Peptide* 05/03/2019 4     Sed Rate 05/03/2019 8     SS-A (RO) Ab 05/03/2019 <0 2     SS-B (LA) Ab 05/03/2019 <0 2      Imaging: Ir Abdominal Angiography / Intervention    Result Date: 9/23/2019  Narrative: PLEASE SEE COMPLETE REPORT UNDER OP NOTE IN THE PATIENT'S CHART      Review of Systems:  Review of Systems   Constitutional: Negative for diaphoresis, fatigue, fever and unexpected weight change  HENT: Negative  Respiratory: Positive for shortness of breath  Negative for cough and wheezing  Cardiovascular: Negative for chest pain, palpitations and leg swelling  Gastrointestinal: Negative for abdominal pain, diarrhea and nausea  Musculoskeletal: Negative for gait problem and myalgias  Skin: Negative for rash  Neurological: Negative for dizziness and numbness  Psychiatric/Behavioral: Negative  Physical Exam:  Physical Exam   Constitutional: He is oriented to person, place, and time  He appears well-developed and well-nourished  HENT:   Head: Normocephalic and atraumatic  Eyes: Pupils are equal, round, and reactive to light  Neck: Normal range of motion  Neck supple  No JVD present  Cardiovascular: Regular rhythm, S1 normal, S2 normal and normal pulses  Murmur heard  Systolic murmur is present with a grade of 5/6  Pulses:       Carotid pulses are 2+ on the right side, and 2+ on the left side  Pulmonary/Chest: Effort normal and breath sounds normal  He has no wheezes  He has no rales  Abdominal: Soft  Bowel sounds are normal  There is no tenderness  Musculoskeletal: Normal range of motion  He exhibits no edema or tenderness  Neurological: He is alert and oriented to person, place, and time  He has normal reflexes  No cranial nerve deficit  Skin: Skin is warm  Psychiatric: He has a normal mood and affect

## 2019-10-02 NOTE — PROGRESS NOTES
Cardiology Follow Up    Lieutenant Siddiqui  1950  [id number]  [address] [facility]  [id number] [place] [id number] [phone]    1  Cardiomyopathy, ischemic     2  Coronary artery disease of native artery of native heart with stable angina pectoris ([address] )     3  Essential hypertension     4  Nonrheumatic aortic valve stenosis     5  PAD (peripheral artery disease) ([facility])           Discussion/Summary: We had a long discussion concerning his AS  This will progress and his symptoms will get worse  We will proceed with TAVR evaluation although his main concern right now is the health of his wife  I have reviewed his medications and made no changes  RTO 3 months  Interval History: Echo is 6/2019 reads EF of 20% with severe AS  He had mild AS by SANAZ in 2017 with EF 35% and 100 % LAD occlusion  MRI did not show anterior wall viability  He recently had b/l LE PV surgery  He is able to ambulate better  He denies Cp or chest pressure  He is getting more exertional fatigue and SOB  Renal function  Is normal     His wife recently had TAVR   She is a chronic HD patient, and is presently in a ventilator in [facility]     Patient Active Problem List   Diagnosis    Nonrheumatic aortic valve stenosis    RODRIGUEZ (dyspnea on exertion)    Cardiomyopathy, ischemic    Essential hypertension    Coronary artery disease of native artery of native heart with stable angina pectoris ([address] )    Polymyalgia rheumatica ([address] )    Primary generalized (osteo)arthritis    Long term systemic steroid user    Depression    PAD (peripheral artery disease) ([address] )     Past Medical History:   Diagnosis Date    Benign essential hypertension     Cardiomyopathy ([address] )     Coronary artery disease     Depression with anxiety     Diffuse arthralgia     Last Assessed: 1/23/2017    Hypercholesterolemia     Hyperlipidemia     Lyme disease  Polymyalgia rheumatica (HCC)     Rheumatic fever      Social History     Socioeconomic History    Marital status: /Civil Union     Spouse name: Not on file    Number of children: 11    Years of education: Not on file    Highest education level: Not on file   Occupational History    Occupation: Environmental Supervisor   Social Needs    Financial resource strain: Not on file    Food insecurity:     Worry: Not on file     Inability: Not on file   [employer] needs:     Medical: Not on file     Non-medical: Not on file   Tobacco Use    Smoking status: Former Smoker     Last attempt to quit:      Years since quittin 7    Smokeless tobacco: Never Used   Substance and Sexual Activity    Alcohol use: Never     Frequency: Never     Comment:      Drug use: Never    Sexual activity: Not on file   Lifestyle    Physical activity:     Days per week: Not on file     Minutes per session: Not on file    Stress: Not on file   Relationships    Social connections:     Talks on phone: Not on file     Gets together: Not on file     Attends [employer] witness service: Not on file     Active member of club or organization: Not on file     Attends meetings of clubs or organizations: Not on file     Relationship status: Not on file    Intimate partner violence:     Fear of current or ex partner: Not on file     Emotionally abused: Not on file     Physically abused: Not on file     Forced sexual activity: Not on file   Other Topics Concern    Not on file   Social History Narrative    Not on file      Family History   Problem Relation Age of Onset    Cancer Mother         cervix    Coronary artery disease Father     Heart attack Father     Cancer Brother     Coronary artery disease Maternal Grandfather     Heart disease Paternal Grandfather     Stroke Paternal Grandfather         CVA    Heart attack Paternal Madina Else     Coronary artery disease Other          at age 78 per [employer]     Past Surgical History:   Procedure Laterality Date    FEMORAL BYPASS      IR ABDOMINAL ANGIOGRAPHY / INTERVENTION  8/15/2019    IR ABDOMINAL ANGIOGRAPHY / INTERVENTION  9/17/2019    [place] SLCTV CATHJ 3RD+ ORD SLCTV ABDL PEL/LXTR [facility] Right 8/15/2019    Procedure: LEFT GROIN ACCESS RIGHT LEG ARTERIOGRAM WITH ARTHRECTOMY, LEFT LEG RUNOFF;  Surgeon: Neal Castillo MD;  Location: [facility];  Service: Vascular    TONSILLECTOMY AND ADENOIDECTOMY         Current Outpatient Medications:     amLODIPine (NORVASC) 10 mg tablet, Take 1 tablet (10 mg total) by mouth daily, Disp: 90 tablet, Rfl: 2    aspirin (ECOTRIN LOW STRENGTH) 81 mg EC tablet, Take 81 mg by mouth daily, Disp: , Rfl:     Cholecalciferol (VITAMIN D3) 2000 units TABS, Take 2,000 Units by mouth daily, Disp: , Rfl:     clopidogrel (PLAVIX) 75 mg tablet, Take 1 tablet (75 mg total) by mouth daily, Disp: 180 tablet, Rfl: 3    isosorbide mononitrate (IMDUR) 30 mg 24 hr tablet, Take 1 tablet (30 mg total) by mouth daily, Disp: 90 tablet, Rfl: 3    Multiple Vitamin (MULTIVITAMIN) tablet, Take 1 tablet by mouth daily, Disp: , Rfl:     predniSONE 5 mg tablet, Take 1 tablet (5 mg total) by mouth daily, Disp: 30 tablet, Rfl: 2    ranolazine (RANEXA) 500 mg 12 hr tablet, take 1 tablet by mouth twice a day, Disp: 180 tablet, Rfl: 3    rosuvastatin (CRESTOR) 5 mg tablet, Take 1 tablet (5 mg total) by mouth daily, Disp: 90 tablet, Rfl: 3    triamterene-hydrochlorothiazide (DYAZIDE) 37 5-25 mg per capsule, take 1 capsule by mouth once daily, Disp: 90 capsule, Rfl: 1  Allergies   Allergen Reactions    Atorvastatin Myalgia     Annotation - [id number]: Joint pain, general pain    Avelox [Moxifloxacin] Diarrhea    Pravastatin     Rosuvastatin Calcium Myalgia     Annotation - [id number]: Joint pain, general pain    Simvastatin      Vitals:    10/02/19 0828   BP: 140/60   BP Location: Left arm   Cuff Size: Standard   Pulse: 68   Weight: 75 3 kg (166 lb)   Height: 5' 6" (1 676 m)     Weight (last 2 days)     Date/Time   Weight    10/02/19 0828   75 3 (166)             Blood pressure 140/60, pulse 68, height 5' 6" (1 676 m), weight 75 3 kg (166 lb)  , Body mass index is 26 79 kg/m²      Labs:  Admission on 09/17/2019, Discharged on 09/17/2019   Component Date Value    Protime 09/17/2019 12 2     INR 09/17/2019 0 94     Activated Clotting Time,* 09/17/2019 230*    Specimen Type 09/17/2019 VENOUS    Office Visit on 08/23/2019   Component Date Value    WBC 08/23/2019 6 60     RBC 08/23/2019 4 38     Hemoglobin 08/23/2019 13 8     Hematocrit 08/23/2019 41 1     MCV 08/23/2019 94     MCH 08/23/2019 31 5     MCHC 08/23/2019 33 6     RDW 08/23/2019 12 7     MPV 08/23/2019 9 6     Platelets 99/12/7858 240     nRBC 08/23/2019 0     Neutrophils Relative 08/23/2019 69     Immat GRANS % 08/23/2019 0     Lymphocytes Relative 08/23/2019 19     Monocytes Relative 08/23/2019 9     Eosinophils Relative 08/23/2019 2     Basophils Relative 08/23/2019 1     Neutrophils Absolute 08/23/2019 4 52     Immature Grans Absolute 08/23/2019 0 02     Lymphocytes Absolute 08/23/2019 1 26     Monocytes Absolute 08/23/2019 0 61     Eosinophils Absolute 08/23/2019 0 13     Basophils Absolute 08/23/2019 0 06     Sodium 08/23/2019 136     Potassium 08/23/2019 4 0     Chloride 08/23/2019 100     CO2 08/23/2019 27     ANION GAP 08/23/2019 9     BUN 08/23/2019 15     Creatinine 08/23/2019 0 80     Glucose 08/23/2019 80     Calcium 08/23/2019 9 8     AST 08/23/2019 20     ALT 08/23/2019 25     Alkaline Phosphatase 08/23/2019 60     Total Protein 08/23/2019 7 3     Albumin 08/23/2019 3 9     Total Bilirubin 08/23/2019 0 40     eGFR 08/23/2019 90     CRP 08/23/2019 <3 0     Sed Rate 08/23/2019 15*   Admission on 08/15/2019, Discharged on 08/15/2019   Component Date Value    Activated Clotting Time,* 08/15/2019 236*    Specimen Type 08/15/2019 ARTERIAL     Activated Clotting Time,* 08/15/2019 230*    Specimen Type 08/15/2019 ARTERIAL    Appointment on 07/25/2019   Component Date Value    Sodium 07/25/2019 140     Potassium 07/25/2019 3 8     Chloride 07/25/2019 102     CO2 07/25/2019 25     ANION GAP 07/25/2019 13     BUN 07/25/2019 15     Creatinine 07/25/2019 0 87     Glucose 07/25/2019 86     Calcium 07/25/2019 9 9     eGFR 07/25/2019 87     WBC 07/25/2019 8 00     RBC 07/25/2019 4 30     Hemoglobin 07/25/2019 13 9*    Hematocrit 07/25/2019 39 7*    MCV 07/25/2019 92     MCH 07/25/2019 32 3     MCHC 07/25/2019 35 0     RDW 07/25/2019 13 1     Platelets 63/76/4310 223     MPV 07/25/2019 8 1*   Appointment on 07/05/2019   Component Date Value    Sodium 07/05/2019 138     Potassium 07/05/2019 4 1     Chloride 07/05/2019 104     CO2 07/05/2019 24     ANION GAP 07/05/2019 10     BUN 07/05/2019 16     Creatinine 07/05/2019 0 76     Glucose, Fasting 07/05/2019 101*    Calcium 07/05/2019 9 5     eGFR 07/05/2019 92    Office Visit on 06/12/2019   Component Date Value    INTERPRETATIONS 06/12/2019     Appointment on 05/20/2019   Component Date Value    WBC 05/20/2019 6 50     RBC 05/20/2019 4 49     Hemoglobin 05/20/2019 14 5     Hematocrit 05/20/2019 43 2     MCV 05/20/2019 96     MCH 05/20/2019 32 3     MCHC 05/20/2019 33 6     RDW 05/20/2019 12 4     MPV 05/20/2019 10 4     Platelets 50/42/8361 205     nRBC 05/20/2019 0     Neutrophils Relative 05/20/2019 64     Immat GRANS % 05/20/2019 0     Lymphocytes Relative 05/20/2019 23     Monocytes Relative 05/20/2019 10     Eosinophils Relative 05/20/2019 2     Basophils Relative 05/20/2019 1     Neutrophils Absolute 05/20/2019 4 12     Immature Grans Absolute 05/20/2019 0 02     Lymphocytes Absolute 05/20/2019 1 48     Monocytes Absolute 05/20/2019 0 66     Eosinophils Absolute 05/20/2019 0 15     Basophils Absolute 05/20/2019 0 07     Sodium 05/20/2019 137     Potassium 05/20/2019 4 0     Chloride 05/20/2019 104     CO2 05/20/2019 27     ANION GAP 05/20/2019 6     BUN 05/20/2019 18     Creatinine 05/20/2019 0 80     Glucose, Fasting 05/20/2019 117*    Calcium 05/20/2019 8 8     AST 05/20/2019 20     ALT 05/20/2019 24     Alkaline Phosphatase 05/20/2019 44*    Total Protein 05/20/2019 6 5     Albumin 05/20/2019 3 6     Total Bilirubin 05/20/2019 0 51     eGFR 05/20/2019 90     Cholesterol 05/20/2019 173     Triglycerides 05/20/2019 104     HDL, Direct 05/20/2019 56     LDL Calculated 05/20/2019 96     Non-HDL-Chol (CHOL-HDL) 05/20/2019 117     TSH 3RD GENERATON 05/20/2019 1 710     PSA 05/20/2019 0 6    Appointment on 05/03/2019   Component Date Value    Rheumatoid Factor 05/03/2019 Negative    Office Visit on 05/03/2019   Component Date Value    WBC 05/03/2019 8 94     RBC 05/03/2019 4 88     Hemoglobin 05/03/2019 15 9     Hematocrit 05/03/2019 46 9     MCV 05/03/2019 96     MCH 05/03/2019 32 6     MCHC 05/03/2019 33 9     RDW 05/03/2019 12 5     MPV 05/03/2019 10 2     Platelets 38/42/9767 233     nRBC 05/03/2019 0     Neutrophils Relative 05/03/2019 81*    Immat GRANS % 05/03/2019 0     Lymphocytes Relative 05/03/2019 13*    Monocytes Relative 05/03/2019 5     Eosinophils Relative 05/03/2019 0     Basophils Relative 05/03/2019 1     Neutrophils Absolute 05/03/2019 7 16     Immature Grans Absolute 05/03/2019 0 03     Lymphocytes Absolute 05/03/2019 1 20     Monocytes Absolute 05/03/2019 0 46     Eosinophils Absolute 05/03/2019 0 03     Basophils Absolute 05/03/2019 0 06     Sodium 05/03/2019 135*    Potassium 05/03/2019 5 0     Chloride 05/03/2019 101     CO2 05/03/2019 28     ANION GAP 05/03/2019 6     BUN 05/03/2019 16     Creatinine 05/03/2019 0 82     Glucose 05/03/2019 122     Calcium 05/03/2019 9 3     AST 05/03/2019 36     ALT 05/03/2019 33     Alkaline Phosphatase 05/03/2019 49     Total Protein 05/03/2019 7 6     Albumin 05/03/2019 4  3     Total Bilirubin 05/03/2019 0 65     eGFR 05/03/2019 89     CRP 29/93/5612 <8 7     Cyclic Citrullin Peptide* 05/03/2019 4     Sed Rate 05/03/2019 8     SS-A (RO) Ab 05/03/2019 <0 2     SS-B (LA) Ab 05/03/2019 <0 2      Imaging: Ir Abdominal Angiography / Intervention    Result Date: 9/23/2019  Narrative: PLEASE SEE COMPLETE REPORT UNDER OP NOTE IN THE PATIENT'S CHART      Review of Systems:  Review of Systems   Constitutional: Negative for diaphoresis, fatigue, fever and unexpected weight change  HENT: Negative  Respiratory: Positive for shortness of breath  Negative for cough and wheezing  Cardiovascular: Negative for chest pain, palpitations and leg swelling  Gastrointestinal: Negative for abdominal pain, diarrhea and nausea  Musculoskeletal: Negative for gait problem and myalgias  Skin: Negative for rash  Neurological: Negative for dizziness and numbness  Psychiatric/Behavioral: Negative  Physical Exam:  Physical Exam   Constitutional: He is oriented to person, place, and time  He appears well-developed and well-nourished  HENT:   Head: Normocephalic and atraumatic  Eyes: Pupils are equal, round, and reactive to light  Neck: Normal range of motion  Neck supple  No JVD present  Cardiovascular: Regular rhythm, S1 normal, S2 normal and normal pulses  Murmur heard  Systolic murmur is present with a grade of 5/6  Pulses:       Carotid pulses are 2+ on the right side, and 2+ on the left side  Pulmonary/Chest: Effort normal and breath sounds normal  He has no wheezes  He has no rales  Abdominal: Soft  Bowel sounds are normal  There is no tenderness  Musculoskeletal: Normal range of motion  He exhibits no edema or tenderness  Neurological: He is alert and oriented to person, place, and time  He has normal reflexes  No cranial nerve deficit  Skin: Skin is warm  Psychiatric: He has a normal mood and affect

## 2019-10-17 ENCOUNTER — OFFICE VISIT (OUTPATIENT)
Dept: CARDIAC SURGERY | Facility: CLINIC | Age: 69
End: 2019-10-17
Payer: MEDICARE

## 2019-10-17 VITALS
WEIGHT: 168 LBS | HEART RATE: 65 BPM | RESPIRATION RATE: 14 BRPM | DIASTOLIC BLOOD PRESSURE: 64 MMHG | TEMPERATURE: 97.4 F | BODY MASS INDEX: 27 KG/M2 | OXYGEN SATURATION: 98 % | SYSTOLIC BLOOD PRESSURE: 140 MMHG | HEIGHT: 66 IN

## 2019-10-17 DIAGNOSIS — R06.02 SHORTNESS OF BREATH: ICD-10-CM

## 2019-10-17 DIAGNOSIS — I73.9 PAD (PERIPHERAL ARTERY DISEASE) (HCC): ICD-10-CM

## 2019-10-17 DIAGNOSIS — Z87.891 FORMER TOBACCO USE: ICD-10-CM

## 2019-10-17 DIAGNOSIS — I25.118 CORONARY ARTERY DISEASE OF NATIVE ARTERY OF NATIVE HEART WITH STABLE ANGINA PECTORIS (HCC): ICD-10-CM

## 2019-10-17 DIAGNOSIS — I35.0 NONRHEUMATIC AORTIC VALVE STENOSIS: Primary | Chronic | ICD-10-CM

## 2019-10-17 PROCEDURE — 99214 OFFICE O/P EST MOD 30 MIN: CPT | Performed by: PHYSICIAN ASSISTANT

## 2019-10-17 RX ORDER — CHOLECALCIFEROL (VITAMIN D3) 125 MCG
500 CAPSULE ORAL DAILY
COMMUNITY

## 2019-10-17 NOTE — PROGRESS NOTES
Consultation - Cardiothoracic Surgery   Michelle Peters 68 y o  male MRN: [id number]    Physician Requesting Consult: No att  providers found    Reason for Consult / Principal Problem: Aortic stenosis, Non-Rheumatic    History of Present Illness: Michelle Peters is a 68y o  year old male who presents for evaluation of severe AS  Patient states he started w/ cardiac issues in 2017  Was evaluated by Dr Bernardo Meier for MV CAD w/ low EF & found not to be open heart candidate due to no myocardial viability  Had mild AS at the time & was followed by cardiology  Most recent TTE w/ severe AS  Now presents to our office for surgical consultation  Upon examination today, Mr  Ngozi Barros reports he is feeling well  Admits to SOB/RODRIGUEZ daily, LE edema chronically on Lasix & daily CP (chronic, on Ranexa)  Denies palpitations, orthopnea, PND, numbness/tinlging/paresthesias in UE or LE bilaterally, HA, lightheadeness, dizziness, presyncopal symptoms, hx syncopal events, N/V/D, hemoptysis, hematemesis, hematochezia, melena  Denies hx stroke, hx cancer w/ chest wall radiation, hx blood loss anemia, hx varicose veins or vein stripping  PMH includes CAD (no myocardial viability therefore being tx w/ medical therapy), severe AS, ischemic CM, PAD (s/p b/l fem-below the knee bypass w/ GSV  PSH includes cardiac cath, T&A, b/l fem-below the knee bypasses w/ GSV b/l, angioplasty right CFA/SFA & left external iliac/CFA  FH significant for paternal MI s/ SCD later in life  Denies FH of valvular disease, DM, aortic aneurysms, or SCD  Patient is retired  Lives in a 1st story home w/ steps  Does not use an assist device for ambulation  Drives  Free time activities include visiting & taking care of his wife who lives in assisted living & is on HD  (+) former tobacco use (2ppd x20 years, quit 1993)  (+) h/o ETOH use (3-6 cans beer/day x20 years, quit 1993, no residual liver issues)) Denies current or prior use of drugs   Allergies include statins with rxn myalgia/arthalgia & Avelox w/ rxn diarrhea  Cardiac pertinent medications include: Aspiring 81mg, Norvasc 10mg, Plavix 75mg, Imdur 30mg, prednisone, Ranexa 500mg BID, Rosuvastatin 5mg  Other medications can be reviewed in the patient chart  Patient sees Dr Shakila Washburn as his primary care physician  Patient sees Dr Bonita Curling as his cardiologist  Patient sees Dr Jeannine Adams as his vascular surgeon  Patient does not see a dentist regularly  (+) full upper/lower dentures  Has cologuard in 2019      Past Medical History:  Past Medical History:   Diagnosis Date    Anxiety     Benign essential hypertension     Coronary artery disease     Depression     Diffuse arthralgia     Last Assessed: 1/23/2017    Former tobacco use     History of Lyme disease     History of rheumatic fever     Hypercholesterolemia     Hyperlipidemia     Hyperlipidemia     Ischemic cardiomyopathy     PAD (peripheral artery disease) (HCC)     s/p fem-below the knee bypass & percutaneous tx right CFA/SFA & left external iliac/CFA    Polymyalgia rheumatica (HCC)     on chronic steroids    Severe aortic stenosis      Past Surgical History:   Past Surgical History:   Procedure Laterality Date    CARDIAC CATHETERIZATION      FEMORAL BYPASS Bilateral     fem-below knee w/ GSV    IR ABDOMINAL ANGIOGRAPHY / INTERVENTION  8/15/2019    IR ABDOMINAL ANGIOGRAPHY / INTERVENTION  9/17/2019    [place] SLCTV CATHJ 3RD+ ORD SLCTV ABDL PEL/LXTR [facility] Right 8/15/2019    Procedure: LEFT GROIN ACCESS RIGHT LEG ARTERIOGRAM WITH ARTHRECTOMY, LEFT LEG RUNOFF;  Surgeon: Onelia Wiggins MD;  [facility];  Service: Vascular    TONSILLECTOMY AND ADENOIDECTOMY       Family History:  Family History   Problem Relation Age of Onset    Cancer Mother         cervix    Coronary artery disease Father     Heart attack Father     Cancer Brother     Coronary artery disease Maternal Grandfather     Heart disease Paternal Larene Larch Stroke Paternal Grandfather         CVA    Heart attack Paternal Grandfather     Coronary artery disease Other          at age 80 per [employer]     Social History:  Social History     Substance and Sexual Activity   Alcohol Use Never    Frequency: Never    Comment:       Social History     Substance and Sexual Activity   Drug Use Never     Social History     Tobacco Use   Smoking Status Former Smoker    Packs/day: 2 00    Years: 20 00    Pack years: 40 00    Types: Cigarettes    Last attempt to quit:     Years since quittin 8   Smokeless Tobacco Never Used       Home Medications:   Prior to Admission medications    Medication Sig Start Date End Date Taking? Authorizing Provider   cyanocobalamin (VITAMIN B-12) 500 mcg tablet Take 500 mcg by mouth daily   Yes Historical Provider, MD   amLODIPine (NORVASC) 10 mg tablet Take 1 tablet (10 mg total) by mouth daily 19   Sukh Mohan DO   aspirin (ECOTRIN LOW STRENGTH) 81 mg EC tablet Take 81 mg by mouth daily    Historical Provider, MD   Cholecalciferol (VITAMIN D3) 2000 units TABS Take 2,000 Units by mouth daily    Historical Provider, MD   clopidogrel (PLAVIX) 75 mg tablet Take 1 tablet (75 mg total) by mouth daily 19   Dequan Schmidt MD   isosorbide mononitrate (IMDUR) 30 mg 24 hr tablet Take 1 tablet (30 mg total) by mouth daily 9/10/19   Wenceslao Khanna MD   Multiple Vitamin (MULTIVITAMIN) tablet Take 1 tablet by mouth daily    Historical Provider, MD   predniSONE 5 mg tablet Take 1 tablet (5 mg total) by mouth daily 19   Cathaleen Leyden, PA-C   ranolazine (RANEXA) 500 mg 12 hr tablet take 1 tablet by mouth twice a day 6/15/19   Wenceslao Khanna MD   rosuvastatin (CRESTOR) 5 mg tablet Take 1 tablet (5 mg total) by mouth daily 18   Wenceslao Khanna MD   triamterene-hydrochlorothiazide (DYAZIDE) 37 5-25 mg per capsule take 1 capsule by mouth once daily 19   Sukh Mohan DO       Allergies:   Allergies   Allergen Reactions    Atorvastatin Myalgia and Arthralgia    Avelox [Moxifloxacin] Diarrhea    Pravastatin Myalgia and Arthralgia    Rosuvastatin Calcium Myalgia and Arthralgia    Simvastatin Myalgia and Arthralgia       Review of Systems:  Review of Systems   Constitutional: Positive for fatigue  Negative for activity change, diaphoresis and unexpected weight change  HENT: Negative  Negative for dental problem  Respiratory: Positive for shortness of breath  Negative for chest tightness and wheezing  Cardiovascular: Positive for chest pain and leg swelling  Negative for palpitations  Gastrointestinal: Negative  Negative for blood in stool, constipation, diarrhea, nausea and vomiting  Genitourinary: Negative  Musculoskeletal: Negative  Negative for arthralgias, back pain, gait problem and myalgias  Skin: Negative  Neurological: Negative  Negative for dizziness, syncope, weakness, light-headedness, numbness and headaches  Hematological: Negative  Does not bruise/bleed easily  All other systems reviewed and are negative  Vital Signs:     Vitals:    10/17/19 1000 10/17/19 1044   BP: 144/62 140/64   BP Location: Left arm Right arm   Cuff Size: Adult    Pulse: 65    Resp: 14    Temp: (!) 97 4 °F (36 3 °C)    TempSrc: Oral    SpO2: 98%    Weight: 76 2 kg (168 lb)    Height: 5' 6" (1 676 m)        Physical Exam:  Physical Exam   Constitutional: He is oriented to person, place, and time  Vital signs are normal  He appears well-developed and well-nourished  Non-toxic appearance  He does not appear ill  No distress  Laying in bed in NAD   HENT:   Head: Normocephalic and atraumatic  Mouth/Throat: Uvula is midline, oropharynx is clear and moist and mucous membranes are normal  He does not have dentures  Normal dentition  Neck: Trachea normal and normal range of motion  Neck supple  No JVD present  Carotid bruit is not present     Cardiovascular: Normal rate, regular rhythm, S1 normal and S2 normal  PMI is not displaced  Exam reveals no S3, no S4 and no friction rub  Murmur heard  Systolic murmur is present with a grade of 4/6  No heaves/lifts on palpation   Pulmonary/Chest: Effort normal and breath sounds normal  No accessory muscle usage  No respiratory distress  He has no wheezes  He has no rhonchi  He has no rales  Good inspiratory effort, equal expansion bilaterally   Abdominal: Normal appearance and bowel sounds are normal  He exhibits no distension and no mass  There is no tenderness  There is no rigidity, no rebound and no guarding  Neurological: He is alert and oriented to person, place, and time  He has normal strength  No cranial nerve deficit or sensory deficit  No focal deficit appreciated   Skin: Skin is warm, dry and intact  No bruising, no petechiae and no rash noted  No cyanosis  Nails show no clubbing  Trace edema b/l LE, no varicosities appreciated to b/l LE; 2+ RLE DP pulse, 1+ LLE DP pulse   Psychiatric: He has a normal mood and affect  Lab Results:               Invalid input(s): LABGLOM      No results found for: HGBA1C  Lab Results   Component Value Date    CKTOTAL 103 01/30/2017       Imaging Studies:     Transthoracic Echocardiogram 6/26/19: EF 20%, mild MR, severe AS (TA 0 8cm2, peak 59, men 30)    I have personally reviewed pertinent reports        TAVR evaluation Assessment:     Kristi Nolan: III    STS Risk Score: 1 6 %, mortality risk    KCCQ-12 completed    6 min walk: 5 - 5 - 5    Assessment:  Patient Active Problem List    Diagnosis Date Noted    PAD (peripheral artery disease) ([facility] ) 07/04/2019    Depression 06/17/2019    Long term systemic steroid user 04/08/2019    Coronary artery disease of native artery of native heart with stable angina pectoris ([facility] ) 06/27/2018    Essential hypertension 03/25/2018    Nonrheumatic aortic valve stenosis 09/06/2017    RODRIGUEZ (dyspnea on exertion) 09/06/2017    Cardiomyopathy, ischemic 09/06/2017    Primary generalized (osteo)arthritis 06/14/2017    Polymyalgia rheumatica ([address] ) 02/06/2017     Severe aortic stenosis; Ongoing TAVR workup    Plan:    Abisai Reyes has severe symptomatic aortic stenosis  Based on their STS risk assessment, they will undergo the following testing for transcatheter aortic valve replacement: Gated CTA of the chest/abdomen/pelvis, 3D SANAZ, left and right cardiac catheterization, dental clearance, pulmonary function tests with ABG, and carotid artery ultrasound  Once these studies have been completed, Abisai Reyes will follow up in our office to review the results and confirm the suitability of proceeding with transcatheter aortic valve replacment  Abisai Reyes was comfortable with our recommendations, and their questions were answered to their satisfaction  We will continue to evaluate the patient daily with further recommendations as work up is completed  Thank you for allowing us to participate in the care of this patient  The patient recently had a screening for colon caner via cologuard in 2019  Therefore GI referral is not indicated at this time       SIGNATURE: Nena Chan PA-C  DATE: October 17, 2019  TIME: 10:52 AM

## 2019-10-17 NOTE — LETTER
October 17, 2019     Clint Conroy MD  [facility]    Patient: Lucero Reed   YOB: 1950   Date of Visit: 10/17/2019       Dear Dr Cher Feliciano:    Thank you for referring Dilcia Pena to me for evaluation  Below are my notes for this consultation  If you have questions, please do not hesitate to call me  I look forward to following your patient along with you  Sincerely,        Yariel Hernandez MD        CC: DO Sri Osei PA-C  10/17/2019 12:14 PM  Attested  Consultation - Cardiothoracic Surgery   Lucero Reed 68 y o  male MRN: [id number]    Physician Requesting Consult: No att  providers found    Reason for Consult / Principal Problem: Aortic stenosis, Non-Rheumatic    History of Present Illness: Lucero Reed is a 68y o  year old male who presents for evaluation of severe AS  Patient states he started w/ cardiac issues in 2017  Was evaluated by Dr Rg Cast for MV CAD w/ low EF & found not to be open heart candidate due to no myocardial viability  Had mild AS at the time & was followed by cardiology  Most recent TTE w/ severe AS  Now presents to our office for surgical consultation  Upon examination today, Mr David Wise reports he is feeling well  Admits to SOB/RODRIGUEZ daily, LE edema chronically on Lasix & daily CP (chronic, on Ranexa)  Denies palpitations, orthopnea, PND, numbness/tinlging/paresthesias in UE or LE bilaterally, HA, lightheadeness, dizziness, presyncopal symptoms, hx syncopal events, N/V/D, hemoptysis, hematemesis, hematochezia, melena  Denies hx stroke, hx cancer w/ chest wall radiation, hx blood loss anemia, hx varicose veins or vein stripping  PMH includes CAD (no myocardial viability therefore being tx w/ medical therapy), severe AS, ischemic CM, PAD (s/p b/l fem-below the knee bypass w/ GSV  PSH includes cardiac cath, T&A, b/l fem-below the knee bypasses w/ GSV b/l, angioplasty right CFA/SFA & left external iliac/CFA  FH significant for paternal MI s/ SCD later in life  Denies FH of valvular disease, DM, aortic aneurysms, or SCD  Patient is retired  Lives in a 1st story home w/ steps  Does not use an assist device for ambulation  Drives  Free time activities include visiting & taking care of his wife who lives in assisted living & is on HD  (+) former tobacco use (2ppd x20 years, quit 1993)  (+) h/o ETOH use (3-6 cans beer/day x20 years, quit 1993, no residual liver issues)) Denies current or prior use of drugs  Allergies include statins with rxn myalgia/arthalgia & Avelox w/ rxn diarrhea  Cardiac pertinent medications include: Aspiring 81mg, Norvasc 10mg, Plavix 75mg, Imdur 30mg, prednisone, Ranexa 500mg BID, Rosuvastatin 5mg  Other medications can be reviewed in the patient chart  Patient sees Dr Mila Hirsch as his primary care physician  Patient sees Dr Wei Kaplan as his cardiologist  Patient sees Dr Bishnu Trujillo as his vascular surgeon  Patient does not see a dentist regularly  (+) full upper/lower dentures  Has cologuard in 2019      Past Medical History:  Past Medical History:   Diagnosis Date    Anxiety     Benign essential hypertension     Coronary artery disease     Depression     Diffuse arthralgia     Last Assessed: 1/23/2017    Former tobacco use     History of Lyme disease     History of rheumatic fever     Hypercholesterolemia     Hyperlipidemia     Hyperlipidemia     Ischemic cardiomyopathy     PAD (peripheral artery disease) (HCC)     s/p fem-below the knee bypass & percutaneous tx right CFA/SFA & left external iliac/CFA    Polymyalgia rheumatica (HCC)     on chronic steroids    Severe aortic stenosis      Past Surgical History:   Past Surgical History:   Procedure Laterality Date    CARDIAC CATHETERIZATION      FEMORAL BYPASS Bilateral     fem-below knee w/ GSV    IR ABDOMINAL ANGIOGRAPHY / INTERVENTION  8/15/2019    IR ABDOMINAL ANGIOGRAPHY / INTERVENTION  9/17/2019    [place] SLCTV CATHJ 3RD+ ORD SLCTV ABDL PEL/LXTR [facility] Right 8/15/2019    Procedure: LEFT GROIN ACCESS RIGHT LEG ARTERIOGRAM WITH ARTHRECTOMY, LEFT LEG RUNOFF;  Surgeon: Cameron Duran MD;  [facility];  Service: Vascular    TONSILLECTOMY AND ADENOIDECTOMY       Family History:  Family History   Problem Relation Age of Onset    Cancer Mother         cervix    Coronary artery disease Father     Heart attack Father     Cancer Brother     Coronary artery disease Maternal Grandfather     Heart disease Paternal Grandfather     Stroke Paternal Grandfather         CVA    Heart attack Paternal Grandfather     Coronary artery disease Other          at age 80 per [employer]     Social History:  Social History     Substance and Sexual Activity   Alcohol Use Never    Frequency: Never    Comment:       Social History     Substance and Sexual Activity   Drug Use Never     Social History     Tobacco Use   Smoking Status Former Smoker    Packs/day: 2 00    Years: 20 00    Pack years: 40 00    Types: Cigarettes    Last attempt to quit:     Years since quittin 8   Smokeless Tobacco Never Used       Home Medications:   Prior to Admission medications    Medication Sig Start Date End Date Taking?  Authorizing Provider   cyanocobalamin (VITAMIN B-12) 500 mcg tablet Take 500 mcg by mouth daily   Yes Historical Provider, MD   amLODIPine (NORVASC) 10 mg tablet Take 1 tablet (10 mg total) by mouth daily 19   Alicia Hein DO   aspirin (ECOTRIN LOW STRENGTH) 81 mg EC tablet Take 81 mg by mouth daily    Historical Provider, MD   Cholecalciferol (VITAMIN D3) 2000 units TABS Take 2,000 Units by mouth daily    Historical Provider, MD   clopidogrel (PLAVIX) 75 mg tablet Take 1 tablet (75 mg total) by mouth daily 19   Cameron Duran MD   isosorbide mononitrate (IMDUR) 30 mg 24 hr tablet Take 1 tablet (30 mg total) by mouth daily 9/10/19   Cristina Wall MD   Multiple Vitamin (MULTIVITAMIN) tablet Take 1 tablet by mouth daily    Historical Provider, MD   predniSONE 5 mg tablet Take 1 tablet (5 mg total) by mouth daily 8/23/19   Sonali Howard PA-C   ranolazine (RANEXA) 500 mg 12 hr tablet take 1 tablet by mouth twice a day 6/15/19   Eric Landa MD   rosuvastatin (CRESTOR) 5 mg tablet Take 1 tablet (5 mg total) by mouth daily 12/12/18   Eric Landa MD   triamterene-hydrochlorothiazide (DYAZIDE) 37 5-25 mg per capsule take 1 capsule by mouth once daily 4/24/19   James Truong DO       Allergies: Allergies   Allergen Reactions    Atorvastatin Myalgia and Arthralgia    Avelox [Moxifloxacin] Diarrhea    Pravastatin Myalgia and Arthralgia    Rosuvastatin Calcium Myalgia and Arthralgia    Simvastatin Myalgia and Arthralgia       Review of Systems:  Review of Systems    Vital Signs:     Vitals:    10/17/19 1000 10/17/19 1044   BP: 144/62 140/64   BP Location: Left arm Right arm   Cuff Size: Adult    Pulse: 65    Resp: 14    Temp: (!) 97 4 °F (36 3 °C)    TempSrc: Oral    SpO2: 98%    Weight: 76 2 kg (168 lb)    Height: 5' 6" (1 676 m)        Physical Exam:  Physical Exam    Lab Results:               Invalid input(s): LABGLOM      No results found for: HGBA1C  Lab Results   Component Value Date    CKTOTAL 103 01/30/2017       Imaging Studies:     Transthoracic Echocardiogram 6/26/19: EF 20%, mild MR, severe AS (TA 0 8cm2, peak 59, men 30)    I have personally reviewed pertinent reports        TAVR evaluation Assessment:     Roxane Liriano: III    STS Risk Score: 1 6 %, mortality risk    KCCQ-12 completed    6 min walk: 5 - 5 - 5    Assessment:  Patient Active Problem List    Diagnosis Date Noted    PAD (peripheral artery disease) ([facility] ) 07/04/2019    Depression 06/17/2019    Long term systemic steroid user 04/08/2019    Coronary artery disease of native artery of native heart with stable angina pectoris ([facility] ) 06/27/2018    Essential hypertension 03/25/2018    Nonrheumatic aortic valve stenosis 09/06/2017    RODRIGUEZ (dyspnea on exertion) 09/06/2017    Cardiomyopathy, ischemic 09/06/2017    Primary generalized (osteo)arthritis 06/14/2017    Polymyalgia rheumatica ([address] ) 02/06/2017     Severe aortic stenosis; Ongoing TAVR workup    Plan:    Zohra Taylor has severe symptomatic aortic stenosis  Based on their STS risk assessment, they will undergo the following testing for transcatheter aortic valve replacement: Gated CTA of the chest/abdomen/pelvis, 3D SANAZ, left and right cardiac catheterization, dental clearance, pulmonary function tests with ABG, and carotid artery ultrasound  Once these studies have been completed, Zohra Taylor will follow up in our office to review the results and confirm the suitability of proceeding with transcatheter aortic valve replacment  Zohra Taylor was comfortable with our recommendations, and their questions were answered to their satisfaction  We will continue to evaluate the patient daily with further recommendations as work up is completed  Thank you for allowing us to participate in the care of this patient  The patient recently had a screening for colon caner via cologuard in 2019  Therefore GI referral is not indicated at this time  Mick Das PA-C  DATE: October 17, 2019  TIME: 10:52 AM  Attestation signed by Jennifer Martinez MD at 10/17/2019 12:48 PM:  Patient seen and evaluated with TRICIA / KIRAN  I agree with the above assessment and plan with the following additions  The patient is a 78-year-old man with symptomatic severe aortic stenosis, he also has severe ischemic cardiomyopathy with not myocardial viability on MRI  I explained the diagnosis to the patient the treatment options  I recommend TAVR  At this point the patient will undergo preoperative testing and will return to our office to schedule an elective procedure

## 2019-10-18 ENCOUNTER — OFFICE VISIT (OUTPATIENT)
Dept: FAMILY MEDICINE CLINIC | Facility: CLINIC | Age: 69
End: 2019-10-18
Payer: MEDICARE

## 2019-10-18 VITALS
BODY MASS INDEX: 27.21 KG/M2 | TEMPERATURE: 96.6 F | HEART RATE: 77 BPM | OXYGEN SATURATION: 98 % | WEIGHT: 168.6 LBS | DIASTOLIC BLOOD PRESSURE: 70 MMHG | SYSTOLIC BLOOD PRESSURE: 124 MMHG

## 2019-10-18 DIAGNOSIS — R29.898 RIGHT HAND WEAKNESS: ICD-10-CM

## 2019-10-18 DIAGNOSIS — Z23 ENCOUNTER FOR IMMUNIZATION: ICD-10-CM

## 2019-10-18 DIAGNOSIS — I73.9 PAD (PERIPHERAL ARTERY DISEASE) (HCC): Primary | ICD-10-CM

## 2019-10-18 DIAGNOSIS — I35.0 NONRHEUMATIC AORTIC VALVE STENOSIS: Chronic | ICD-10-CM

## 2019-10-18 PROCEDURE — 99214 OFFICE O/P EST MOD 30 MIN: CPT | Performed by: FAMILY MEDICINE

## 2019-10-18 PROCEDURE — 90732 PPSV23 VACC 2 YRS+ SUBQ/IM: CPT

## 2019-10-18 PROCEDURE — G0009 ADMIN PNEUMOCOCCAL VACCINE: HCPCS

## 2019-10-18 NOTE — PROGRESS NOTES
Assessment/Plan:    No problem-specific Assessment & Plan notes found for this encounter  Diagnoses and all orders for this visit:    PAD (peripheral artery disease) ([facility] )    Nonrheumatic aortic valve stenosis    Encounter for immunization    Right hand weakness    Other orders  -     Cancel: PNEUMOCOCCAL CONJUGATE VACCINE 13-VALENT GREATER THAN 6 MONTHS          PHQ-9 Depression Screening    PHQ-9:    Frequency of the following problems over the past two weeks:       Little interest or pleasure in doing things:  0 - not at all  Feeling down, depressed, or hopeless:  1 - several days  Trouble falling or staying asleep, or sleeping too much:  0 - not at all  Feeling tired or having little energy:  0 - not at all  Poor appetite or overeatin - not at all  Feeling bad about yourself - or that you are a failure or have let yourself or your family down:  0 - not at all  Trouble concentrating on things, such as reading the newspaper or watching television:  0 - not at all  Moving or speaking so slowly that other people could have noticed  Or the opposite - being so fidgety or restless that you have been moving around a lot more than usual:  0 - not at all  Thoughts that you would be better off dead, or of hurting yourself in some way:  0 - not at all  PHQ-2 Score:  1  PHQ-9 Score:  1            Subjective:      Patient ID: Zohra Taylor is a 68 y o  male      Follow up for PAD and severe aortic stenosis, pt has seen the surgeon, pt is considering a TAVR procedure for the aortic stenosis but he was told that it is risky, pt has extensive pretesting prior to the procedure, pt had a catherter procedure to open his arteries in his legs and pt is doing much better with walking, pt complains of of right hand pain and droping things with the hand which started over a year ago      The following portions of the patient's history were reviewed and updated as appropriate: allergies, current medications, past family history, past medical history, past social history, past surgical history and problem list     Review of Systems   Respiratory: Positive for shortness of breath  Negative for wheezing  Cardiovascular: Negative for chest pain and palpitations  Objective:    /70   Pulse 77   Temp (!) 96 6 °F (35 9 °C) (Tympanic)   Wt 76 5 kg (168 lb 9 6 oz)   SpO2 98%   BMI 27 21 kg/m²      Physical Exam   Constitutional: He is oriented to person, place, and time  He appears well-developed and well-nourished  No distress  HENT:   Head: Normocephalic and atraumatic  Eyes: Pupils are equal, round, and reactive to light  Conjunctivae and EOM are normal  No scleral icterus  Neck: Normal range of motion  Neck supple  Cardiovascular: Normal rate, regular rhythm and normal heart sounds  No murmur heard  Pulmonary/Chest: Effort normal and breath sounds normal  No respiratory distress  He has no wheezes  He has no rales  Abdominal: Soft  Bowel sounds are normal  He exhibits no distension and no mass  There is no tenderness  There is no rebound and no guarding  Musculoskeletal: He exhibits no edema  Lymphadenopathy:     He has no cervical adenopathy  Neurological: He is alert and oriented to person, place, and time  Skin: Skin is warm and dry  He is not diaphoretic  Psychiatric: He has a normal mood and affect  His behavior is normal  Judgment and thought content normal    Nursing note and vitals reviewed      Ortho Exam

## 2019-10-21 ENCOUNTER — OFFICE VISIT (OUTPATIENT)
Dept: VASCULAR SURGERY | Facility: CLINIC | Age: 69
End: 2019-10-21
Payer: MEDICARE

## 2019-10-21 ENCOUNTER — TRANSCRIBE ORDERS (OUTPATIENT)
Dept: LAB | Facility: HOSPITAL | Age: 69
End: 2019-10-21

## 2019-10-21 ENCOUNTER — CLINICAL SUPPORT (OUTPATIENT)
Dept: CARDIAC REHAB | Facility: HOSPITAL | Age: 69
End: 2019-10-21
Payer: MEDICARE

## 2019-10-21 ENCOUNTER — TELEPHONE (OUTPATIENT)
Dept: CARDIOLOGY CLINIC | Facility: CLINIC | Age: 69
End: 2019-10-21

## 2019-10-21 ENCOUNTER — APPOINTMENT (OUTPATIENT)
Dept: LAB | Facility: HOSPITAL | Age: 69
End: 2019-10-21
Payer: MEDICARE

## 2019-10-21 VITALS
BODY MASS INDEX: 27.13 KG/M2 | DIASTOLIC BLOOD PRESSURE: 78 MMHG | HEART RATE: 65 BPM | WEIGHT: 168.8 LBS | HEIGHT: 66 IN | SYSTOLIC BLOOD PRESSURE: 114 MMHG

## 2019-10-21 DIAGNOSIS — I70.218 ATHSCL NATIVE ARTERIES OF EXTRM W INTRMT CLAUD, OTH EXTRM (HCC): ICD-10-CM

## 2019-10-21 DIAGNOSIS — I25.5 ISCHEMIC CARDIOMYOPATHY: ICD-10-CM

## 2019-10-21 DIAGNOSIS — I25.5 ISCHEMIC CARDIOMYOPATHY: Primary | ICD-10-CM

## 2019-10-21 DIAGNOSIS — I73.9 CLAUDICATION IN PERIPHERAL VASCULAR DISEASE (HCC): ICD-10-CM

## 2019-10-21 DIAGNOSIS — I73.9 PAD (PERIPHERAL ARTERY DISEASE) (HCC): Primary | ICD-10-CM

## 2019-10-21 LAB
ALBUMIN SERPL BCP-MCNC: 4.5 G/DL (ref 3.5–5.7)
ALP SERPL-CCNC: 40 U/L (ref 55–165)
ALT SERPL W P-5'-P-CCNC: 17 U/L (ref 7–52)
ANION GAP SERPL CALCULATED.3IONS-SCNC: 6 MMOL/L (ref 4–13)
AST SERPL W P-5'-P-CCNC: 20 U/L (ref 13–39)
BASOPHILS # BLD AUTO: 0 THOUSANDS/ΜL (ref 0–0.1)
BASOPHILS NFR BLD AUTO: 0 % (ref 0–2)
BILIRUB SERPL-MCNC: 0.4 MG/DL (ref 0.2–1)
BUN SERPL-MCNC: 18 MG/DL (ref 7–25)
CALCIUM ALBUM COR SERPL-MCNC: 9.8 MG/DL (ref 8.3–10.1)
CALCIUM SERPL-MCNC: 10.2 MG/DL (ref 8.6–10.5)
CHLORIDE SERPL-SCNC: 100 MMOL/L (ref 98–107)
CO2 SERPL-SCNC: 31 MMOL/L (ref 21–31)
CREAT SERPL-MCNC: 0.91 MG/DL (ref 0.7–1.3)
EOSINOPHIL # BLD AUTO: 0 THOUSAND/ΜL (ref 0–0.61)
EOSINOPHIL NFR BLD AUTO: 0 % (ref 0–5)
ERYTHROCYTE [DISTWIDTH] IN BLOOD BY AUTOMATED COUNT: 13.5 % (ref 11.5–14.5)
GFR SERPL CREATININE-BSD FRML MDRD: 84 ML/MIN/1.73SQ M
GLUCOSE SERPL-MCNC: 117 MG/DL (ref 65–99)
HCT VFR BLD AUTO: 42.1 % (ref 42–47)
HGB BLD-MCNC: 14.3 G/DL (ref 14–18)
LYMPHOCYTES # BLD AUTO: 1.1 THOUSANDS/ΜL (ref 0.6–4.47)
LYMPHOCYTES NFR BLD AUTO: 16 % (ref 21–51)
MCH RBC QN AUTO: 31.5 PG (ref 26–34)
MCHC RBC AUTO-ENTMCNC: 33.9 G/DL (ref 31–37)
MCV RBC AUTO: 93 FL (ref 81–99)
MONOCYTES # BLD AUTO: 0.3 THOUSAND/ΜL (ref 0.17–1.22)
MONOCYTES NFR BLD AUTO: 5 % (ref 2–12)
NEUTROPHILS # BLD AUTO: 5.8 THOUSANDS/ΜL (ref 1.4–6.5)
NEUTS SEG NFR BLD AUTO: 79 % (ref 42–75)
PLATELET # BLD AUTO: 208 THOUSANDS/UL (ref 149–390)
PMV BLD AUTO: 8.2 FL (ref 8.6–11.7)
POTASSIUM SERPL-SCNC: 4.4 MMOL/L (ref 3.5–5.5)
PROT SERPL-MCNC: 7.1 G/DL (ref 6.4–8.9)
RBC # BLD AUTO: 4.53 MILLION/UL (ref 4.3–5.9)
SODIUM SERPL-SCNC: 137 MMOL/L (ref 134–143)
WBC # BLD AUTO: 7.3 THOUSAND/UL (ref 4.8–10.8)

## 2019-10-21 PROCEDURE — 99213 OFFICE O/P EST LOW 20 MIN: CPT | Performed by: SURGERY

## 2019-10-21 PROCEDURE — 93668 PERIPHERAL VASCULAR REHAB: CPT

## 2019-10-21 PROCEDURE — 85025 COMPLETE CBC W/AUTO DIFF WBC: CPT

## 2019-10-21 PROCEDURE — 36415 COLL VENOUS BLD VENIPUNCTURE: CPT

## 2019-10-21 PROCEDURE — 80053 COMPREHEN METABOLIC PANEL: CPT

## 2019-10-21 NOTE — ASSESSMENT & PLAN NOTE
Status post successful bilateral common femoral artery atherectomy with subsequent angioplasty    Patient to continue in surveillance program   Encourage patient to participate in a supervised walking exercise program

## 2019-10-21 NOTE — PROGRESS NOTES
Cardiac/PAD Rehabilitation Plan of Care   Care Plan       Today's date: 10/21/2019   Visits: Initial Visit  Patient name: Flor Aparicio      : 1950  Age: 68 y o  MRN: [id number]  Referring Physician: Amanda Saucedo MD  Cardiologist: Dr Jairon Dumont  Provider: Juan Fox  Clinician: ELO Rivas    Dx: PAD  Date of onset: 2019      SUMMARY OF PROGRESS:  Patient seen to day for initial evaluation  Patient performed a Sub Max TM ETT w/correct hemodynamic responses to activity  Patient's resting vitals were HR 80 and /62  His peak vitals were  and /76  Vitals upon recovery were HR 89 and /56  Patient rates his test a 5 on the 1-10 RPE Scale  Patient noted 1/10 Angina  Patient denied any SOB  Patient rated his claudication as 3/5  Patient performed the test on room air and was able to maintain his 02 saturation at 96% or greater  He was able to attain a 3 1 MET Level  Patient is an excellent rehabilitation candidate due to high prior level of function and willingness to progress  Patient's program will be progressed as he is able to tolerate  Patient will receive education specific to his disease process        Medication compliance: Yes   Comments: Patient admits to medication compliance  Fall Risk: Low   Comments: Patient exhibits good dynamic standing balance and 3+/5 BLE strength    EKG changes: Telemetry reveals NSR      EXERCISE ASSESSMENT and PLAN    Current Exercise Program in Rehab:       Frequency: 3 days/week        Minutes: 30-35         METS: 2 to 2 5            HR: 20-30 > RHR  100-110   RPE: 4-6         Modalities: Treadmill, Lifecycle, NuStep and Recumbent bike      Exercise Progression 30 Day Goals :    Frequency: 5 days/week   Minutes: 35-40   METS: 2 5 to 3   HR: 100-110   RPE: 4-6   Modalities: Treadmill, Airdyne bike, Lifecycle and NuStep    Strength trainin-3 days / week  12-15 repetitions  1-2 sets per modality    Modalities: Leg Press    Progressing: In Progress    Home Exercise: None    Goals: 10% improvement in functional capacity, improved DASI score by 10%, Increase in peak CR METs by 40%, Resume ADLs with increased strength and Exercise 5 days/wk, >150mins/wk  Education: Benefit of exercise for CAD risk factors, home exercise guidelines, signs and sxs, RPE scale, class: Risk Factors for Heart Disease and Exercise instructions/guidelines for discharge    Plan:education on home exercise guidelines, home exercise 30+ mins 2 days opposite CR and Education class: Risk Factors for Heart Disease  Readiness to change: Preparation      NUTRITION ASSESSMENT AND PLAN    Weight control:    Starting weight: 188   Current weight:   188  Waist circumference:   Startin   Current:  42  Diabetes: N/A  Lipid management: Discussed diet and lipid management  Goals:LDL <100, HDL >40, TRG <150 and CHOL <200  Education: heart healthy eating  low sodium diet  hydration  diet and lipid management  wt  loss  healthy choices while dining out  portion control  class: Heart Healthy Eating  class:  Label Reading  Progressing: In Progress  Plan: Education class: Reading Food Labels, Education Class: Heart Healthy Eating, Increase PUFA and MUFA, Decrease SFA, Increase whole grains and increase fruits/vegs  Readiness to change: Preparation      PSYCHOSOCIAL ASSESSMENT AND PLAN    Emotional:  PHQ-9 = 5-9 = Mild Depression  Self-reported stress level: 8   Social support: Very Good  Goals:  Reduce perceived stress to 1-3/10, improved [facility] QOL < 27, continue medical therapy, Feelings in [facility] Score < 3, Physical Fitness in [facility] Score < 3, Overall Health in [facility] Score < 3, Quality of Life in [facility] Score < 3 , Change in Health in [place] Score < 3 , Increased interest in doing things, improved sleep, improved positive thoughts of well being and increased energy  Education: signs/sxs of depression, benefits of positive support system, coping mechanisms, depression and CAD, class:  Stress and Your Health  and class:  Relaxation  Progressing: In Progress  Plan: Class: Stress and Your Health, Class: Relaxation and Practice relaxation techniques  Readiness to change: Preparation      OTHER CORE COMPONENTS     Tobacco:   Social History     Tobacco Use   Smoking Status Former Smoker    Packs/day: 2 00    Years: 20 00    Pack years: 40 00    Types: Cigarettes    Last attempt to quit: Elvia Contreras Years since quittin 8   Smokeless Tobacco Never Used       Tobacco Use Intervention: Referral to tobacco expert:   N/A    Blood pressure:    Restin/62   Exercise: 148/76   Recovery: 122/56    Goals: consistent BP < 130/80, reduced dietary sodium <2300mg, consistent exercise >150 mins/wk and medication compliance  Education:  understanding HTN and CAD, low sodium diet and HTN, Education class:  Common Heart Medications and Education class: Understanding Heart Disease  Progressing: In Progress  Plan: Class: Understanding Heart Disease and Class: Common Heart Medications  Readiness to change: Preparation       PAD REHAB ASSESSMENT    Today's date: 2019  Patient name: Zohra Taylor     : 1950       MRN: [id number]  PCP: Grace Cooper DO  Referring Physician: Ruben Luna MD  Cardiologist: Dr Mireya Bernabe    Date of onset: 8/15/2019  Last Foot Exam: 10/21/2019  Cultural needs: None    Height:    Wt Readings from Last 1 Encounters:   10/21/19 76 6 kg (168 lb 12 8 oz)      Weight:   Ht Readings from Last 1 Encounters:   10/21/19 5' 6" (1 676 m)     Medical History:   Past Medical History:   Diagnosis Date    Anxiety     Benign essential hypertension     Coronary artery disease     Depression     Diffuse arthralgia     Last Assessed: 2017    Former tobacco use     History of alcohol use     3-6 beers/day x20 years, no residual liver disease    History of Lyme disease     History of rheumatic fever     Hypercholesterolemia     Hyperlipidemia     Hyperlipidemia     Ischemic cardiomyopathy     Osteoarthritis, hip, bilateral     PAD (peripheral artery disease) (HCC)     s/p fem-below the knee bypass & percutaneous tx right CFA/SFA & left external iliac/CFA    Polymyalgia rheumatica (HCC)     on chronic steroids    RBBB     Rotator cuff disorder     b/l    Severe aortic stenosis     Wears hearing aid     b/l       Patient Reported Daily Claudication Pain: 3/5  Physical Limitations: None    Risk Factors   Cholesterol: Yes  Smoking: Yes - quit in the 70's  HTN: Yes  DM: No  Obesity: No   Inactivity: No      Family History:  Family History   Problem Relation Age of Onset    Cancer Mother         cervix    Coronary artery disease Father     Heart attack Father     Cancer Brother     Coronary artery disease Maternal Grandfather     Heart disease Paternal Grandfather     Stroke Paternal Grandfather         CVA    Heart attack Paternal Grandfather     Coronary artery disease Other          at age 78 per [employer]       Allergies: Atorvastatin; Avelox [moxifloxacin];  Pravastatin; Rosuvastatin calcium; and Simvastatin    ETOH:   Social History     Substance and Sexual Activity   Alcohol Use Not Currently    Frequency: Never    Comment: 3-6 cans beer x20 years, quit , no residual liver issues         Current Medications:   Current Outpatient Medications   Medication Sig Dispense Refill    amLODIPine (NORVASC) 10 mg tablet Take 1 tablet (10 mg total) by mouth daily 90 tablet 2    aspirin (ECOTRIN LOW STRENGTH) 81 mg EC tablet Take 81 mg by mouth daily      Cholecalciferol (VITAMIN D3) 2000 units TABS Take 2,000 Units by mouth daily      clopidogrel (PLAVIX) 75 mg tablet Take 1 tablet (75 mg total) by mouth daily 180 tablet 3    cyanocobalamin (VITAMIN B-12) 500 mcg tablet Take 500 mcg by mouth daily      isosorbide mononitrate (IMDUR) 30 mg 24 hr tablet Take 1 tablet (30 mg total) by mouth daily 90 tablet 3    Multiple Vitamin (MULTIVITAMIN) tablet Take 1 tablet by mouth daily      predniSONE 5 mg tablet Take 1 tablet (5 mg total) by mouth daily 30 tablet 2    ranolazine (RANEXA) 500 mg 12 hr tablet take 1 tablet by mouth twice a day 180 tablet 3    rosuvastatin (CRESTOR) 5 mg tablet Take 1 tablet (5 mg total) by mouth daily 90 tablet 3    triamterene-hydrochlorothiazide (DYAZIDE) 37 5-25 mg per capsule take 1 capsule by mouth once daily 90 capsule 1     No current facility-administered medications for this visit  Current Functional Status  Occupation: retired  Recreation: Activities w/friends and family  ADLs:No limitations  [place]: No limitations  Exercise: No formal HEP  Other: Patient is the primary caregiver for his wife who is disabeled    Short Term Program Goals: dietary modifications increased strength improved energy/stamina with ADLs exercise 120-150 mins/wk    Long Term Goals: intial claudication time extended  increased maximal walking duration  increased intial training workload  Improved [facility] Activity Status score  Improved functional capacity  Improved Quality of Life - [facility] score reduced  Improved lipid profile  Reduced stress  improved Rate Your Plate Score    Ability to reach goals/rehabilitation potential:  Excellent  Projected return to function: 12 weeks  Objective tests: 6 MWT      Nutritional   Reviewed details of Rate your Plate  Discussed key elements of heart healthy eating  Reviewed patient goals for dietary modifications and their clinical implications  Reviewed most recent lipid profile       Goals for dietary modification: choose lean cuts of meat  poultry without the skin  low fat ground meat and poultry  eliminate processed meats  increase fish intake  more meatless meals  reduced fat cheese  increase whole grains  increase fruits and vegetables  eliminate butter  low sodium  improved snack choices  more nuts/seeds    Emotional/Social  Patient reports he/she is coping well with good social support and denies depression or anxiety    SOCIAL SUPPORT NETWORK     Marital status:      Rate 1-5:     Marriage: 5    Family: 5    Financial: 4    Relationships: 4    Spirituality: 4    Intellectual: 5    Perceived Stress: 7/7   Stressors:Health, Finance and Ability to care for his wife   Goals for Stress Management: decrease stress level to 5/10 or less    Domestic Violence Screening: No    Comments: Patient present w/weaknes, debility and claudication in BLEs  Patient very motivated to progress  Patient requires skilled PAD interventions in order to attain his maximal level of function and remain safe/independent in his own home  It is important to note that sonja is the primary caregiver for his disabled wife        Norman Members 1950      Risk: LOW/MOD/HIGH High       Pre Post % change  Goal   Date: 10/21/2019      Physical       Sub Max ETT (mets) 3 1  -100 0% 10% increase   6MWT (feet) NA  #VALUE! 10% increase   UCSD Dyspnea Score NA  #VALUE! 5 pt decrease   Supplemental O2 use (L) 0      [facility] AI (est  peak O2) 14 2  -100 0%    COPD assessment Test (CAT) NA   2 pt decrease   Peak exercise CR/ MD (mets) 3 1  -100 0% 40% increase   Emotional       PHQ 9  (> 5 refer to MD) 5  -100 0% 4 pt decrease   STEFANY 7 (> 8 refer to MD) 11      [facility] lower score = improvement     Total  31  -100 0% < 27   Feelings 3  -100 0% < 3   Physical fitness 4  -100 0% < 3   Social Support 4  0 0% < 3   Daily activities 3  -100 0% < 3   Social Activities 3  -100 0% < 3   Pain 4  -100 0% < 3   Overall Health 4  -100 0% < 3   Quality of Life 4  -100 0% < 3   Change in health 2  -100 0% < 3   Dietary       Rate your plate 44  -490 5% > 58   Measurements       Weight 169  -100 0% 2 5-5%    BMI 26 78  -100 0% 19-25   Waist Circ  42  -100 0% < 40 M / < 35 F    BP left arm     (systolic) 190  -683 9% < 152                              (diastolic) 62  -599 9% < 90   Smoking #/day (if applicable) 0  #DIV/0! 0 Lipids/ Glucose (Date) 5/20/2019  -1    Total cholesterol 173  -100 0%    Triglycerides 104  -100 0% < 150   HDL 56  -100 0% 40-60   LDL 96  -100 0% < 100   A1C % Na  #VALUE! 4 0 - 5 6%   Fasting BG NA  #VALUE!           Physician signature: _______________________ _________________     Date:

## 2019-10-21 NOTE — TELEPHONE ENCOUNTER
Pt is scheduled on 10/28/19 for a Right and Left Heart Cath with possible TAVR at [facility] with Dr Alec Espinal  Pt aware of instructions  Pt has Medicare as the primary

## 2019-10-21 NOTE — PROGRESS NOTES
Assessment/Plan:    PAD (peripheral artery disease) ([facility])  Status post successful bilateral common femoral artery atherectomy with subsequent angioplasty  Patient to continue in surveillance program   Encourage patient to participate in a supervised walking exercise program     Claudication in peripheral vascular disease ([facility] [place] [facility] )  Status post successful bilateral common femoral artery atherectomy with subsequent angioplasty  Patient to continue in surveillance program   Encourage patient to participate in a supervised walking exercise program        Diagnoses and all orders for this visit:    PAD (peripheral artery disease) ([facility] [place] [facility] )  -     Cancel: VAS lower limb arterial duplex, complete bilateral; Future  -     VAS lower limb arterial duplex, complete bilateral; Future    Claudication in peripheral vascular disease ([facility])  -     Cancel: VAS lower limb arterial duplex, complete bilateral; Future  -     VAS lower limb arterial duplex, complete bilateral; Future          Subjective:      Patient ID: Tricia Tuttle is a 68 y o  male  Pt is here to REVIEW LLE AGRAM from 09/17/19 done by Dr Garfield Alegria  Pt claducation pain has improved since the Agram   Pt denies having any numbness, tingling, atherectomy with subsequent angioplasty  discoloration  Pt denies having any open wounds  Pt currently takes ASA 81 mg, Plavix, Rosuvastatin  Patient has now undergone bilateral common femoral artery atherectomy with subsequent angioplasty  The bilateral femoral to popliteal artery bypass grafts remain patent  Patient is claudication has essentially resolved  Patient had profound calcification within the common femoral arteries bilaterally therefore he will follow with surveillance imaging  Patient is scheduled to undergo TAVR in approximately 3-4 weeks        The following portions of the patient's history were reviewed and updated as appropriate: allergies, current medications, past family history, past medical history, past social history, past surgical history and problem list     Review of Systems   Constitutional: Negative  HENT: Negative  Eyes: Negative  Respiratory: Negative  Cardiovascular: Negative  Gastrointestinal: Negative  Endocrine: Negative  Genitourinary: Negative  Musculoskeletal: Negative  Skin: Negative  Allergic/Immunologic: Negative  Neurological: Negative  Hematological: Negative  Psychiatric/Behavioral: Negative  Objective:      /78 (BP Location: Left arm, Patient Position: Sitting)   Pulse 65   Ht 5' 6" (1 676 m)   Wt 76 6 kg (168 lb 12 8 oz)   BMI 27 25 kg/m²          Physical Exam   Constitutional: He is oriented to person, place, and time  He appears well-developed and well-nourished  HENT:   Head: Normocephalic and atraumatic  Mouth/Throat: Oropharynx is clear and moist    Eyes: Pupils are equal, round, and reactive to light  Conjunctivae and EOM are normal    Neck: Normal range of motion  Neck supple  No JVD present  Cardiovascular: Normal rate, regular rhythm and normal heart sounds  Palpable common femoral artery pulses bilaterally  Palpable graft pulses bilaterally  Doppler signals at the DP/PT/P bilaterally  Pulmonary/Chest: Effort normal and breath sounds normal  No stridor  No respiratory distress  He has no wheezes  He has no rales  He exhibits no tenderness  Abdominal: Soft  He exhibits no distension and no mass  There is no tenderness  There is no rebound and no guarding  Musculoskeletal: Normal range of motion  He exhibits no tenderness or deformity  Neurological: He is alert and oriented to person, place, and time  Skin: Skin is warm and dry  No rash noted  No erythema  Psychiatric: He has a normal mood and affect  His behavior is normal  Thought content normal    Nursing note and vitals reviewed

## 2019-10-21 NOTE — LETTER
October 21, 2019     Yeison Hickman DO  [facility] [address]    Patient: Reina Mesa   YOB: 1950   Date of Visit: 10/21/2019       Dear Dr Jordan Rogers:    Thank you for referring Kirill Pendleton to me for evaluation  Below are the relevant portions of my assessment and plan of care  Pt is here to REVIEW LLE AGRAM from 09/17/19 done by Dr Ferdinand Quiles  Pt claducation pain has improved since the Agram   Pt denies having any numbness, tingling, atherectomy with subsequent angioplasty  discoloration  Pt denies having any open wounds  Pt currently takes ASA 81 mg, Plavix, Rosuvastatin  Patient has now undergone bilateral common femoral artery atherectomy with subsequent angioplasty  The bilateral femoral to popliteal artery bypass grafts remain patent  Patient is claudication has essentially resolved  Patient had profound calcification within the common femoral arteries bilaterally therefore he will follow with surveillance imaging  Patient is scheduled to undergo TAVR in approximately 3-4 weeks  Assessment/Plan:    PAD (peripheral artery disease) (HCC)  Status post successful bilateral common femoral artery atherectomy with subsequent angioplasty  Patient to continue in surveillance program   Encourage patient to participate in a supervised walking exercise program     Claudication in peripheral vascular disease ([address] )  Status post successful bilateral common femoral artery atherectomy with subsequent angioplasty  Patient to continue in surveillance program   Encourage patient to participate in a supervised walking exercise program     If you have questions, please do not hesitate to call me  I look forward to following [place] Shows along with you             Sincerely,        Marcela Abreu MD        CC: DO Gio Portillo, MD Mae Zelaya PA-C

## 2019-10-23 ENCOUNTER — CLINICAL SUPPORT (OUTPATIENT)
Dept: PULMONOLOGY | Facility: HOSPITAL | Age: 69
End: 2019-10-23
Payer: MEDICARE

## 2019-10-23 DIAGNOSIS — I70.218 ATHSCL NATIVE ARTERIES OF EXTRM W INTRMT CLAUD, OTH EXTRM (HCC): ICD-10-CM

## 2019-10-23 PROCEDURE — 93668 PERIPHERAL VASCULAR REHAB: CPT

## 2019-10-24 ENCOUNTER — HOSPITAL ENCOUNTER (OUTPATIENT)
Dept: RADIOLOGY | Facility: HOSPITAL | Age: 69
Discharge: HOME | End: 2019-10-24
Payer: MEDICARE

## 2019-10-24 ENCOUNTER — HOSPITAL ENCOUNTER (OUTPATIENT)
Dept: PULMONOLOGY | Facility: HOSPITAL | Age: 69
Discharge: HOME | End: 2019-10-24
Payer: MEDICARE

## 2019-10-24 ENCOUNTER — HOSPITAL ENCOUNTER (OUTPATIENT)
Dept: NON INVASIVE DIAGNOSTICS | Facility: HOSPITAL | Age: 69
Discharge: HOME | End: 2019-10-24
Payer: MEDICARE

## 2019-10-24 DIAGNOSIS — R06.02 SHORTNESS OF BREATH: ICD-10-CM

## 2019-10-24 DIAGNOSIS — I35.0 NONRHEUMATIC AORTIC VALVE STENOSIS: ICD-10-CM

## 2019-10-24 DIAGNOSIS — I73.9 PAD (PERIPHERAL ARTERY DISEASE) (HCC): ICD-10-CM

## 2019-10-24 DIAGNOSIS — Z87.891 FORMER TOBACCO USE: ICD-10-CM

## 2019-10-24 DIAGNOSIS — I35.0 NONRHEUMATIC AORTIC VALVE STENOSIS: Chronic | ICD-10-CM

## 2019-10-24 DIAGNOSIS — Z01.818 PRE-OP EVALUATION: ICD-10-CM

## 2019-10-24 LAB
ARTERIAL PATENCY WRIST A: ABNORMAL
BASE EXCESS BLDA CALC-SCNC: 1 MMOL/L (ref -2–3)
CA-I BLD-SCNC: 1.27 MMOL/L (ref 1.12–1.32)
FIO2 GAS DIL.REBREATH: 21 L
GLUCOSE SERPL-MCNC: 122 MG/DL (ref 65–140)
HCO3 BLDA-SCNC: 25.9 MMOL/L (ref 22–28)
HCT VFR BLD CALC: 40 % (ref 36.5–49.3)
HGB BLDA-MCNC: 13.6 G/DL (ref 12–17)
PCO2 BLD: 27 MMOL/L (ref 21–32)
PCO2 BLD: 39.7 MM HG (ref 36–44)
PH BLD: 7.42 [PH] (ref 7.35–7.45)
PO2 BLD: 95 MM HG (ref 75–129)
POTASSIUM BLD-SCNC: 3.7 MMOL/L (ref 3.5–5.3)
SAMPLE SITE: ABNORMAL
SAO2 % BLD FROM PO2: 98 % (ref 60–85)
SODIUM BLD-SCNC: 138 MMOL/L (ref 136–145)
SPECIMEN SOURCE: ABNORMAL

## 2019-10-24 PROCEDURE — 75572 CT HRT W/3D IMAGE: CPT

## 2019-10-24 PROCEDURE — 94060 EVALUATION OF WHEEZING: CPT | Performed by: INTERNAL MEDICINE

## 2019-10-24 PROCEDURE — 94726 PLETHYSMOGRAPHY LUNG VOLUMES: CPT

## 2019-10-24 PROCEDURE — 36600 WITHDRAWAL OF ARTERIAL BLOOD: CPT

## 2019-10-24 PROCEDURE — 85014 HEMATOCRIT: CPT

## 2019-10-24 PROCEDURE — 82330 ASSAY OF CALCIUM: CPT

## 2019-10-24 PROCEDURE — 94060 EVALUATION OF WHEEZING: CPT

## 2019-10-24 PROCEDURE — 84295 ASSAY OF SERUM SODIUM: CPT

## 2019-10-24 PROCEDURE — 94726 PLETHYSMOGRAPHY LUNG VOLUMES: CPT | Performed by: INTERNAL MEDICINE

## 2019-10-24 PROCEDURE — 82803 BLOOD GASES ANY COMBINATION: CPT

## 2019-10-24 PROCEDURE — 93880 EXTRACRANIAL BILAT STUDY: CPT

## 2019-10-24 PROCEDURE — 84132 ASSAY OF SERUM POTASSIUM: CPT

## 2019-10-24 PROCEDURE — 94729 DIFFUSING CAPACITY: CPT | Performed by: INTERNAL MEDICINE

## 2019-10-24 PROCEDURE — 94729 DIFFUSING CAPACITY: CPT

## 2019-10-24 PROCEDURE — 93880 EXTRACRANIAL BILAT STUDY: CPT | Performed by: SURGERY

## 2019-10-24 PROCEDURE — 82947 ASSAY GLUCOSE BLOOD QUANT: CPT

## 2019-10-24 PROCEDURE — 74174 CTA ABD&PLVS W/CONTRAST: CPT

## 2019-10-24 RX ORDER — ALBUTEROL SULFATE 2.5 MG/3ML
2.5 SOLUTION RESPIRATORY (INHALATION) ONCE
Status: COMPLETED | OUTPATIENT
Start: 2019-10-24 | End: 2019-10-24

## 2019-10-24 RX ADMIN — IODIXANOL 120 ML: 320 INJECTION, SOLUTION INTRAVASCULAR at 08:34

## 2019-10-24 RX ADMIN — ALBUTEROL SULFATE 2.5 MG: 2.5 SOLUTION RESPIRATORY (INHALATION) at 07:42

## 2019-10-25 ENCOUNTER — CLINICAL SUPPORT (OUTPATIENT)
Dept: PULMONOLOGY | Facility: HOSPITAL | Age: 69
End: 2019-10-25
Payer: MEDICARE

## 2019-10-25 DIAGNOSIS — I70.218 ATHSCL NATIVE ARTERIES OF EXTRM W INTRMT CLAUD, OTH EXTRM (HCC): ICD-10-CM

## 2019-10-25 PROCEDURE — 93668 PERIPHERAL VASCULAR REHAB: CPT

## 2019-10-25 RX ORDER — SODIUM CHLORIDE 9 MG/ML
50 INJECTION, SOLUTION INTRAVENOUS CONTINUOUS
Status: CANCELLED | OUTPATIENT
Start: 2019-10-25

## 2019-10-25 RX ORDER — ASPIRIN 81 MG/1
324 TABLET, CHEWABLE ORAL ONCE
Status: CANCELLED | OUTPATIENT
Start: 2019-10-25 | End: 2019-10-25

## 2019-10-26 DIAGNOSIS — I10 ESSENTIAL HYPERTENSION: ICD-10-CM

## 2019-10-27 ENCOUNTER — TELEPHONE (OUTPATIENT)
Dept: INPATIENT UNIT | Facility: HOSPITAL | Age: 69
End: 2019-10-27

## 2019-10-28 ENCOUNTER — APPOINTMENT (OUTPATIENT)
Dept: PULMONOLOGY | Facility: HOSPITAL | Age: 69
End: 2019-10-28
Payer: MEDICARE

## 2019-10-28 ENCOUNTER — HOSPITAL ENCOUNTER (OUTPATIENT)
Dept: NON INVASIVE DIAGNOSTICS | Facility: HOSPITAL | Age: 69
Discharge: HOME | End: 2019-10-28
Attending: INTERNAL MEDICINE | Admitting: INTERNAL MEDICINE
Payer: MEDICARE

## 2019-10-28 VITALS
OXYGEN SATURATION: 98 % | TEMPERATURE: 97.1 F | HEART RATE: 65 BPM | RESPIRATION RATE: 16 BRPM | WEIGHT: 168 LBS | HEIGHT: 66 IN | BODY MASS INDEX: 27 KG/M2 | SYSTOLIC BLOOD PRESSURE: 111 MMHG | DIASTOLIC BLOOD PRESSURE: 58 MMHG

## 2019-10-28 DIAGNOSIS — I25.118 CORONARY ARTERY DISEASE OF NATIVE ARTERY OF NATIVE HEART WITH STABLE ANGINA PECTORIS (HCC): ICD-10-CM

## 2019-10-28 DIAGNOSIS — I35.0 NONRHEUMATIC AORTIC VALVE STENOSIS: Chronic | ICD-10-CM

## 2019-10-28 LAB
ATRIAL RATE: 74 BPM
P AXIS: 70 DEGREES
PR INTERVAL: 180 MS
QRS AXIS: -9 DEGREES
QRSD INTERVAL: 154 MS
QT INTERVAL: 436 MS
QTC INTERVAL: 483 MS
T WAVE AXIS: 89 DEGREES
VENTRICULAR RATE: 74 BPM

## 2019-10-28 PROCEDURE — 93005 ELECTROCARDIOGRAM TRACING: CPT

## 2019-10-28 PROCEDURE — C1769 GUIDE WIRE: HCPCS | Performed by: PHYSICIAN ASSISTANT

## 2019-10-28 PROCEDURE — 99153 MOD SED SAME PHYS/QHP EA: CPT | Performed by: PHYSICIAN ASSISTANT

## 2019-10-28 PROCEDURE — 1124F ACP DISCUSS-NO DSCNMKR DOCD: CPT | Performed by: INTERNAL MEDICINE

## 2019-10-28 PROCEDURE — 93454 CORONARY ARTERY ANGIO S&I: CPT | Performed by: INTERNAL MEDICINE

## 2019-10-28 PROCEDURE — 93010 ELECTROCARDIOGRAM REPORT: CPT | Performed by: INTERNAL MEDICINE

## 2019-10-28 PROCEDURE — 99152 MOD SED SAME PHYS/QHP 5/>YRS: CPT | Performed by: INTERNAL MEDICINE

## 2019-10-28 PROCEDURE — 99152 MOD SED SAME PHYS/QHP 5/>YRS: CPT | Performed by: PHYSICIAN ASSISTANT

## 2019-10-28 PROCEDURE — 93454 CORONARY ARTERY ANGIO S&I: CPT | Performed by: PHYSICIAN ASSISTANT

## 2019-10-28 PROCEDURE — C1894 INTRO/SHEATH, NON-LASER: HCPCS | Performed by: PHYSICIAN ASSISTANT

## 2019-10-28 RX ORDER — ONDANSETRON 2 MG/ML
4 INJECTION INTRAMUSCULAR; INTRAVENOUS EVERY 8 HOURS PRN
Status: DISCONTINUED | OUTPATIENT
Start: 2019-10-28 | End: 2019-10-28 | Stop reason: HOSPADM

## 2019-10-28 RX ORDER — LIDOCAINE HYDROCHLORIDE 10 MG/ML
INJECTION, SOLUTION EPIDURAL; INFILTRATION; INTRACAUDAL; PERINEURAL CODE/TRAUMA/SEDATION MEDICATION
Status: COMPLETED | OUTPATIENT
Start: 2019-10-28 | End: 2019-10-28

## 2019-10-28 RX ORDER — ASPIRIN 81 MG/1
324 TABLET, CHEWABLE ORAL ONCE
Status: COMPLETED | OUTPATIENT
Start: 2019-10-28 | End: 2019-10-28

## 2019-10-28 RX ORDER — RANOLAZINE 500 MG/1
500 TABLET, EXTENDED RELEASE ORAL EVERY 12 HOURS SCHEDULED
Status: DISCONTINUED | OUTPATIENT
Start: 2019-10-28 | End: 2019-10-28 | Stop reason: HOSPADM

## 2019-10-28 RX ORDER — AMLODIPINE BESYLATE 10 MG/1
10 TABLET ORAL DAILY
Status: DISCONTINUED | OUTPATIENT
Start: 2019-10-29 | End: 2019-10-28 | Stop reason: HOSPADM

## 2019-10-28 RX ORDER — SODIUM CHLORIDE 9 MG/ML
75 INJECTION, SOLUTION INTRAVENOUS CONTINUOUS
Status: DISCONTINUED | OUTPATIENT
Start: 2019-10-28 | End: 2019-10-28 | Stop reason: HOSPADM

## 2019-10-28 RX ORDER — SODIUM CHLORIDE 9 MG/ML
50 INJECTION, SOLUTION INTRAVENOUS CONTINUOUS
Status: DISCONTINUED | OUTPATIENT
Start: 2019-10-28 | End: 2019-10-28 | Stop reason: HOSPADM

## 2019-10-28 RX ORDER — MIDAZOLAM HYDROCHLORIDE 1 MG/ML
INJECTION INTRAMUSCULAR; INTRAVENOUS CODE/TRAUMA/SEDATION MEDICATION
Status: COMPLETED | OUTPATIENT
Start: 2019-10-28 | End: 2019-10-28

## 2019-10-28 RX ORDER — PREDNISONE 1 MG/1
5 TABLET ORAL DAILY
Status: DISCONTINUED | OUTPATIENT
Start: 2019-10-28 | End: 2019-10-28 | Stop reason: HOSPADM

## 2019-10-28 RX ORDER — NITROGLYCERIN 20 MG/100ML
INJECTION INTRAVENOUS CODE/TRAUMA/SEDATION MEDICATION
Status: COMPLETED | OUTPATIENT
Start: 2019-10-28 | End: 2019-10-28

## 2019-10-28 RX ORDER — ACETAMINOPHEN 325 MG/1
650 TABLET ORAL EVERY 6 HOURS PRN
Status: DISCONTINUED | OUTPATIENT
Start: 2019-10-28 | End: 2019-10-28 | Stop reason: HOSPADM

## 2019-10-28 RX ORDER — FENTANYL CITRATE 50 UG/ML
INJECTION, SOLUTION INTRAMUSCULAR; INTRAVENOUS CODE/TRAUMA/SEDATION MEDICATION
Status: COMPLETED | OUTPATIENT
Start: 2019-10-28 | End: 2019-10-28

## 2019-10-28 RX ORDER — CLOPIDOGREL BISULFATE 75 MG/1
75 TABLET ORAL DAILY
Status: DISCONTINUED | OUTPATIENT
Start: 2019-10-28 | End: 2019-10-28 | Stop reason: HOSPADM

## 2019-10-28 RX ORDER — ASPIRIN 81 MG/1
81 TABLET ORAL DAILY
Status: DISCONTINUED | OUTPATIENT
Start: 2019-10-29 | End: 2019-10-28 | Stop reason: HOSPADM

## 2019-10-28 RX ORDER — MELATONIN
2000 DAILY
Status: DISCONTINUED | OUTPATIENT
Start: 2019-10-28 | End: 2019-10-28 | Stop reason: HOSPADM

## 2019-10-28 RX ORDER — ISOSORBIDE MONONITRATE 30 MG/1
30 TABLET, EXTENDED RELEASE ORAL DAILY
Status: DISCONTINUED | OUTPATIENT
Start: 2019-10-28 | End: 2019-10-28 | Stop reason: HOSPADM

## 2019-10-28 RX ORDER — VERAPAMIL HYDROCHLORIDE 2.5 MG/ML
INJECTION, SOLUTION INTRAVENOUS CODE/TRAUMA/SEDATION MEDICATION
Status: COMPLETED | OUTPATIENT
Start: 2019-10-28 | End: 2019-10-28

## 2019-10-28 RX ORDER — HEPARIN SODIUM 1000 [USP'U]/ML
INJECTION, SOLUTION INTRAVENOUS; SUBCUTANEOUS CODE/TRAUMA/SEDATION MEDICATION
Status: COMPLETED | OUTPATIENT
Start: 2019-10-28 | End: 2019-10-28

## 2019-10-28 RX ADMIN — MIDAZOLAM 2 MG: 1 INJECTION INTRAMUSCULAR; INTRAVENOUS at 07:32

## 2019-10-28 RX ADMIN — ASPIRIN 81 MG 324 MG: 81 TABLET ORAL at 07:11

## 2019-10-28 RX ADMIN — FENTANYL CITRATE 50 MCG: 50 INJECTION, SOLUTION INTRAMUSCULAR; INTRAVENOUS at 07:39

## 2019-10-28 RX ADMIN — VERAPAMIL HYDROCHLORIDE 2.5 MG: 2.5 INJECTION INTRAVENOUS at 07:38

## 2019-10-28 RX ADMIN — LIDOCAINE HYDROCHLORIDE 1 ML: 10 INJECTION, SOLUTION EPIDURAL; INFILTRATION; INTRACAUDAL; PERINEURAL at 07:33

## 2019-10-28 RX ADMIN — HEPARIN SODIUM 4000 UNITS: 1000 INJECTION INTRAVENOUS; SUBCUTANEOUS at 07:38

## 2019-10-28 RX ADMIN — SODIUM CHLORIDE 50 ML/HR: 0.9 INJECTION, SOLUTION INTRAVENOUS at 07:11

## 2019-10-28 RX ADMIN — IOHEXOL 55 ML: 350 INJECTION, SOLUTION INTRAVENOUS at 07:49

## 2019-10-28 RX ADMIN — FENTANYL CITRATE 50 MCG: 50 INJECTION, SOLUTION INTRAMUSCULAR; INTRAVENOUS at 07:32

## 2019-10-28 RX ADMIN — NITROGLYCERIN 200 MCG: 20 INJECTION INTRAVENOUS at 07:37

## 2019-10-28 RX ADMIN — MIDAZOLAM 1 MG: 1 INJECTION INTRAMUSCULAR; INTRAVENOUS at 07:40

## 2019-10-28 NOTE — DISCHARGE INSTRUCTIONS
1  Please see the post cardiac catheterization dishcarge instructions  No heavy lifting, greater than 10 lbs  or strenuous  activity for 48 hrs  2 Remove band aid tomorrow  Shower and wash area- wrist gently with soap and water- beginning tomorrow  Rinse and pat dry  Apply new water seal band aid  Repeat this process for 5 days  No powders, creams lotions or antibiotic ointments  for 5 days  No tub baths, hot tubs or swimming for 5 days  3  Please call our office ([phone]) if you have any fever, redness, swelling, discharge from your wrist access site  4 No driving for 1 day    Left Heart Catheterization   WHAT YOU NEED TO KNOW:   A left heart catheterization is a procedure to look at your heart and its arteries  You may need this procedure if you have chest pain, heart disease, or your heart is not working as it should  WHILE YOU ARE HERE:   Before your procedure:   · Informed consent  is a legal document that explains the tests, treatments, or procedures that you may need  Informed consent means you understand what will be done and can make decisions about what you want  You give your permission when you sign the consent form  You can have someone sign this form for you if you are not able to sign it  You have the right to understand your medical care in words you know  Before you sign the consent form, understand the risks and benefits of what will be done  Make sure all your questions are answered  · An IV  is a small tube placed in your vein that is used to give you medicine or liquids  · Medicines:      ¨ Antihistamines  help prevent a reaction to the dye used during your heart catheterization  ¨ A sedative  is given to help you stay calm and relaxed  ¨ Steroids  decrease inflammation and help prevent a reaction to the contrast dye  · Local anesthesia  is a shot of medicine put into your arm or leg  It is used to numb the area and dull the pain   You may still feel pressure or pushing during the procedure  During your procedure:   · Your healthcare provider will insert a catheter into an artery in your arm or leg  An x-ray will be used to carefully guide the catheter to your heart  He will inject a dye so he can see the blood vessels, muscle, or valves of your heart more clearly  You may get a warm feeling or slight nausea right after the dye is injected  This is normal, and will pass quickly  Your healthcare provider may remove a small sample of heart tissue and send it to a lab for testing  He may also open a narrow or blocked heart valve or artery  A stent (small tube) may be left inside your artery to hold it open  · The catheter may be left in place to monitor pressure in your heart  When the catheter is removed, a healthcare provider will apply pressure to the site for at least 30 minutes to help decrease the risk of bleeding  A collagen plug or other closure devices may be used to close the site  Healthcare providers will cover the site with a pressure bandage to decrease further bleeding  After your procedure: You will be taken to a room to rest until you are fully awake  Healthcare providers will monitor you closely for any problems  Do not get out of bed until your healthcare provider says it is okay  When your healthcare provider sees that you are okay, you will be taken to your hospital room  You may need to lie flat and keep your arm or leg straight for several hours  Arm or leg movement too soon can cause serious bleeding  Healthcare providers may ask you to drink more liquids to help flush the dye out of your body  If the catheter was in your groin and you need to cough, apply pressure over the area with your hands as directed  RISKS:   During the procedure, the catheter may tear an artery and cause bleeding  An air bubble may enter your lung, or your lung may collapse  You may have a heart attack   After the procedure, you may have bleeding or an infection  You may have damage to a heart valve, or a fistula (abnormal opening) may form between an artery and vein  You may have irregular heartbeats, which may cause dizziness or fainting  You may get a blood clot in your leg or arm  Without this procedure, your condition may get worse  These problems may become life-threatening  CARE AGREEMENT:   You have the right to help plan your care  Learn about your health condition and how it may be treated  Discuss treatment options with your caregivers to decide what care you want to receive  You always have the right to refuse treatment  © 2017 [facility] [facility] Information is for End User's use only and may not be sold, redistributed or otherwise used for commercial purposes  All illustrations and images included in CareNotes® are the copyrighted property of [employer]  or [employer]ef[employer]  The above information is an  only  It is not intended as medical advice for individual conditions or treatments  Talk to your doctor, nurse or pharmacist before following any medical regimen to see if it is safe and effective for you

## 2019-10-28 NOTE — INTERVAL H&P NOTE
/75 (BP Location: Right arm)   Pulse 78   Temp (!) 97 1 °F (36 2 °C) (Temporal)   Resp 16   Ht 5' 6" (1 676 m)   Wt 76 2 kg (168 lb)   SpO2 98%   BMI 27 12 kg/m²       H&P reviewed  After examining the patient, I find no changed to the H&P since it had been written  Patient re-evaluated   Accept as history and physical     Alex Cueva MD/October 28, 2019/7:19 AM

## 2019-10-29 RX ORDER — TRIAMTERENE AND HYDROCHLOROTHIAZIDE 37.5; 25 MG/1; MG/1
CAPSULE ORAL
Qty: 90 CAPSULE | Refills: 1 | Status: SHIPPED | OUTPATIENT
Start: 2019-10-29 | End: 2020-04-30

## 2019-10-30 ENCOUNTER — APPOINTMENT (OUTPATIENT)
Dept: PULMONOLOGY | Facility: HOSPITAL | Age: 69
End: 2019-10-30
Payer: MEDICARE

## 2019-11-01 ENCOUNTER — OFFICE VISIT (OUTPATIENT)
Dept: RHEUMATOLOGY | Facility: CLINIC | Age: 69
End: 2019-11-01
Payer: MEDICARE

## 2019-11-01 ENCOUNTER — CLINICAL SUPPORT (OUTPATIENT)
Dept: PULMONOLOGY | Facility: HOSPITAL | Age: 69
End: 2019-11-01
Payer: MEDICARE

## 2019-11-01 VITALS
SYSTOLIC BLOOD PRESSURE: 124 MMHG | HEIGHT: 66 IN | BODY MASS INDEX: 27 KG/M2 | DIASTOLIC BLOOD PRESSURE: 76 MMHG | HEART RATE: 70 BPM | WEIGHT: 168 LBS

## 2019-11-01 DIAGNOSIS — M85.852 OSTEOPENIA OF LEFT HIP: ICD-10-CM

## 2019-11-01 DIAGNOSIS — I70.218 ATHSCL NATIVE ARTERIES OF EXTRM W INTRMT CLAUD, OTH EXTRM (HCC): ICD-10-CM

## 2019-11-01 DIAGNOSIS — Z79.52 LONG TERM SYSTEMIC STEROID USER: ICD-10-CM

## 2019-11-01 DIAGNOSIS — M54.42 CHRONIC BILATERAL LOW BACK PAIN WITH BILATERAL SCIATICA: ICD-10-CM

## 2019-11-01 DIAGNOSIS — G89.29 CHRONIC BILATERAL LOW BACK PAIN WITH BILATERAL SCIATICA: ICD-10-CM

## 2019-11-01 DIAGNOSIS — M15.0 PRIMARY GENERALIZED (OSTEO)ARTHRITIS: ICD-10-CM

## 2019-11-01 DIAGNOSIS — M54.41 CHRONIC BILATERAL LOW BACK PAIN WITH BILATERAL SCIATICA: ICD-10-CM

## 2019-11-01 DIAGNOSIS — M35.3 POLYMYALGIA RHEUMATICA (HCC): Primary | ICD-10-CM

## 2019-11-01 PROCEDURE — 99214 OFFICE O/P EST MOD 30 MIN: CPT | Performed by: INTERNAL MEDICINE

## 2019-11-01 PROCEDURE — 93668 PERIPHERAL VASCULAR REHAB: CPT

## 2019-11-01 NOTE — PATIENT INSTRUCTIONS
Cut 5mg tablets of prednisone in half and take a half tablet daily for 2 weeks, then stop the prednisone

## 2019-11-01 NOTE — PROGRESS NOTES
Assessment and Plan:   Patient is a 68-year-old male with multiple medical issues who presents for rheumatology follow-up regarding history of PMR, currently on low-dose prednisone 5 mg  Discussion with him reveals chronic generalized pain issues at multiple sites and in his lower back  His symptoms do not sound like typical inflammatory type symptoms that would suggest active PMR or another type of inflammatory arthritis  After he restarted low-dose prednisone 2 months ago he really had no significant change in his level of pain which would be atypical for PMR as I would expect a drastic improvement with the low dose  He also had essentially normal inflammatory markers time  His exam today reveals osteoarthritic changes and generalized pain, but no findings of inflammatory arthritis  I discussed with him that my suspicion is low for current activity from PMR and I would suggest again weaning off prednisone  He was agreeable with that plan  I advised him to reduce his dose to 2 5 mg for 2 weeks and then try to stop it  He will monitor for any drastic change in his symptoms but I do not anticipate this  If he does report significant worsening of stiffness and pain would also want to check inflammatory markers at that point before restarting prednisone  In terms of his lower back pain with bilateral leg pain which is worse by activity, this sounds like possible spinal stenosis and so I did give him an x-ray referral for the lumbar spine to further evaluate  He does have a lot going on with his medical issues currently and his wife's health and so we discussed this is non urgent and he can just have it done at some point before he sees me again  In terms of his bone density study, since we are getting off prednisone therapy hopefully in the next 2 weeks, we will hold off on treatment at this time    If he requires restarting prednisone the future for clear signs of active PMR then he will likely require treatment at that point for the osteopenia in the high fracture risk  We will hold off at this point too since he has ongoing multiple medical issues and upcoming surgery  He was understanding and agreeable with that plan  Plan:  Diagnoses and all orders for this visit:    Polymyalgia rheumatica ([address] )    Primary generalized (osteo)arthritis    Long term systemic steroid user    Osteopenia of left hip    Chronic bilateral low back pain with bilateral sciatica  -     XR spine lumbar minimum 4 views non injury; Future        Follow-up plan: 4 months        Rheumatic Disease Summary  1  PMR  -Established care- June, 2017- dx with PMR by PCP and placed on steroids the March prior to consult  Presented on Prednisone 20 mg daily    -Weaned Prednisone over several months and current on Prednisone 5 mg daily (12/2018)  -XRs hands/feet 5/2019: OA, no inflammatory changes   -Labs 12/2018: negative hep panel, HIV  -Labs 5/3/19: negative RF, CCP, SSA, SSB, ESR 8, CRP<3  -XR hands/feet 5/3/19: OA, no inflammatory changes   -Weaned off pred completely in 7/2019  -Visit 8/23/19: feeling worse off pred, unclear if sx related to PMR, was restarted on pred 5mg (normal CRP, ESR 15 prior to pred)  2  Osteopenia  -DEXA 6/12/19: T -2 4 hip, FRAX 4/12% for hip/major OP fracture    3  OA  Tylenol with Tramadol  4  Comorbidities: depression, PVD s/p angioplasty, on dual antiplatelet, HLD, HTN, ischemic CM with EF 20%, severe AS, CAD     HPI Severiano Byes is a 68 y o   male with multiple medical issues including peripheral vascular disease, ischemic cardiomyopathy, CAD, severe aortic stenosis who presents for rheumatology follow-up for history of polymyalgia rheumatica  At last visit in the summer he had restarted prednisone due to worsening pain off prednisone  He did have lab work around that time which showed normal CRP and minimally elevated ESR of 15      Patient reports after he restarted on prednisone 2 months ago at 5mg daily, he felt "a little" better, no drastic improvement  Worst areas of pain are Knees, shoulders, sometimes hips; has right torn RTC with chronic pain and numbness/tingling down the right upper extremity  He gets pain at the base of his head and both shoulders, down his entire back, his hips  He gets a "burning sensation" from his waist down through his legs, has never seen neurology  This worsens as he is progressively active and improves with rest  When he is lying down resting the pain is not there at all  He is having EMG on the right UE in Jan for suspected carpal tunnel due to the numbness/tingling in his arm  Previously Hermelinda Lewis gave him gabapentin which helped him and when he titrated up to TID, he was too dizzy and so completely stopped it  He also recalls previously using tramadol which helped  He still struggles with dizziness in the mornings and his neck feels stiff  He has stiffness "all over" in the morning which improves once he is up and moving  He has had angioplasty on both lower extremities which resolved his claudication pain and symptoms  He is currently doing PVD rehab program    He has upcoming plans to undergo TAVR sometime in November and he is meeting with his cardiologist again to discuss this  He takes about 2500mg total of tylenol per day which does take the edge off pain issues  We also reviewed his bone density study from June of this year which showed osteopenia but with an elevated fracture risk of the hip and ongoing prednisone therapy  He also reports he has a lot of stress from his wife's medical issues as she is on dialysis and has multiple medical problems that cause frequent hospitalizations      The following portions of the patient's history were reviewed and updated as appropriate: allergies, current medications, past family history, past medical history, past social history, past surgical history and problem list     Review of Systems:   Review of Systems Constitutional: Negative for chills, fatigue, fever and unexpected weight change  HENT: Negative for mouth sores and trouble swallowing  Eyes: Negative for pain and visual disturbance  Respiratory: Negative for cough and shortness of breath  Cardiovascular: Negative for chest pain and leg swelling  Gastrointestinal: Negative for abdominal pain, blood in stool, constipation, diarrhea and nausea  Genitourinary: Negative for hematuria  Musculoskeletal: Positive for arthralgias, back pain, myalgias and neck pain  Negative for joint swelling  Skin: Negative for rash  Neurological: Negative for weakness and numbness  Hematological: Negative for adenopathy  Psychiatric/Behavioral: Negative for sleep disturbance         Home Medications:    Current Outpatient Medications:     amLODIPine (NORVASC) 10 mg tablet, Take 1 tablet (10 mg total) by mouth daily, Disp: 90 tablet, Rfl: 2    aspirin (ECOTRIN LOW STRENGTH) 81 mg EC tablet, Take 81 mg by mouth daily, Disp: , Rfl:     Cholecalciferol (VITAMIN D3) 2000 units TABS, Take 2,000 Units by mouth daily, Disp: , Rfl:     clopidogrel (PLAVIX) 75 mg tablet, Take 1 tablet (75 mg total) by mouth daily, Disp: 180 tablet, Rfl: 3    cyanocobalamin (VITAMIN B-12) 500 mcg tablet, Take 500 mcg by mouth daily, Disp: , Rfl:     isosorbide mononitrate (IMDUR) 30 mg 24 hr tablet, Take 1 tablet (30 mg total) by mouth daily, Disp: 90 tablet, Rfl: 3    Multiple Vitamin (MULTIVITAMIN) tablet, Take 1 tablet by mouth daily, Disp: , Rfl:     predniSONE 5 mg tablet, Take 1 tablet (5 mg total) by mouth daily, Disp: 30 tablet, Rfl: 2    ranolazine (RANEXA) 500 mg 12 hr tablet, take 1 tablet by mouth twice a day, Disp: 180 tablet, Rfl: 3    rosuvastatin (CRESTOR) 5 mg tablet, Take 1 tablet (5 mg total) by mouth daily, Disp: 90 tablet, Rfl: 3    triamterene-hydrochlorothiazide (DYAZIDE) 37 5-25 mg per capsule, take 1 capsule by mouth once daily, Disp: 90 capsule, Rfl: 1    Objective:    Vitals:    11/01/19 0921   BP: 124/76   Pulse: 70   Weight: 76 2 kg (168 lb)   Height: 5' 6" (1 676 m)       Physical Exam   Constitutional: He appears well-developed and well-nourished  He is cooperative  HENT:   Head: Normocephalic and atraumatic  Eyes: Conjunctivae and EOM are normal  No scleral icterus  Neck: No spinous process tenderness and no muscular tenderness present  No thyromegaly present  Musculoskeletal:   Scattered Bobby's and Heberden's nodes in the PIP and DIP joints in the hands  No joint swelling or synovitis anywhere  Tenderness in the shoulders, base of neck  Limited ROM of right shoulder  Lymphadenopathy:     He has no cervical adenopathy  Neurological: He is alert  No sensory deficit  Skin: Skin is warm and dry  No rash noted  Nails show no clubbing  Psychiatric: He has a normal mood and affect         Labs:   Component      Latest Ref Rng & Units 10/21/2019   WBC      4 80 - 10 80 Thousand/uL 7 30   Red Blood Cell Count      4 30 - 5 90 Million/uL 4 53   Hemoglobin      14 0 - 18 0 g/dL 14 3   HCT      42 0 - 47 0 % 42 1   MCV      81 - 99 fL 93   MCH      26 0 - 34 0 pg 31 5   MCHC      31 0 - 37 0 g/dL 33 9   RDW      11 5 - 14 5 % 13 5   MPV      8 6 - 11 7 fL 8 2 (L)   Platelet Count      967 - 390 Thousands/uL 208   Neutrophils %      42 - 75 % 79 (H)   Lymphocytes Relative      21 - 51 % 16 (L)   Monocytes Relative      2 - 12 % 5   Eosinophils      0 - 5 % 0   Basophils Relative      0 - 2 % 0   Absolute Neutrophils      1 40 - 6 50 Thousands/µL 5 80   Lymphocytes Absolute      0 60 - 4 47 Thousands/µL 1 10   Absolute Monocytes      0 17 - 1 22 Thousand/µL 0 30   Absolute Eosinophils      0 00 - 0 61 Thousand/µL 0 00   Basophils Absolute      0 00 - 0 10 Thousands/µL 0 00   Sodium      134 - 143 mmol/L 137   Potassium      3 5 - 5 5 mmol/L 4 4   Chloride      98 - 107 mmol/L 100   CO2      21 - 31 mmol/L 31   Anion Gap      4 - 13 mmol/L 6 BUN      7 - 25 mg/dL 18   Creatinine      0 70 - 1 30 mg/dL 0 91   Glucose, Random      65 - 99 mg/dL 117 (H)   Calcium      8 6 - 10 5 mg/dL 10 2   CORRECTED CALCIUM      8 3 - 10 1 mg/dL 9 8   AST      13 - 39 U/L 20   ALT      7 - 52 U/L 17   Alkaline Phosphatase      55 - 165 U/L 40 (L)   Total Protein      6 4 - 8 9 g/dL 7 1   Albumin      3 5 - 5 7 g/dL 4 5   TOTAL BILIRUBIN      0 20 - 1 00 mg/dL 0 40   eGFR      ml/min/1 73sq m 84     Component      Latest Ref Rng & Units 8/23/2019   C-REACTIVE PROTEIN      <3 0 mg/L <3 0   Sed Rate      0 - 10 mm/hour 15 (H)     Component      Latest Ref Rng & Units 5/3/2019   SSA (RO) ANTIBODY      0 0 - 0 9 AI <0 2   SSB (LA) ANTIBODY      0 0 - 0 9 AI <0 2   C-REACTIVE PROTEIN      <2 0 mg/L <9 8   CYCLIC CITRULLINATED PEPTIDE ANTIBODY      0 - 19 units 4   Sed Rate      0 - 10 mm/hour 8   RHEUMATOID FACTOR      Negative Negative     Component      Latest Ref Rng & Units 12/14/2018   HEPATITIS B SURFACE ANTIGEN      Non-reactive, NonReactive - Confirmed Non-reactive   HEPATITIS C ANTIBODY      Non-reactive Non-reactive   HEPATITIS B CORE IGM ANTIBODY      Non-reactive Non-reactive   HEPATITIS B CORE TOTAL ANTIBODY      Non-reactive Non-reactive   TSH 3RD GENERATON      0 358 - 3 740 uIU/mL 0 777   HIV-1/2 AB-AG      Non-Reactive Non-Reactive

## 2019-11-04 ENCOUNTER — APPOINTMENT (OUTPATIENT)
Dept: PULMONOLOGY | Facility: HOSPITAL | Age: 69
End: 2019-11-04
Payer: MEDICARE

## 2019-11-05 PROBLEM — I35.0 AORTIC VALVE STENOSIS: Status: ACTIVE | Noted: 2019-11-05

## 2019-11-06 ENCOUNTER — OFFICE VISIT (OUTPATIENT)
Dept: CARDIAC SURGERY | Facility: CLINIC | Age: 69
End: 2019-11-06
Payer: MEDICARE

## 2019-11-06 ENCOUNTER — CLINICAL SUPPORT (OUTPATIENT)
Dept: PULMONOLOGY | Facility: HOSPITAL | Age: 69
End: 2019-11-06
Payer: MEDICARE

## 2019-11-06 ENCOUNTER — APPOINTMENT (OUTPATIENT)
Dept: LAB | Facility: HOSPITAL | Age: 69
End: 2019-11-06
Payer: MEDICARE

## 2019-11-06 VITALS
TEMPERATURE: 97.5 F | HEIGHT: 66 IN | OXYGEN SATURATION: 99 % | SYSTOLIC BLOOD PRESSURE: 126 MMHG | WEIGHT: 169 LBS | HEART RATE: 67 BPM | DIASTOLIC BLOOD PRESSURE: 62 MMHG | BODY MASS INDEX: 27.16 KG/M2 | RESPIRATION RATE: 14 BRPM

## 2019-11-06 DIAGNOSIS — I70.218 ATHSCL NATIVE ARTERIES OF EXTRM W INTRMT CLAUD, OTH EXTRM (HCC): ICD-10-CM

## 2019-11-06 DIAGNOSIS — I35.0 SEVERE AORTIC STENOSIS: Primary | ICD-10-CM

## 2019-11-06 DIAGNOSIS — I35.0 SEVERE AORTIC STENOSIS: ICD-10-CM

## 2019-11-06 LAB
ABO GROUP BLD: NORMAL
BILIRUB UR QL STRIP: NEGATIVE
BLD GP AB SCN SERPL QL: NEGATIVE
CLARITY UR: CLEAR
COLOR UR: YELLOW
GLUCOSE UR STRIP-MCNC: NEGATIVE MG/DL
HGB UR QL STRIP.AUTO: NEGATIVE
KETONES UR STRIP-MCNC: NEGATIVE MG/DL
LEUKOCYTE ESTERASE UR QL STRIP: NEGATIVE
NITRITE UR QL STRIP: NEGATIVE
PH UR STRIP.AUTO: 7 [PH]
PROT UR STRIP-MCNC: NEGATIVE MG/DL
RH BLD: POSITIVE
SP GR UR STRIP.AUTO: <=1.005 (ref 1–1.03)
SPECIMEN EXPIRATION DATE: NORMAL
UROBILINOGEN UR QL STRIP.AUTO: 0.2 E.U./DL

## 2019-11-06 PROCEDURE — 93668 PERIPHERAL VASCULAR REHAB: CPT

## 2019-11-06 PROCEDURE — 36415 COLL VENOUS BLD VENIPUNCTURE: CPT

## 2019-11-06 PROCEDURE — 86901 BLOOD TYPING SEROLOGIC RH(D): CPT

## 2019-11-06 PROCEDURE — 86850 RBC ANTIBODY SCREEN: CPT

## 2019-11-06 PROCEDURE — 86900 BLOOD TYPING SEROLOGIC ABO: CPT

## 2019-11-06 PROCEDURE — 87147 CULTURE TYPE IMMUNOLOGIC: CPT

## 2019-11-06 PROCEDURE — 99214 OFFICE O/P EST MOD 30 MIN: CPT | Performed by: PHYSICIAN ASSISTANT

## 2019-11-06 PROCEDURE — 87081 CULTURE SCREEN ONLY: CPT

## 2019-11-06 PROCEDURE — 81003 URINALYSIS AUTO W/O SCOPE: CPT | Performed by: THORACIC SURGERY (CARDIOTHORACIC VASCULAR SURGERY)

## 2019-11-06 RX ORDER — CHLORHEXIDINE GLUCONATE 0.12 MG/ML
15 RINSE ORAL ONCE
Status: CANCELLED | OUTPATIENT
Start: 2019-11-06 | End: 2019-11-06

## 2019-11-06 RX ORDER — CEFAZOLIN SODIUM 2 G/50ML
2000 SOLUTION INTRAVENOUS ONCE
Status: CANCELLED | OUTPATIENT
Start: 2019-11-12 | End: 2019-11-06

## 2019-11-06 NOTE — H&P (VIEW-ONLY)
Consultation - Cardiothoracic Surgery   Carlos Way 68 y o  male MRN: [id number]      Reason for Consult / Principal Problem: Aortic stenosis, Non-Rheumatic    History of Present Illness: Carlos Way is a 68y o  year old male who was previously evaluated in our office by ALFRED Karimi  for transcatheter aortic valve replacement  During this initial consultation, arrangements were made for the following studies to be completed: Gated CTA of the chest/abdomen/pelvis, 3D SANAZ, left and right cardiac catheterization, dental clearance, pulmonary function tests with ABG, and carotid artery ultrasound  Carlos Way now presents to review the results of these tests and confirm the suitability of proceeding with transcatheter aortic valve replacment  No changes to PMH  No hospitalizations/ED visits/acute illnesses  Saw Dr Shai Mckeon on 10/18 w/ no changes to medical regimen  Saw Dr Jhonatan Wilson on 11/1 who gave him a taper to be finished in 2 days for steroids  Saw Dr Anna Godoy on 10/21 & started PAD rehab from recent surgery  Please refer to initial consultation from 10/17/19 for further PMH details      Past Medical History:  Past Medical History:   Diagnosis Date    Anxiety     Benign essential hypertension     Coronary artery disease     Depression     Diffuse arthralgia     Last Assessed: 1/23/2017    Former tobacco use     History of alcohol use     3-6 beers/day x20 years, no residual liver disease    History of Lyme disease     History of rheumatic fever     Hypercholesterolemia     Hyperlipidemia     Hyperlipidemia     Ischemic cardiomyopathy     Osteoarthritis, hip, bilateral     PAD (peripheral artery disease) (HCC)     s/p fem-below the knee bypass & percutaneous tx right CFA/SFA & left external iliac/CFA    Polymyalgia rheumatica (HCC)     on chronic steroids    RBBB     Rotator cuff disorder     b/l    Severe aortic stenosis     Wears hearing aid     b/l     Past Surgical History:   Past Surgical History:   Procedure Laterality Date    CARDIAC CATHETERIZATION      FEMORAL BYPASS Bilateral     fem-below knee w/ GSV    IR ABDOMINAL ANGIOGRAPHY / INTERVENTION  8/15/2019    IR ABDOMINAL ANGIOGRAPHY / INTERVENTION  2019    [place] SLCTV CATHJ 3RD+ ORD SLCTV ABDL PEL/LXTR [address] [facility] Right 8/15/2019    Procedure: LEFT GROIN ACCESS RIGHT LEG ARTERIOGRAM WITH ARTHRECTOMY, LEFT LEG RUNOFF;  Surgeon: Antonio Perez MD;  Location: [facility];  Service: Vascular    TONSILLECTOMY AND ADENOIDECTOMY       Family History:  Family History   Problem Relation Age of Onset    Cancer Mother         cervix    Coronary artery disease Father     Heart attack Father     Cancer Brother     Coronary artery disease Maternal Grandfather     Heart disease Paternal Grandfather     Stroke Paternal Grandfather         CVA    Heart attack Paternal Grandfather     Coronary artery disease Other          at age 78 per [employer]     Social History:  Social History     Substance and Sexual Activity   Alcohol Use Not Currently    Frequency: Never    Comment: 3-6 cans beer x20 years, quit , no residual liver issues     Social History     Substance and Sexual Activity   Drug Use Never     Social History     Tobacco Use   Smoking Status Former Smoker    Packs/day: 2 00    Years: 20 00    Pack years: 40 00    Types: Cigarettes    Last attempt to quit:     Years since quittin 8   Smokeless Tobacco Former User    Types: Chew       Home Medications:   Prior to Admission medications    Medication Sig Start Date End Date Taking?  Authorizing Provider   acetaminophen (TYLENOL) 325 mg tablet Take 650 mg by mouth every 6 (six) hours as needed for mild pain   Yes Historical Provider, MD   amLODIPine (NORVASC) 10 mg tablet Take 1 tablet (10 mg total) by mouth daily 19  Yes Nik Weldon, DO   aspirin (ECOTRIN LOW STRENGTH) 81 mg EC tablet Take 81 mg by mouth daily   Yes Historical Provider, MD Cholecalciferol (VITAMIN D3) 2000 units TABS Take 2,000 Units by mouth daily   Yes Historical Provider, MD   clopidogrel (PLAVIX) 75 mg tablet Take 1 tablet (75 mg total) by mouth daily 7/16/19  Yes Galina Tovar MD   cyanocobalamin (VITAMIN B-12) 500 mcg tablet Take 500 mcg by mouth daily   Yes Historical Provider, MD   isosorbide mononitrate (IMDUR) 30 mg 24 hr tablet Take 1 tablet (30 mg total) by mouth daily 9/10/19  Yes Raj Palomares MD   Multiple Vitamin (MULTIVITAMIN) tablet Take 1 tablet by mouth daily   Yes Historical Provider, MD   predniSONE 5 mg tablet Take 1 tablet (5 mg total) by mouth daily 8/23/19  Yes Macho Clarke PA-C   ranolazine (RANEXA) 500 mg 12 hr tablet take 1 tablet by mouth twice a day 6/15/19  Yes Raj Palomares MD   rosuvastatin (CRESTOR) 5 mg tablet Take 1 tablet (5 mg total) by mouth daily 12/12/18  Yes Raj Palomares MD   triamterene-hydrochlorothiazide (DYAZIDE) 37 5-25 mg per capsule take 1 capsule by mouth once daily 10/29/19  Yes Hutchinson Client, DO   mupirocin (BACTROBAN) 2 % nasal ointment into each nostril 2 (two) times a day 11/6/19   Barb Henry PA-C       Allergies: Allergies   Allergen Reactions    Atorvastatin Myalgia and Arthralgia    Avelox [Moxifloxacin] Diarrhea    Pravastatin Myalgia and Arthralgia    Rosuvastatin Calcium Myalgia and Arthralgia    Simvastatin Myalgia and Arthralgia       Review of Systems:  Review of Systems   Constitutional: Positive for fatigue  Negative for activity change, diaphoresis and unexpected weight change  HENT: Negative  Negative for dental problem  Respiratory: Positive for chest tightness and shortness of breath  Negative for wheezing  Cardiovascular: Positive for leg swelling  Negative for chest pain and palpitations  Gastrointestinal: Negative  Negative for blood in stool, constipation, diarrhea, nausea and vomiting  Genitourinary: Negative  Musculoskeletal: Negative    Negative for arthralgias, back pain, gait problem and myalgias  Skin: Negative  Neurological: Negative  Negative for dizziness, syncope, weakness, light-headedness, numbness and headaches  Hematological: Negative  Does not bruise/bleed easily  All other systems reviewed and are negative  Vital Signs:     Vitals:    11/06/19 0900 11/06/19 0949   BP: 136/62 126/62   BP Location: Left arm Right arm   Cuff Size: Adult    Pulse: 67    Resp: 14    Temp: 97 5 °F (36 4 °C)    TempSrc: Oral    SpO2: 99%    Weight: 76 7 kg (169 lb)    Height: 5' 6" (1 676 m)        Physical Exam:  Physical Exam   Constitutional: He is oriented to person, place, and time  Vital signs are normal  He appears well-developed and well-nourished  Non-toxic appearance  He does not appear ill  No distress  Laying in bed in NAD   HENT:   Head: Normocephalic and atraumatic  Mouth/Throat: Uvula is midline, oropharynx is clear and moist and mucous membranes are normal  He does not have dentures  Normal dentition  Neck: Trachea normal and normal range of motion  Neck supple  No JVD present  Carotid bruit is not present  Cardiovascular: Normal rate, regular rhythm, S1 normal and S2 normal  PMI is not displaced  Exam reveals no S3, no S4 and no friction rub  No murmur heard  No heaves/lifts on palpation   Pulmonary/Chest: Effort normal and breath sounds normal  No accessory muscle usage  No respiratory distress  He has no wheezes  He has no rhonchi  He has no rales  Good inspiratory effort, equal expansion bilaterally   Abdominal: Normal appearance and bowel sounds are normal  He exhibits no distension and no mass  There is no tenderness  There is no rigidity, no rebound and no guarding  Neurological: He is alert and oriented to person, place, and time  He has normal strength  No cranial nerve deficit or sensory deficit  No focal deficit appreciated   Skin: Skin is warm, dry and intact  No bruising, no petechiae and no rash noted   No cyanosis  Nails show no clubbing  Trace edema b/l LE, no varicosities appreciated to b/l LE, 2+ RLE DP pulse, 1+ LLE DP pulse   Psychiatric: He has a normal mood and affect  Lab Results:               Invalid input(s): LABGLOM      No results found for: HGBA1C  Lab Results   Component Value Date    CKTOTAL 103 01/30/2017       Imaging Studies:     Transthoracic Echocardiogram 6/26/19: EF 20%, mild MR, severe as (TA 0 8cm2, mean 30, peak 59)    Gated CTA of the chest/abdomen/pelvis 10/24/19: TAVR measurements complete, diffuse iliac calcifications, mod b/l CFA calcifications    Left cardiac catheterization 10/28/19: 100% prox LAD , 50% prox RCA, 70% distal RCA    Pulmonary function tests 10/24/19: FEV 1 2 68/98% pred    Arterial Blood gas 10/24/19: done    Carotid artery ultrasound 10/24/19: 50-69% GLENN stenosis, <37% LICA stenosis, antegrade flow b/l, no subclavian disease b/l    I have personally reviewed pertinent reports  and I have personally reviewed pertinent films in PACS    TAVR evaluation Assessment:     5 Meter Walk Test:      Attempt 1: 5   Attempt 2: 5   Attempt 3: 5    STS Risk Score: 1 1 6 %, mortality risk    [address]: II    KCCQ-12 was completed    Assessment:  Patient Active Problem List    Diagnosis Date Noted    Aortic valve stenosis 11/05/2019    Osteopenia of left hip 11/01/2019    Claudication in peripheral vascular disease ([facility] ) 10/21/2019    PAD (peripheral artery disease) ([facility] ) 07/04/2019    Depression 06/17/2019    Long term systemic steroid user 04/08/2019    Coronary artery disease of native artery of native heart with stable angina pectoris ([facility] ) 06/27/2018    Essential hypertension 03/25/2018    Nonrheumatic aortic valve stenosis 09/06/2017    RODRIGUEZ (dyspnea on exertion) 09/06/2017    Cardiomyopathy, ischemic 09/06/2017    Primary generalized (osteo)arthritis 06/14/2017    Polymyalgia rheumatica ([facility] ) 02/06/2017     Severe aortic stenosis;  Ongoing TAVR workup    Plan:    Lucero Reed has severe symptomatic aortic stenosis  Based on their STS risk assessment, they have undergone testing for transcatheter aortic valve replacement  The results of these studies have been interpreted by SUSAN Magaña  who has determined the patient to be High risk for open aortic valve replacement  The risks, benefits, and alternatives to TAVR were discussed in detail with the patient today  They understand and wish to proceed with transcatheter aortic valve replacement (transfemoral to start but probably transapical)  Informed consent was obtained and a date for the intervention has been set  He may need stress dose steroids at time of surgery if is transapical approach  Will discuss w/ anesthesia prior to OR  Lucero Reed was comfortable with our recommendations, and their questions were answered to their satisfaction  The following preoperative instructions were provided at the conclusion of their consultation:     1  You will receive a phone call from the hospital between 2:00 PM and 8:00 PM the day prior to surgery to confirm arrival time and location  For surgery on Mondays, you will receive a call on Friday  2  Do not drink or eat anything after midnight the night before surgery  That includes no water, candy, gum, lozenges, Lifesavers, etc  We recommend you not eat any "junk" food, consume alcohol or smoke the night before surgery  3  Continue taking aspirin but only 81 mg daily  4  If you take Coumadin and/or Plavix, discontinue it 5 days before surgery  5  If you are diabetic, do not take any of your diabetic pills the morning of surgery  If you take Lantus insulin, you may take it at your regularly scheduled time the day before surgery  Do not take any other insulins the morning of surgery    6  The 2 nights before surgery, take a shower each night using the special antiseptic soap or soap sponges you received from the office or hospital  Shampoo your hair with regular shampoo and rinse completely before using the antiseptic sponges  Use the sponge to wash from your neck down, with special attention to the armpits and groin area  Do not use any other soap or cleanser on your skin  Do not use lotions, powder, deodorant or perfume of any kind on your skin after you shower  Use clean bed linens and wear clean pajamas after your shower  7  You will be prescribed Mupirocin nasal ointment  Apply to both nostrils twice a day for 5 days prior to surgery  8  Do not take a shower the morning of her surgery; you'll be given a special" bath" at the hospital   9  Notify the CT surgery office if you develop a cold, sore throat, cough, fever or other health issues before your surgery  10  Other medication changes included the following: Plavix 5 days prior to sx       SIGNATURE: Zeb Ghotra PA-C  DATE: November 6, 2019  TIME: 10:14 AM

## 2019-11-06 NOTE — H&P
H&P- Cardiothoracic Surgery   Johana Reza 68 y o  male MRN: [id number]      Reason for Consult / Principal Problem: Aortic stenosis, Non-Rheumatic    History of Present Illness: Johana Reza is a 68y o  year old male who was previously evaluated in our office by ALFRED Cano  for transcatheter aortic valve replacement  During this initial consultation, arrangements were made for the following studies to be completed: Gated CTA of the chest/abdomen/pelvis, 3D SANAZ, left and right cardiac catheterization, dental clearance, pulmonary function tests with ABG, and carotid artery ultrasound  Johana Reza now presents to review the results of these tests and confirm the suitability of proceeding with transcatheter aortic valve replacment  No changes to PMH  No hospitalizations/ED visits/acute illnesses  Saw Dr Dori Conner on 10/18 w/ no changes to medical regimen  Saw Dr Priyanka Esparza on 11/1 who gave him a taper to be finished in 2 days for steroids  Saw Dr Manisha Munguia on 10/21 & started PAD rehab from recent surgery  Please refer to initial consultation from 10/17/19 for further PMH details      Past Medical History:  Past Medical History:   Diagnosis Date    Anxiety     Benign essential hypertension     Coronary artery disease     Depression     Diffuse arthralgia     Last Assessed: 1/23/2017    Former tobacco use     History of alcohol use     3-6 beers/day x20 years, no residual liver disease    History of Lyme disease     History of rheumatic fever     Hypercholesterolemia     Hyperlipidemia     Hyperlipidemia     Ischemic cardiomyopathy     Osteoarthritis, hip, bilateral     PAD (peripheral artery disease) (HCC)     s/p fem-below the knee bypass & percutaneous tx right CFA/SFA & left external iliac/CFA    Polymyalgia rheumatica (HCC)     on chronic steroids    RBBB     Rotator cuff disorder     b/l    Severe aortic stenosis     Wears hearing aid     b/l     Past Surgical History:   Past Surgical History:   Procedure Laterality Date    CARDIAC CATHETERIZATION      FEMORAL BYPASS Bilateral     fem-below knee w/ GSV    IR ABDOMINAL ANGIOGRAPHY / INTERVENTION  8/15/2019    IR ABDOMINAL ANGIOGRAPHY / INTERVENTION  2019    LA SLCTV CATHJ 3RD+ ORD SLCTV ABDL PEL/LXTR [address] [facility] Right 8/15/2019    Procedure: LEFT GROIN ACCESS RIGHT LEG ARTERIOGRAM WITH ARTHRECTOMY, LEFT LEG RUNOFF;  Surgeon: Ling Gomes MD;  Location: [facility];  Service: Vascular    TONSILLECTOMY AND ADENOIDECTOMY       Family History:  Family History   Problem Relation Age of Onset    Cancer Mother         cervix    Coronary artery disease Father     Heart attack Father     Cancer Brother     Coronary artery disease Maternal Grandfather     Heart disease Paternal Grandfather     Stroke Paternal Grandfather         CVA    Heart attack Paternal Grandfather     Coronary artery disease Other          at age 78 per [employer]     Social History:  Social History     Substance and Sexual Activity   Alcohol Use Not Currently    Frequency: Never    Comment: 3-6 cans beer x20 years, quit , no residual liver issues     Social History     Substance and Sexual Activity   Drug Use Never     Social History     Tobacco Use   Smoking Status Former Smoker    Packs/day: 2 00    Years: 20 00    Pack years: 40 00    Types: Cigarettes    Last attempt to quit:     Years since quittin 8   Smokeless Tobacco Former User    Types: Chew       Home Medications:   Prior to Admission medications    Medication Sig Start Date End Date Taking?  Authorizing Provider   acetaminophen (TYLENOL) 325 mg tablet Take 650 mg by mouth every 6 (six) hours as needed for mild pain   Yes Historical Provider, MD   amLODIPine (NORVASC) 10 mg tablet Take 1 tablet (10 mg total) by mouth daily 19  Yes Michelle Benz, DO   aspirin (ECOTRIN LOW STRENGTH) 81 mg EC tablet Take 81 mg by mouth daily   Yes Historical Provider, MD Cholecalciferol (VITAMIN D3) 2000 units TABS Take 2,000 Units by mouth daily   Yes Historical Provider, MD   clopidogrel (PLAVIX) 75 mg tablet Take 1 tablet (75 mg total) by mouth daily 7/16/19  Yes Malcolm Garcia MD   cyanocobalamin (VITAMIN B-12) 500 mcg tablet Take 500 mcg by mouth daily   Yes Historical Provider, MD   isosorbide mononitrate (IMDUR) 30 mg 24 hr tablet Take 1 tablet (30 mg total) by mouth daily 9/10/19  Yes Mely Deutsch MD   Multiple Vitamin (MULTIVITAMIN) tablet Take 1 tablet by mouth daily   Yes Historical Provider, MD   predniSONE 5 mg tablet Take 1 tablet (5 mg total) by mouth daily 8/23/19  Yes Deanne Astorga PA-C   ranolazine (RANEXA) 500 mg 12 hr tablet take 1 tablet by mouth twice a day 6/15/19  Yes Mely Deutsch MD   rosuvastatin (CRESTOR) 5 mg tablet Take 1 tablet (5 mg total) by mouth daily 12/12/18  Yes Mely Deutsch MD   triamterene-hydrochlorothiazide (DYAZIDE) 37 5-25 mg per capsule take 1 capsule by mouth once daily 10/29/19  Yes Ernie Median, DO   mupirocin (BACTROBAN) 2 % nasal ointment into each nostril 2 (two) times a day 11/6/19   Anjali Benitez PA-C       Allergies: Allergies   Allergen Reactions    Atorvastatin Myalgia and Arthralgia    Avelox [Moxifloxacin] Diarrhea    Pravastatin Myalgia and Arthralgia    Rosuvastatin Calcium Myalgia and Arthralgia    Simvastatin Myalgia and Arthralgia       Review of Systems:  Review of Systems   Constitutional: Positive for fatigue  Negative for activity change, diaphoresis and unexpected weight change  HENT: Negative  Negative for dental problem  Respiratory: Positive for chest tightness and shortness of breath  Negative for wheezing  Cardiovascular: Positive for leg swelling  Negative for chest pain and palpitations  Gastrointestinal: Negative  Negative for blood in stool, constipation, diarrhea, nausea and vomiting  Genitourinary: Negative  Musculoskeletal: Negative    Negative for arthralgias, back pain, gait problem and myalgias  Skin: Negative  Neurological: Negative  Negative for dizziness, syncope, weakness, light-headedness, numbness and headaches  Hematological: Negative  Does not bruise/bleed easily  All other systems reviewed and are negative  Vital Signs:     Vitals:    11/06/19 0900 11/06/19 0949   BP: 136/62 126/62   BP Location: Left arm Right arm   Cuff Size: Adult    Pulse: 67    Resp: 14    Temp: 97 5 °F (36 4 °C)    TempSrc: Oral    SpO2: 99%    Weight: 76 7 kg (169 lb)    Height: 5' 6" (1 676 m)        Physical Exam:  Physical Exam   Constitutional: He is oriented to person, place, and time  Vital signs are normal  He appears well-developed and well-nourished  Non-toxic appearance  He does not appear ill  No distress  Laying in bed in NAD   HENT:   Head: Normocephalic and atraumatic  Mouth/Throat: Uvula is midline, oropharynx is clear and moist and mucous membranes are normal  He does not have dentures  Normal dentition  Neck: Trachea normal and normal range of motion  Neck supple  No JVD present  Carotid bruit is not present  Cardiovascular: Normal rate, regular rhythm, S1 normal and S2 normal  PMI is not displaced  Exam reveals no S3, no S4 and no friction rub  No murmur heard  No heaves/lifts on palpation   Pulmonary/Chest: Effort normal and breath sounds normal  No accessory muscle usage  No respiratory distress  He has no wheezes  He has no rhonchi  He has no rales  Good inspiratory effort, equal expansion bilaterally   Abdominal: Normal appearance and bowel sounds are normal  He exhibits no distension and no mass  There is no tenderness  There is no rigidity, no rebound and no guarding  Neurological: He is alert and oriented to person, place, and time  He has normal strength  No cranial nerve deficit or sensory deficit  No focal deficit appreciated   Skin: Skin is warm, dry and intact  No bruising, no petechiae and no rash noted   No cyanosis  Nails show no clubbing  Trace edema b/l LE, no varicosities appreciated to b/l LE, 2+ RLE DP pulse, 1+ LLE DP pulse   Psychiatric: He has a normal mood and affect  Lab Results:               Invalid input(s): LABGLOM      No results found for: HGBA1C  Lab Results   Component Value Date    CKTOTAL 103 01/30/2017       Imaging Studies:     Transthoracic Echocardiogram 6/26/19: EF 20%, mild MR, severe as (TA 0 8cm2, mean 30, peak 59)    Gated CTA of the chest/abdomen/pelvis 10/24/19: TAVR measurements complete, diffuse iliac calcifications, mod b/l CFA calcifications    Left cardiac catheterization 10/28/19: 100% prox LAD , 50% prox RCA, 70% distal RCA    Pulmonary function tests 10/24/19: FEV 1 2 68/98% pred    Arterial Blood gas 10/24/19: done    Carotid artery ultrasound 10/24/19: 50-69% GLENN stenosis, <66% LICA stenosis, antegrade flow b/l, no subclavian disease b/l    I have personally reviewed pertinent reports  and I have personally reviewed pertinent films in PACS    TAVR evaluation Assessment:     5 Meter Walk Test:      Attempt 1: 5   Attempt 2: 5   Attempt 3: 5    STS Risk Score: 1 1 6 %, mortality risk    [address]: II    KCCQ-12 was completed    Assessment:  Patient Active Problem List    Diagnosis Date Noted    Aortic valve stenosis 11/05/2019    Osteopenia of left hip 11/01/2019    Claudication in peripheral vascular disease ([facility] [address] ) 10/21/2019    PAD (peripheral artery disease) ([facility] [address] ) 07/04/2019    Depression 06/17/2019    Long term systemic steroid user 04/08/2019    Coronary artery disease of native artery of native heart with stable angina pectoris ([facility] [place] [address] ) 06/27/2018    Essential hypertension 03/25/2018    Nonrheumatic aortic valve stenosis 09/06/2017    RODRIGUEZ (dyspnea on exertion) 09/06/2017    Cardiomyopathy, ischemic 09/06/2017    Primary generalized (osteo)arthritis 06/14/2017    Polymyalgia rheumatica ([facility] [address] ) 02/06/2017     Severe aortic stenosis;  Ongoing TAVR workup    Plan:    Mohit Ch has severe symptomatic aortic stenosis  Based on their STS risk assessment, they have undergone testing for transcatheter aortic valve replacement  The results of these studies have been interpreted by SUSAN Nathan  who has determined the patient to be High risk for open aortic valve replacement  The risks, benefits, and alternatives to TAVR were discussed in detail with the patient today  They understand and wish to proceed with transcatheter aortic valve replacement (transfemoral to start but probably transapical)  Informed consent was obtained and a date for the intervention has been set  He may need stress dose steroids at time of surgery if is transapical approach  Will discuss w/ anesthesia prior to OR  Mohit Ch was comfortable with our recommendations, and their questions were answered to their satisfaction  The following preoperative instructions were provided at the conclusion of their consultation:     1  You will receive a phone call from the hospital between 2:00 PM and 8:00 PM the day prior to surgery to confirm arrival time and location  For surgery on Mondays, you will receive a call on Friday  2  Do not drink or eat anything after midnight the night before surgery  That includes no water, candy, gum, lozenges, Lifesavers, etc  We recommend you not eat any "junk" food, consume alcohol or smoke the night before surgery  3  Continue taking aspirin but only 81 mg daily  4  If you take Coumadin and/or Plavix, discontinue it 5 days before surgery  5  If you are diabetic, do not take any of your diabetic pills the morning of surgery  If you take Lantus insulin, you may take it at your regularly scheduled time the day before surgery  Do not take any other insulins the morning of surgery    6  The 2 nights before surgery, take a shower each night using the special antiseptic soap or soap sponges you received from the office or hospital  Shampoo your hair with regular shampoo and rinse completely before using the antiseptic sponges  Use the sponge to wash from your neck down, with special attention to the armpits and groin area  Do not use any other soap or cleanser on your skin  Do not use lotions, powder, deodorant or perfume of any kind on your skin after you shower  Use clean bed linens and wear clean pajamas after your shower  7  You will be prescribed Mupirocin nasal ointment  Apply to both nostrils twice a day for 5 days prior to surgery  8  Do not take a shower the morning of her surgery; you'll be given a special" bath" at the hospital   9  Notify the CT surgery office if you develop a cold, sore throat, cough, fever or other health issues before your surgery  10  Other medication changes included the following: Plavix 5 days prior to sx       SIGNATURE: Bell Guillen PA-C  DATE: November 6, 2019  TIME: 10:14 AM

## 2019-11-06 NOTE — PATIENT INSTRUCTIONS
1  You will receive a phone call from the hospital between 2:00 PM and 8:00 PM the day prior to surgery to confirm arrival time and location  For surgery on Mondays, you will receive a call on Friday  2  Do not drink or eat anything after midnight the night before surgery  That includes no water, candy, gum, lozenges, Lifesavers, etc  We recommend you not eat any "junk" food, consume alcohol or smoke the night before surgery  3  Continue taking aspirin but only 81 mg daily  4  If you take Coumadin and/or Plavix, discontinue it 5 days before surgery  5  If you are diabetic, do not take any of your diabetic pills the morning of surgery  If you take Lantus insulin, you may take it at your regularly scheduled time the day before surgery  Do not take any other insulins the morning of surgery  6  The 2 nights before surgery, take a shower each night using the special antiseptic soap or soap sponges you received from the office or hospital  Lucero Cevallosars your hair with regular shampoo and rinse completely before using the antiseptic sponges  Use the sponge to wash from your neck down, with special attention to the armpits and groin area  Do not use any other soap or cleanser on your skin  Do not use lotions, powder, deodorant or perfume of any kind on your skin after you shower  Use clean bed linens and wear clean pajamas after your shower  7  You will be prescribed Mupirocin nasal ointment  Apply to both nostrils twice a day for 5 days prior to surgery  8  Do not take a shower the morning of her surgery; you'll be given a special" bath" at the hospital   9  Notify the CT surgery office if you develop a cold, sore throat, cough, fever or other health issues before your surgery  10  Other medication changes included the following: Plavix 5 days prior to sx

## 2019-11-06 NOTE — LETTER
November 6, 2019     Referral [address] [place] [id number]    Patient: Rock Chairez   YOB: 1950   Date of Visit: 11/6/2019       Dear Dr Alex Bartlett:    Thank you for referring Deepak George to me for evaluation  Below are my notes for this consultation  If you have questions, please do not hesitate to call me  I look forward to following your patient along with you  Sincerely,        Jess Braswell DO        CC: DO Malorie Latham PA-C  11/6/2019 10:30 AM  Attested  Consultation - Cardiothoracic Surgery   Rock Chairez 68 y o  male MRN: [id number]      Reason for Consult / Principal Problem: Aortic stenosis, Non-Rheumatic    History of Present Illness: Rock Chairez is a 68y o  year old male who was previously evaluated in our office by ALFRED Hardy  for transcatheter aortic valve replacement  During this initial consultation, arrangements were made for the following studies to be completed: Gated CTA of the chest/abdomen/pelvis, 3D SANAZ, left and right cardiac catheterization, dental clearance, pulmonary function tests with ABG, and carotid artery ultrasound  Rock Chairez now presents to review the results of these tests and confirm the suitability of proceeding with transcatheter aortic valve replacment  No changes to PMH  No hospitalizations/ED visits/acute illnesses  Saw Dr Mila Hirsch on 10/18 w/ no changes to medical regimen  Saw Dr Guillermina Kim on 11/1 who gave him a taper to be finished in 2 days for steroids  Saw Dr Bishnu Trujillo on 10/21 & started PAD rehab from recent surgery  Please refer to initial consultation from 10/17/19 for further PMH details      Past Medical History:  Past Medical History:   Diagnosis Date    Anxiety     Benign essential hypertension     Coronary artery disease     Depression     Diffuse arthralgia     Last Assessed: 1/23/2017    Former tobacco use     History of alcohol use     3-6 beers/day x20 years, no residual liver disease    History of Lyme disease     History of rheumatic fever     Hypercholesterolemia     Hyperlipidemia     Hyperlipidemia     Ischemic cardiomyopathy     Osteoarthritis, hip, bilateral     PAD (peripheral artery disease) (HCC)     s/p fem-below the knee bypass & percutaneous tx right CFA/SFA & left external iliac/CFA    Polymyalgia rheumatica (HCC)     on chronic steroids    RBBB     Rotator cuff disorder     b/l    Severe aortic stenosis     Wears hearing aid     b/l     Past Surgical History:   Past Surgical History:   Procedure Laterality Date    CARDIAC CATHETERIZATION      FEMORAL BYPASS Bilateral     fem-below knee w/ GSV    IR ABDOMINAL ANGIOGRAPHY / INTERVENTION  8/15/2019    IR ABDOMINAL ANGIOGRAPHY / INTERVENTION  2019    [place] SLCTV CATHJ 3RD+ ORD SLCTV ABDL PEL/LXTR [facility] Right 8/15/2019    Procedure: LEFT GROIN ACCESS RIGHT LEG ARTERIOGRAM WITH ARTHRECTOMY, LEFT LEG RUNOFF;  Surgeon: Jefry Chávez MD;  Location: [facility];  Service: Vascular    TONSILLECTOMY AND ADENOIDECTOMY       Family History:  Family History   Problem Relation Age of Onset    Cancer Mother         cervix    Coronary artery disease Father     Heart attack Father     Cancer Brother     Coronary artery disease Maternal Grandfather     Heart disease Paternal Grandfather     Stroke Paternal Grandfather         CVA    Heart attack Paternal Grandfather     Coronary artery disease Other          at age 78 per [employer]     Social History:  Social History     Substance and Sexual Activity   Alcohol Use Not Currently    Frequency: Never    Comment: 3-6 cans beer x20 years, quit , no residual liver issues     Social History     Substance and Sexual Activity   Drug Use Never     Social History     Tobacco Use   Smoking Status Former Smoker    Packs/day: 2 00    Years: 20 00    Pack years: 40 00    Types: Cigarettes    Last attempt to quit:     Years since quitting: 26 8   Smokeless Tobacco Former User    Types: Chew       Home Medications:   Prior to Admission medications    Medication Sig Start Date End Date Taking? Authorizing Provider   acetaminophen (TYLENOL) 325 mg tablet Take 650 mg by mouth every 6 (six) hours as needed for mild pain   Yes Historical Provider, MD   amLODIPine (NORVASC) 10 mg tablet Take 1 tablet (10 mg total) by mouth daily 8/20/19  Yes Christ Womack DO   aspirin (ECOTRIN LOW STRENGTH) 81 mg EC tablet Take 81 mg by mouth daily   Yes Historical Provider, MD   Cholecalciferol (VITAMIN D3) 2000 units TABS Take 2,000 Units by mouth daily   Yes Historical Provider, MD   clopidogrel (PLAVIX) 75 mg tablet Take 1 tablet (75 mg total) by mouth daily 7/16/19  Yes Dimple Schneider MD   cyanocobalamin (VITAMIN B-12) 500 mcg tablet Take 500 mcg by mouth daily   Yes Historical Provider, MD   isosorbide mononitrate (IMDUR) 30 mg 24 hr tablet Take 1 tablet (30 mg total) by mouth daily 9/10/19  Yes Fleicita Chen MD   Multiple Vitamin (MULTIVITAMIN) tablet Take 1 tablet by mouth daily   Yes Historical Provider, MD   predniSONE 5 mg tablet Take 1 tablet (5 mg total) by mouth daily 8/23/19  Yes Tri Alejo PA-C   ranolazine (RANEXA) 500 mg 12 hr tablet take 1 tablet by mouth twice a day 6/15/19  Yes Felicita Chen MD   rosuvastatin (CRESTOR) 5 mg tablet Take 1 tablet (5 mg total) by mouth daily 12/12/18  Yes Felicita Chen MD   triamterene-hydrochlorothiazide (DYAZIDE) 37 5-25 mg per capsule take 1 capsule by mouth once daily 10/29/19  Yes Christ Womack DO   mupirocin (BACTROBAN) 2 % nasal ointment into each nostril 2 (two) times a day 11/6/19   Viki Merlin, PA-C       Allergies:   Allergies   Allergen Reactions    Atorvastatin Myalgia and Arthralgia    Avelox [Moxifloxacin] Diarrhea    Pravastatin Myalgia and Arthralgia    Rosuvastatin Calcium Myalgia and Arthralgia    Simvastatin Myalgia and Arthralgia       Review of Systems:  Review of Systems   Constitutional: Positive for fatigue  Negative for activity change, diaphoresis and unexpected weight change  HENT: Negative  Negative for dental problem  Respiratory: Positive for chest tightness and shortness of breath  Negative for wheezing  Cardiovascular: Positive for leg swelling  Negative for chest pain and palpitations  Gastrointestinal: Negative  Negative for blood in stool, constipation, diarrhea, nausea and vomiting  Genitourinary: Negative  Musculoskeletal: Negative  Negative for arthralgias, back pain, gait problem and myalgias  Skin: Negative  Neurological: Negative  Negative for dizziness, syncope, weakness, light-headedness, numbness and headaches  Hematological: Negative  Does not bruise/bleed easily  All other systems reviewed and are negative  Vital Signs:     Vitals:    11/06/19 0900 11/06/19 0949   BP: 136/62 126/62   BP Location: Left arm Right arm   Cuff Size: Adult    Pulse: 67    Resp: 14    Temp: 97 5 °F (36 4 °C)    TempSrc: Oral    SpO2: 99%    Weight: 76 7 kg (169 lb)    Height: 5' 6" (1 676 m)        Physical Exam:  Physical Exam   Constitutional: He is oriented to person, place, and time  Vital signs are normal  He appears well-developed and well-nourished  Non-toxic appearance  He does not appear ill  No distress  Laying in bed in NAD   HENT:   Head: Normocephalic and atraumatic  Mouth/Throat: Uvula is midline, oropharynx is clear and moist and mucous membranes are normal  He does not have dentures  Normal dentition  Neck: Trachea normal and normal range of motion  Neck supple  No JVD present  Carotid bruit is not present  Cardiovascular: Normal rate, regular rhythm, S1 normal and S2 normal  PMI is not displaced  Exam reveals no S3, no S4 and no friction rub  No murmur heard  No heaves/lifts on palpation   Pulmonary/Chest: Effort normal and breath sounds normal  No accessory muscle usage  No respiratory distress   He has no wheezes  He has no rhonchi  He has no rales  Good inspiratory effort, equal expansion bilaterally   Abdominal: Normal appearance and bowel sounds are normal  He exhibits no distension and no mass  There is no tenderness  There is no rigidity, no rebound and no guarding  Neurological: He is alert and oriented to person, place, and time  He has normal strength  No cranial nerve deficit or sensory deficit  No focal deficit appreciated   Skin: Skin is warm, dry and intact  No bruising, no petechiae and no rash noted  No cyanosis  Nails show no clubbing  Trace edema b/l LE, no varicosities appreciated to b/l LE, 2+ RLE DP pulse, 1+ LLE DP pulse   Psychiatric: He has a normal mood and affect  Lab Results:               Invalid input(s): LABGLOM      No results found for: HGBA1C  Lab Results   Component Value Date    CKTOTAL 103 01/30/2017       Imaging Studies:     Transthoracic Echocardiogram 6/26/19: EF 20%, mild MR, severe as (TA 0 8cm2, mean 30, peak 59)    Gated CTA of the chest/abdomen/pelvis 10/24/19: TAVR measurements complete, diffuse iliac calcifications, mod b/l CFA calcifications    Left cardiac catheterization 10/28/19: 100% prox LAD , 50% prox RCA, 70% distal RCA    Pulmonary function tests 10/24/19: FEV 1 2 68/98% pred    Arterial Blood gas 10/24/19: done    Carotid artery ultrasound 10/24/19: 50-69% GLENN stenosis, <47% LICA stenosis, antegrade flow b/l, no subclavian disease b/l    I have personally reviewed pertinent reports     and I have personally reviewed pertinent films in PACS    TAVR evaluation Assessment:     5 Meter Walk Test:      Attempt 1: 5   Attempt 2: 5   Attempt 3: 5    STS Risk Score: 1 1 6 %, mortality risk    [place]: II    KCCQ-12 was completed    Assessment:  Patient Active Problem List    Diagnosis Date Noted    Aortic valve stenosis 11/05/2019    Osteopenia of left hip 11/01/2019    Claudication in peripheral vascular disease ([facility] [place] [address] ) 10/21/2019    [facility]peripheral artery disease) ([facility] ) 07/04/2019    Depression 06/17/2019    Long term systemic steroid user 04/08/2019    Coronary artery disease of native artery of native heart with stable angina pectoris ([facility] ) 06/27/2018    Essential hypertension 03/25/2018    Nonrheumatic aortic valve stenosis 09/06/2017    RODRIGUEZ (dyspnea on exertion) 09/06/2017    Cardiomyopathy, ischemic 09/06/2017    Primary generalized (osteo)arthritis 06/14/2017    Polymyalgia rheumatica ([facility] ) 02/06/2017     Severe aortic stenosis; Ongoing TAVR workup    Plan:    Hollis Essex has severe symptomatic aortic stenosis  Based on their STS risk assessment, they have undergone testing for transcatheter aortic valve replacement  The results of these studies have been interpreted by SUSAN Cowan  who has determined the patient to be High risk for open aortic valve replacement  The risks, benefits, and alternatives to TAVR were discussed in detail with the patient today  They understand and wish to proceed with transcatheter aortic valve replacement (transfemoral to start but probably transapical)  Informed consent was obtained and a date for the intervention has been set  He may need stress dose steroids at time of surgery if is transapical approach  Will discuss w/ anesthesia prior to OR  Hollis Essex was comfortable with our recommendations, and their questions were answered to their satisfaction  The following preoperative instructions were provided at the conclusion of their consultation:     1  You will receive a phone call from the hospital between 2:00 PM and 8:00 PM the day prior to surgery to confirm arrival time and location  For surgery on Mondays, you will receive a call on Friday  2  Do not drink or eat anything after midnight the night before surgery  That includes no water, candy, gum, lozenges, Lifesavers, etc  We recommend you not eat any "junk" food, consume alcohol or smoke the night before surgery    3  Continue taking aspirin but only 81 mg daily  4  If you take Coumadin and/or Plavix, discontinue it 5 days before surgery  5  If you are diabetic, do not take any of your diabetic pills the morning of surgery  If you take Lantus insulin, you may take it at your regularly scheduled time the day before surgery  Do not take any other insulins the morning of surgery  6  The 2 nights before surgery, take a shower each night using the special antiseptic soap or soap sponges you received from the office or hospital  Redia Query your hair with regular shampoo and rinse completely before using the antiseptic sponges  Use the sponge to wash from your neck down, with special attention to the armpits and groin area  Do not use any other soap or cleanser on your skin  Do not use lotions, powder, deodorant or perfume of any kind on your skin after you shower  Use clean bed linens and wear clean pajamas after your shower  7  You will be prescribed Mupirocin nasal ointment  Apply to both nostrils twice a day for 5 days prior to surgery  8  Do not take a shower the morning of her surgery; you'll be given a special" bath" at the hospital   9  Notify the CT surgery office if you develop a cold, sore throat, cough, fever or other health issues before your surgery  10  Other medication changes included the following: Plavix 5 days prior to sx  Dary Davis PA-C  DATE: November 6, 2019  TIME: 10:14 AM  Attestation signed by Aldair Warner DO at 11/6/2019 10:38 AM:  The patient was seen and examined, and I agree with the midlevel's history, physical exam, assessment and plan with the following additions:    Mr Jarvis Shoemaker was seen in the office today for follow-up for his aortic stenosis  He does have symptomatic severe aortic stenosis  His studies in preoperative testing have been reviewed by myself personally and have been discussed in detail with him  I recommended transcatheter aortic valve replacement    The risks, benefits, and alternatives to TAVR have been discussed in detail with the patient and he wishes to proceed  In review of his CT scan I believe we may be able to approaches valve from the right common femoral artery  If this is unsuccessful he will require transapical approach   The patient understands this plan

## 2019-11-06 NOTE — PROGRESS NOTES
Consultation - Cardiothoracic Surgery   Ashley Walter 68 y o  male MRN: [id number]      Reason for Consult / Principal Problem: Aortic stenosis, Non-Rheumatic    History of Present Illness: Ashley Walter is a 68y o  year old male who was previously evaluated in our office by ALFRED Rick  for transcatheter aortic valve replacement  During this initial consultation, arrangements were made for the following studies to be completed: Gated CTA of the chest/abdomen/pelvis, 3D SANAZ, left and right cardiac catheterization, dental clearance, pulmonary function tests with ABG, and carotid artery ultrasound  Ashley Walter now presents to review the results of these tests and confirm the suitability of proceeding with transcatheter aortic valve replacment  No changes to PMH  No hospitalizations/ED visits/acute illnesses  Saw Dr Jillian Mejias on 10/18 w/ no changes to medical regimen  Saw Dr Rosario Burdick on 11/1 who gave him a taper to be finished in 2 days for steroids  Saw Dr Jordan Wagner on 10/21 & started PAD rehab from recent surgery  Please refer to initial consultation from 10/17/19 for further PMH details      Past Medical History:  Past Medical History:   Diagnosis Date    Anxiety     Benign essential hypertension     Coronary artery disease     Depression     Diffuse arthralgia     Last Assessed: 1/23/2017    Former tobacco use     History of alcohol use     3-6 beers/day x20 years, no residual liver disease    History of Lyme disease     History of rheumatic fever     Hypercholesterolemia     Hyperlipidemia     Hyperlipidemia     Ischemic cardiomyopathy     Osteoarthritis, hip, bilateral     PAD (peripheral artery disease) (HCC)     s/p fem-below the knee bypass & percutaneous tx right CFA/SFA & left external iliac/CFA    Polymyalgia rheumatica (HCC)     on chronic steroids    RBBB     Rotator cuff disorder     b/l    Severe aortic stenosis     Wears hearing aid     b/l     Past Surgical History:   Past Surgical History:   Procedure Laterality Date    CARDIAC CATHETERIZATION      FEMORAL BYPASS Bilateral     fem-below knee w/ GSV    IR ABDOMINAL ANGIOGRAPHY / INTERVENTION  8/15/2019    IR ABDOMINAL ANGIOGRAPHY / INTERVENTION  2019    TN SLCTV CATHJ 3RD+ ORD SLCTV ABDL PEL/LXTR [address] [facility] Right 8/15/2019    Procedure: LEFT GROIN ACCESS RIGHT LEG ARTERIOGRAM WITH ARTHRECTOMY, LEFT LEG RUNOFF;  Surgeon: Aleah Vides MD;  Location: [facility];  Service: Vascular    TONSILLECTOMY AND ADENOIDECTOMY       Family History:  Family History   Problem Relation Age of Onset    Cancer Mother         cervix    Coronary artery disease Father     Heart attack Father     Cancer Brother     Coronary artery disease Maternal Grandfather     Heart disease Paternal Grandfather     Stroke Paternal Grandfather         CVA    Heart attack Paternal Grandfather     Coronary artery disease Other          at age 78 per [employer]     Social History:  Social History     Substance and Sexual Activity   Alcohol Use Not Currently    Frequency: Never    Comment: 3-6 cans beer x20 years, quit , no residual liver issues     Social History     Substance and Sexual Activity   Drug Use Never     Social History     Tobacco Use   Smoking Status Former Smoker    Packs/day: 2 00    Years: 20 00    Pack years: 40 00    Types: Cigarettes    Last attempt to quit:     Years since quittin 8   Smokeless Tobacco Former User    Types: Chew       Home Medications:   Prior to Admission medications    Medication Sig Start Date End Date Taking?  Authorizing Provider   acetaminophen (TYLENOL) 325 mg tablet Take 650 mg by mouth every 6 (six) hours as needed for mild pain   Yes Historical Provider, MD   amLODIPine (NORVASC) 10 mg tablet Take 1 tablet (10 mg total) by mouth daily 19  Yes Renetta Cruz, DO   aspirin (ECOTRIN LOW STRENGTH) 81 mg EC tablet Take 81 mg by mouth daily   Yes Historical Provider, MD Cholecalciferol (VITAMIN D3) 2000 units TABS Take 2,000 Units by mouth daily   Yes Historical Provider, MD   clopidogrel (PLAVIX) 75 mg tablet Take 1 tablet (75 mg total) by mouth daily 7/16/19  Yes Anita Coulter MD   cyanocobalamin (VITAMIN B-12) 500 mcg tablet Take 500 mcg by mouth daily   Yes Historical Provider, MD   isosorbide mononitrate (IMDUR) 30 mg 24 hr tablet Take 1 tablet (30 mg total) by mouth daily 9/10/19  Yes Travis Wagner MD   Multiple Vitamin (MULTIVITAMIN) tablet Take 1 tablet by mouth daily   Yes Historical Provider, MD   predniSONE 5 mg tablet Take 1 tablet (5 mg total) by mouth daily 8/23/19  Yes Taryn Kumari PA-C   ranolazine (RANEXA) 500 mg 12 hr tablet take 1 tablet by mouth twice a day 6/15/19  Yes Travis Wagner MD   rosuvastatin (CRESTOR) 5 mg tablet Take 1 tablet (5 mg total) by mouth daily 12/12/18  Yes Travis Wagner MD   triamterene-hydrochlorothiazide (DYAZIDE) 37 5-25 mg per capsule take 1 capsule by mouth once daily 10/29/19  Yes Evie Salinas DO   mupirocin (BACTROBAN) 2 % nasal ointment into each nostril 2 (two) times a day 11/6/19   Danay Zamorano PA-C       Allergies: Allergies   Allergen Reactions    Atorvastatin Myalgia and Arthralgia    Avelox [Moxifloxacin] Diarrhea    Pravastatin Myalgia and Arthralgia    Rosuvastatin Calcium Myalgia and Arthralgia    Simvastatin Myalgia and Arthralgia       Review of Systems:  Review of Systems   Constitutional: Positive for fatigue  Negative for activity change, diaphoresis and unexpected weight change  HENT: Negative  Negative for dental problem  Respiratory: Positive for chest tightness and shortness of breath  Negative for wheezing  Cardiovascular: Positive for leg swelling  Negative for chest pain and palpitations  Gastrointestinal: Negative  Negative for blood in stool, constipation, diarrhea, nausea and vomiting  Genitourinary: Negative  Musculoskeletal: Negative    Negative for arthralgias, back pain, gait problem and myalgias  Skin: Negative  Neurological: Negative  Negative for dizziness, syncope, weakness, light-headedness, numbness and headaches  Hematological: Negative  Does not bruise/bleed easily  All other systems reviewed and are negative  Vital Signs:     Vitals:    11/06/19 0900 11/06/19 0949   BP: 136/62 126/62   BP Location: Left arm Right arm   Cuff Size: Adult    Pulse: 67    Resp: 14    Temp: 97 5 °F (36 4 °C)    TempSrc: Oral    SpO2: 99%    Weight: 76 7 kg (169 lb)    Height: 5' 6" (1 676 m)        Physical Exam:  Physical Exam   Constitutional: He is oriented to person, place, and time  Vital signs are normal  He appears well-developed and well-nourished  Non-toxic appearance  He does not appear ill  No distress  Laying in bed in NAD   HENT:   Head: Normocephalic and atraumatic  Mouth/Throat: Uvula is midline, oropharynx is clear and moist and mucous membranes are normal  He does not have dentures  Normal dentition  Neck: Trachea normal and normal range of motion  Neck supple  No JVD present  Carotid bruit is not present  Cardiovascular: Normal rate, regular rhythm, S1 normal and S2 normal  PMI is not displaced  Exam reveals no S3, no S4 and no friction rub  No murmur heard  No heaves/lifts on palpation   Pulmonary/Chest: Effort normal and breath sounds normal  No accessory muscle usage  No respiratory distress  He has no wheezes  He has no rhonchi  He has no rales  Good inspiratory effort, equal expansion bilaterally   Abdominal: Normal appearance and bowel sounds are normal  He exhibits no distension and no mass  There is no tenderness  There is no rigidity, no rebound and no guarding  Neurological: He is alert and oriented to person, place, and time  He has normal strength  No cranial nerve deficit or sensory deficit  No focal deficit appreciated   Skin: Skin is warm, dry and intact  No bruising, no petechiae and no rash noted   No cyanosis  Nails show no clubbing  Trace edema b/l LE, no varicosities appreciated to b/l LE, 2+ RLE DP pulse, 1+ LLE DP pulse   Psychiatric: He has a normal mood and affect  Lab Results:               Invalid input(s): LABGLOM      No results found for: HGBA1C  Lab Results   Component Value Date    CKTOTAL 103 01/30/2017       Imaging Studies:     Transthoracic Echocardiogram 6/26/19: EF 20%, mild MR, severe as (TA 0 8cm2, mean 30, peak 59)    Gated CTA of the chest/abdomen/pelvis 10/24/19: TAVR measurements complete, diffuse iliac calcifications, mod b/l CFA calcifications    Left cardiac catheterization 10/28/19: 100% prox LAD , 50% prox RCA, 70% distal RCA    Pulmonary function tests 10/24/19: FEV 1 2 68/98% pred    Arterial Blood gas 10/24/19: done    Carotid artery ultrasound 10/24/19: 50-69% GLENN stenosis, <93% LICA stenosis, antegrade flow b/l, no subclavian disease b/l    I have personally reviewed pertinent reports  and I have personally reviewed pertinent films in PACS    TAVR evaluation Assessment:     5 Meter Walk Test:      Attempt 1: 5   Attempt 2: 5   Attempt 3: 5    STS Risk Score: 1 1 6 %, mortality risk    [address]: II    KCCQ-12 was completed    Assessment:  Patient Active Problem List    Diagnosis Date Noted    Aortic valve stenosis 11/05/2019    Osteopenia of left hip 11/01/2019    Claudication in peripheral vascular disease ([facility] [address] ) 10/21/2019    PAD (peripheral artery disease) ([facility] [address] ) 07/04/2019    Depression 06/17/2019    Long term systemic steroid user 04/08/2019    Coronary artery disease of native artery of native heart with stable angina pectoris ([facility] [address] ) 06/27/2018    Essential hypertension 03/25/2018    Nonrheumatic aortic valve stenosis 09/06/2017    RODRIGUEZ (dyspnea on exertion) 09/06/2017    Cardiomyopathy, ischemic 09/06/2017    Primary generalized (osteo)arthritis 06/14/2017    Polymyalgia rheumatica ([facility] [address] ) 02/06/2017     Severe aortic stenosis;  Ongoing TAVR workup    Plan:    Jennifer Aviels has severe symptomatic aortic stenosis  Based on their STS risk assessment, they have undergone testing for transcatheter aortic valve replacement  The results of these studies have been interpreted by SUSAN Rice  who has determined the patient to be High risk for open aortic valve replacement  The risks, benefits, and alternatives to TAVR were discussed in detail with the patient today  They understand and wish to proceed with transcatheter aortic valve replacement (transfemoral to start but probably transapical)  Informed consent was obtained and a date for the intervention has been set  He may need stress dose steroids at time of surgery if is transapical approach  Will discuss w/ anesthesia prior to OR  Jennifer Aviles was comfortable with our recommendations, and their questions were answered to their satisfaction  The following preoperative instructions were provided at the conclusion of their consultation:     1  You will receive a phone call from the hospital between 2:00 PM and 8:00 PM the day prior to surgery to confirm arrival time and location  For surgery on Mondays, you will receive a call on Friday  2  Do not drink or eat anything after midnight the night before surgery  That includes no water, candy, gum, lozenges, Lifesavers, etc  We recommend you not eat any "junk" food, consume alcohol or smoke the night before surgery  3  Continue taking aspirin but only 81 mg daily  4  If you take Coumadin and/or Plavix, discontinue it 5 days before surgery  5  If you are diabetic, do not take any of your diabetic pills the morning of surgery  If you take Lantus insulin, you may take it at your regularly scheduled time the day before surgery  Do not take any other insulins the morning of surgery    6  The 2 nights before surgery, take a shower each night using the special antiseptic soap or soap sponges you received from the office or hospital  Shampoo your hair with regular shampoo and rinse completely before using the antiseptic sponges  Use the sponge to wash from your neck down, with special attention to the armpits and groin area  Do not use any other soap or cleanser on your skin  Do not use lotions, powder, deodorant or perfume of any kind on your skin after you shower  Use clean bed linens and wear clean pajamas after your shower  7  You will be prescribed Mupirocin nasal ointment  Apply to both nostrils twice a day for 5 days prior to surgery  8  Do not take a shower the morning of her surgery; you'll be given a special" bath" at the hospital   9  Notify the CT surgery office if you develop a cold, sore throat, cough, fever or other health issues before your surgery  10  Other medication changes included the following: Plavix 5 days prior to sx       SIGNATURE: Jen Nyhan, PA-C  DATE: November 6, 2019  TIME: 10:14 AM

## 2019-11-08 ENCOUNTER — CLINICAL SUPPORT (OUTPATIENT)
Dept: PULMONOLOGY | Facility: HOSPITAL | Age: 69
End: 2019-11-08
Payer: MEDICARE

## 2019-11-08 DIAGNOSIS — I70.218 ATHSCL NATIVE ARTERIES OF EXTRM W INTRMT CLAUD, OTH EXTRM (HCC): ICD-10-CM

## 2019-11-08 LAB
MRSA NOSE QL CULT: ABNORMAL
MRSA NOSE QL CULT: ABNORMAL

## 2019-11-08 PROCEDURE — 93668 PERIPHERAL VASCULAR REHAB: CPT

## 2019-11-08 NOTE — PRE-PROCEDURE INSTRUCTIONS
Pre-Surgery Instructions:   Medication Instructions    acetaminophen (TYLENOL) 325 mg tablet Patient was instructed by Physician and understands   amLODIPine (NORVASC) 10 mg tablet Patient was instructed by Physician and understands   aspirin (ECOTRIN LOW STRENGTH) 81 mg EC tablet Patient was instructed by Physician and understands   Cholecalciferol (VITAMIN D3) 2000 units TABS Patient was instructed by Physician and understands   cyanocobalamin (VITAMIN B-12) 500 mcg tablet Patient was instructed by Physician and understands   isosorbide mononitrate (IMDUR) 30 mg 24 hr tablet Patient was instructed by Physician and understands   Multiple Vitamin (MULTIVITAMIN) tablet Patient was instructed by Physician and understands   mupirocin (BACTROBAN) 2 % nasal ointment Patient was instructed by Physician and understands   ranolazine (RANEXA) 500 mg 12 hr tablet Patient was instructed by Physician and understands   rosuvastatin (CRESTOR) 5 mg tablet Patient was instructed by Physician and understands   triamterene-hydrochlorothiazide (DYAZIDE) 37 5-25 mg per capsule Patient was instructed by Physician and understands  Review with patient via phone showering and medications instructions given by office and verbalized understanding  Advice [facility] call

## 2019-11-11 ENCOUNTER — ANESTHESIA EVENT (OUTPATIENT)
Dept: PERIOP | Facility: HOSPITAL | Age: 69
DRG: 266 | End: 2019-11-11
Payer: MEDICARE

## 2019-11-11 ENCOUNTER — APPOINTMENT (OUTPATIENT)
Dept: PULMONOLOGY | Facility: HOSPITAL | Age: 69
End: 2019-11-11
Payer: MEDICARE

## 2019-11-11 RX ORDER — HEPARIN SODIUM 1000 [USP'U]/ML
10000 INJECTION, SOLUTION INTRAVENOUS; SUBCUTANEOUS ONCE
Status: CANCELLED | OUTPATIENT
Start: 2019-11-12

## 2019-11-11 RX ORDER — HEPARIN SODIUM 1000 [USP'U]/ML
400 INJECTION, SOLUTION INTRAVENOUS; SUBCUTANEOUS ONCE
Status: CANCELLED | OUTPATIENT
Start: 2019-11-12

## 2019-11-11 RX ORDER — CEFAZOLIN SODIUM 2 G/50ML
2000 SOLUTION INTRAVENOUS ONCE
Status: CANCELLED | OUTPATIENT
Start: 2019-11-12

## 2019-11-11 NOTE — ANESTHESIA PREPROCEDURE EVALUATION
Review of Systems/Medical History      No history of anesthetic complications     Cardiovascular  Hyperlipidemia, Hypertension , Valvular heart disease , aortic valve stenosis, CAD , Angina , CHF , RODRIGUEZ,   Comment: EF 20, RV normal systolic function, sev AS    CAD: LAD , collaterals; stent in LCx circulation; plavix, asa    PAD, multiple vascular procedures    R ICA 50-69; L ICA < 50    RBBB,  Pulmonary  Smoker ex-smoker  , Shortness of breath,        GI/Hepatic      Comment: H/o etOH use     Negative  ROS        Endo/Other  Negative endo/other ROS      GYN  Negative gynecology ROS          Hematology  Negative hematology ROS      Musculoskeletal    Comment: PMR on steroids Arthritis     Neurology  Negative neurology ROS      Psychology   Anxiety, Depression ,              Physical Exam    Airway    Mallampati score: I  TM Distance: >3 FB  Neck ROM: full     Dental   No notable dental hx upper dentures and lower dentures,     Cardiovascular      Pulmonary      Other Findings        Anesthesia Plan  ASA Score- 4     Anesthesia Type- general with ASA Monitors  Additional Monitors: arterial line and central venous line  Airway Plan:     Comment: General anesthesia, endotracheal intubation, standard ASA monitors; large bore IV access (possible Cordis); pre-induction arterial line; TLC CVL; SANAZ (no SANAZ contraindications noted); plan for post-op PACU, possiblve ICU/vent  Risks discussed - nausea, discomfort, sore throat, dental damage; rare anesthetic emergencies; patient understands, agrees to proceed  Plan for stress dose steroids in OR - 125 mg hydrocortisone - pt is one chronic steroid therapy    Plan Factors-    Induction- intravenous  Postoperative Plan- Plan for postoperative opioid use  Planned trial extubation    Informed Consent- Anesthetic plan and risks discussed with patient  I personally reviewed this patient with the CRNA   Discussed and agreed on the Anesthesia Plan with the NAOMY Ruiz

## 2019-11-12 ENCOUNTER — APPOINTMENT (OUTPATIENT)
Dept: NON INVASIVE DIAGNOSTICS | Facility: HOSPITAL | Age: 69
DRG: 266 | End: 2019-11-12
Payer: MEDICARE

## 2019-11-12 ENCOUNTER — HOSPITAL ENCOUNTER (INPATIENT)
Facility: HOSPITAL | Age: 69
LOS: 2 days | Discharge: HOME HEALTH | DRG: 266 | End: 2019-11-14
Attending: THORACIC SURGERY (CARDIOTHORACIC VASCULAR SURGERY) | Admitting: THORACIC SURGERY (CARDIOTHORACIC VASCULAR SURGERY)
Payer: MEDICARE

## 2019-11-12 ENCOUNTER — ANESTHESIA (OUTPATIENT)
Dept: PERIOP | Facility: HOSPITAL | Age: 69
DRG: 266 | End: 2019-11-12
Payer: MEDICARE

## 2019-11-12 ENCOUNTER — APPOINTMENT (INPATIENT)
Dept: RADIOLOGY | Facility: HOSPITAL | Age: 69
DRG: 266 | End: 2019-11-12
Payer: MEDICARE

## 2019-11-12 DIAGNOSIS — I35.0 AORTIC VALVE STENOSIS, ETIOLOGY OF CARDIAC VALVE DISEASE UNSPECIFIED: ICD-10-CM

## 2019-11-12 DIAGNOSIS — I35.0 SEVERE AORTIC STENOSIS: Primary | ICD-10-CM

## 2019-11-12 DIAGNOSIS — I35.9 NONRHEUMATIC AORTIC VALVE DISORDER: ICD-10-CM

## 2019-11-12 DIAGNOSIS — Z95.2 S/P TAVR (TRANSCATHETER AORTIC VALVE REPLACEMENT): Primary | ICD-10-CM

## 2019-11-12 LAB
ANION GAP SERPL CALCULATED.3IONS-SCNC: 7 MMOL/L (ref 4–13)
ATRIAL RATE: 83 BPM
BASE EXCESS BLDA CALC-SCNC: -2 MMOL/L (ref -2–3)
BASE EXCESS BLDA CALC-SCNC: 1 MMOL/L (ref -2–3)
BUN SERPL-MCNC: 15 MG/DL (ref 5–25)
CA-I BLD-SCNC: 1.13 MMOL/L (ref 1.12–1.32)
CA-I BLD-SCNC: 1.2 MMOL/L (ref 1.12–1.32)
CALCIUM SERPL-MCNC: 8.7 MG/DL (ref 8.3–10.1)
CHLORIDE SERPL-SCNC: 109 MMOL/L (ref 100–108)
CO2 SERPL-SCNC: 24 MMOL/L (ref 21–32)
CREAT SERPL-MCNC: 0.69 MG/DL (ref 0.6–1.3)
GFR SERPL CREATININE-BSD FRML MDRD: 96 ML/MIN/1.73SQ M
GLUCOSE SERPL-MCNC: 118 MG/DL (ref 65–140)
GLUCOSE SERPL-MCNC: 118 MG/DL (ref 65–140)
GLUCOSE SERPL-MCNC: 121 MG/DL (ref 65–140)
GLUCOSE SERPL-MCNC: 144 MG/DL (ref 65–140)
GLUCOSE SERPL-MCNC: 148 MG/DL (ref 65–140)
GLUCOSE SERPL-MCNC: 150 MG/DL (ref 65–140)
GLUCOSE SERPL-MCNC: 154 MG/DL (ref 65–140)
HCO3 BLDA-SCNC: 23.3 MMOL/L (ref 22–28)
HCO3 BLDA-SCNC: 26.1 MMOL/L (ref 22–28)
HCT VFR BLD AUTO: 37.2 % (ref 36.5–49.3)
HCT VFR BLD AUTO: 39.2 % (ref 36.5–49.3)
HCT VFR BLD CALC: 33 % (ref 36.5–49.3)
HCT VFR BLD CALC: 35 % (ref 36.5–49.3)
HGB BLD-MCNC: 12.4 G/DL (ref 12–17)
HGB BLD-MCNC: 13 G/DL (ref 12–17)
HGB BLDA-MCNC: 11.2 G/DL (ref 12–17)
HGB BLDA-MCNC: 11.9 G/DL (ref 12–17)
KCT BLD-ACNC: 126 SEC (ref 89–137)
KCT BLD-ACNC: 126 SEC (ref 89–137)
KCT BLD-ACNC: 316 SEC (ref 89–137)
P AXIS: 65 DEGREES
PCO2 BLD: 25 MMOL/L (ref 21–32)
PCO2 BLD: 27 MMOL/L (ref 21–32)
PCO2 BLD: 41.4 MM HG (ref 36–44)
PCO2 BLD: 41.4 MM HG (ref 36–44)
PH BLD: 7.36 [PH] (ref 7.35–7.45)
PH BLD: 7.41 [PH] (ref 7.35–7.45)
PLATELET # BLD AUTO: 161 THOUSANDS/UL (ref 149–390)
PMV BLD AUTO: 9.8 FL (ref 8.9–12.7)
PO2 BLD: 112 MM HG (ref 75–129)
PO2 BLD: 169 MM HG (ref 75–129)
POTASSIUM BLD-SCNC: 3.3 MMOL/L (ref 3.5–5.3)
POTASSIUM BLD-SCNC: 3.4 MMOL/L (ref 3.5–5.3)
POTASSIUM SERPL-SCNC: 3.5 MMOL/L (ref 3.5–5.3)
PR INTERVAL: 171 MS
QRS AXIS: -44 DEGREES
QRSD INTERVAL: 158 MS
QT INTERVAL: 388 MS
QTC INTERVAL: 456 MS
SAO2 % BLD FROM PO2: 100 % (ref 60–85)
SAO2 % BLD FROM PO2: 98 % (ref 60–85)
SODIUM BLD-SCNC: 140 MMOL/L (ref 136–145)
SODIUM BLD-SCNC: 140 MMOL/L (ref 136–145)
SODIUM SERPL-SCNC: 140 MMOL/L (ref 136–145)
SPECIMEN SOURCE: ABNORMAL
SPECIMEN SOURCE: NORMAL
SPECIMEN SOURCE: NORMAL
T WAVE AXIS: 96 DEGREES
VENTRICULAR RATE: 83 BPM

## 2019-11-12 PROCEDURE — 02HV33Z INSERTION OF INFUSION DEVICE INTO SUPERIOR VENA CAVA, PERCUTANEOUS APPROACH: ICD-10-PCS | Performed by: ANESTHESIOLOGY

## 2019-11-12 PROCEDURE — 82330 ASSAY OF CALCIUM: CPT

## 2019-11-12 PROCEDURE — 93312 ECHO TRANSESOPHAGEAL: CPT

## 2019-11-12 PROCEDURE — 82947 ASSAY GLUCOSE BLOOD QUANT: CPT

## 2019-11-12 PROCEDURE — NC001 PR NO CHARGE: Performed by: PHYSICIAN ASSISTANT

## 2019-11-12 PROCEDURE — 85049 AUTOMATED PLATELET COUNT: CPT | Performed by: PHYSICIAN ASSISTANT

## 2019-11-12 PROCEDURE — C1769 GUIDE WIRE: HCPCS | Performed by: THORACIC SURGERY (CARDIOTHORACIC VASCULAR SURGERY)

## 2019-11-12 PROCEDURE — 82803 BLOOD GASES ANY COMBINATION: CPT

## 2019-11-12 PROCEDURE — 85018 HEMOGLOBIN: CPT | Performed by: PHYSICIAN ASSISTANT

## 2019-11-12 PROCEDURE — 85014 HEMATOCRIT: CPT | Performed by: PHYSICIAN ASSISTANT

## 2019-11-12 PROCEDURE — C1894 INTRO/SHEATH, NON-LASER: HCPCS | Performed by: THORACIC SURGERY (CARDIOTHORACIC VASCULAR SURGERY)

## 2019-11-12 PROCEDURE — 76377 3D RENDER W/INTRP POSTPROCES: CPT

## 2019-11-12 PROCEDURE — 86923 COMPATIBILITY TEST ELECTRIC: CPT

## 2019-11-12 PROCEDURE — 33362 REPLACE AORTIC VALVE OPEN: CPT | Performed by: THORACIC SURGERY (CARDIOTHORACIC VASCULAR SURGERY)

## 2019-11-12 PROCEDURE — 02RF38Z REPLACEMENT OF AORTIC VALVE WITH ZOOPLASTIC TISSUE, PERCUTANEOUS APPROACH: ICD-10-PCS | Performed by: THORACIC SURGERY (CARDIOTHORACIC VASCULAR SURGERY)

## 2019-11-12 PROCEDURE — 85014 HEMATOCRIT: CPT

## 2019-11-12 PROCEDURE — 94760 N-INVAS EAR/PLS OXIMETRY 1: CPT

## 2019-11-12 PROCEDURE — 84132 ASSAY OF SERUM POTASSIUM: CPT

## 2019-11-12 PROCEDURE — 85347 COAGULATION TIME ACTIVATED: CPT

## 2019-11-12 PROCEDURE — 93005 ELECTROCARDIOGRAM TRACING: CPT

## 2019-11-12 PROCEDURE — 71045 X-RAY EXAM CHEST 1 VIEW: CPT

## 2019-11-12 PROCEDURE — 33361 REPLACE AORTIC VALVE PERQ: CPT | Performed by: INTERNAL MEDICINE

## 2019-11-12 PROCEDURE — 93010 ELECTROCARDIOGRAM REPORT: CPT | Performed by: INTERNAL MEDICINE

## 2019-11-12 PROCEDURE — 84295 ASSAY OF SERUM SODIUM: CPT

## 2019-11-12 PROCEDURE — 80048 BASIC METABOLIC PNL TOTAL CA: CPT | Performed by: PHYSICIAN ASSISTANT

## 2019-11-12 PROCEDURE — 82948 REAGENT STRIP/BLOOD GLUCOSE: CPT

## 2019-11-12 DEVICE — TAVR SAPIEN 3 CMNDR DLV SYS 29MM: Type: IMPLANTABLE DEVICE | Site: HEART | Status: FUNCTIONAL

## 2019-11-12 RX ORDER — ONDANSETRON 2 MG/ML
INJECTION INTRAMUSCULAR; INTRAVENOUS AS NEEDED
Status: DISCONTINUED | OUTPATIENT
Start: 2019-11-12 | End: 2019-11-12 | Stop reason: SURG

## 2019-11-12 RX ORDER — ROSUVASTATIN CALCIUM 10 MG/1
5 TABLET, COATED ORAL DAILY
Status: DISCONTINUED | OUTPATIENT
Start: 2019-11-12 | End: 2019-11-14 | Stop reason: HOSPADM

## 2019-11-12 RX ORDER — POTASSIUM CHLORIDE 29.8 MG/ML
40 INJECTION INTRAVENOUS ONCE
Status: COMPLETED | OUTPATIENT
Start: 2019-11-12 | End: 2019-11-12

## 2019-11-12 RX ORDER — ISOSORBIDE MONONITRATE 30 MG/1
30 TABLET, EXTENDED RELEASE ORAL DAILY
Status: DISCONTINUED | OUTPATIENT
Start: 2019-11-12 | End: 2019-11-14 | Stop reason: HOSPADM

## 2019-11-12 RX ORDER — ETOMIDATE 2 MG/ML
INJECTION INTRAVENOUS AS NEEDED
Status: DISCONTINUED | OUTPATIENT
Start: 2019-11-12 | End: 2019-11-12 | Stop reason: SURG

## 2019-11-12 RX ORDER — OXYCODONE HYDROCHLORIDE AND ACETAMINOPHEN 5; 325 MG/1; MG/1
1 TABLET ORAL EVERY 8 HOURS PRN
Status: DISCONTINUED | OUTPATIENT
Start: 2019-11-12 | End: 2019-11-14 | Stop reason: HOSPADM

## 2019-11-12 RX ORDER — SODIUM CHLORIDE, SODIUM LACTATE, POTASSIUM CHLORIDE, CALCIUM CHLORIDE 600; 310; 30; 20 MG/100ML; MG/100ML; MG/100ML; MG/100ML
INJECTION, SOLUTION INTRAVENOUS CONTINUOUS PRN
Status: DISCONTINUED | OUTPATIENT
Start: 2019-11-12 | End: 2019-11-12 | Stop reason: SURG

## 2019-11-12 RX ORDER — GLYCOPYRROLATE 0.2 MG/ML
INJECTION INTRAMUSCULAR; INTRAVENOUS AS NEEDED
Status: DISCONTINUED | OUTPATIENT
Start: 2019-11-12 | End: 2019-11-12 | Stop reason: SURG

## 2019-11-12 RX ORDER — TRIAMTERENE AND HYDROCHLOROTHIAZIDE 37.5; 25 MG/1; MG/1
1 TABLET ORAL DAILY
Status: DISCONTINUED | OUTPATIENT
Start: 2019-11-12 | End: 2019-11-14 | Stop reason: HOSPADM

## 2019-11-12 RX ORDER — CHLORHEXIDINE GLUCONATE 0.12 MG/ML
15 RINSE ORAL 2 TIMES DAILY
Status: DISCONTINUED | OUTPATIENT
Start: 2019-11-12 | End: 2019-11-14 | Stop reason: HOSPADM

## 2019-11-12 RX ORDER — FUROSEMIDE 10 MG/ML
20 INJECTION INTRAMUSCULAR; INTRAVENOUS ONCE
Status: COMPLETED | OUTPATIENT
Start: 2019-11-12 | End: 2019-11-12

## 2019-11-12 RX ORDER — CEFAZOLIN SODIUM 2 G/50ML
2000 SOLUTION INTRAVENOUS ONCE
Status: COMPLETED | OUTPATIENT
Start: 2019-11-12 | End: 2019-11-12

## 2019-11-12 RX ORDER — CEFAZOLIN SODIUM 2 G/50ML
2000 SOLUTION INTRAVENOUS EVERY 12 HOURS
Status: DISCONTINUED | OUTPATIENT
Start: 2019-11-12 | End: 2019-11-12 | Stop reason: HOSPADM

## 2019-11-12 RX ORDER — HYDROMORPHONE HCL/PF 1 MG/ML
0.2 SYRINGE (ML) INJECTION
Status: COMPLETED | OUTPATIENT
Start: 2019-11-12 | End: 2019-11-12

## 2019-11-12 RX ORDER — FENTANYL CITRATE 50 UG/ML
INJECTION, SOLUTION INTRAMUSCULAR; INTRAVENOUS AS NEEDED
Status: DISCONTINUED | OUTPATIENT
Start: 2019-11-12 | End: 2019-11-12 | Stop reason: SURG

## 2019-11-12 RX ORDER — EPHEDRINE SULFATE 50 MG/ML
INJECTION INTRAVENOUS AS NEEDED
Status: DISCONTINUED | OUTPATIENT
Start: 2019-11-12 | End: 2019-11-12 | Stop reason: SURG

## 2019-11-12 RX ORDER — NEOSTIGMINE METHYLSULFATE 1 MG/ML
INJECTION INTRAVENOUS AS NEEDED
Status: DISCONTINUED | OUTPATIENT
Start: 2019-11-12 | End: 2019-11-12 | Stop reason: SURG

## 2019-11-12 RX ORDER — SODIUM CHLORIDE 9 MG/ML
INJECTION, SOLUTION INTRAVENOUS CONTINUOUS PRN
Status: DISCONTINUED | OUTPATIENT
Start: 2019-11-12 | End: 2019-11-12 | Stop reason: SURG

## 2019-11-12 RX ORDER — MIDAZOLAM HYDROCHLORIDE 2 MG/2ML
INJECTION, SOLUTION INTRAMUSCULAR; INTRAVENOUS AS NEEDED
Status: DISCONTINUED | OUTPATIENT
Start: 2019-11-12 | End: 2019-11-12 | Stop reason: SURG

## 2019-11-12 RX ORDER — LIDOCAINE HYDROCHLORIDE 20 MG/ML
INJECTION, SOLUTION INFILTRATION; PERINEURAL
Status: COMPLETED | OUTPATIENT
Start: 2019-11-12 | End: 2019-11-12

## 2019-11-12 RX ORDER — HEPARIN SODIUM 1000 [USP'U]/ML
INJECTION, SOLUTION INTRAVENOUS; SUBCUTANEOUS AS NEEDED
Status: DISCONTINUED | OUTPATIENT
Start: 2019-11-12 | End: 2019-11-12 | Stop reason: SURG

## 2019-11-12 RX ORDER — HEPARIN SODIUM 5000 [USP'U]/ML
5000 INJECTION, SOLUTION INTRAVENOUS; SUBCUTANEOUS EVERY 8 HOURS SCHEDULED
Status: DISCONTINUED | OUTPATIENT
Start: 2019-11-13 | End: 2019-11-14 | Stop reason: HOSPADM

## 2019-11-12 RX ORDER — SODIUM CHLORIDE, SODIUM LACTATE, POTASSIUM CHLORIDE, CALCIUM CHLORIDE 600; 310; 30; 20 MG/100ML; MG/100ML; MG/100ML; MG/100ML
75 INJECTION, SOLUTION INTRAVENOUS CONTINUOUS
Status: DISCONTINUED | OUTPATIENT
Start: 2019-11-12 | End: 2019-11-13

## 2019-11-12 RX ORDER — ACETAMINOPHEN 325 MG/1
650 TABLET ORAL EVERY 4 HOURS PRN
Status: DISCONTINUED | OUTPATIENT
Start: 2019-11-12 | End: 2019-11-14 | Stop reason: HOSPADM

## 2019-11-12 RX ORDER — CLOPIDOGREL BISULFATE 75 MG/1
75 TABLET ORAL DAILY
Status: DISCONTINUED | OUTPATIENT
Start: 2019-11-12 | End: 2019-11-14 | Stop reason: HOSPADM

## 2019-11-12 RX ORDER — SODIUM CHLORIDE, SODIUM GLUCONATE, SODIUM ACETATE, POTASSIUM CHLORIDE, MAGNESIUM CHLORIDE, SODIUM PHOSPHATE, DIBASIC, AND POTASSIUM PHOSPHATE .53; .5; .37; .037; .03; .012; .00082 G/100ML; G/100ML; G/100ML; G/100ML; G/100ML; G/100ML; G/100ML
50 INJECTION, SOLUTION INTRAVENOUS CONTINUOUS
Status: DISPENSED | OUTPATIENT
Start: 2019-11-12 | End: 2019-11-12

## 2019-11-12 RX ORDER — ASPIRIN 81 MG/1
81 TABLET ORAL DAILY
Status: DISCONTINUED | OUTPATIENT
Start: 2019-11-12 | End: 2019-11-14 | Stop reason: HOSPADM

## 2019-11-12 RX ORDER — CHLORHEXIDINE GLUCONATE 0.12 MG/ML
15 RINSE ORAL ONCE
Status: COMPLETED | OUTPATIENT
Start: 2019-11-12 | End: 2019-11-12

## 2019-11-12 RX ORDER — ROCURONIUM BROMIDE 10 MG/ML
INJECTION, SOLUTION INTRAVENOUS AS NEEDED
Status: DISCONTINUED | OUTPATIENT
Start: 2019-11-12 | End: 2019-11-12 | Stop reason: SURG

## 2019-11-12 RX ORDER — FENTANYL CITRATE/PF 50 MCG/ML
25 SYRINGE (ML) INJECTION
Status: COMPLETED | OUTPATIENT
Start: 2019-11-12 | End: 2019-11-12

## 2019-11-12 RX ORDER — PANTOPRAZOLE SODIUM 40 MG/1
40 TABLET, DELAYED RELEASE ORAL DAILY
Status: DISCONTINUED | OUTPATIENT
Start: 2019-11-12 | End: 2019-11-14 | Stop reason: HOSPADM

## 2019-11-12 RX ORDER — RANOLAZINE 500 MG/1
500 TABLET, EXTENDED RELEASE ORAL 2 TIMES DAILY
Status: DISCONTINUED | OUTPATIENT
Start: 2019-11-12 | End: 2019-11-14 | Stop reason: HOSPADM

## 2019-11-12 RX ORDER — PROTAMINE SULFATE 10 MG/ML
INJECTION, SOLUTION INTRAVENOUS AS NEEDED
Status: DISCONTINUED | OUTPATIENT
Start: 2019-11-12 | End: 2019-11-12 | Stop reason: SURG

## 2019-11-12 RX ORDER — ONDANSETRON 2 MG/ML
4 INJECTION INTRAMUSCULAR; INTRAVENOUS EVERY 6 HOURS PRN
Status: DISCONTINUED | OUTPATIENT
Start: 2019-11-12 | End: 2019-11-14 | Stop reason: HOSPADM

## 2019-11-12 RX ORDER — POLYETHYLENE GLYCOL 3350 17 G/17G
17 POWDER, FOR SOLUTION ORAL DAILY
Status: DISCONTINUED | OUTPATIENT
Start: 2019-11-12 | End: 2019-11-14 | Stop reason: HOSPADM

## 2019-11-12 RX ORDER — BISACODYL 10 MG
10 SUPPOSITORY, RECTAL RECTAL DAILY PRN
Status: DISCONTINUED | OUTPATIENT
Start: 2019-11-12 | End: 2019-11-14 | Stop reason: HOSPADM

## 2019-11-12 RX ADMIN — METOPROLOL TARTRATE 12.5 MG: 25 TABLET ORAL at 11:09

## 2019-11-12 RX ADMIN — FENTANYL CITRATE 25 MCG: 50 INJECTION INTRAMUSCULAR; INTRAVENOUS at 10:00

## 2019-11-12 RX ADMIN — FENTANYL CITRATE 50 MCG: 50 INJECTION, SOLUTION INTRAMUSCULAR; INTRAVENOUS at 08:34

## 2019-11-12 RX ADMIN — HYDROMORPHONE HYDROCHLORIDE 0.2 MG: 1 INJECTION, SOLUTION INTRAMUSCULAR; INTRAVENOUS; SUBCUTANEOUS at 10:29

## 2019-11-12 RX ADMIN — RANOLAZINE 500 MG: 500 TABLET, FILM COATED, EXTENDED RELEASE ORAL at 13:26

## 2019-11-12 RX ADMIN — HYDROMORPHONE HYDROCHLORIDE 0.2 MG: 1 INJECTION, SOLUTION INTRAMUSCULAR; INTRAVENOUS; SUBCUTANEOUS at 10:58

## 2019-11-12 RX ADMIN — CHLORHEXIDINE GLUCONATE 0.12% ORAL RINSE 15 ML: 1.2 LIQUID ORAL at 06:36

## 2019-11-12 RX ADMIN — MIDAZOLAM 1 MG: 1 INJECTION INTRAMUSCULAR; INTRAVENOUS at 07:33

## 2019-11-12 RX ADMIN — NEOSTIGMINE METHYLSULFATE 3 MG: 1 INJECTION, SOLUTION INTRAVENOUS at 09:20

## 2019-11-12 RX ADMIN — HYDROMORPHONE HYDROCHLORIDE 0.2 MG: 1 INJECTION, SOLUTION INTRAMUSCULAR; INTRAVENOUS; SUBCUTANEOUS at 10:42

## 2019-11-12 RX ADMIN — FENTANYL CITRATE 25 MCG: 50 INJECTION INTRAMUSCULAR; INTRAVENOUS at 10:18

## 2019-11-12 RX ADMIN — SODIUM CHLORIDE 5 MG/HR: 0.9 INJECTION, SOLUTION INTRAVENOUS at 20:40

## 2019-11-12 RX ADMIN — SODIUM CHLORIDE, SODIUM LACTATE, POTASSIUM CHLORIDE, AND CALCIUM CHLORIDE: .6; .31; .03; .02 INJECTION, SOLUTION INTRAVENOUS at 07:05

## 2019-11-12 RX ADMIN — LIDOCAINE HYDROCHLORIDE 0.5 ML: 20 INJECTION, SOLUTION INFILTRATION; PERINEURAL at 07:29

## 2019-11-12 RX ADMIN — RANOLAZINE 500 MG: 500 TABLET, FILM COATED, EXTENDED RELEASE ORAL at 21:27

## 2019-11-12 RX ADMIN — ACETAMINOPHEN 650 MG: 325 TABLET ORAL at 13:26

## 2019-11-12 RX ADMIN — PHENYLEPHRINE HYDROCHLORIDE 100 MCG: 10 INJECTION INTRAVENOUS at 07:36

## 2019-11-12 RX ADMIN — OXYCODONE HYDROCHLORIDE AND ACETAMINOPHEN 1 TABLET: 5; 325 TABLET ORAL at 21:26

## 2019-11-12 RX ADMIN — MIDAZOLAM 1 MG: 1 INJECTION INTRAMUSCULAR; INTRAVENOUS at 07:32

## 2019-11-12 RX ADMIN — GLYCOPYRROLATE 0.6 MG: 0.2 INJECTION, SOLUTION INTRAMUSCULAR; INTRAVENOUS at 09:20

## 2019-11-12 RX ADMIN — HYDROMORPHONE HYDROCHLORIDE 0.2 MG: 1 INJECTION, SOLUTION INTRAMUSCULAR; INTRAVENOUS; SUBCUTANEOUS at 10:24

## 2019-11-12 RX ADMIN — SODIUM CHLORIDE, SODIUM GLUCONATE, SODIUM ACETATE, POTASSIUM CHLORIDE AND MAGNESIUM CHLORIDE 50 ML/HR: 526; 502; 368; 37; 30 INJECTION, SOLUTION INTRAVENOUS at 10:22

## 2019-11-12 RX ADMIN — EPHEDRINE SULFATE 5 MG: 50 INJECTION, SOLUTION INTRAVENOUS at 07:35

## 2019-11-12 RX ADMIN — ROCURONIUM BROMIDE 40 MG: 50 INJECTION, SOLUTION INTRAVENOUS at 07:36

## 2019-11-12 RX ADMIN — POTASSIUM CHLORIDE 40 MEQ: 29.8 INJECTION, SOLUTION INTRAVENOUS at 12:43

## 2019-11-12 RX ADMIN — FENTANYL CITRATE 100 MCG: 50 INJECTION, SOLUTION INTRAMUSCULAR; INTRAVENOUS at 07:36

## 2019-11-12 RX ADMIN — METOPROLOL TARTRATE 12.5 MG: 25 TABLET ORAL at 21:27

## 2019-11-12 RX ADMIN — HEPARIN SODIUM 15000 UNITS: 1000 INJECTION INTRAVENOUS; SUBCUTANEOUS at 08:33

## 2019-11-12 RX ADMIN — MUPIROCIN 1 APPLICATION: 20 OINTMENT TOPICAL at 06:37

## 2019-11-12 RX ADMIN — CHLORHEXIDINE GLUCONATE 0.12% ORAL RINSE 15 ML: 1.2 LIQUID ORAL at 17:27

## 2019-11-12 RX ADMIN — ETOMIDATE 12 MG: 20 INJECTION, SOLUTION INTRAVENOUS at 07:36

## 2019-11-12 RX ADMIN — ROCURONIUM BROMIDE 10 MG: 50 INJECTION, SOLUTION INTRAVENOUS at 08:57

## 2019-11-12 RX ADMIN — FUROSEMIDE 20 MG: 10 INJECTION, SOLUTION INTRAMUSCULAR; INTRAVENOUS at 09:57

## 2019-11-12 RX ADMIN — SODIUM CHLORIDE 5 MG/HR: 0.9 INJECTION, SOLUTION INTRAVENOUS at 09:02

## 2019-11-12 RX ADMIN — HYDROMORPHONE HYDROCHLORIDE 0.2 MG: 1 INJECTION, SOLUTION INTRAMUSCULAR; INTRAVENOUS; SUBCUTANEOUS at 10:35

## 2019-11-12 RX ADMIN — CLOPIDOGREL BISULFATE 75 MG: 75 TABLET ORAL at 10:44

## 2019-11-12 RX ADMIN — ASPIRIN 81 MG: 81 TABLET, COATED ORAL at 17:27

## 2019-11-12 RX ADMIN — ACETAMINOPHEN 650 MG: 325 TABLET ORAL at 18:03

## 2019-11-12 RX ADMIN — CEFAZOLIN SODIUM 2000 MG: 2 SOLUTION INTRAVENOUS at 07:40

## 2019-11-12 RX ADMIN — TRIAMTERENE AND HYDROCHLOROTHIAZIDE 1 TABLET: 37.5; 25 TABLET ORAL at 17:27

## 2019-11-12 RX ADMIN — Medication 2 MCG/MIN: at 08:32

## 2019-11-12 RX ADMIN — FENTANYL CITRATE 50 MCG: 50 INJECTION, SOLUTION INTRAMUSCULAR; INTRAVENOUS at 08:57

## 2019-11-12 RX ADMIN — ONDANSETRON 4 MG: 2 INJECTION INTRAMUSCULAR; INTRAVENOUS at 09:20

## 2019-11-12 RX ADMIN — FENTANYL CITRATE 25 MCG: 50 INJECTION INTRAMUSCULAR; INTRAVENOUS at 10:03

## 2019-11-12 RX ADMIN — FENTANYL CITRATE 25 MCG: 50 INJECTION INTRAMUSCULAR; INTRAVENOUS at 10:15

## 2019-11-12 RX ADMIN — SODIUM CHLORIDE: 0.9 INJECTION, SOLUTION INTRAVENOUS at 07:34

## 2019-11-12 RX ADMIN — MUPIROCIN 1 APPLICATION: 20 OINTMENT TOPICAL at 21:27

## 2019-11-12 RX ADMIN — ISOSORBIDE MONONITRATE 30 MG: 30 TABLET, EXTENDED RELEASE ORAL at 13:51

## 2019-11-12 RX ADMIN — HYDROCORTISONE SODIUM SUCCINATE 100 MG: 100 INJECTION, POWDER, FOR SOLUTION INTRAMUSCULAR; INTRAVENOUS at 07:54

## 2019-11-12 RX ADMIN — PROTAMINE SULFATE 80 MG: 10 INJECTION, SOLUTION INTRAVENOUS at 09:09

## 2019-11-12 NOTE — OP NOTE
OPERATIVE REPORT  PATIENT NAME: Jennifer Aviles    :  1950  MRN: [id number]  Pt Location: [facility]    SURGERY DATE: 2019    SURGEON: Nimco Pedro DO    CO-SURGEON: Usha Berg MD    ASSISTANT: Brandie Stoll MD    ADDITIONAL ASSISTANT: Viviana Landa PA-C    PREOPERATIVE DIAGNOSIS: Symptomatic severe aortic stenosis  POSTOPERATIVE DIAGNOSIS: Symptomatic severe aortic stenosis  NYHA Class: 3  CCS Class: 3    PROCEDURE:   Transcatheter aortic valve replacement with a 29 mm Medeiros MIKEL 3 bioprosthetic valve via right open surgical transfemoral approach  CARDIOPULMONARY BYPASS TIME: 0 minutes  ANESTHESIA Dr Deric Martinez, general endotracheal anesthesia with transesophageal echocardiogram guidance  INDICATIONS:  The patient is a 68y o  year-old male with clinical and echocardiographic findings consistent with severe aortic stenosis  FINDINGS:  1  Intraoperative transesophageal echocardiogram revealed severe aortic stenosis  2  Final transesophageal echocardiogram demonstrated prosthetic valve with normal function no perivalvular leak  OPERATIVE TECHNIQUE:    The patient was taken to the operating room and placed supine on the operating table  Following the satisfactory induction of general anesthesia and placement of monitoring lines, the patient was prepped and draped in the usual sterile fashion  A time-out procedure was performed  The right common femoral artery was attempted to be accessed percutaneously but was unsuccessful  We attempted then to access the left common femoral using ultrasound percutaneously, however this was also unsuccessful  Therefore an open surgical incision was performed over the right common femoral artery  The vessel was encircled with vessel loops proximally and distally  The vessel was then accessed with a needle and a wire maintain  A 6 Romanian catheter was inserted    A Lunderquist wire was then inserted into the ascending aorta  The delivery system sheath was then inserted under direct fluoroscopic visualization  This might without difficulty  An 25 Vincentian dilator was then inserted through the sheath  A 20 Vincentian dilator was also inserted through the sheath  A pigtail catheter was inserted and advanced to the right coronary cusp, an aortogram was performed to determine proper angle and orientation for valve deployment   Through the left common femoral vein sheath, a balloon-tip temporary pacing catheter was inserted and advanced into the right ventricle and its capture was confirmed  A 6 Vincentian Paulina right 4 coronary catheter was advanced through the delivery sheath to the aortic valve  The aortic valve was crossed with a 0 035 straight-tip wire, the right coronary catheter was advanced into the left ventricular cavity, this was then exchanged for a curved tip Amplatzer extra stiff wire  A 29 mm MIKEL 3 valve delivery system was advanced through the delivery sheath into the aorta, the delivery system was configured for deployment  The valve on the delivery system was then advanced and the aortic valve was crossed  At this point, the catheter was desheathed in the standard fashion  The valve was positioned appropriately using a combination of fluoroscopy and transesophageal echocardiogram guidance  During an episode of rapid pacing, balloon deployment of the valve was performed  Following deployment, the position of the valve was confirmed by fluoroscopy and echocardiography and its position appeared appropriate with no perivalvular leak  The valve delivery system was subsequently removed followed   And the common femoral artery was repaired using a 5 0 Prolene  There was a good pulse proximally and distally after the repair and there was good hemostasis  Protamine was administered with normalization of the ACT  The left common femoral vein sheath was removed and pressure was held       COMPLICATIONS: None    PACKS/TUBES/DRAINS: None  EBL: 100mL  TRANSFUSION: None  SPECIMENS: None      As the attending surgeon, I was present and scrubbed for all critical portions of this procedure  There was no qualified surgical resident available  Sponge, needle and instrument counts were reported as correct by the nursing staff       SIGNATURE: Gurmeet Pope DO  DATE: November 12, 2019  TIME: 9:24 AM

## 2019-11-12 NOTE — ANESTHESIA POSTPROCEDURE EVALUATION
Post-Op Assessment Note    CV Status:  Stable  Pain Score: 0    Pain management: adequate     Mental Status:  Alert and awake   Hydration Status:  Euvolemic   PONV Controlled:  Controlled   Airway Patency:  Patent   Post Op Vitals Reviewed: Yes      Staff: CRNA   Comments: VSS; reprot to PACU RN          /60 (11/12/19 0938)    Temp 98 8 °F (37 1 °C) (11/12/19 0938)    Pulse 81 (11/12/19 0938)   Resp 14 (11/12/19 0938)    SpO2 97 % (11/12/19 0938)

## 2019-11-12 NOTE — ANESTHESIA PROCEDURE NOTES
Procedure Performed: SANAZ Anesthesia  Start Time:  11/12/2019 7:52 AM        Preanesthesia Checklist    Patient identified, IV assessed, risks and benefits discussed, monitors and equipment assessed, procedure being performed at surgeon's request and anesthesia consent obtained  Procedure    Diagnostic Indications for SANAZ:  hemodynamic monitoring  Type of SANAZ: interventional SANAZ with real time guidance of intracardiac procedure  Images Saved: ultrasound permanent image saved  Physician Requesting Echo: Jay Mata DO  Location performed: OR  Intubated  Bite block not placed  Heart visualized  Insertion of SANAZ Probe:  Atraumatic  Probe Type:  Multiplane  Modalities:  3D, color flow mapping, continuous wave Doppler and pulse wave Doppler  Echocardiographic and Doppler Measurements    PREPROCEDURE    LEFT VENTRICLE:  Systolic Function: moderately impaired  Ejection Fraction: 25 (dilated)%  Regional Wall Motion Abnormalities: global HK; worse along anterior and septal walls (appear AK)  RIGHT VENTRICLE:  Systolic Function: normal   Cavity size normal              AORTIC VALVE:  Leaflets: trileaflet  Leaflet motions restricted  Stenosis: severe  Mean Gradient: 22 (low flow AS) mmHg  Peak Gradient: 40 mmHg  Maximum velocity (cm/s): 3 2 m/sec  Regurgitation: none  MITRAL VALVE:  Leaflets: normal  Leaflet Motions: normal  Regurgitation: mild and central, annulus dilated  Stenosis: none  TRICUSPID VALVE:  Leaflets: normal  Regurgitation: trace  ASCENDING AORTA:  Dissection not present  AORTIC ARCH:  dissection not present  Grade 3: atheroma protruding < 0 5 cm into lumen  DESCENDING AORTA:  Dissection not present  Grade 3: atheroma protruding < 0 5 cm into lumen  LEFT ATRIAL APPENDAGE:  No spontaneous echo contrast Emptying Velocity: 26 cm/sec      OTHER ATRIAL FINDINGS:  No BELLO clot seen, though difficult to visualize due to coumadin ridge - visualized with 3D imaging too  ATRIAL SEPTUM:  Intra-atrial septal morphology: patent foramen ovale and small  Patent foramen ovale shunt: left to right shunt  POSTPROCEDURE    LEFT VENTRICLE: Unchanged   RIGHT VENTRICLE: Unchanged   AORTIC VALVE:   Leaflets: bioprosthetic  Stenosis: 29 MIKEL valve in aortic position, well seated, does not rock, good leaflet excursion and closure, no PVL  Mean Gradient: 5 (AV VTI 32 4 cm, LVOT VTI 10 5 cm; TA = 1 73 cm^2) mmHg  Regurgitation: none  MITRAL VALVE: Unchanged   TRICUSPID VALVE: Unchanged   AORTA: Unchanged   Dissection: Dissection not present  REMOVAL:  Probe Removal: atraumatic

## 2019-11-12 NOTE — ANESTHESIA PROCEDURE NOTES
Arterial Line Insertion  Date/Time: 11/12/2019 7:29 AM  Performed by: Sue Orozco MD  Authorized by: Sue Orozco MD   Consent: Verbal consent obtained  Written consent obtained  Risks and benefits: risks, benefits and alternatives were discussed  Consent given by: patient  Patient understanding: patient states understanding of the procedure being performed  Patient consent: the patient's understanding of the procedure matches consent given  Required items: required blood products, implants, devices, and special equipment available  Patient identity confirmed: verbally with patient and arm band  Time out: Immediately prior to procedure a "time out" was called to verify the correct patient, procedure, equipment, support staff and site/side marked as required  Preparation: Patient was prepped and draped in the usual sterile fashion  Indications: multiple ABGs and hemodynamic monitoring  Orientation:  Left  Location: radial arterylidocaine (XYLOCAINE) 2% local infiltration, 0 5 mL  Sedation:  Patient sedated: yes  Analgesia: see MAR for details  Vitals: Vital signs were monitored during sedation      Procedure Details:  Needle gauge: 20  Seldinger technique: Seldinger technique used  Number of attempts: 1    Post-procedure:  Post-procedure: dressing applied  Waveform: good waveform and waveform confirmed  Post-procedure CNS: normal and unchanged  Patient tolerance: Patient tolerated the procedure well with no immediate complications  Comments: Pre-induction, 1 minute

## 2019-11-12 NOTE — ANESTHESIA PROCEDURE NOTES
Central Line Insertion  Performed by: Lawyer Cary MD  Authorized by: Lawyer Cary MD   Date/Time: 11/12/2019 7:42 AM  Catheter Type:  triple lumen  Consent: Verbal consent obtained  Written consent obtained  Risks and benefits: risks, benefits and alternatives were discussed  Consent given by: patient  Patient understanding: patient states understanding of the procedure being performed  Patient consent: the patient's understanding of the procedure matches consent given  Required items: required blood products, implants, devices, and special equipment available  Patient identity confirmed: verbally with patient and arm band  Time out: Immediately prior to procedure a "time out" was called to verify the correct patient, procedure, equipment, support staff and site/side marked as required  Indications: vascular access  Location details: right internal jugular  Catheter size: 7 Fr  Patient position: Trendelenburg  Anesthesia method: ga    Assessment: free fluid flow and blood return through all ports  Preparation: Chloraprep  Skin prep agent dried: skin prep agent completely dried prior to procedure  Sterile barriers: all five maximum sterile barriers used - cap, mask, sterile gown, sterile gloves, and large sterile sheet  Hand hygiene: hand hygiene performed prior to central venous catheter insertion  Ultrasound guidance: yes  sterile gel and probe cover used in ultrasound-guided central venous catheter insertionSite selection rationale: tavr  Vessel of Catheter Tip End: central venous  Number of attempts: 1  Successful placement: yes  Post-procedure: chlorhexidine patch applied,  dressing applied and line sutured  Patient tolerance: Patient tolerated the procedure well with no immediate complications

## 2019-11-12 NOTE — PLAN OF CARE
Problem: PAIN - ADULT  Goal: Verbalizes/displays adequate comfort level or baseline comfort level  Description  Interventions:  - Encourage patient to monitor pain and request assistance  - Assess pain using appropriate pain scale  - Administer analgesics based on type and severity of pain and evaluate response  - Implement non-pharmacological measures as appropriate and evaluate response  - Consider cultural and social influences on pain and pain management  - Notify physician/advanced practitioner if interventions unsuccessful or patient reports new pain  Outcome: Progressing     Problem: INFECTION - ADULT  Goal: Absence or prevention of progression during hospitalization  Description  INTERVENTIONS:  - Assess and monitor for signs and symptoms of infection  - Monitor lab/diagnostic results  - Monitor all insertion sites, i e  indwelling lines, tubes, and drains  - Monitor endotracheal if appropriate and nasal secretions for changes in amount and color  - [place] appropriate cooling/warming therapies per order  - Administer medications as ordered  - Instruct and encourage patient and family to use good hand hygiene technique  - Identify and instruct in appropriate isolation precautions for identified infection/condition  Outcome: Progressing  Goal: Absence of fever/infection during neutropenic period  Description  INTERVENTIONS:  - Monitor WBC    Outcome: Progressing     Problem: SAFETY ADULT  Goal: Patient will remain free of falls  Description  INTERVENTIONS:  - Assess patient frequently for physical needs  -  Identify cognitive and physical deficits and behaviors that affect risk of falls    -  [place] fall precautions as indicated by assessment   - Educate patient/family on patient safety including physical limitations  - Instruct patient to call for assistance with activity based on assessment  - Modify environment to reduce risk of injury  - Consider OT/PT consult to assist with strengthening/mobility  Outcome: Progressing  Goal: Maintain or return to baseline ADL function  Description  INTERVENTIONS:  -  Assess patient's ability to carry out ADLs; assess patient's baseline for ADL function and identify physical deficits which impact ability to perform ADLs (bathing, care of mouth/teeth, toileting, grooming, dressing, etc )  - Assess/evaluate cause of self-care deficits   - Assess range of motion  - Assess patient's mobility; develop plan if impaired  - Assess patient's need for assistive devices and provide as appropriate  - Encourage maximum independence but intervene and supervise when necessary  - Involve family in performance of ADLs  - Assess for home care needs following discharge   - Consider OT consult to assist with ADL evaluation and planning for discharge  - Provide patient education as appropriate  Outcome: Progressing  Goal: Maintain or return mobility status to optimal level  Description  INTERVENTIONS:  - Assess patient's baseline mobility status (ambulation, transfers, stairs, etc )    - Identify cognitive and physical deficits and behaviors that affect mobility  - Identify mobility aids required to assist with transfers and/or ambulation (gait belt, sit-to-stand, lift, walker, cane, etc )  - [place] fall precautions as indicated by assessment  - Record patient progress and toleration of activity level on Mobility SBAR; progress patient to next Phase/Stage  - Instruct patient to call for assistance with activity based on assessment  - Consider rehabilitation consult to assist with strengthening/weightbearing, etc   Outcome: Progressing     Problem: DISCHARGE PLANNING  Goal: Discharge to home or other facility with appropriate resources  Description  INTERVENTIONS:  - Identify barriers to discharge w/patient and caregiver  - Arrange for needed discharge resources and transportation as appropriate  - Identify discharge learning needs (meds, wound care, etc )  - Arrange for interpretive services to assist at discharge as needed  - Refer to Case Management Department for coordinating discharge planning if the patient needs post-hospital services based on physician/advanced practitioner order or complex needs related to functional status, cognitive ability, or social support system  Outcome: Progressing     Problem: Knowledge Deficit  Goal: Patient/family/caregiver demonstrates understanding of disease process, treatment plan, medications, and discharge instructions  Description  Complete learning assessment and assess knowledge base    Interventions:  - Provide teaching at level of understanding  - Provide teaching via preferred learning methods  Outcome: Progressing

## 2019-11-12 NOTE — RESPIRATORY THERAPY NOTE
RT Protocol Note  Archana Wagner 68 y o  male MRN: [id number]  Unit/Bed#: [facility] 403-01 Encounter: [id number]    Assessment    Principal Problem:     Aortic valve stenosis  Active Problems:    Essential hypertension    Coronary artery disease of native artery of native heart with stable angina pectoris (HCC)    Polymyalgia rheumatica (HCC)    Primary generalized (osteo)arthritis    Long term systemic steroid user    Depression    PAD (peripheral artery disease) ([facility])    S/P TAVR (transcatheter aortic valve replacement)      Home Pulmonary Medications:  None         Past Medical History:   Diagnosis Date    Anxiety     Benign essential hypertension     Carotid stenosis, asymptomatic, bilateral     59-76% GLENN, <45% LICA    Coronary artery disease     Depression     Diffuse arthralgia     Last Assessed: 1/23/2017    Former tobacco use     History of alcohol use     3-6 beers/day x20 years, no residual liver disease    History of Lyme disease     History of rheumatic fever     Hypercholesterolemia     Hyperlipidemia     Hyperlipidemia     Ischemic cardiomyopathy     Osteoarthritis, hip, bilateral     PAD (peripheral artery disease) ([facility])     s/p fem-below the knee bypass & percutaneous tx right CFA/SFA & left external iliac/CFA    Polymyalgia rheumatica (HCC)     on chronic steroids    RBBB     Rotator cuff disorder     b/l    Severe aortic stenosis     Wears hearing aid     b/l     Social History     Socioeconomic History    Marital status: /Civil Union     Spouse name: None    Number of children: 5    Years of education: None    Highest education level: None   Occupational History    Occupation: Environmental Supervisor   Social Needs    Financial resource strain: None    Food insecurity:     Worry: None     Inability: None    Transportation needs:     Medical: None     Non-medical: None   Tobacco Use    Smoking status: Former Smoker     Packs/day: 2 00     Years: 20 00     Pack years: 40 00     Types: Cigarettes     Last attempt to quit:      Years since quittin 8    Smokeless tobacco: Former User     Types: Chew   Substance and Sexual Activity    Alcohol use: Not Currently     Frequency: Never     Comment: 3-6 cans beer x20 years, quit , no residual liver issues    Drug use: Never    Sexual activity: Not Currently   Lifestyle    Physical activity:     Days per week: None     Minutes per session: None    Stress: None   Relationships    Social connections:     Talks on phone: None     Gets together: None     Attends Jainism service: None     Active member of club or organization: None     Attends meetings of clubs or organizations: None     Relationship status: None    Intimate partner violence:     Fear of current or ex partner: None     Emotionally abused: None     Physically abused: None     Forced sexual activity: None   Other Topics Concern    None   Social History Narrative    None       Subjective         Objective    Physical Exam:   Assessment Type: Assess only  General Appearance: Alert, Awake  Respiratory Pattern: Normal  Chest Assessment: Chest expansion symmetrical  Bilateral Breath Sounds: Clear    Vitals:  Blood pressure 113/59, pulse 64, temperature 99 3 °F (37 4 °C), temperature source Temporal, resp  rate 15, height 5' 6" (1 676 m), weight 73 9 kg (163 lb), SpO2 99 %  Imaging and other studies: I have personally reviewed pertinent reports  Plan    Respiratory Plan: Discontinue Protocol  Airway Clearance Plan: Incentive Spirometer     Resp Comments: Pt assesed at this time per resp/acp protocol S/P TAVR procedure  Pt is in no apparent resp distress  SpO2 on 2 L NC WNL  Per chart pt has no pulmonary HX and takes no resp medications at home  Resp protocol will be D/C at this time  Pt was instructed on the IS per ACP  Pt gave good effort and understands the use and frequency of device   Pt will continue to be monitored per acp protocol

## 2019-11-12 NOTE — INTERVAL H&P NOTE
H&P reviewed  After examining the patient I find no changes in the patients condition since the H&P had been written  Vitals:    11/12/19 0551   BP: 131/79   Pulse: 82   Resp: 20   Temp: (!) 97 2 °F (36 2 °C)   SpO2: 97%     Anticipated Length of Stay:  Patient will be admitted on an Inpatient basis with an anticipated length of stay of  greater than 2 midnights  Justification for Hospital Stay:  Post surgical recovery following open heart surgery

## 2019-11-12 NOTE — NURSING NOTE
Made TRICIA Ma aware of patients floyd catheter draining dark red blood  Appeared to be sediment closer to floyd insertion site inside of catheter  Floyd was irrigated and there was difficulty pulling back on syringe  See flowsheets  No clots retrieved  scant Output did appear light pink after irrigation,  Received verbal order to monitor color and output s/p irrigation and let provider know if drainage remains unchanged  Noted  Will continue to monitor

## 2019-11-12 NOTE — SOCIAL WORK
77yo male is POD#0 TAVR  He is alert and oriented, independent ADLS, retired, drives  He has no hx DME, remote hx HHC, no rehab  He resides with is wife Nicol Martinez, phone [phone]  He reports she has multiple health problems, on HD  They live in 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  He would like [facility] meds to beds at discharge  Family will transport home at discharge  CM reviewed d/c planning process including the following: identifying help at home, patient preference for d/c planning needs, Discharge Lounge, [facility] Meds to Bed program, availability of treatment team to discuss questions or concerns patient and/or family may have regarding understanding medications and recognizing signs and symptoms once discharged  CM also encouraged patient to follow up with all recommended appointments after discharge  Patient advised of importance for patient and family to participate in managing patients medical well being  Patient/caregiver received discharge checklist  Content reviewed  Patient/caregiver encouraged to participate in discharge plan of care prior to discharge home

## 2019-11-13 ENCOUNTER — APPOINTMENT (INPATIENT)
Dept: NON INVASIVE DIAGNOSTICS | Facility: HOSPITAL | Age: 69
DRG: 266 | End: 2019-11-13
Payer: MEDICARE

## 2019-11-13 ENCOUNTER — APPOINTMENT (OUTPATIENT)
Dept: PULMONOLOGY | Facility: HOSPITAL | Age: 69
End: 2019-11-13
Payer: MEDICARE

## 2019-11-13 ENCOUNTER — APPOINTMENT (INPATIENT)
Dept: RADIOLOGY | Facility: HOSPITAL | Age: 69
DRG: 266 | End: 2019-11-13
Payer: MEDICARE

## 2019-11-13 PROBLEM — D62 POSTOPERATIVE ANEMIA DUE TO ACUTE BLOOD LOSS: Status: ACTIVE | Noted: 2019-11-13

## 2019-11-13 PROBLEM — R20.0 RIGHT LEG NUMBNESS: Status: ACTIVE | Noted: 2019-11-13

## 2019-11-13 LAB
ANION GAP SERPL CALCULATED.3IONS-SCNC: 11 MMOL/L (ref 4–13)
ATRIAL RATE: 68 BPM
BUN SERPL-MCNC: 15 MG/DL (ref 5–25)
CALCIUM SERPL-MCNC: 8.7 MG/DL (ref 8.3–10.1)
CHLORIDE SERPL-SCNC: 104 MMOL/L (ref 100–108)
CO2 SERPL-SCNC: 25 MMOL/L (ref 21–32)
CREAT SERPL-MCNC: 0.67 MG/DL (ref 0.6–1.3)
ERYTHROCYTE [DISTWIDTH] IN BLOOD BY AUTOMATED COUNT: 13.6 % (ref 11.6–15.1)
GFR SERPL CREATININE-BSD FRML MDRD: 97 ML/MIN/1.73SQ M
GLUCOSE SERPL-MCNC: 102 MG/DL (ref 65–140)
GLUCOSE SERPL-MCNC: 112 MG/DL (ref 65–140)
GLUCOSE SERPL-MCNC: 112 MG/DL (ref 65–140)
GLUCOSE SERPL-MCNC: 115 MG/DL (ref 65–140)
GLUCOSE SERPL-MCNC: 125 MG/DL (ref 65–140)
HCT VFR BLD AUTO: 35.8 % (ref 36.5–49.3)
HGB BLD-MCNC: 11.8 G/DL (ref 12–17)
MAGNESIUM SERPL-MCNC: 1.9 MG/DL (ref 1.6–2.6)
MCH RBC QN AUTO: 31.3 PG (ref 26.8–34.3)
MCHC RBC AUTO-ENTMCNC: 33 G/DL (ref 31.4–37.4)
MCV RBC AUTO: 95 FL (ref 82–98)
P AXIS: 56 DEGREES
PLATELET # BLD AUTO: 156 THOUSANDS/UL (ref 149–390)
PMV BLD AUTO: 10.5 FL (ref 8.9–12.7)
POTASSIUM SERPL-SCNC: 3.5 MMOL/L (ref 3.5–5.3)
PR INTERVAL: 174 MS
QRS AXIS: 11 DEGREES
QRSD INTERVAL: 148 MS
QT INTERVAL: 408 MS
QTC INTERVAL: 433 MS
RBC # BLD AUTO: 3.77 MILLION/UL (ref 3.88–5.62)
SODIUM SERPL-SCNC: 140 MMOL/L (ref 136–145)
T WAVE AXIS: 118 DEGREES
VENTRICULAR RATE: 68 BPM
WBC # BLD AUTO: 9.25 THOUSAND/UL (ref 4.31–10.16)

## 2019-11-13 PROCEDURE — 80048 BASIC METABOLIC PNL TOTAL CA: CPT | Performed by: THORACIC SURGERY (CARDIOTHORACIC VASCULAR SURGERY)

## 2019-11-13 PROCEDURE — 97530 THERAPEUTIC ACTIVITIES: CPT

## 2019-11-13 PROCEDURE — 71045 X-RAY EXAM CHEST 1 VIEW: CPT

## 2019-11-13 PROCEDURE — 83735 ASSAY OF MAGNESIUM: CPT | Performed by: THORACIC SURGERY (CARDIOTHORACIC VASCULAR SURGERY)

## 2019-11-13 PROCEDURE — 97166 OT EVAL MOD COMPLEX 45 MIN: CPT

## 2019-11-13 PROCEDURE — G8978 MOBILITY CURRENT STATUS: HCPCS

## 2019-11-13 PROCEDURE — 93010 ELECTROCARDIOGRAM REPORT: CPT | Performed by: INTERNAL MEDICINE

## 2019-11-13 PROCEDURE — 93005 ELECTROCARDIOGRAM TRACING: CPT

## 2019-11-13 PROCEDURE — 97535 SELF CARE MNGMENT TRAINING: CPT

## 2019-11-13 PROCEDURE — G8988 SELF CARE GOAL STATUS: HCPCS

## 2019-11-13 PROCEDURE — 97163 PT EVAL HIGH COMPLEX 45 MIN: CPT

## 2019-11-13 PROCEDURE — 85027 COMPLETE CBC AUTOMATED: CPT | Performed by: THORACIC SURGERY (CARDIOTHORACIC VASCULAR SURGERY)

## 2019-11-13 PROCEDURE — G8987 SELF CARE CURRENT STATUS: HCPCS

## 2019-11-13 PROCEDURE — 93306 TTE W/DOPPLER COMPLETE: CPT

## 2019-11-13 PROCEDURE — G8989 SELF CARE D/C STATUS: HCPCS

## 2019-11-13 PROCEDURE — 82948 REAGENT STRIP/BLOOD GLUCOSE: CPT

## 2019-11-13 PROCEDURE — G8979 MOBILITY GOAL STATUS: HCPCS

## 2019-11-13 PROCEDURE — 99233 SBSQ HOSP IP/OBS HIGH 50: CPT | Performed by: THORACIC SURGERY (CARDIOTHORACIC VASCULAR SURGERY)

## 2019-11-13 RX ADMIN — POLYETHYLENE GLYCOL 3350 17 G: 17 POWDER, FOR SOLUTION ORAL at 09:21

## 2019-11-13 RX ADMIN — RANOLAZINE 500 MG: 500 TABLET, FILM COATED, EXTENDED RELEASE ORAL at 17:51

## 2019-11-13 RX ADMIN — RANOLAZINE 500 MG: 500 TABLET, FILM COATED, EXTENDED RELEASE ORAL at 09:20

## 2019-11-13 RX ADMIN — ISOSORBIDE MONONITRATE 30 MG: 30 TABLET, EXTENDED RELEASE ORAL at 09:23

## 2019-11-13 RX ADMIN — CHLORHEXIDINE GLUCONATE 0.12% ORAL RINSE 15 ML: 1.2 LIQUID ORAL at 09:20

## 2019-11-13 RX ADMIN — CLOPIDOGREL BISULFATE 75 MG: 75 TABLET ORAL at 09:20

## 2019-11-13 RX ADMIN — OXYCODONE HYDROCHLORIDE AND ACETAMINOPHEN 1 TABLET: 5; 325 TABLET ORAL at 17:51

## 2019-11-13 RX ADMIN — MUPIROCIN 1 APPLICATION: 20 OINTMENT TOPICAL at 09:21

## 2019-11-13 RX ADMIN — CHLORHEXIDINE GLUCONATE 0.12% ORAL RINSE 15 ML: 1.2 LIQUID ORAL at 17:51

## 2019-11-13 RX ADMIN — ACETAMINOPHEN 650 MG: 325 TABLET ORAL at 06:13

## 2019-11-13 RX ADMIN — HEPARIN SODIUM 5000 UNITS: 5000 INJECTION INTRAVENOUS; SUBCUTANEOUS at 13:30

## 2019-11-13 RX ADMIN — METOPROLOL TARTRATE 12.5 MG: 25 TABLET ORAL at 22:33

## 2019-11-13 RX ADMIN — HEPARIN SODIUM 5000 UNITS: 5000 INJECTION INTRAVENOUS; SUBCUTANEOUS at 06:04

## 2019-11-13 RX ADMIN — TRIAMTERENE AND HYDROCHLOROTHIAZIDE 1 TABLET: 37.5; 25 TABLET ORAL at 09:23

## 2019-11-13 RX ADMIN — MUPIROCIN 1 APPLICATION: 20 OINTMENT TOPICAL at 22:33

## 2019-11-13 RX ADMIN — ASPIRIN 81 MG: 81 TABLET, COATED ORAL at 09:20

## 2019-11-13 RX ADMIN — ACETAMINOPHEN 650 MG: 325 TABLET ORAL at 13:30

## 2019-11-13 RX ADMIN — HEPARIN SODIUM 5000 UNITS: 5000 INJECTION INTRAVENOUS; SUBCUTANEOUS at 22:33

## 2019-11-13 RX ADMIN — PANTOPRAZOLE SODIUM 40 MG: 40 TABLET, DELAYED RELEASE ORAL at 09:20

## 2019-11-13 RX ADMIN — SODIUM CHLORIDE 4 MG/HR: 0.9 INJECTION, SOLUTION INTRAVENOUS at 01:53

## 2019-11-13 RX ADMIN — METOPROLOL TARTRATE 12.5 MG: 25 TABLET ORAL at 09:19

## 2019-11-13 NOTE — PROGRESS NOTES
Progress Note - Cardiothoracic Surgery   Rolando Gordon 68 y o  male MRN: [id number]  Unit/Bed#: [facility] 403-01 Encounter: [id number]    Aortic stenosis, Non-Rheumatic  S/P transfemoral transcatheter aortic valve replacement; POD # 1    24 Hour Events: Transferred to Stepdown yesterday  No events overnight  Complains of right anterior thigh numbness and burning pain  Denies CP or SOB      Medications:   Scheduled Meds:  Current Facility-Administered Medications:  acetaminophen 650 mg Rectal Q4H PRN Ulysees Bound, PA-C    acetaminophen 650 mg Oral Q4H PRN Ulysees Bound, PA-C    aspirin 81 mg Oral Daily Ulysees Bound, PA-C    bisacodyl 10 mg Rectal Daily PRN Ulysees Bound, PA-C    chlorhexidine 15 mL Mouth/Throat BID Ulysees Bound, PA-C    clopidogrel 75 mg Oral Daily Ulysees Bound, PA-C    heparin (porcine) 5,000 Units Subcutaneous Q8H [address] Ulysees Bound, PA-C    insulin lispro 1-5 Units Subcutaneous TID AGUILAR Jain, PA-C    insulin lispro 1-5 Units Subcutaneous HS Ulysees Bound, PA-C    iohexol 48 mL Intravenous Once in imaging Alex Parnell MD    isosorbide mononitrate 30 mg Oral Daily Ulysees Bound, PA-C    lactated ringers 75 mL/hr Intravenous Continuous Afshin Sida, CRNA Last Rate: Stopped (11/12/19 1019)   metoprolol tartrate 12 5 mg Oral Q12H [address] Ulysees Bound, PA-C    mupirocin 1 application Nasal M55T [address] Ulysees Bound, PA-C    niCARdipine 1-15 mg/hr Intravenous Titrated Ulysees Bound, PA-C Last Rate: Stopped (11/13/19 0530)   niCARdipine 1-15 mg/hr Intravenous Titrated Afshin Sida, CRNA Last Rate: Stopped (11/12/19 2040)   ondansetron 4 mg Intravenous Q6H PRN Ulysees Bound, PA-C    oxyCODONE-acetaminophen 1 tablet Oral Q8H PRN Ulysees Bound, PA-C    pantoprazole 40 mg Oral Daily Ulysees Bound, PA-C    polyethylene glycol 17 g Oral Daily Ulysees Bound, PA-C    ranolazine 500 mg Oral BID Ulysees Bound, PA-C    rosuvastatin 5 mg Oral Daily Ulysees Bound, PA-C triamterene-hydrochlorothiazide 1 tablet Oral Daily Andrei Edmond PA-C      Continuous Infusions:  lactated ringers 75 mL/hr Last Rate: Stopped (11/12/19 1019)   niCARdipine 1-15 mg/hr Last Rate: Stopped (11/13/19 0530)   niCARdipine 1-15 mg/hr Last Rate: Stopped (11/12/19 2040)     PRN Meds:   acetaminophen    acetaminophen    bisacodyl    iohexol    ondansetron    oxyCODONE-acetaminophen    Vitals:   Vitals:    11/13/19 0300 11/13/19 0357 11/13/19 0500 11/13/19 0700   BP: 112/56 110/54 105/87 126/78   BP Location: Right arm   Left arm   Pulse: 80 67 66 70   Resp: 18   20   Temp: 100 3 °F (37 9 °C)   97 6 °F (36 4 °C)   TempSrc: Oral   Oral   SpO2: 97% 93%  95%   Weight:       Height:           Telemetry: NSR; Heart Rate: 68    Hemodynamics:       Respiratory:   SpO2: SpO2: 95 %; Room Air    Intake/Output:   I/O       11/11 0701 - 11/12 0700 11/12 0701 - 11/13 0700 11/13 0701 - 11/14 0700    P  O   480     I V  (mL/kg)  2594 2 (35 1)     IV Piggyback  100     Total Intake(mL/kg)  3174 2 (43)     Urine (mL/kg/hr)  2375 (1 3)     Blood  50     Total Output  2425     Net  +749 2                  Weights:   Weight (last 2 days)     Date/Time   Weight    11/12/19 0617   73 9 (163)            Admit weight: 169 kg    Results:   Results from last 7 days   Lab Units 11/13/19  0351 11/12/19  1736 11/12/19  0941   WBC Thousand/uL 9 25  --   --    HEMOGLOBIN g/dL 11 8* 12 4 13 0   HEMATOCRIT % 35 8* 37 2 39 2   PLATELETS Thousands/uL 156  --  161     Results from last 7 days   Lab Units 11/13/19  0351 11/12/19  0941 11/12/19  0919   POTASSIUM mmol/L 3 5 3 5  --    CHLORIDE mmol/L 104 109*  --    CO2 mmol/L 25 24  --    CO2, I-STAT mmol/L  --   --  25   BUN mg/dL 15 15  --    CREATININE mg/dL 0 67 0 69  --    GLUCOSE, ISTAT mg/dl  --   --  148*   CALCIUM mg/dL 8 7 8 7  --          Point of care glucose:  - 154  No SSIC required    Studies:  CXR: Mild vascular congestion  Lines and tubes in good position   No ptx  EKG: NSR with RBBB, rate 68    I have personally reviewed pertinent reports  and I have personally reviewed pertinent films in PACS    Invasive Lines/Tubes:  Invasive Devices     Central Venous Catheter Line            CVC Central Lines 11/12/19 Triple 1 day          Peripheral Intravenous Line            Peripheral IV 11/12/19 Left Arm 1 day    Peripheral IV 11/12/19 Right Arm 1 day          Arterial Line            Arterial Line 11/12/19 Right Radial 1 day          Drain            Urethral Catheter Non-latex; Temperature probe; Other (Comment) 16 Fr  1 day                Physical Exam:    HEENT/NECK:  PERRLA  No jugular venous distention  Cardiac: Regular rate and rhythm and No murmurs/rubs/gallops  Pulmonary:  Breath sounds clear bilaterally and No rales/rhonchi/wheezes  Abdomen:  Non-tender, Non-distended and Normal bowel sounds  Incisions: Groin puncture sites dressed with sterile dressing  No hematoma, erythema, or drainage  Extremities: Extremities warm/dry, Radial pulses 2+ bilaterally, DP pulses 2+ bilaterally and Trace edema B/L  Right groin tender with palpation  No hematoma  Neuro: Alert and oriented X 3, Sensation is grossly intact and No focal deficits  Skin: Warm/Dry, without rashes or lesions  Assessment:  Principal Problem: Aortic valve stenosis  Active Problems:    Essential hypertension    Coronary artery disease of native artery of native heart with stable angina pectoris (HCC)    Polymyalgia rheumatica (HCC)    Primary generalized (osteo)arthritis    Long term systemic steroid user    Depression    PAD (peripheral artery disease) ([facility])    S/P TAVR (transcatheter aortic valve replacement)       Aortic stenosis, Non-Rheumatic  S/P transfemoral transcatheter aortic valve replacement; POD # 1    Plan:    1   Cardiac:   NSR; HR/BP well-controlled  Lopressor, 12 5mg PO BID, Imdur 30mg daily  Maintain central IV access today for access  ASA/Plavix  Consult cardiology for postoperative medical management  Follow up transthoracic echocardiogram today  Continue Statin therapy  Continue DVT prophylaxis    2  Pulmonary:   Extubated  CXR findings: mild vascular congestion  Good Room air oxygen saturation; Continue incentive spirometry/Coughing/Deep breathing exercises    3  Renal:   Intake/Output net: +749 mL/24 hours  Post op Creatinine stable; Follow up labs prn  Continue Triamterene  D/C floyd    4  Neuro:  Neurologically intact; No active issues  Incisional pain well-controlled; Continue prn Tylenol    5  GI:  Tolerating TLC 2 3 gm sodium diet  Maintain 1800 mL daily fluid restriction   Continue daily stool softeners and prn suppository  Continue GI prophylaxis    6  Endo:   No history of diabetes; Glucose well-controlled  Continue Insulin sliding scale coverage    7  Hematology:    Post-operative acute blood loss anemia; Hemoglobin 11 8; trend prn    8  Disposition:   Gerontology consultation ordered for routine assessment of TAVR patient over 72years old  Downgrade to telemetry today  Anticipate discharge home tomorrow  VTE Pharmacologic Prophylaxis: Heparin  VTE Mechanical Prophylaxis: sequential compression device    Bedside rounds completed with SUSAN Duggan    Plan of care discussed with bedside nurse    SIGNATURE: Jane Lance PA-C  DATE: November 13, 2019  TIME: 7:49 AM

## 2019-11-13 NOTE — PLAN OF CARE
Problem: OCCUPATIONAL THERAPY ADULT  Goal: Performs self-care activities at highest level of function for planned discharge setting  See evaluation for individualized goals  Description  Treatment Interventions: Cardiac education, Compensatory technique education, Patient/family training          See flowsheet documentation for full assessment, interventions and recommendations  Outcome: Completed  Note:   Limitation: Decreased ADL status, Decreased sensation, Decreased high-level ADLs  Prognosis: Good  Assessment: Pt is a 68 y o  YO  male admitted to [facility] on 2019 w/ aortic valve stenosis s/p TAVR on 19  Comorbidities include a h/o CAD, polymyalgia rheumatica, depression, and PAD   Pt with active OT orders and ambulate  orders   Pt resides in a ranch style home with spouse  Pt assists w/ spouse who has "many health problems"  Pt was I w/  ADLS and IADLS, (+) drove, & required no use of DME PTA  Currently pt is Min A for LB ADls and supervision for functional mobility  Pt is limited at this time 2*: endurance, activity tolerance, unsupportive home environment, decreased I w/ ADLS/IADLS and decreased safety awareness  The following Occupational Performance Areas to address include: toilet hygiene, dressing, functional mobility, household maintenance and care of others  Pt scored overall  65/100 on the Barthel Index  Based on the aforementioned OT evaluation, functional performance deficits, and assessments, pt has been identified as a moderate complexity evaluation  From OT standpoint, anticipate d/c home with family support  Pt to continue to benefit from acute immediate OT services to address the following goals 1-2 sessions to  w/in 3-5 days:  OT Discharge Recommendation: Home with family support  OT - OK to Discharge:  Yes

## 2019-11-13 NOTE — OP NOTE
OPERATIVE REPORT  PATIENT NAME: Glen De Oliveira    :  1950  MRN: [id number]  Pt Location: [facility] OR ROOM 02    SURGERY DATE: 2019    SURGEON: Shelly Howard DO     ASSISTANT: Coralee Ganser, MD    CO-SURGEON: Dr Abi Mota DIAGNOSIS Symptomatic severe aortic stenosis  POSTOPERATIVE DIAGNOSIS Symptomatic severe aortic stenosis  NYHA CLASS: 3    CCS CLASS: 3    PROCEDURE Transcatheter aortic valve replacement with a 29 mm Medeiros MIKEL 3 bioprosthetic valve via a right open surgical transfemoral approach  ANESTHESIA Dr Deborah La, general endotracheal anesthesia with transesophageal echocardiogram guidance  CARDIOPULMONARY BYPASS TIME 0  PACKS/TUBES/DRAINS None  ESTIMATED BLOOD LOSS: 100 mL    OPERATIVE TECHNIQUE The patient was taken to the operating room and placed supine on the operating table  Following the satisfactory induction of general anesthesia and placement of monitoring lines, the patient was prepped and draped in the usual sterile fashion  A time-out procedure was performed  Neither the left nor the right common femoral arteries could be entered percutaneously  Ultrasound imaging on the left was employed, without success  Therefore, open surgical access of the right CFA was obtained, and a wire introduced under direct visualization  A Lunderquist wire was advanced into the aorta via a sheath and JR4 diagnoistic catheter, and pre-dilation of the R CFA to 20 Fr was performed  The Medeiros sheath was placed in the R CFA  A pigtail catheter was advanced into the right coronary cusp and injections of contrast performed to determine the appropriate angle of orientation for valve deployment  Through a left common femoral vein sheath, a balloon tip temporary pacing catheter was inserted into the right ventricle and its capture was confirmed  The aortic valve was crossed with a straight-tipped wire   A 29 mm MIKEL 3 valve was then advanced through the sheath into the aorta and positioned onto the deployment balloon in the aorta and then advanced around the aortic arch and through the annulus of the aortic valve to the appropriate level  At this point, the catheter was desheathed in the standard fashion  The valve was positioned appropriately using a combination of fluoroscopy and transesophageal echocardiogram guidance  During an episode of rapid pacing, balloon deployment of the 29 mm MIKEL 3 valve was performed  Following deployment, the position of the valve was confirmed by fluoroscopy and echocardiography and its position appeared appropriate with no perivalvular leak  The valve delivery system was subsequently removed and the sheath was removed from the right common femoral artery and the artery was repaired with 5-0 prolene sutures  Pulses were appreciated proximally and distally  Protamine was administered with normalization of the ACT  The common femoral vein sheath was removed from the left and pressure was held  Sponge, needle, and instrument counts were reported as correct by the nursing staff  There was no qualified surgical resident available to assist  As the attending surgeon, I was present and scrubbed for all critical portions of this procedure  Final transesophageal echocardiogram demonstrated a well-positioned bioprosthetic transcatheter aortic valve with normal function and no perivalvular leak           SIGNATURE: Marcello Tirado MD  DATE: November 13, 2019  TIME: 2:05 PM

## 2019-11-13 NOTE — RESTORATIVE TECHNICIAN NOTE
Restorative Specialist Mobility Note       Activity: Ambulate in hirsch, Chair     Assistive Device: Front wheel walker

## 2019-11-13 NOTE — DISCHARGE INSTRUCTIONS
Transcatheter Aortic Valve Replacement   WHAT YOU NEED TO KNOW:   · A TAVR is a procedure to replace your aortic valve  It is done without removing your old valve  The aortic valve is between the left ventricle and the aorta  The left ventricle is the lower left chamber of your heart  The aorta is a blood vessel that pumps blood to your brain and body  The aortic valve opens and closes to let blood flow from your heart to the rest of your body  · TAVR may be used to replace your aortic valve if open heart surgery is not safe for you  Your valve will be replaced with a tissue valve  The tissue may be taken from an animal, such as a pig or cow  DISCHARGE INSTRUCTIONS:   Call 911 for any of the following:   · You have any of the following signs of a heart attack:      ¨ Squeezing, pressure, or pain in your chest that lasts longer than 5 minutes or returns    ¨ Discomfort or pain in your back, neck, jaw, stomach, or arm     ¨ Trouble breathing    ¨ Nausea or vomiting    ¨ Lightheadedness or a sudden cold sweat, especially with chest pain or trouble breathing    · You have any of the following signs of a stroke:      ¨ Numbness or drooping on one side of your face     ¨ Weakness in an arm or leg    ¨ Confusion or difficulty speaking    ¨ Dizziness, a severe headache, or vision loss    · You feel lightheaded, short of breath, and have chest pain  · You cough up blood  · You have trouble breathing  Seek care immediately if:   · Blood soaks through your bandage  · Your stitches come apart  · Your wound gets swollen quickly  · Your heart is beating faster or slower than usual      · Your arm or leg feels numb, cool, or looks pale  · Your arm or leg feels warm, tender, and painful  It may look swollen and red  · You feel weak or dizzy  Contact your healthcare provider if:   · You have a fever or chills  · Your wound is red, swollen, or draining pus      · Your wound looks more bruised or there is new bruising near your wound  · You have nausea or are vomiting  · Your skin is itchy, swollen, or you have a rash  · You have questions or concerns about your condition or care  Medicines: You may need any of the following:  · Blood thinners    help prevent blood clots  Examples of blood thinners include heparin and warfarin  Clots can cause strokes, heart attacks, and death  The following are general safety guidelines to follow while you are taking a blood thinner:    ¨ Watch for bleeding and bruising while you take blood thinners  Watch for bleeding from your gums or nose  Watch for blood in your urine and bowel movements  Use a soft washcloth on your skin, and a soft toothbrush to brush your teeth  This can keep your skin and gums from bleeding  If you shave, use an electric shaver  Do not play contact sports  ¨ Tell your dentist and other healthcare providers that you take anticoagulants  Wear a bracelet or necklace that says you take this medicine  ¨ Do not start or stop any medicines unless your healthcare provider tells you to  Many medicines cannot be used with blood thinners  ¨ Tell your healthcare provider right away if you forget to take the medicine, or if you take too much  ¨ Warfarin  is a blood thinner that you may need to take  The following are things you should be aware of if you take warfarin  § Foods and medicines can affect the amount of warfarin in your blood  Do not make major changes to your diet while you take warfarin  Warfarin works best when you eat about the same amount of vitamin K every day  Vitamin K is found in green leafy vegetables and certain other foods  Ask for more information about what to eat when you are taking warfarin  § You will need to see your healthcare provider for follow-up visits when you are on warfarin  You will need regular blood tests  These tests are used to decide how much medicine you need      · Antiplatelets , such as aspirin, help prevent blood clots  Take your antiplatelet medicine exactly as directed  These medicines make it more likely for you to bleed or bruise  If you are told to take aspirin, do not take acetaminophen or ibuprofen instead  · Acetaminophen  helps decrease your pain  This medicine is available without a doctor's order  Ask how much medicine is safe to take, and how often to take it  Acetaminophen can cause liver damage if not taken correctly  · Take your medicine as directed  Contact your healthcare provider if you think your medicine is not helping or if you have side effects  Tell him or her if you are allergic to any medicine  Keep a list of the medicines, vitamins, and herbs you take  Include the amounts, and when and why you take them  Bring the list or the pill bottles to follow-up visits  Carry your medicine list with you in case of an emergency  Care for your wound as directed:  Ask your healthcare provider when your wound can get wet  Carefully wash around the wound with soap and water  Do not scrub your wound  You can let soap and water gently run over your wound  Gently pat dry the area and put on new, clean bandages as directed  Check your wound every day for signs of infection such as swelling, pus, or redness  Change your bandages when they get wet or dirty  Do not put lotions or powders on your wound  Do not take a bath or swim until your healthcare provider says it is okay  These activities can increase your risk for a wound infection  Activity:  Do not lift anything heavier than 5 pounds or do vigorous activities such as sports  These actions will put too much stress on your wound and heart  Take short walks around the house several times a day  This will help prevent blood clots  Ask your healthcare provider what other activities are safe for you to do  Also ask when you can return to your normal activities and work or school  Self-care:   · Eat heart healthy foods    You may need to eat foods that are low in salt, fat, or cholesterol  Healthy foods include fruits, vegetables, whole-grain breads, low-fat dairy products, beans, lean meats, and fish  Ask your healthcare provider for more information about a heart healthy diet  · Do not smoke  Nicotine and other chemicals in cigarettes and cigars can cause heart and lung damage  Nicotine can also slow healing  Ask your healthcare provider for information if you currently smoke and need help to quit  E-cigarettes or smokeless tobacco still contain nicotine  Talk to your healthcare provider before you use these products  · Do not drink alcohol  Ask your cardiologist if it is safe for you to drink alcohol  Alcohol can increase your risk for high blood pressure, diabetes, and coronary artery disease  · Maintain a healthy weight  Ask your healthcare provider how much you should weigh  Extra weight can increase the stress on your heart  Ask him to help you create a weight loss plan if you are overweight  · Exercise as directed after you recover  Your healthcare provider can help you create an exercise plan that is right for you  Exercise will help keep your heart healthy  Ask your healthcare provider if you need antibiotics before procedures:  Some procedures may allow bacteria to get into your blood and travel to your heart  This can cause an infection in your heart and prevent you from healing  You may need antibiotics before certain procedures that happen in the next 6 months to prevent infection  This may also include certain dental procedures  Ask your healthcare provider for more information  Follow up with your healthcare provider as directed: You may need to return for tests  These tests will make sure your valve is working correctly  Write down your questions so you remember to ask them during your visits    © 2017 [employer] Information is for End User's use only and may not be sold, redistributed or otherwise used for commercial purposes  All illustrations and images included in CareNotes® are the copyrighted property of [employer]  or [employer]  The above information is an  only  It is not intended as medical advice for individual conditions or treatments  Talk to your doctor, nurse or pharmacist before following any medical regimen to see if it is safe and effective for you  Heart Healthy Diet   WHAT YOU NEED TO KNOW:   What is a heart healthy diet? A heart healthy diet is an eating plan low in total fat, unhealthy fats, and sodium (salt)  A heart healthy diet helps decrease your risk for heart disease and stroke  Limit the amount of fat you eat to 25% to 35% of your total daily calories  Limit sodium to less than 2,300 mg each day  What is healthy fat, and where is it found? Healthy fats can help improve cholesterol levels  The risk for heart disease is decreased when cholesterol levels are normal  Choose healthy fats, such as the following:  · Unsaturated fat  is found in foods such as soybean, canola, olive, corn, and safflower oils  It is also found in soft tub margarine that is made with liquid vegetable oil  · Omega-3 fat  is found in certain fish, such as salmon, tuna, and trout, and in walnuts and flaxseed  What is unhealthy fat, and where is it found? Unhealthy fats can cause unhealthy cholesterol levels in your blood and increase your risk of heart disease  Limit unhealthy fats, such as the following:  · Cholesterol  is found in animal foods, such as eggs and lobster, and in dairy products made from whole milk  Limit cholesterol to less than 300 milligrams (mg) each day  You may need to limit cholesterol to 200 mg each day if you have heart disease  · Saturated fat  is found in meats, such as martinez and hamburger  It is also found in chicken or turkey skin, whole milk, and butter   Limit saturated fat to less than 7% of your total daily calories  Limit saturated fat to less than 6% if you have heart disease or are at increased risk for it  · Trans fat  is found in packaged foods, such as potato chips and cookies  It is also in hard margarine, some fried foods, and shortening  Avoid trans fats as much as possible  What can I eat and drink on a heart healthy diet? Ask your dietitian or healthcare provider how many servings to have from each of the following food groups:  · Grains:      ¨ Whole-wheat breads, cereals, and pastas, and brown rice    ¨ Low-fat, low-sodium crackers and chips    · Vegetables:      ¨ Broccoli, green beans, green peas, and spinach    ¨ Collards, kale, and lima beans    ¨ Carrots, sweet potatoes, tomatoes, and peppers    ¨ Canned vegetables with no salt added    · Fruits:      ¨ Bananas, peaches, pears, and pineapple    ¨ Grapes, raisins, and dates    ¨ Oranges, tangerines, grapefruit, orange juice, and grapefruit juice    ¨ Apricots, mangoes, melons, and papaya    ¨ Raspberries and strawberries    ¨ Canned fruit with no added sugar    · Low-fat dairy products:      ¨ Nonfat (skim) milk, 1% milk, and low-fat almond, cashew, or soy milks fortified with calcium    ¨ Low-fat cheese, regular or frozen yogurt, and cottage cheese    · Meats and proteins , such as lean cuts of beef and pork (loin, leg, round), skinless chicken and turkey, legumes, soy products, egg whites, and nuts  Which foods and drinks do I need to limit or avoid?   Ask your dietitian or healthcare provider about these and other foods that are high in unhealthy fat, sodium, and sugar:  · Snack or packaged foods , such as frozen dinners, cookies, macaroni and cheese, and cereals with more than 300 mg of sodium per serving    · Canned or dry mixes  for cakes, soups, sauces, or gravies    · Vegetables with added sodium , such as instant potatoes, vegetables with added sauces, or regular canned vegetables    · Other foods high in sodium , such as ketchup, barbecue sauce, salad dressing, pickles, olives, soy sauce, and miso    · High-fat dairy foods  such as whole or 2% milk, cream cheese, or sour cream, and cheeses     · High-fat protein foods  such as high-fat cuts of beef (T-bone steaks, ribs), chicken or turkey with skin, and organ meats, such as liver    · Cured or smoked meats , such as hot dogs, martinez, and sausage    · Unhealthy fats and oils , such as butter, stick margarine, shortening, and cooking oils such as coconut or palm oil    · Food and drinks high in sugar , such as soft drinks (soda), sports drinks, sweetened tea, candy, cake, cookies, pies, and doughnuts  What other diet guidelines should I follow? · Eat more foods containing omega-3 fats  Eat fish high in omega-3 fats at least 2 times a week  · Limit alcohol  Too much alcohol can damage your heart and raise your blood pressure  Women should limit alcohol to 1 drink a day  Men should limit alcohol to 2 drinks a day  A drink of alcohol is 12 ounces of beer, 5 ounces of wine, or 1½ ounces of liquor  · Choose low-sodium foods  High-sodium foods can lead to high blood pressure  Add little or no salt to food you prepare  Use herbs and spices in place of salt  · Eat more fiber  to help lower cholesterol levels  Eat at least 5 servings of fruits and vegetables each day  Eat 3 ounces of whole-grain foods each day  Legumes (beans) are also a good source of fiber  What lifestyle guidelines should I follow? · Do not smoke  Nicotine and other chemicals in cigarettes and cigars can cause lung and heart damage  Ask your healthcare provider for information if you currently smoke and need help to quit  E-cigarettes or smokeless tobacco still contain nicotine  Talk to your healthcare provider before you use these products  · Exercise regularly  to help you maintain a healthy weight and improve your blood pressure and cholesterol levels   Ask your healthcare provider about the best exercise plan for you  Do not start an exercise program without asking your healthcare provider  CARE AGREEMENT:   You have the right to help plan your care  Discuss treatment options with your caregivers to decide what care you want to receive  You always have the right to refuse treatment  The above information is an  only  It is not intended as medical advice for individual conditions or treatments  Talk to your doctor, nurse or pharmacist before following any medical regimen to see if it is safe and effective for you  © 2017 [facility] Information is for End User's use only and may not be sold, redistributed or otherwise used for commercial purposes  All illustrations and images included in CareNotes® are the copyrighted property of [employer]  or [employer]

## 2019-11-13 NOTE — OCCUPATIONAL THERAPY NOTE
OccupationalTherapy Evaluation & TX     Patient Name: Claudia KUMAR Date: 11/13/2019  Problem List  Principal Problem: Aortic valve stenosis  Active Problems:    Essential hypertension    Coronary artery disease of native artery of native heart with stable angina pectoris (HCC)    PAD (peripheral artery disease) ([facility])    S/P TAVR (transcatheter aortic valve replacement)    Postoperative anemia due to acute blood loss    Right leg numbness    Past Medical History  Past Medical History:   Diagnosis Date    Anxiety     Benign essential hypertension     Carotid stenosis, asymptomatic, bilateral     79-60% GLENN, <51% LICA    Coronary artery disease     Depression     Diffuse arthralgia     Last Assessed: 1/23/2017    Former tobacco use     History of alcohol use     3-6 beers/day x20 years, no residual liver disease    History of Lyme disease     History of rheumatic fever     Hypercholesterolemia     Hyperlipidemia     Hyperlipidemia     Ischemic cardiomyopathy     Osteoarthritis, hip, bilateral     PAD (peripheral artery disease) ([facility])     s/p fem-below the knee bypass & percutaneous tx right CFA/SFA & left external iliac/CFA    Polymyalgia rheumatica (HCC)     on chronic steroids    RBBB     Rotator cuff disorder     b/l    Severe aortic stenosis     Wears hearing aid     b/l     Past Surgical History  Past Surgical History:   Procedure Laterality Date    CARDIAC CATHETERIZATION      FEMORAL BYPASS Bilateral     fem-below knee w/ GSV    IR ABDOMINAL ANGIOGRAPHY / INTERVENTION  8/15/2019    IR ABDOMINAL ANGIOGRAPHY / INTERVENTION  9/17/2019    RI ECHO TRANSESOPHAG R-T 2D W/PRB IMG ACQUISJ I&R N/A 11/12/2019    Procedure: TRANSESOPHAGEAL ECHOCARDIOGRAM (SANAZ);   Surgeon: Wei Garza DO;  [facility];  Service: Cardiac Surgery    RI REPLACE AORTIC VALVE OPENFEMORAL ARTERY APPROACH N/A 11/12/2019    Procedure: REPLACEMENT AORTIC VALVE TRANSCATHETER (TAVR) TRANSFEMORAL WITH 29 MM MEEKS MIKEL S3 VALVE (ACCESS ON THE RIGHT); RIGHT GROIN CUT DOWN;  Surgeon: Waldemar Barrientos DO;  [facility];  Service: Cardiac Surgery    AL Albino Limber 3RD+ ORD SLCTV ABDL PEL/LXTR [facility] Right 8/15/2019    Procedure: LEFT GROIN ACCESS RIGHT LEG ARTERIOGRAM WITH ARTHRECTOMY, LEFT LEG RUNOFF;  Surgeon: Sara Sheikh MD;  [facility];  Service: Vascular    TONSILLECTOMY AND ADENOIDECTOMY           11/13/19 0846   Note Type   Note type Eval/Treat   Restrictions/Precautions   Weight Bearing Precautions Per Order No   Other Precautions Multiple lines;Telemetry   Pain Assessment   Pain Assessment 0-10   Pain Score 5   Pain Type Surgical pain   Pain Location Groin   Pain Orientation Right   Hospital Pain Intervention(s) Repositioned; Ambulation/increased activity; Emotional support   Response to Interventions tolerated   Home Living   Type of [address] One level;Stairs to enter with rails  (1 TON)   Bathroom Shower/Tub Tub/shower unit   Bathroom Toilet Standard   Prior Function   Level of [place] Independent with ADLs and functional mobility   Lives With [place] Help From Family   ADL Assistance Independent   IADLs Independent   Falls in the last 6 months 0   Vocational Retired   Lifestyle   Autonomy pta pt reports I in ADLs/IADLs/functional mobility   Reciprocal Relationships helps care for his wife who is disabled   Service to Others retired   Intrinsic Gratification reports he doesn't have much time for leisure as he takes care of the home   Psychosocial   Psychosocial (WDL) WDL   Subjective   Subjective "I need to get home to help with my wife"   ADL   Where Assessed Chair   Eating Assistance 5  Supervision/Setup   Grooming Assistance 5  Supervision/Setup   UB Bathing Assistance 5  Supervision/Setup    [facility],[phone] [facility] Transfers   Sit to Stand 4  Minimal assistance   Additional items Assist x 1; Increased time required   Stand to Sit 4  Minimal assistance   Additional items Assist x 1; Increased time required   Functional Mobility   Functional Mobility 4  Minimal assistance   Additional items Rolling walker   Balance   Static Sitting Fair +   Dynamic Sitting Fair   Static Standing Fair -   Dynamic Standing Fair -   Ambulatory Fair   Activity Tolerance   Activity Tolerance Patient tolerated treatment well   Medical Staff Made Aware PT Darnell   Nurse Made Aware okay to see per RN   RUE Assessment   RUE Assessment WFL   LUE Assessment   LUE Assessment WFL   Hand Function   Gross Motor Coordination Functional   Fine Motor Coordination Functional   Cognition   Overall Cognitive Status WFL   Arousal/Participation Cooperative   Attention Within functional limits   Orientation Level Oriented X4   Memory Within functional limits   Following Commands Follows all commands and directions without difficulty   Comments pt pleasant and cooperative   Assessment   Limitation Decreased ADL status; Decreased sensation;Decreased high-level ADLs   Prognosis Good   Assessment Pt is a 68 y o  YO  male admitted to [facility] on 11/12/2019 w/ aortic valve stenosis s/p TAVR on 11/12/19  Comorbidities include a h/o CAD, polymyalgia rheumatica, depression, and PAD   Pt with active OT orders and ambulate  orders   Pt resides in a ranch style home with spouse  Pt assists w/ spouse who has "many health problems"  Pt was I w/  ADLS and IADLS, (+) drove, & required no use of DME PTA  Currently pt is Min A for LB ADls and supervision for functional mobility  Pt is limited at this time 2*: endurance, activity tolerance, unsupportive home environment, decreased I w/ ADLS/IADLS and decreased safety awareness  The following Occupational Performance Areas to address include: toilet hygiene, dressing, functional mobility, household maintenance and care of others   Pt scored overall  65/100 on the Barthel Index  Based on the aforementioned OT evaluation, functional performance deficits, and assessments, pt has been identified as a moderate complexity evaluation  From OT standpoint, anticipate d/c home with family support  Pt to continue to benefit from acute immediate OT services to address the following goals 1-2 sessions to  w/in 3-5 days: [facility] Goals   Patient Goals go home   STG Time Frame 3-5   Plan   Treatment Interventions Cardiac education; Compensatory technique education;Patient/family training   Goal Expiration Date 19   OT Treatment Day 1   OT Frequency 1-2x/wk   Additional Treatment Session   Start Time 4813   End Time 9326   Recommendation   OT Discharge Recommendation Home with family support   OT - OK to Discharge Yes   Barthel Index   Feeding 10   Bathing 0   Grooming Score 5   Dressing Score 5   Bladder Score 10   Bowels Score 10   Toilet Use Score 5   Transfers (Bed/Chair) Score 10   Mobility (Level Surface) Score 10   Stairs Score 0   Barthel Index Score 65   Modified [place] Scale   Modified [place] Scale 3     Goals:    1 ) Pt/family will participate in cardiac education w/ G attn and carryover during functional/leisure activities      2 ) Pt will demonstrate 100% carryover of energy conservation techniques t/o functional I/ADL/leisure tasks w/o cues s/p skilled education      Yenni Cano MS, OTR/L

## 2019-11-13 NOTE — PHYSICAL THERAPY NOTE
Physical Therapy Evaluation     Patient's Name: Ashley Walter    Admitting Diagnosis  Aortic valve stenosis, etiology of cardiac valve disease unspecified [I35 [id number]]    Problem List  Patient Active Problem List   Diagnosis    Nonrheumatic aortic valve stenosis    RODRIGUEZ (dyspnea on exertion)    Cardiomyopathy, ischemic    Essential hypertension    Coronary artery disease of native artery of native heart with stable angina pectoris ([facility] )    [facility]a ([facility] )    Primary generalized (osteo)arthritis    Long term systemic steroid user    Depression    [facility]ipheral [facility]    Claudication in peripheral vascular disease ([facility] )    Osteopenia of left hip    Aortic valve stenosis    S/P TAVR (transcatheter aortic valve replacement)    Postoperative anemia due to acute blood loss    Right leg numbness       Past Medical History  Past Medical History:   Diagnosis Date    Anxiety     Benign essential hypertension     Carotid stenosis, asymptomatic, bilateral     66-03% GLENN, <31% LICA    Coronary artery disease     Depression     Diffuse arthralgia     Last Assessed: 1/23/2017    Former tobacco use     History of alcohol use     3-6 beers/day x20 years, no residual liver disease    History of Lyme disease     History of rheumatic fever     Hypercholesterolemia     Hyperlipidemia     Hyperlipidemia     Ischemic cardiomyopathy     Osteoarthritis, hip, bilateral     PAD (peripheral artery disease) (HCC)     s/p fem-below the knee bypass & percutaneous tx right CFA/SFA & left external iliac/CFA    Polymyalgia rheumatica (HCC)     on chronic steroids    RBBB     Rotator cuff disorder     b/l    Severe aortic stenosis     Wears hearing aid     b/l       Past Surgical History  Past Surgical History:   Procedure Laterality Date    CARDIAC CATHETERIZATION      FEMORAL BYPASS Bilateral     fem-below knee w/ GSV    IR ABDOMINAL ANGIOGRAPHY / INTERVENTION  8/15/2019    IR ABDOMINAL ANGIOGRAPHY / INTERVENTION  9/17/2019    MD ECHO TRANSESOPHAG R-T 2D W/PRB IMG ACQUISJ I&R N/A 11/12/2019    Procedure: TRANSESOPHAGEAL ECHOCARDIOGRAM (SANAZ); Surgeon: Nimco Pedro DO;  [facility];  Service: Cardiac Surgery    MD REPLACE AORTIC VALVE OPENFEMORAL ARTERY APPROACH N/A 11/12/2019    Procedure: REPLACEMENT AORTIC VALVE TRANSCATHETER (TAVR) TRANSFEMORAL WITH 29 MM MEEKS MIKEL S3 VALVE (ACCESS ON THE RIGHT); RIGHT GROIN CUT DOWN;  Surgeon: Nimco Pedro DO;  [facility];  Service: Cardiac Surgery    MD Chyrl Shall 3RD+ ORD SLCTV ABDL PEL/LXTR [facility] Right 8/15/2019    Procedure: LEFT GROIN ACCESS RIGHT LEG ARTERIOGRAM WITH ARTHRECTOMY, LEFT LEG RUNOFF;  Surgeon: Andrew Flores MD;  [facility];  Service: Vascular    TONSILLECTOMY AND ADENOIDECTOMY              11/13/19 0833   Note Type   Note type Eval/Treat   Pain Assessment   Pain Assessment 0-10   Pain Score 5   Pain Type Acute pain;Neuropathic pain   Pain Location Groin; Hip   Pain Orientation Right   Pain Radiating Towards towards the knee   Pain Descriptors Aching;Discomfort;Numbness   Pain Frequency Intermittent   Pain Onset Ongoing   Clinical Progression Gradually improving   Effect of Pain on Daily Activities Discomfort w/ positional changes and mobilization   Patient's Stated Pain Goal No pain   Hospital Pain Intervention(s) Repositioned; Ambulation/increased activity; Distraction; Emotional support  (CT sx team aware)   Response to Interventions no increased discomfort   Home Living   Type of [address] One level  (1 ABDI)   Home Equipment Walker  [facility])   Prior Function   Level of [place] Independent with ADLs and functional mobility  (amb w/o AD)   Lives With Spouse   Restrictions/Precautions   Braces or Orthoses   (denies)   Other Precautions Cardiac/sternal;Contact/isolation;Multiple lines;Telemetry; Fall Risk;Pain  (decreased HR)   General   Additional Pertinent History cleared for assessment (spoke to KIRAN w/ CT sx and cleared by RN, as per OTR)   Cognition   Overall Cognitive Status [facility]   Arousal/Participation Alert   Attention Attends with cues to redirect   Orientation Level Oriented to person;Oriented to place;Oriented to situation   Memory Within functional limits   Following Commands Follows one step commands without difficulty   Comments Pt is in the chair; agreeable to mobilize   RUE Assessment   RUE Assessment WFL  (AROM)   LUE Assessment   LUE Assessment WFL  (AROM)   RLE Assessment   RLE Assessment WFL  (AROM)   Strength RLE   RLE Overall Strength   (fair/ fair + (grossly))   LLE Assessment   LLE Assessment WFL  (AROM)   Strength LLE   LLE Overall Strength   (fair +/ good - (grossly))   Coordination   Sensation X  ((R) ant thigh --> knee numbness)   Transfers   Sit to Stand 3  Moderate assistance   Additional items Assist x 1;Verbal cues   Stand to Sit 3  Moderate assistance   Additional items Assist x 1;Verbal cues   Ambulation/Elevation   Gait pattern Excessively slow; Step to;Short stride; Inconsistent regan   Gait Assistance 4  Minimal assist   Additional items Assist x 1;Verbal cues; Tactile cues  (stand by (A) of 2 more for lines and chair follow)   Assistive Device Rolling walker   Distance 40 ft   Balance   Static Sitting Fair   Static Standing Fair -   Ambulatory Poor +   Activity Tolerance   Activity Tolerance Patient limited by fatigue;Patient limited by pain   Medical Staff Made Aware spoke to [place], [place] w/ CT sx   Assessment   Prognosis Good   Problem List Decreased strength;Decreased endurance; Impaired balance;Decreased mobility;Pain; Impaired sensation   Assessment Pt is 68 y o  male admitted with Dx of Aortic valve stenosis and underwent transcatheter aortic valve replacement with a 29 mm Medeiros MIKEL 3 bioprosthetic valve via right open surgical transfemoral approach on 11/12/2019   Pt 's comorbidities affecting POC include: anxiety, CAD, depression, diffuse arthralgia, ICM, PAD and personal factors of: ABDI  Pt's clinical presentation is currently unstable/unpredictable which is evident in ongoing telem monitoring w/ generally decreased HR noted at rest, abn lab values, and ongoing (R) proximal LE discomfort and numbness (radiating towards the knee --> mobility technique modification performed during transfers and amb)  Pt presents w/ postop discomfort and guarding, general weakness, incl decreased (R) > (L) LE strength, decreased functional endurance and activity tolerance, and impaired balance w/ associated gait deviations requiring use of rw at this time  Will cont to follow pt in PT for progressive mobilization to address above functional deficits and to max level of (I), endurance, and safety  Otherwise, anticipate pt will return home w/ available family support upon D/C provided he cont improving w/ mobility skills, safety, and endurance (incl on the step) and when medically cleared; home PT follow up is recommended at this time; rw for amb; will follow  Barriers to Discharge Inaccessible home environment   Goals   Patient Goals to return home   STG Expiration Date 11/23/19   Short Term Goal #1 7-10 days  Pt will amb 150 ft w/ rw <--> SPC, mod (I) in order to facilitate safe return to premorbid environment and to initiate return to community amb status  Pt will negotiate 1 step/curb w/ appropriate assistive device, mod (I) in order to assure safe navigation in and out of the premorbid living environment  Pt will achieve (I) level w/ bed mob in order to facilitate safety with OOB and back to bed transitions in own living environment  Pt will perform transfers w/ mod (I) to assure (I) and safety w/ functional mobility/transitions w/ all aspects of mobility/locomotion  Pt will participate in LE therex and balance activities to max progression w/ mobility skills     PT Treatment Day 1  (follow up Tx session)   Plan   Treatment/Interventions Functional transfer training;LE strengthening/ROM; Elevations; Therapeutic exercise; Endurance training;Equipment eval/education; Bed mobility;Gait training;Spoke to nursing;OT   PT Frequency Other (Comment)  (4-6x/wk)   Recommendation   Recommendation Home PT; Home with family support   Equipment Recommended Walker  (at this time)   Modified [place] Scale   Modified [place] Scale 4   Barthel Index   Feeding 10   Bathing 0   Grooming Score 5   Dressing Score 5   Bladder Score 0   Bowels Score 10   Toilet Use Score 10   Transfers (Bed/Chair) Score 5   Mobility (Level Surface) Score 0   Stairs Score 0   Barthel Index Score 45       Darnell Phelps, PT                       PT Tx note    Time In: 08:35  Time Out: 08:45  Total Time: 10 min      S:  Pt is in the chair; after rest period, agreeable to try further amb     O:  Additional functional mobility training performed as following: sit <--> stand transfers w/ min (A)x1; pt amb 70 ft w/ rw, (S)x 1 and stand by (A) of 2 more for lines and chair follow; SCDs re-activated at the end of session; call bell w/in reach  A:  Additional follow up consecutive session performed to progress further w/ mobilization/ambulation distance to max overall strength and endurance and to facilitate progression w/ functional mobility skills and overall level of (I)  Pt was able to amb further distance w/ rw while requiring less (A) overall; (R) LE discomfort/numbness seemed to min decline as the session went on; transfer technique was reviewed and pt was able to progress to step through gait pattern  Currently, cont to recommend home PT follow up upon returning home w/ family support pending progress and when medically cleared; will follow  P:  Cont to follow pt 4-6x/week for LE therex, functional mobility training, endurance training and pt education per POC to max functional (I) and safety        Madhav Landry, PT

## 2019-11-13 NOTE — PLAN OF CARE
Problem: PHYSICAL THERAPY ADULT  Goal: Performs mobility at highest level of function for planned discharge setting  See evaluation for individualized goals  Description  Treatment/Interventions: Functional transfer training, LE strengthening/ROM, Elevations, Therapeutic exercise, Endurance training, Equipment eval/education, Bed mobility, Gait training, Spoke to nursing, OT  Equipment Recommended: Walker(at this time)       See flowsheet documentation for full assessment, interventions and recommendations  Note:   Prognosis: Good  Problem List: Decreased strength, Decreased endurance, Impaired balance, Decreased mobility, Pain, Impaired sensation  Assessment: Pt is 68 y o  male admitted with Dx of Aortic valve stenosis and underwent transcatheter aortic valve replacement with a 29 mm Medeiros MIKEL 3 bioprosthetic valve via right open surgical transfemoral approach on 11/12/2019  Pt 's comorbidities affecting POC include: anxiety, CAD, depression, diffuse arthralgia, ICM, PAD and personal factors of: ABDI  Pt's clinical presentation is currently unstable/unpredictable which is evident in ongoing telem monitoring w/ generally decreased HR noted at rest, abn lab values, and ongoing (R) proximal LE discomfort and numbness (radiating towards the knee --> mobility technique modification performed during transfers and amb)  Pt presents w/ postop discomfort and guarding, general weakness, incl decreased (R) > (L) LE strength, decreased functional endurance and activity tolerance, and impaired balance w/ associated gait deviations requiring use of rw at this time  Will cont to follow pt in PT for progressive mobilization to address above functional deficits and to max level of (I), endurance, and safety  Otherwise, anticipate pt will return home w/ available family support upon D/C provided he cont improving w/ mobility skills, safety, and endurance (incl on the step) and when medically cleared; home PT follow up is recommended at this time; rw for amb; will follow  Barriers to Discharge: Inaccessible home environment     Recommendation: Home PT, Home with family support          See flowsheet documentation for full assessment

## 2019-11-14 ENCOUNTER — TRANSITIONAL CARE MANAGEMENT (OUTPATIENT)
Dept: FAMILY MEDICINE CLINIC | Facility: CLINIC | Age: 69
End: 2019-11-14

## 2019-11-14 VITALS
HEIGHT: 66 IN | OXYGEN SATURATION: 95 % | RESPIRATION RATE: 19 BRPM | HEART RATE: 78 BPM | WEIGHT: 165.57 LBS | TEMPERATURE: 98.6 F | BODY MASS INDEX: 26.61 KG/M2 | DIASTOLIC BLOOD PRESSURE: 65 MMHG | SYSTOLIC BLOOD PRESSURE: 129 MMHG

## 2019-11-14 LAB
ABO GROUP BLD BPU: NORMAL
ANION GAP SERPL CALCULATED.3IONS-SCNC: 9 MMOL/L (ref 4–13)
BPU ID: NORMAL
BUN SERPL-MCNC: 12 MG/DL (ref 5–25)
CALCIUM SERPL-MCNC: 9.1 MG/DL (ref 8.3–10.1)
CHLORIDE SERPL-SCNC: 102 MMOL/L (ref 100–108)
CO2 SERPL-SCNC: 27 MMOL/L (ref 21–32)
CREAT SERPL-MCNC: 0.73 MG/DL (ref 0.6–1.3)
CROSSMATCH: NORMAL
ERYTHROCYTE [DISTWIDTH] IN BLOOD BY AUTOMATED COUNT: 13.4 % (ref 11.6–15.1)
GFR SERPL CREATININE-BSD FRML MDRD: 93 ML/MIN/1.73SQ M
GLUCOSE SERPL-MCNC: 101 MG/DL (ref 65–140)
GLUCOSE SERPL-MCNC: 102 MG/DL (ref 65–140)
GLUCOSE SERPL-MCNC: 127 MG/DL (ref 65–140)
HCT VFR BLD AUTO: 37.6 % (ref 36.5–49.3)
HGB BLD-MCNC: 12.6 G/DL (ref 12–17)
MCH RBC QN AUTO: 31.3 PG (ref 26.8–34.3)
MCHC RBC AUTO-ENTMCNC: 33.5 G/DL (ref 31.4–37.4)
MCV RBC AUTO: 93 FL (ref 82–98)
PLATELET # BLD AUTO: 149 THOUSANDS/UL (ref 149–390)
PMV BLD AUTO: 10.3 FL (ref 8.9–12.7)
POTASSIUM SERPL-SCNC: 3.5 MMOL/L (ref 3.5–5.3)
RBC # BLD AUTO: 4.03 MILLION/UL (ref 3.88–5.62)
SODIUM SERPL-SCNC: 138 MMOL/L (ref 136–145)
UNIT DISPENSE STATUS: NORMAL
UNIT PRODUCT CODE: NORMAL
UNIT RH: NORMAL
WBC # BLD AUTO: 8.5 THOUSAND/UL (ref 4.31–10.16)

## 2019-11-14 PROCEDURE — 97116 GAIT TRAINING THERAPY: CPT

## 2019-11-14 PROCEDURE — 99238 HOSP IP/OBS DSCHRG MGMT 30/<: CPT | Performed by: PHYSICIAN ASSISTANT

## 2019-11-14 PROCEDURE — 85027 COMPLETE CBC AUTOMATED: CPT | Performed by: PHYSICIAN ASSISTANT

## 2019-11-14 PROCEDURE — 97110 THERAPEUTIC EXERCISES: CPT

## 2019-11-14 PROCEDURE — 82948 REAGENT STRIP/BLOOD GLUCOSE: CPT

## 2019-11-14 PROCEDURE — 80048 BASIC METABOLIC PNL TOTAL CA: CPT | Performed by: PHYSICIAN ASSISTANT

## 2019-11-14 PROCEDURE — 93306 TTE W/DOPPLER COMPLETE: CPT | Performed by: INTERNAL MEDICINE

## 2019-11-14 PROCEDURE — NC001 PR NO CHARGE: Performed by: PHYSICIAN ASSISTANT

## 2019-11-14 RX ORDER — OXYCODONE HYDROCHLORIDE 5 MG/1
TABLET ORAL
Qty: 30 TABLET | Refills: 0 | Status: SHIPPED | OUTPATIENT
Start: 2019-11-14 | End: 2019-11-14 | Stop reason: SDUPTHER

## 2019-11-14 RX ORDER — OXYCODONE HYDROCHLORIDE 5 MG/1
TABLET ORAL
Qty: 30 TABLET | Refills: 0 | Status: SHIPPED | OUTPATIENT
Start: 2019-11-14 | End: 2019-12-04

## 2019-11-14 RX ORDER — PREGABALIN 50 MG/1
50 CAPSULE ORAL 3 TIMES DAILY
Qty: 90 CAPSULE | Refills: 0 | Status: SHIPPED | OUTPATIENT
Start: 2019-11-14 | End: 2019-12-11 | Stop reason: SDUPTHER

## 2019-11-14 RX ORDER — PANTOPRAZOLE SODIUM 20 MG/1
20 TABLET, DELAYED RELEASE ORAL DAILY
Qty: 30 TABLET | Refills: 0 | Status: SHIPPED | OUTPATIENT
Start: 2019-11-14 | End: 2019-12-04

## 2019-11-14 RX ADMIN — TRIAMTERENE AND HYDROCHLOROTHIAZIDE 1 TABLET: 37.5; 25 TABLET ORAL at 08:18

## 2019-11-14 RX ADMIN — ASPIRIN 81 MG: 81 TABLET, COATED ORAL at 08:18

## 2019-11-14 RX ADMIN — PANTOPRAZOLE SODIUM 40 MG: 40 TABLET, DELAYED RELEASE ORAL at 08:18

## 2019-11-14 RX ADMIN — MUPIROCIN 1 APPLICATION: 20 OINTMENT TOPICAL at 08:18

## 2019-11-14 RX ADMIN — RANOLAZINE 500 MG: 500 TABLET, FILM COATED, EXTENDED RELEASE ORAL at 08:18

## 2019-11-14 RX ADMIN — CLOPIDOGREL BISULFATE 75 MG: 75 TABLET ORAL at 08:18

## 2019-11-14 RX ADMIN — OXYCODONE HYDROCHLORIDE AND ACETAMINOPHEN 1 TABLET: 5; 325 TABLET ORAL at 08:22

## 2019-11-14 RX ADMIN — ACETAMINOPHEN 650 MG: 325 TABLET ORAL at 12:50

## 2019-11-14 RX ADMIN — CHLORHEXIDINE GLUCONATE 0.12% ORAL RINSE 15 ML: 1.2 LIQUID ORAL at 08:17

## 2019-11-14 RX ADMIN — HEPARIN SODIUM 5000 UNITS: 5000 INJECTION INTRAVENOUS; SUBCUTANEOUS at 05:54

## 2019-11-14 RX ADMIN — POLYETHYLENE GLYCOL 3350 17 G: 17 POWDER, FOR SOLUTION ORAL at 08:18

## 2019-11-14 RX ADMIN — ISOSORBIDE MONONITRATE 30 MG: 30 TABLET, EXTENDED RELEASE ORAL at 08:18

## 2019-11-14 RX ADMIN — METOPROLOL TARTRATE 12.5 MG: 25 TABLET ORAL at 08:18

## 2019-11-14 NOTE — PLAN OF CARE
Problem: PHYSICAL THERAPY ADULT  Goal: Performs mobility at highest level of function for planned discharge setting  See evaluation for individualized goals  Description  Treatment/Interventions: Functional transfer training, LE strengthening/ROM, Elevations, Therapeutic exercise, Endurance training, Equipment eval/education, Bed mobility, Gait training, Spoke to nursing, OT  Equipment Recommended: Walker(at this time)       See flowsheet documentation for full assessment, interventions and recommendations  11/14/2019 1522 by Manpreet Amanda PTA  Outcome: Adequate for Discharge  Note:   Prognosis: Good  Problem List: Decreased strength, Decreased endurance, Impaired balance, Decreased mobility, Pain, Impaired sensation  Assessment: Today the pt  was able to improve his ambulatory distance as well as complete a stair trial  He had no loss of balance, and he required no assistance  He has demonstrated the necessary mobility in order to safely return home at discharge  Barriers to Discharge: None     Recommendation: Home PT, Home with family support     PT - OK to Discharge: Yes    See flowsheet documentation for full assessment       11/14/2019 1522 by Manpreet Amanda PTA  Reactivated

## 2019-11-14 NOTE — PROGRESS NOTES
Progress Note - Cardiothoracic Surgery   Tricia Tuttle 68 y o  male MRN: [id number]  Unit/Bed#: [facility] 403-01 Encounter: [id number]    Aortic stenosis, Non-Rheumatic  S/P transfemoral transcatheter aortic valve replacement; POD # 2    24 Hour Events: No events overnight  Downgraded to telemetry yesterday  Feels well today  Denies CP or SOB  Complains of persistent anterior thigh numbness and burning  Reports his proximal thigh area is improved in comparison to yesterday, but the same around his distal thigh/knee  Reports no difficulty ambulating      Medications:   Scheduled Meds:  Current Facility-Administered Medications:  acetaminophen 650 mg Rectal Q4H PRN Randolm Sor, PA-C   acetaminophen 650 mg Oral Q4H PRN Randolm Sor, PA-C   aspirin 81 mg Oral Daily Randolm Sor, PA-C   bisacodyl 10 mg Rectal Daily PRN Randolm Sor, PA-C   chlorhexidine 15 mL Mouth/Throat BID Randolm Sor, PA-C   clopidogrel 75 mg Oral Daily Randolm Sor, PA-C   heparin (porcine) 5,000 Units Subcutaneous Q8H [address] Randolm Sor, PA-C   insulin lispro 1-5 Units Subcutaneous TID AC Randolm Sor, PA-C   insulin lispro 1-5 Units Subcutaneous HS Randolm Sor, PA-C   iohexol 48 mL Intravenous Once in imaging [facility] MD Young   isosorbide mononitrate 30 mg Oral Daily Randolm Sor, PA-C   metoprolol tartrate 12 5 mg Oral Q12H [address] Randolm Sor, PA-C   mupirocin 1 application Nasal O41Z [address] Randolm Sor, PA-C   ondansetron 4 mg Intravenous Q6H PRN Randolm Sor, PA-C   oxyCODONE-acetaminophen 1 tablet Oral Q8H PRN Randolm Sor, PA-C   pantoprazole 40 mg Oral Daily Randolm Sor, PA-C   polyethylene glycol 17 g Oral Daily Randolm Sor, PA-C   ranolazine 500 mg Oral BID Randolm Sor, PA-C   rosuvastatin 5 mg Oral Daily Randolm Sor, PA-C   triamterene-hydrochlorothiazide 1 tablet Oral Daily Michael Susy, PA-C     Continuous Infusions:   PRN Meds:   acetaminophen    acetaminophen    bisacodyl    iohexol   ondansetron    oxyCODONE-acetaminophen    Vitals:   Vitals:    11/13/19 2223 11/13/19 2233 11/14/19 0300 11/14/19 0500   BP: 112/57 114/55 136/60    BP Location: Right arm  Right arm    Pulse: 70 74 78    Resp: 18  18    Temp: 99 °F (37 2 °C)  99 3 °F (37 4 °C)    TempSrc: Oral  Oral    SpO2: 93%  94%    Weight:    75 1 kg (165 lb 9 1 oz)   Height:           Telemetry: NSR; Heart Rate: 87    Respiratory:   SpO2: SpO2: 94 %; Room Air    Intake/Output:   I/O       11/12 0701 - 11/13 0700 11/13 0701 - 11/14 0700 11/14 0701 - 11/15 0700    P  O  480 1150     I V  (mL/kg) 2594 2 (35 2)      IV Piggyback 100      Total Intake(mL/kg) 3174 2 (43) 1150 (15 3)     Urine (mL/kg/hr) 2375 (1 3) 2950 (1 6)     Blood 50      Total Output 2425 2950     Net +749 2 -1800                  Weights:   Weight (last 2 days)     Date/Time   Weight    11/14/19 0500   75 1 (165 57)    11/13/19 0600   73 8 (162 7)    11/12/19 0617   73 9 (163)            Admit weight: 73 9 kg    Results:   Results from last 7 days   Lab Units 11/14/19  0600 11/13/19  0351 11/12/19  1736 11/12/19  0941   WBC Thousand/uL 8 50 9 25  --   --    HEMOGLOBIN g/dL 12 6 11 8* 12 4 13 0   HEMATOCRIT % 37 6 35 8* 37 2 39 2   PLATELETS Thousands/uL 149 156  --  161     Results from last 7 days   Lab Units 11/14/19  0600 11/13/19  0351 11/12/19  0941 11/12/19  0919   POTASSIUM mmol/L 3 5 3 5 3 5  --    CHLORIDE mmol/L 102 104 109*  --    CO2 mmol/L 27 25 24  --    CO2, I-STAT mmol/L  --   --   --  25   BUN mg/dL 12 15 15  --    CREATININE mg/dL 0 73 0 67 0 69  --    GLUCOSE, ISTAT mg/dl  --   --   --  148*   CALCIUM mg/dL 9 1 8 7 8 7  --          Point of care glucose:  - 125  No SSIC required    Studies:  Echocardiogram: LEFT VENTRICLE:  The ventricle was moderately dilated  Systolic function was severely reduced  Ejection fraction was estimated to be 20 %  There was severe diffuse hypokinesis with distinct regional wall motion abnormalities    The changes were consistent with eccentric hypertrophy  Features were consistent with a pseudonormal left ventricular filling pattern, with concomitant abnormal relaxation and increased filling pressure (grade 2 diastolic dysfunction)      LEFT ATRIUM:  The atrium was mildly dilated      MITRAL VALVE:  There was trace regurgitation      AORTIC VALVE:  A transcatheter (TAVR #29 [place] 3) bioprosthesis was present  The prosthesis was well seated without stenosis or regurgitation  The peak (mean) transvalvular velocities and gradients were 2 84 (1 87) m/sec and 32 (16) mmHg,  respectively  The aortic valve area measured 1 4 cm2 using continuity equation  I have personally reviewed pertinent reports  Invasive Lines/Tubes:  Invasive Devices     Central Venous Catheter Line            CVC Central Lines 11/12/19 Triple 2 days          Peripheral Intravenous Line            Peripheral IV 11/12/19 Left Arm 2 days    Peripheral IV 11/12/19 Right Arm 2 days                Physical Exam:    HEENT/NECK:  PERRLA  No jugular venous distention  Cardiac: Regular rate and rhythm and No murmurs/rubs/gallops  Pulmonary:  Breath sounds clear bilaterally and No rales/rhonchi/wheezes  Abdomen:  Non-tender, Non-distended and Normal bowel sounds  Incisions: Groin incision clean, dry and intact  Extremities: Extremities warm/dry, Radial pulses 2+ bilaterally, DP pulses 2+ bilaterally and Trace edema B/L  Neuro: Alert and oriented X 3, Sensation is grossly intact and No focal deficits  Skin: Warm/Dry, without rashes or lesions  Assessment:  Principal Problem: Aortic valve stenosis  Active Problems:    Essential hypertension    Coronary artery disease of native artery of native heart with stable angina pectoris ([facility])    PAD (peripheral artery disease) ([facility])    S/P TAVR (transcatheter aortic valve replacement)    Postoperative anemia due to acute blood loss    Right leg numbness       Aortic stenosis, Non-Rheumatic   S/P transfemoral transcatheter aortic valve replacement; POD # 2    Plan:    1  Cardiac:   NSR; HR/BP well-controlled  Lopressor, 12 5mg PO BID and Imdur 30mg daily  Central IV access no longer required; Remove central venous catheter today  ASA/Plavix  Follow up transthoracic echocardiogram today  Continue Statin therapy  Continue DVT prophylaxis    2  Pulmonary:   Good Room air oxygen saturation; Continue incentive spirometry/Coughing/Deep breathing exercises    Likely acute pulmonary edema secondary to fluid volume overload in the OR as evidenced by SOB with requirements for increased O2 and single dose of Lasix immediately post op, now resolving  3  Renal:   Intake/Output net: -1800 mL/24 hours  Post op Creatinine stable; Follow up labs prn  Continue Triamterene/HCTZ    4  Neuro:  Neurologically intact; No active issues  Incisional pain well-controlled; Continue prn Tylenol  Postop Neuralgia - likely related to cutdown incision  Improved in comparison to yesterday  Cont to monitor  Consider Lyrica  Reports intolerance to gabapentin in the past     5  GI:  Tolerating TLC 2 3 gm sodium diet  Maintain 1800 mL daily fluid restriction   Continue stool softeners and prn suppository  Continue GI prophylaxis    6  Endo:   No history of diabetes; Glucose well-controlled with insulin sliding scale coverage    7  Hematology:   Post-operative blood count acceptable; Trend prn    8  Disposition:  Gerontology consultation already completed for routine assessment of TAVR patient over 72years old  Ambulating independently, Anticipate discharge to home today     VTE Pharmacologic Prophylaxis: Heparin  VTE Mechanical Prophylaxis: sequential compression device    Collaborative rounds completed with SUSAN Sommers    Plan of care discussed with bedside nurse    SIGNATURE: Altagracia Rust PA-C  DATE: November 14, 2019  TIME: 8:04 AM

## 2019-11-14 NOTE — DISCHARGE SUMMARY
Discharge Summary - Cardiothoracic Surgery   Pao Lott 68 y o  male MRN: [id number]  Unit/Bed#: [facility] 403-01 Encounter: [id number]    Admission Date: 11/12/2019     Discharge Date: 11/14/19    Admitting Diagnosis: Aortic valve stenosis, etiology of cardiac valve disease unspecified [I35 0]    Primary Discharge Diagnosis:   Aortic stenosis, Non-Rheumatic  S/P transcatheter aortic valve replacement;    Secondary Discharge Diagnosis:   Patient Active Problem List   Diagnosis    Nonrheumatic aortic valve stenosis    RODRIGUEZ (dyspnea on exertion)    Cardiomyopathy, ischemic    Essential hypertension    Coronary artery disease of native artery of native heart with stable angina pectoris (HCC)    [facility]ymyalgia rheumatica [facility] [address] [facility]    Primary generalized (osteo)arthritis    Long term systemic steroid user    Depression    [facility]ipheral [facility]    Claudication in peripheral vascular disease ([facility] [address] )    Osteopenia of left hip    Aortic valve stenosis    S/P TAVR (transcatheter aortic valve replacement)    Postoperative anemia due to acute blood loss    Right leg numbness         Attending: SUSAN Quinn  Consulting Physician(s):   Cardiology  Medical/Critical Care      Procedures Performed:   Transcatheter aortic valve replacement with a 29 mm Medeiros MIKEL 3 bioprosthetic valve via right open surgical transfemoral approach    [facility] Course: The patient was seen in consultation prior to this admission for evaluation of Aortic stenosis, Non-Rheumatic  Risks and benefits of transcatheter aortic valve replacement were discussed in detail, and patient was agreeable  Routine preoperative evaluation was completed and informed consent was obtained prior to admission  11/12: Elective admission for TAVR (29 mm [place] 3), via right cut-down femoral approach  Extubated and transferred to PACU without inotropic or pressor support  Cardene started    Preoperative Triamterene restarted  Lopressor 12 5 BID started  Pre Op Norvasc held  No significant postoperative bleeding  LE Doppler signals B/L    CXR shows pulmonary vascular congestion; Lasix 40 mg IV X 1 given  PM: No events  Off cardene, a line to be discontinued tonight  On 2LNC  PM: Echo without PVL, no significant gradient  11/13: NSR, RA  Hg 11 8, Cr 0 67  C/O Right anterior thigh numbness/burning  Echo today  D/C floyd and arterial line  Downgrade to telemetry status  Anticipate discharge home tomorrow  Condition at Discharge:   good     Discharge Physical Exam:  HEENT/NECK:  PERRLA  No jugular venous distention  Cardiac: Regular rate and rhythm  No rubs/murmurs/gallops  Pulmonary:  Breath sounds slightly diminished at the bases bilaterally  Abdomen:  Non-tender, Non-distended  Positive bowel sounds  Incisions: groin incision healing to satisfaction  Lower extremities: Extremities warm/dry  Radial/PT/DP pulses 2+ bilaterally  No edema B/L  Neuro: Alert and oriented X 3  Sensation is grossly intact  No focal deficits  Skin: Warm/Dry, without rashes or lesions  Discharge Data:  Results from last 7 days   Lab Units 11/14/19  0600 11/13/19  0351 11/12/19  1736 11/12/19  0941   WBC Thousand/uL 8 50 9 25  --   --    HEMOGLOBIN g/dL 12 6 11 8* 12 4 13 0   HEMATOCRIT % 37 6 35 8* 37 2 39 2   PLATELETS Thousands/uL 149 156  --  161     Results from last 7 days   Lab Units 11/14/19  0600 11/13/19  0351 11/12/19  0941 11/12/19  0919   POTASSIUM mmol/L 3 5 3 5 3 5  --    CHLORIDE mmol/L 102 104 109*  --    CO2 mmol/L 27 25 24  --    CO2, I-STAT mmol/L  --   --   --  25   BUN mg/dL 12 15 15  --    CREATININE mg/dL 0 73 0 67 0 69  --    GLUCOSE, ISTAT mg/dl  --   --   --  148*   CALCIUM mg/dL 9 1 8 7 8 7  --            Discharge instructions/Information to patient and family:   See after visit summary for information provided to patient and family        Severiano Byes was educated on restrictions regarding driving and lifting, and techniques of proper incisional care  They were specifically counselled on signs and symptoms of a sternal wound infection, and advised to contact our service immediately should they develop fevers, sweats, chill, redness or drainage at the site of any incisions  Provisions for Follow-Up Care:  See after visit summary for information related to follow-up care and any pertinent home health orders  Disposition:  Home    Planned Readmission:   No    Discharge Medications:  See after visit summary for reconciled discharge medications provided to patient and family  Robin Neville was provided contact information and scheduled a follow up appointment with SUSAN White  Additionally, they were advised to schedule follow up appointments to be seen by their primary care physician within 7-10 days, and their cardiologist within 2-3 weeks  Contact information was provided  Robin Neville was counseled on the importance of avoiding tobacco products  As with all patients whom have undergone open heart surgery, tobacco cessation medication was contraindicated at the time of discharge  ACE/ARB was Contraindicated secondary to hypotension      The patient was discharged on ongoing diuretic therapy with his home dose of Dyazide  Narcotic pain medication was prescribed in the form of oxycodone  Prior to prescribing, their prescription profile was reviewed on the [facility] of health prescription drug monitoring program     The patient was informed that following their postoperative surgical evaluation, they will be referred to outpatient cardiac rehabilitation  They were counseled that this program is run by specialists who will help them safely strengthen their heart and prevent more heart disease  Cardiac rehabilitation will include exercise, relaxation, stress management, and heart-healthy nutrition    Caregivers will also check to make sure their medication regimen is working  I spent 30 minutes discharging the patient  This time was spent on the day of discharge  I had direct contact with the patient on the day of discharge  Additional documentation is required if more than 30 minutes were spent on discharge       SIGNATURE: Daniel Rehman PA-C  DATE: November 14, 2019  TIME: 11:55 AM

## 2019-11-14 NOTE — PHYSICAL THERAPY NOTE
Physical Therapy Progress Note     11/14/19 0915   Pain Assessment   Pain Assessment 0-10   Pain Score 4   Pain Type Surgical pain   Pain Location Groin   Pain Orientation Right   Hospital Pain Intervention(s) Repositioned; Ambulation/increased activity; Emotional support   Response to Interventions Tolerated  Restrictions/Precautions   Other Precautions Telemetry;Pain   Subjective   Subjective The pt states that he is eager to go home today  Transfers   Sit to Stand 5  Supervision   Stand to Sit 5  Supervision   Ambulation/Elevation   Gait pattern Excessively slow   Gait Assistance 5  Supervision   Assistive Device Rolling walker   Distance 220 feet  Stair Management Assistance 5  Supervision   Additional items Verbal cues   Stair Management Technique One rail R;Step to pattern; Foreward   Number of Stairs 2   Balance   Static Sitting Good   Dynamic Sitting Good   Static Standing Fair +   Ambulatory Fair   Activity Tolerance   Activity Tolerance Patient tolerated treatment well   Nurse [address] Herlinda Mariscal RN  Exercises   TKR Sitting;Bilateral;AROM;10 reps   Assessment   Prognosis Good   Problem List Decreased strength;Decreased endurance; Impaired balance;Decreased mobility;Pain; Impaired sensation   Assessment Today the pt  was able to improve his ambulatory distance as well as complete a stair trial  He had no loss of balance, and he required no assistance  He has demonstrated the necessary mobility in order to safely return home at discharge  Barriers to Discharge None   Goals   Patient Goals To go home  STG Expiration Date 11/23/19   PT Treatment Day 2   Plan   Treatment/Interventions Functional transfer training;LE strengthening/ROM; Elevations; Therapeutic exercise; Endurance training;Patient/family training;Bed mobility;Gait training   Progress Progressing toward goals   PT Frequency   (4-6x a week )   Recommendation   Recommendation Home PT; Home with family support   Equipment Recommended Sergio Hess   PT - OK to Discharge Yes     Ever Dorie, ROSE

## 2019-11-15 ENCOUNTER — APPOINTMENT (OUTPATIENT)
Dept: PULMONOLOGY | Facility: HOSPITAL | Age: 69
End: 2019-11-15
Payer: MEDICARE

## 2019-11-18 ENCOUNTER — APPOINTMENT (OUTPATIENT)
Dept: PULMONOLOGY | Facility: HOSPITAL | Age: 69
End: 2019-11-18
Payer: MEDICARE

## 2019-11-18 NOTE — PROGRESS NOTES
Cardiac/PAD Rehabilitation Plan of Care   Care Plan       Today's date: 2019   Visits:   Patient name: Flor Aparicio      : 1950  Age: 68 y o  MRN: [id number]  Referring Physician: Amanda Saucedo MD  Cardiologist: Dr Jairon Dumont  Provider: Juan Fox  Clinician: ELO Rivas    Dx: PAD  Date of onset: 2019      SUMMARY OF PROGRESS:  Patient has completed  sessions as of today  He has correct hemodynamic responses to activity  Patient's resting vitals were HR 77 and /64  His peak vitals were  and /76  Vitals upon recovery were HR 85 and /58  Patient rates his test a 5 on the 1-10 RPE Scale  Patient noted 1/10 Angina  Patient denied any SOB  Patient rated his claudication as 3/5  Patient performed the test on room air and was able to maintain his 02 saturation at 96% or greater  He was able to attain a 3 1 MET Level  Patient is an excellent rehabilitation candidate due to high prior level of function and willingness to progress  Patient's program will be progressed as he is able to tolerate  Patient will receive education specific to his disease process        Medication compliance: Yes   Comments: Patient admits to medication compliance  Fall Risk: Low   Comments: Patient exhibits good dynamic standing balance and 3+/5 BLE strength    EKG changes: Telemetry reveals NSR      EXERCISE ASSESSMENT and PLAN    Current Exercise Program in Rehab:       Frequency: 3 days/week        Minutes: 35-40        METS: 3 to 3 5            HR: 20-30 > RHR  100-110   RPE: 4-6         Modalities: Treadmill, Lifecycle, NuStep and Recumbent bike      Exercise Progression 30 Day Goals :    Frequency: 5 days/week   Minutes: 40-45   METS: 3 5 to 4   HR: 100-110   RPE: 4-6   Modalities: Treadmill, Airdyne bike, Lifecycle and NuStep    Strength trainin-3 days / week  12-15 repetitions  1-2 sets per modality    Modalities: Leg Press    Progressing: Action    Home Exercise: None    Goals: 10% improvement in functional capacity, improved DASI score by 10%, Increase in peak CR METs by 40%, Resume ADLs with increased strength and Exercise 5 days/wk, >150mins/wk  Education: Benefit of exercise for CAD risk factors, home exercise guidelines, signs and sxs, RPE scale, class: Risk Factors for Heart Disease and Exercise instructions/guidelines for discharge    Plan:education on home exercise guidelines, home exercise 30+ mins 2 days opposite CR and Education class: Risk Factors for Heart Disease  Readiness to change: Action      NUTRITION ASSESSMENT AND PLAN    Weight control:    Starting weight: 188   Current weight:   188  Waist circumference:   Startin   Current:  42  Diabetes: N/A  Lipid management: Discussed diet and lipid management  Goals:LDL <100, HDL >40, TRG <150 and CHOL <200  Education: heart healthy eating  low sodium diet  hydration  diet and lipid management  wt  loss  healthy choices while dining out  portion control  class: Heart Healthy Eating  class:  Label Reading  Progressing: In Progress  Plan: Education class: Reading Food Labels, Education Class: Heart Healthy Eating, Increase PUFA and MUFA, Decrease SFA, Increase whole grains and increase fruits/vegs  Readiness to change: Action      PSYCHOSOCIAL ASSESSMENT AND PLAN    Emotional:  PHQ-9 = 5-9 = Mild Depression  Self-reported stress level: 8   Social support: Very Good  Goals:  Reduce perceived stress to 1-3/10, improved [facility] QOL < 27, continue medical therapy, Feelings in [facility] Score < 3, Physical Fitness in [facility] Score < 3, Overall Health in [facility] Score < 3, Quality of Life in [place] Score < 3 , Change in Health in [place] Score < 3 , Increased interest in doing things, improved sleep, improved positive thoughts of well being and increased energy  Education: signs/sxs of depression, benefits of positive support system, coping mechanisms, depression and CAD, class:  Stress and Your Health  and class:  Relaxation  Progressing: In Progress  Plan: Class: Stress and Your Health, Class: Relaxation and Practice relaxation techniques  Readiness to change: Action      OTHER CORE COMPONENTS     Tobacco:   Social History     Tobacco Use   Smoking Status Former Smoker    Packs/day: 2 00    Years: 20 00    Pack years: 40 00    Types: Cigarettes    Last attempt to quit: Dale Nahun Years since quittin 8   Smokeless Tobacco Former User    Types: Chew       Tobacco Use Intervention: Referral to tobacco expert:   N/A    Blood pressure:    Restin/64   Exercise: 148/76   Recovery: 120/58     Goals: consistent BP < 130/80, reduced dietary sodium <2300mg, consistent exercise >150 mins/wk and medication compliance  Education:  understanding HTN and CAD, low sodium diet and HTN, Education class:  Common Heart Medications and Education class: Understanding Heart Disease  Progressing: In Progress  Plan: Class: Understanding Heart Disease and Class: Common Heart Medications  Readiness to change: Action

## 2019-11-19 ENCOUNTER — TELEPHONE (OUTPATIENT)
Dept: CARDIAC SURGERY | Facility: CLINIC | Age: 69
End: 2019-11-19

## 2019-11-19 NOTE — TELEPHONE ENCOUNTER
Post op call    11/12/19: Elective TAVR via right femoral cut-down  11/14/19: Discharge home  11/19/19: Spoke to patient today  She states he is feeling well  He denies SOB, lightheadedness or angina  His weight is stable and he has no edema  His right groin site is healing and he denies swelling, pain or bleeding at the incision  He is still experiencing right thigh numbness and sharp neuropathic pain but it is improving  He is taking Tylenol and Lyrica as prescribed  Medications reviewed    Questions answered  Appts reviewed

## 2019-11-20 ENCOUNTER — OFFICE VISIT (OUTPATIENT)
Dept: FAMILY MEDICINE CLINIC | Facility: CLINIC | Age: 69
End: 2019-11-20
Payer: MEDICARE

## 2019-11-20 ENCOUNTER — APPOINTMENT (OUTPATIENT)
Dept: PULMONOLOGY | Facility: HOSPITAL | Age: 69
End: 2019-11-20
Payer: MEDICARE

## 2019-11-20 VITALS
TEMPERATURE: 97.9 F | SYSTOLIC BLOOD PRESSURE: 118 MMHG | BODY MASS INDEX: 27.48 KG/M2 | HEIGHT: 66 IN | DIASTOLIC BLOOD PRESSURE: 54 MMHG | OXYGEN SATURATION: 99 % | HEART RATE: 78 BPM | WEIGHT: 171 LBS

## 2019-11-20 DIAGNOSIS — Z95.2 S/P TAVR (TRANSCATHETER AORTIC VALVE REPLACEMENT): ICD-10-CM

## 2019-11-20 DIAGNOSIS — I35.0 NONRHEUMATIC AORTIC VALVE STENOSIS: Primary | ICD-10-CM

## 2019-11-20 PROCEDURE — 99495 TRANSJ CARE MGMT MOD F2F 14D: CPT | Performed by: FAMILY MEDICINE

## 2019-11-22 ENCOUNTER — APPOINTMENT (OUTPATIENT)
Dept: PULMONOLOGY | Facility: HOSPITAL | Age: 69
End: 2019-11-22
Payer: MEDICARE

## 2019-11-25 ENCOUNTER — APPOINTMENT (OUTPATIENT)
Dept: PULMONOLOGY | Facility: HOSPITAL | Age: 69
End: 2019-11-25
Payer: MEDICARE

## 2019-11-27 ENCOUNTER — APPOINTMENT (OUTPATIENT)
Dept: PULMONOLOGY | Facility: HOSPITAL | Age: 69
End: 2019-11-27
Payer: MEDICARE

## 2019-11-29 ENCOUNTER — APPOINTMENT (OUTPATIENT)
Dept: PULMONOLOGY | Facility: HOSPITAL | Age: 69
End: 2019-11-29
Payer: MEDICARE

## 2019-12-02 ENCOUNTER — APPOINTMENT (OUTPATIENT)
Dept: PULMONOLOGY | Facility: HOSPITAL | Age: 69
End: 2019-12-02
Payer: MEDICARE

## 2019-12-04 ENCOUNTER — APPOINTMENT (OUTPATIENT)
Dept: PULMONOLOGY | Facility: HOSPITAL | Age: 69
End: 2019-12-04
Payer: MEDICARE

## 2019-12-04 ENCOUNTER — OFFICE VISIT (OUTPATIENT)
Dept: CARDIOLOGY CLINIC | Facility: CLINIC | Age: 69
End: 2019-12-04
Payer: MEDICARE

## 2019-12-04 VITALS
HEIGHT: 66 IN | BODY MASS INDEX: 27.8 KG/M2 | DIASTOLIC BLOOD PRESSURE: 60 MMHG | SYSTOLIC BLOOD PRESSURE: 130 MMHG | WEIGHT: 173 LBS | HEART RATE: 64 BPM

## 2019-12-04 DIAGNOSIS — I10 ESSENTIAL HYPERTENSION: ICD-10-CM

## 2019-12-04 DIAGNOSIS — I73.9 PAD (PERIPHERAL ARTERY DISEASE) (HCC): ICD-10-CM

## 2019-12-04 DIAGNOSIS — Z95.2 S/P TAVR (TRANSCATHETER AORTIC VALVE REPLACEMENT): Primary | ICD-10-CM

## 2019-12-04 DIAGNOSIS — I25.10 CORONARY ARTERY DISEASE INVOLVING NATIVE CORONARY ARTERY OF NATIVE HEART WITHOUT ANGINA PECTORIS: ICD-10-CM

## 2019-12-04 DIAGNOSIS — I25.5 CARDIOMYOPATHY, ISCHEMIC: Chronic | ICD-10-CM

## 2019-12-04 DIAGNOSIS — I35.0 NONRHEUMATIC AORTIC VALVE STENOSIS: ICD-10-CM

## 2019-12-04 PROCEDURE — 99214 OFFICE O/P EST MOD 30 MIN: CPT | Performed by: INTERNAL MEDICINE

## 2019-12-04 NOTE — PROGRESS NOTES
Cardiology Follow Up    Clara Rojas  1950  [id number]  [address] [facility]  [address] [address] [address] [phone]    1  S/P TAVR (transcatheter aortic valve replacement)  Ambulatory  Referral to Cardiac Rehabilitation   2  Cardiomyopathy, ischemic     3  Nonrheumatic aortic valve stenosis     4  Coronary artery disease involving native coronary artery of native heart without angina pectoris     5  Essential hypertension     6  PAD (peripheral artery disease) ([facility])           Discussion/Summary: All of his assessed cardiac problems are stable  I have reviewed his medications and made no changes  He is scheduled for a repeat echo in the near future  I will start him in cardiac rehab  RTO 3 months  Interval History: He is s/p TAVR on 11/12/2019  He did well and says that he felt more energy the very next day  His EF was 20% , hopefully this will improved  Cardiac cath prior to his TAVR showed chronic CAD - medical therapy  He denies CP, SOB  BP is controlled      Patient Active Problem List   Diagnosis    RODRIGUEZ (dyspnea on exertion)    Cardiomyopathy, ischemic    Essential hypertension    Coronary artery disease involving native coronary artery of native heart without angina pectoris    [facility]ymyalgia rheumatica [facility]    Primary generalized (osteo)arthritis    Long term systemic steroid user    Depression    PAD (peripheral artery disease) ([facility]    Claudication in peripheral vascular disease ([facility] )    Osteopenia of left hip    Aortic valve stenosis    S/P TAVR (transcatheter aortic valve replacement)    Postoperative anemia due to acute blood loss    Right leg numbness     Past Medical History:   Diagnosis Date    Anxiety     Benign essential hypertension     Carotid stenosis, asymptomatic, bilateral     56-10% GLENN, <83% LICA    Coronary artery disease     Depression     Diffuse arthralgia     Last Assessed: 2017    Former tobacco use     History of alcohol use     3-6 beers/day x20 years, no residual liver disease    History of Lyme disease     History of rheumatic fever     Hypercholesterolemia     Hyperlipidemia     Hyperlipidemia     Ischemic cardiomyopathy     Osteoarthritis, hip, bilateral     PAD (peripheral artery disease) (HCC)     s/p fem-below the knee bypass & percutaneous tx right CFA/SFA & left external iliac/CFA    Polymyalgia rheumatica (HCC)     on chronic steroids    RBBB     Rotator cuff disorder     b/l    Severe aortic stenosis     Wears hearing aid     b/l     Social History     Socioeconomic History    Marital status: /Civil Union     Spouse name: Not on file    Number of children: 11    Years of education: Not on file    Highest education level: Not on file   Occupational History    Occupation: Environmental Supervisor   Social Needs    Financial resource strain: Not on file    Food insecurity:     Worry: Not on file     Inability: Not on file   Sensser needs:     Medical: Not on file     Non-medical: Not on file   Tobacco Use    Smoking status: Former Smoker     Packs/day: 2 00     Years: 20 00     Pack years: 40 00     Types: Cigarettes     Last attempt to quit:      Years since quittin 9    Smokeless tobacco: Former User     Types: Chew   Substance and Sexual Activity    Alcohol use: Not Currently     Frequency: Never     Comment: 3-6 cans beer x20 years, quit , no residual liver issues    Drug use: Never    Sexual activity: Not Currently   Lifestyle    Physical activity:     Days per week: Not on file     Minutes per session: Not on file    Stress: Not on file   Relationships    Social connections:     Talks on phone: Not on file     Gets together: Not on file     Attends Confucianist service: Not on file     Active member of club or organization: Not on file     Attends meetings of clubs or organizations: Not on file     Relationship status: Not on file    Intimate partner violence:     Fear of current or ex partner: Not on file     Emotionally abused: Not on file     Physically abused: Not on file     Forced sexual activity: Not on file   Other Topics Concern    Not on file   Social History Narrative    Not on file      Family History   Problem Relation Age of Onset    Cancer Mother         cervix    Coronary artery disease Father     Heart attack Father     Cancer Brother     Coronary artery disease Maternal Grandfather     Heart disease Paternal Grandfather     Stroke Paternal Grandfather         CVA    Heart attack Paternal Grandfather     Coronary artery disease Other          at age 78 per [employer]     Past Surgical History:   Procedure Laterality Date    CARDIAC CATHETERIZATION      FEMORAL BYPASS Bilateral     fem-below knee w/ GSV    IR ABDOMINAL ANGIOGRAPHY / INTERVENTION  8/15/2019    IR ABDOMINAL ANGIOGRAPHY / INTERVENTION  2019    [place] ECHO TRANSESOPHAG R-T 2D W/PRB IMG ACQUISJ I&R N/A 2019    Procedure: TRANSESOPHAGEAL ECHOCARDIOGRAM (SANAZ);   Surgeon: Tena Huitron DO;  [facility];  Service: Cardiac Surgery    [place] REPLACE AORTIC VALVE OPENFEMORAL ARTERY APPROACH N/A 2019    Procedure: REPLACEMENT AORTIC VALVE TRANSCATHETER (TAVR) TRANSFEMORAL WITH 29 MM MEEKS MIKEL S3 VALVE (ACCESS ON THE RIGHT); RIGHT GROIN CUT DOWN;  Surgeon: Tena Huitron DO;  [facility];  Service: Cardiac Surgery    [place] 3RD+ ORD SLCTV ABDL PEL/TR [facility] Right 8/15/2019    Procedure: LEFT GROIN ACCESS RIGHT LEG ARTERIOGRAM WITH ARTHRECTOMY, LEFT LEG RUNOFF;  Surgeon: Marnie Cox MD;  [facility];  Service: Vascular    TONSILLECTOMY AND ADENOIDECTOMY         Current Outpatient Medications:     acetaminophen (TYLENOL) 325 mg tablet, Take 650 mg by mouth every 6 (six) hours as needed for mild pain, Disp: , Rfl:     aspirin (ECOTRIN LOW STRENGTH) 81 mg EC tablet, Take 81 mg by mouth daily, Disp: , Rfl:     Cholecalciferol (VITAMIN D3) 2000 units TABS, Take 2,000 Units by mouth daily, Disp: , Rfl:     clopidogrel (PLAVIX) 75 mg tablet, Take 1 tablet (75 mg total) by mouth daily, Disp: 180 tablet, Rfl: 3    cyanocobalamin (VITAMIN B-12) 500 mcg tablet, Take 500 mcg by mouth daily, Disp: , Rfl:     isosorbide mononitrate (IMDUR) 30 mg 24 hr tablet, Take 1 tablet (30 mg total) by mouth daily, Disp: 90 tablet, Rfl: 3    metoprolol tartrate (LOPRESSOR) 25 mg tablet, Take 0 5 tablets (12 5 mg total) by mouth every 12 (twelve) hours, Disp: 180 tablet, Rfl: 0    Multiple Vitamin (MULTIVITAMIN) tablet, Take 1 tablet by mouth daily, Disp: , Rfl:     pregabalin (LYRICA) 50 mg capsule, Take 1 capsule (50 mg total) by mouth 3 (three) times a day, Disp: 90 capsule, Rfl: 0    ranolazine (RANEXA) 500 mg 12 hr tablet, take 1 tablet by mouth twice a day, Disp: 180 tablet, Rfl: 3    rosuvastatin (CRESTOR) 5 mg tablet, Take 1 tablet (5 mg total) by mouth daily, Disp: 90 tablet, Rfl: 3    triamterene-hydrochlorothiazide (DYAZIDE) 37 5-25 mg per capsule, take 1 capsule by mouth once daily, Disp: 90 capsule, Rfl: 1  Allergies   Allergen Reactions    Atorvastatin Myalgia and Arthralgia    Avelox [Moxifloxacin] Diarrhea    Pravastatin Myalgia and Arthralgia    Rosuvastatin Calcium Myalgia and Arthralgia    Simvastatin Myalgia and Arthralgia     Vitals:    12/04/19 1117   BP: 130/60   BP Location: Left arm   Cuff Size: Standard   Pulse: 64   Weight: 78 5 kg (173 lb)   Height: 5' 6" (1 676 m)     Weight (last 2 days)     Date/Time   Weight    12/04/19 1117   78 5 (173)             Blood pressure 130/60, pulse 64, height 5' 6" (1 676 m), weight 78 5 kg (173 lb)  , Body mass index is 27 92 kg/m²      Labs:  Admission on 11/12/2019, Discharged on 11/14/2019   Component Date Value    Unit Product Code 11/14/2019 I7712X91     Unit Number 11/14/2019 [id number]     Unit ABO 11/14/2019 AB     Unit RH 11/14/2019 NEG     Crossmatch 11/14/2019 Compatible     Unit Dispense Status 11/14/2019 Return to [address] [place] [address] Unit Product Code 11/14/2019 Q7956I50     Unit Number 11/14/2019 [id number]     Unit ABO 11/14/2019 AB     Unit RH 11/14/2019 POS     Crossmatch 11/14/2019 Compatible     Unit Dispense Status 11/14/2019 Return to [address] [place] [address] Unit Product Code 11/14/2019 Y1872T20     Unit Number 11/14/2019 [id number]     Unit ABO 11/14/2019 AB     Unit RH 11/14/2019 POS     Crossmatch 11/14/2019 Compatible     Unit Dispense Status 11/14/2019 Return to [address] [place] [address] Unit Product Code 11/14/2019 Y3191I83     Unit Number 11/14/2019 [id number]     Unit ABO 11/14/2019 AB     Unit RH 11/14/2019 NEG     Crossmatch 11/14/2019 Compatible     Unit Dispense Status 11/14/2019 Return to Inv     Sodium 11/12/2019 140     Potassium 11/12/2019 3 5     Chloride 11/12/2019 109*    CO2 11/12/2019 24     ANION GAP 11/12/2019 7     BUN 11/12/2019 15     Creatinine 11/12/2019 0 69     Glucose 11/12/2019 154*    Calcium 11/12/2019 8 7     eGFR 11/12/2019 96     Hemoglobin 11/12/2019 13 0     Hematocrit 11/12/2019 39 2     Platelets 89/98/2198 161     MPV 11/12/2019 9 8     POC Glucose 11/12/2019 150*    POC Glucose 11/12/2019 144*    pH, Art i-STAT 11/12/2019 7 407     pCO2, Art i-STAT 11/12/2019 41 4     pO2, ART i-STAT 11/12/2019 169 0*    BE, i-STAT 11/12/2019 1     HCO3, Art i-STAT 11/12/2019 26 1     CO2, i-STAT 11/12/2019 27     O2 Sat, i-STAT 11/12/2019 100*    SODIUM, I-STAT 11/12/2019 140     Potassium, i-STAT 11/12/2019 3 4*    Calcium, Ionized i-STAT 11/12/2019 1 20     Hct, i-STAT 11/12/2019 35*    Hgb, i-STAT 11/12/2019 11 9*    Glucose, i-STAT 11/12/2019 121     Specimen Type 11/12/2019 ARTERIAL     pH, Art i-STAT 11/12/2019 7 359     pCO2, Art i-STAT 11/12/2019 41 4     pO2, ART i-STAT 11/12/2019 112 0     BE, i-STAT 11/12/2019 -2     HCO3, Art i-STAT 11/12/2019 23 3     CO2, i-STAT 11/12/2019 25     O2 Sat, i-STAT 11/12/2019 98*    SODIUM, I-STAT 11/12/2019 140     Potassium, i-STAT 11/12/2019 3 3*    Calcium, Ionized i-STAT 11/12/2019 1 13     Hct, i-STAT 11/12/2019 33*    Hgb, i-STAT 11/12/2019 11 2*    Glucose, i-STAT 11/12/2019 148*    Specimen Type 11/12/2019 ARTERIAL     Activated Clotting Time,* 11/12/2019 126     Specimen Type 11/12/2019 ARTERIAL     Activated Clotting Time,* 11/12/2019 316*    Specimen Type 11/12/2019 ARTERIAL     Activated Clotting Time,* 11/12/2019 126     Specimen Type 11/12/2019 ARTERIAL     Hemoglobin 11/12/2019 12 4     Hematocrit 11/12/2019 37 2     POC Glucose 11/12/2019 118     Ventricular Rate 11/12/2019 83     Atrial Rate 11/12/2019 83     TN Interval 11/12/2019 171     QRSD Interval 11/12/2019 158     QT Interval 11/12/2019 388     QTC Interval 11/12/2019 456     P Axis 11/12/2019 65     QRS Axis 11/12/2019 -40     T Wave Axis 11/12/2019 96     POC Glucose 11/12/2019 118     Sodium 11/13/2019 140     Potassium 11/13/2019 3 5     Chloride 11/13/2019 104     CO2 11/13/2019 25     ANION GAP 11/13/2019 11     BUN 11/13/2019 15     Creatinine 11/13/2019 0 67     Glucose 11/13/2019 102     Calcium 11/13/2019 8 7     eGFR 11/13/2019 97     WBC 11/13/2019 9 25     RBC 11/13/2019 3 77*    Hemoglobin 11/13/2019 11 8*    Hematocrit 11/13/2019 35 8*    MCV 11/13/2019 95     MCH 11/13/2019 31 3     MCHC 11/13/2019 33 0     RDW 11/13/2019 13 6     Platelets 04/73/2106 156     MPV 11/13/2019 10 5     Magnesium 11/13/2019 1 9     POC Glucose 11/13/2019 112     Ventricular Rate 11/13/2019 68     Atrial Rate 11/13/2019 68     TN Interval 11/13/2019 174     QRSD Interval 11/13/2019 148     QT Interval 11/13/2019 408     QTC Interval 11/13/2019 433     P Axis 11/13/2019 56     QRS Axis 11/13/2019 11     T Wave Axis 11/13/2019 118     POC Glucose 11/13/2019 125     POC Glucose 11/13/2019 112     POC Glucose 11/13/2019 115     Sodium 11/14/2019 138     Potassium 11/14/2019 3 5     Chloride 11/14/2019 102     CO2 11/14/2019 27     ANION GAP 11/14/2019 9     BUN 11/14/2019 12     Creatinine 11/14/2019 0 73     Glucose 11/14/2019 101     Calcium 11/14/2019 9 1     eGFR 11/14/2019 93     WBC 11/14/2019 8 50     RBC 11/14/2019 4 03     Hemoglobin 11/14/2019 12 6     Hematocrit 11/14/2019 37 6     MCV 11/14/2019 93     MCH 11/14/2019 31 3     MCHC 11/14/2019 33 5     RDW 11/14/2019 13 4     Platelets 51/45/0266 149     MPV 11/14/2019 10 3     POC Glucose 11/14/2019 102     POC Glucose 11/14/2019 127    Appointment on 11/06/2019   Component Date Value    MRSA Culture Only 11/06/2019 Methicillin Resistant Staphylococcus aureus isolated*    MRSA Culture Only 11/06/2019 Please note: This patient requires contact precautions       ABO Grouping 11/06/2019 AB     Rh Factor 11/06/2019 Positive     Antibody Screen 11/06/2019 Negative     Specimen Expiration Date 11/06/2019 [id number]    Office Visit on 11/06/2019   Component Date Value    Color, UA 11/06/2019 Yellow     Clarity, UA 11/06/2019 Clear     Specific [place], UA 11/06/2019 <=1 005*    pH, UA 11/06/2019 7 0     Leukocytes, UA 11/06/2019 Negative     Nitrite, UA 11/06/2019 Negative     Protein, UA 11/06/2019 Negative     Glucose, UA 11/06/2019 Negative     Ketones, UA 11/06/2019 Negative     Urobilinogen, UA 11/06/2019 0 2     Bilirubin, UA 11/06/2019 Negative     Blood, UA 11/06/2019 Negative    Admission on 10/28/2019, Discharged on 10/28/2019   Component Date Value    Ventricular Rate 10/28/2019 74     Atrial Rate 10/28/2019 74     NC Interval 10/28/2019 180     QRSD Interval 10/28/2019 154     QT Interval 10/28/2019 436     QTC Interval 10/28/2019 483     P Axis 10/28/2019 70     QRS Axis 10/28/2019 -9     T Wave [place] 10/28/2019 [address] Outpatient Visit on 10/24/2019   Component Date Value    pH, Art i-STAT 10/24/2019 7 422     pCO2, Art i-STAT 10/24/2019 39 7     pO2, ART i-STAT 10/24/2019 95 0     BE, i-STAT 10/24/2019 1     HCO3, Art i-STAT 10/24/2019 25 9     CO2, i-STAT 10/24/2019 27     O2 Sat, i-STAT 10/24/2019 98*    SODIUM, I-STAT 10/24/2019 138     Potassium, i-STAT 10/24/2019 3 7     Calcium, Ionized i-STAT 10/24/2019 1 27     Hct, i-STAT 10/24/2019 40     Hgb, i-STAT 10/24/2019 13 6     Glucose, i-STAT 10/24/2019 122     POC FIO2 10/24/2019 21     Specimen Type 10/24/2019 ARTERIAL     SITE 10/24/2019 Right Radial     REMBERTO TEST 10/24/2019 Postive Remberto Test    Appointment on 10/21/2019   Component Date Value    WBC 10/21/2019 7 30     RBC 10/21/2019 4 53     Hemoglobin 10/21/2019 14 3     Hematocrit 10/21/2019 42 1     MCV 10/21/2019 93     MCH 10/21/2019 31 5     MCHC 10/21/2019 33 9     RDW 10/21/2019 13 5     MPV 10/21/2019 8 2*    Platelets 40/04/7525 208     Neutrophils Relative 10/21/2019 79*    Lymphocytes Relative 10/21/2019 16*    Monocytes Relative 10/21/2019 5     Eosinophils Relative 10/21/2019 0     Basophils Relative 10/21/2019 0     Neutrophils Absolute 10/21/2019 5 80     Lymphocytes Absolute 10/21/2019 1 10     Monocytes Absolute 10/21/2019 0 30     Eosinophils Absolute 10/21/2019 0 00     Basophils Absolute 10/21/2019 0 00     Sodium 10/21/2019 137     Potassium 10/21/2019 4 4     Chloride 10/21/2019 100     CO2 10/21/2019 31     ANION GAP 10/21/2019 6     BUN 10/21/2019 18     Creatinine 10/21/2019 0 91     Glucose 10/21/2019 117*    Calcium 10/21/2019 10 2     Corrected Calcium 10/21/2019 9 8     AST 10/21/2019 20     ALT 10/21/2019 17     Alkaline Phosphatase 10/21/2019 40*    Total Protein 10/21/2019 7 1     Albumin 10/21/2019 4 5     Total Bilirubin 10/21/2019 0 40     eGFR 10/21/2019 84    Admission on 09/17/2019, Discharged on 09/17/2019   Component Date Value    Protime 09/17/2019 12 2     INR 09/17/2019 0 94     Activated Clotting Time,* 09/17/2019 230*    Specimen Type 09/17/2019 VENOUS    Office Visit on 08/23/2019   Component Date Value    WBC 08/23/2019 6 60     RBC 08/23/2019 4 38     Hemoglobin 08/23/2019 13 8     Hematocrit 08/23/2019 41 1     MCV 08/23/2019 94     MCH 08/23/2019 31 5     MCHC 08/23/2019 33 6     RDW 08/23/2019 12 7     MPV 08/23/2019 9 6     Platelets 95/78/2926 240     nRBC 08/23/2019 0     Neutrophils Relative 08/23/2019 69     Immat GRANS % 08/23/2019 0     Lymphocytes Relative 08/23/2019 19     Monocytes Relative 08/23/2019 9     Eosinophils Relative 08/23/2019 2     Basophils Relative 08/23/2019 1     Neutrophils Absolute 08/23/2019 4 52     Immature Grans Absolute 08/23/2019 0 02     Lymphocytes Absolute 08/23/2019 1 26     Monocytes Absolute 08/23/2019 0 61     Eosinophils Absolute 08/23/2019 0 13     Basophils Absolute 08/23/2019 0 06     Sodium 08/23/2019 136     Potassium 08/23/2019 4 0     Chloride 08/23/2019 100     CO2 08/23/2019 27     ANION GAP 08/23/2019 9     BUN 08/23/2019 15     Creatinine 08/23/2019 0 80     Glucose 08/23/2019 80     Calcium 08/23/2019 9 8     AST 08/23/2019 20     ALT 08/23/2019 25     Alkaline Phosphatase 08/23/2019 60     Total Protein 08/23/2019 7 3     Albumin 08/23/2019 3 9     Total Bilirubin 08/23/2019 0 40     eGFR 08/23/2019 90     CRP 08/23/2019 <3 0     Sed Rate 08/23/2019 15*   Admission on 08/15/2019, Discharged on 08/15/2019   Component Date Value    Activated Clotting Time,* 08/15/2019 236*    Specimen Type 08/15/2019 ARTERIAL     Activated Clotting Time,* 08/15/2019 230*    Specimen Type 08/15/2019 ARTERIAL    Appointment on 07/25/2019   Component Date Value    Sodium 07/25/2019 140     Potassium 07/25/2019 3 8     Chloride 07/25/2019 102     CO2 07/25/2019 25     ANION GAP 07/25/2019 13     BUN 07/25/2019 15     Creatinine 07/25/2019 0 87     Glucose 07/25/2019 86     Calcium 07/25/2019 9 9     eGFR 07/25/2019 87     WBC 07/25/2019 8 00     RBC 07/25/2019 4 30     Hemoglobin 07/25/2019 13 9*    Hematocrit 07/25/2019 39 7*    MCV 07/25/2019 92     MCH 07/25/2019 32 3     MCHC 07/25/2019 35 0     RDW 07/25/2019 13 1     Platelets 46/64/3684 223     MPV 07/25/2019 8 1*   There may be more visits with results that are not included  Imaging: Xr Chest Portable    Result Date: 11/13/2019  Narrative: CHEST INDICATION:   Post Open Heart Surgey  COMPARISON:  Chest radiograph from 11/12/2019 and chest CT from 10/24/2019  EXAM PERFORMED/VIEWS:  XR CHEST PORTABLE FINDINGS:  Right jugular catheter at cavoatrial junction  Normal heart size  Post TAVR  Improving pulmonary venous congestion  No effusion or pneumothorax  Osseous structures appear within normal limits for patient age  Impression: Post TAVR  Improving pulmonary venous congestion  Workstation performed: [id number]     Xr Chest Portable Icu    Result Date: 11/12/2019  Narrative: CHEST INDICATION:   S/P Transcatheter aortic valve replacement  COMPARISON:  Chest CT from 10/24/2019  Chest radiograph from 6/4/2009  EXAM PERFORMED/VIEWS:  XR CHEST PORTABLE ICU FINDINGS:  Right jugular catheter in lower SVC  No pneumothorax  Normal heart size  Post TAVR  Mild pulmonary venous congestion  No effusion  Osseous structures appear within normal limits for patient age  Impression: Post TAVR  Right jugular catheter in lower SVC with no pneumothorax  Mild pulmonary venous congestion  Workstation performed: [id number]       Review of Systems:  Review of Systems   Constitutional: Negative for diaphoresis, fatigue, fever and unexpected weight change  HENT: Negative  Respiratory: Negative for cough, shortness of breath and wheezing  Cardiovascular: Negative for chest pain, palpitations and leg swelling  Gastrointestinal: Negative for abdominal pain, diarrhea and nausea  Musculoskeletal: Negative for gait problem and myalgias  Skin: Negative for rash  Neurological: Negative for dizziness and numbness  Psychiatric/Behavioral: Negative  Physical Exam:  Physical Exam   Constitutional: He is oriented to person, place, and time  He appears well-developed and well-nourished  HENT:   Head: Normocephalic and atraumatic  Eyes: Pupils are equal, round, and reactive to light  Neck: Normal range of motion  Neck supple  No JVD present  Cardiovascular: Regular rhythm, S1 normal, S2 normal and normal pulses  Murmur heard  Systolic murmur is present with a grade of 2/6  Pulses:       Carotid pulses are 2+ on the right side, and 2+ on the left side  Pulmonary/Chest: Effort normal and breath sounds normal  He has no wheezes  He has no rales  Abdominal: Soft  Bowel sounds are normal  There is no tenderness  Musculoskeletal: Normal range of motion  He exhibits no edema or tenderness  Neurological: He is alert and oriented to person, place, and time  He has normal reflexes  No cranial nerve deficit  Skin: Skin is warm  Psychiatric: He has a normal mood and affect

## 2019-12-06 ENCOUNTER — APPOINTMENT (OUTPATIENT)
Dept: RADIOLOGY | Facility: CLINIC | Age: 69
End: 2019-12-06
Payer: MEDICARE

## 2019-12-06 ENCOUNTER — APPOINTMENT (OUTPATIENT)
Dept: PULMONOLOGY | Facility: HOSPITAL | Age: 69
End: 2019-12-06
Payer: MEDICARE

## 2019-12-06 DIAGNOSIS — M54.42 CHRONIC BILATERAL LOW BACK PAIN WITH BILATERAL SCIATICA: ICD-10-CM

## 2019-12-06 DIAGNOSIS — M54.41 CHRONIC BILATERAL LOW BACK PAIN WITH BILATERAL SCIATICA: ICD-10-CM

## 2019-12-06 DIAGNOSIS — G89.29 CHRONIC BILATERAL LOW BACK PAIN WITH BILATERAL SCIATICA: ICD-10-CM

## 2019-12-06 PROCEDURE — 72110 X-RAY EXAM L-2 SPINE 4/>VWS: CPT

## 2019-12-07 DIAGNOSIS — I25.118 CORONARY ARTERY DISEASE OF NATIVE ARTERY OF NATIVE HEART WITH STABLE ANGINA PECTORIS (HCC): ICD-10-CM

## 2019-12-07 DIAGNOSIS — I25.5 CARDIOMYOPATHY, ISCHEMIC: Chronic | ICD-10-CM

## 2019-12-07 RX ORDER — ROSUVASTATIN CALCIUM 5 MG/1
TABLET, COATED ORAL
Qty: 90 TABLET | Refills: 3 | Status: SHIPPED | OUTPATIENT
Start: 2019-12-07

## 2019-12-09 ENCOUNTER — CLINICAL SUPPORT (OUTPATIENT)
Dept: PULMONOLOGY | Facility: HOSPITAL | Age: 69
End: 2019-12-09
Payer: MEDICARE

## 2019-12-09 DIAGNOSIS — I70.218 ATHSCL NATIVE ARTERIES OF EXTRM W INTRMT CLAUD, OTH EXTRM (HCC): ICD-10-CM

## 2019-12-09 PROCEDURE — 93668 PERIPHERAL VASCULAR REHAB: CPT

## 2019-12-11 ENCOUNTER — APPOINTMENT (OUTPATIENT)
Dept: LAB | Facility: CLINIC | Age: 69
End: 2019-12-11
Payer: MEDICARE

## 2019-12-11 ENCOUNTER — APPOINTMENT (OUTPATIENT)
Dept: PULMONOLOGY | Facility: HOSPITAL | Age: 69
End: 2019-12-11
Payer: MEDICARE

## 2019-12-11 ENCOUNTER — OFFICE VISIT (OUTPATIENT)
Dept: CARDIAC SURGERY | Facility: CLINIC | Age: 69
End: 2019-12-11
Payer: MEDICARE

## 2019-12-11 ENCOUNTER — HOSPITAL ENCOUNTER (OUTPATIENT)
Dept: NON INVASIVE DIAGNOSTICS | Facility: HOSPITAL | Age: 69
Discharge: HOME | End: 2019-12-11
Payer: MEDICARE

## 2019-12-11 VITALS
BODY MASS INDEX: 27.48 KG/M2 | WEIGHT: 171 LBS | HEART RATE: 64 BPM | SYSTOLIC BLOOD PRESSURE: 132 MMHG | HEIGHT: 66 IN | OXYGEN SATURATION: 98 % | DIASTOLIC BLOOD PRESSURE: 62 MMHG | RESPIRATION RATE: 12 BRPM | TEMPERATURE: 96.9 F

## 2019-12-11 DIAGNOSIS — I35.0 SEVERE AORTIC STENOSIS: ICD-10-CM

## 2019-12-11 DIAGNOSIS — Z48.89 ENCOUNTER FOR POSTOPERATIVE CARE: ICD-10-CM

## 2019-12-11 DIAGNOSIS — Z95.2 S/P TAVR (TRANSCATHETER AORTIC VALVE REPLACEMENT): Primary | ICD-10-CM

## 2019-12-11 DIAGNOSIS — I35.0 NONRHEUMATIC AORTIC VALVE STENOSIS: ICD-10-CM

## 2019-12-11 LAB
ANION GAP SERPL CALCULATED.3IONS-SCNC: 6 MMOL/L (ref 4–13)
ATRIAL RATE: 57 BPM
BUN SERPL-MCNC: 14 MG/DL (ref 5–25)
CALCIUM SERPL-MCNC: 9.8 MG/DL (ref 8.3–10.1)
CHLORIDE SERPL-SCNC: 110 MMOL/L (ref 100–108)
CO2 SERPL-SCNC: 28 MMOL/L (ref 21–32)
CREAT SERPL-MCNC: 0.89 MG/DL (ref 0.6–1.3)
ERYTHROCYTE [DISTWIDTH] IN BLOOD BY AUTOMATED COUNT: 13.4 % (ref 11.6–15.1)
GFR SERPL CREATININE-BSD FRML MDRD: 86 ML/MIN/1.73SQ M
GLUCOSE P FAST SERPL-MCNC: 105 MG/DL (ref 65–99)
HCT VFR BLD AUTO: 41.8 % (ref 36.5–49.3)
HGB BLD-MCNC: 13.4 G/DL (ref 12–17)
MCH RBC QN AUTO: 30.8 PG (ref 26.8–34.3)
MCHC RBC AUTO-ENTMCNC: 32.1 G/DL (ref 31.4–37.4)
MCV RBC AUTO: 96 FL (ref 82–98)
P AXIS: 52 DEGREES
PLATELET # BLD AUTO: 170 THOUSANDS/UL (ref 149–390)
PMV BLD AUTO: 10.4 FL (ref 8.9–12.7)
POTASSIUM SERPL-SCNC: 4.9 MMOL/L (ref 3.5–5.3)
PR INTERVAL: 216 MS
QRS AXIS: -30 DEGREES
QRSD INTERVAL: 150 MS
QT INTERVAL: 456 MS
QTC INTERVAL: 443 MS
RBC # BLD AUTO: 4.35 MILLION/UL (ref 3.88–5.62)
SODIUM SERPL-SCNC: 144 MMOL/L (ref 136–145)
T WAVE AXIS: 86 DEGREES
VENTRICULAR RATE: 57 BPM
WBC # BLD AUTO: 7.16 THOUSAND/UL (ref 4.31–10.16)

## 2019-12-11 PROCEDURE — 80048 BASIC METABOLIC PNL TOTAL CA: CPT

## 2019-12-11 PROCEDURE — 85027 COMPLETE CBC AUTOMATED: CPT

## 2019-12-11 PROCEDURE — 93306 TTE W/DOPPLER COMPLETE: CPT | Performed by: INTERNAL MEDICINE

## 2019-12-11 PROCEDURE — 99213 OFFICE O/P EST LOW 20 MIN: CPT | Performed by: NURSE PRACTITIONER

## 2019-12-11 PROCEDURE — 93306 TTE W/DOPPLER COMPLETE: CPT

## 2019-12-11 PROCEDURE — 93010 ELECTROCARDIOGRAM REPORT: CPT | Performed by: INTERNAL MEDICINE

## 2019-12-11 PROCEDURE — 93005 ELECTROCARDIOGRAM TRACING: CPT

## 2019-12-11 PROCEDURE — 36415 COLL VENOUS BLD VENIPUNCTURE: CPT

## 2019-12-11 RX ORDER — PREGABALIN 50 MG/1
50 CAPSULE ORAL 3 TIMES DAILY
Qty: 90 CAPSULE | Refills: 0 | Status: SHIPPED | OUTPATIENT
Start: 2019-12-11 | End: 2020-01-08 | Stop reason: SDUPTHER

## 2019-12-11 NOTE — LETTER
December 11, 2019     Ace Cadet MD  [address]    Patient: Madhu Tai   YOB: 1950   Date of Visit: 12/11/2019       Dear Dr Loreto Isaac:    Thank you for referring Ashley Sosa to me for evaluation  Below are my notes for this consultation  If you have questions, please do not hesitate to call me  I look forward to following your patient along with you  Sincerely,        Stephen Sequeira DO        CC: DO Manohar Lyon CRNP  12/11/2019 12:09 PM  Attested   POST OP FOLLOW UP VISIT S/P TAVR    Procedure: S/P transfemoral transcatheter aortic valve replacement #29 mm Medeiros MIKEL 3 bioprosthesis via right femoral artery cut-down, performed on 11/12/19  History: Madhu Tai is a 68y o  year old male who presents to our office today for routine follow up care from transfemoral transcatheter aortic valve replacement  He tolerated procedure well  Post op echo was stable and his post op course was uneventful  He was discharge home on 11/14/19 with a prescription for Pregabalin for right groin pain and paraesthesias  Today he reports he is feeling well  He states he feels better, breathing easier and more energy  He has been active and denies chest pain, RODRIGUEZ,  Lightheadedness, weight gain or edema  He reports his groin pain and thigh paraesthesias are slowly improving and the Pregabalin has been effective       Review of System:     History obtained from chart review and the patient  General ROS: negative  Psychological ROS: negative  Ophthalmic ROS: positive for - uses glasses  ENT ROS: negative  Hematological and Lymphatic ROS: negative for - bleeding problems, bruising or jaundice  Respiratory ROS: no cough, shortness of breath, or wheezing  Cardiovascular ROS: no chest pain or dyspnea on exertion  Gastrointestinal ROS: no abdominal pain, change in bowel habits, or black or bloody stools  Genito-Urinary ROS: no dysuria, trouble voiding, or hematuria  Musculoskeletal ROS: negative  Neurological ROS: paresthesias right thigh    Vital Signs:     Vitals:    12/11/19 1100 12/11/19 1128   BP: 132/60 132/62   BP Location: Left arm Right arm   Cuff Size: Adult Adult   Pulse: 64    Resp: 12    Temp: (!) 96 9 °F (36 1 °C)    TempSrc: Oral    SpO2: 98%    Weight: 77 6 kg (171 lb)    Height: 5' 6" (1 676 m)        Home Medications:     Prior to Admission medications    Medication Sig Start Date End Date Taking?  Authorizing Provider   acetaminophen (TYLENOL) 325 mg tablet Take 650 mg by mouth every 6 (six) hours as needed for mild pain    Historical Provider, MD   aspirin (ECOTRIN LOW STRENGTH) 81 mg EC tablet Take 81 mg by mouth daily    Historical Provider, MD   Cholecalciferol (VITAMIN D3) 2000 units TABS Take 2,000 Units by mouth daily    Historical Provider, MD   clopidogrel (PLAVIX) 75 mg tablet Take 1 tablet (75 mg total) by mouth daily 7/16/19   Humaira Mishra MD   cyanocobalamin (VITAMIN B-12) 500 mcg tablet Take 500 mcg by mouth daily    Historical Provider, MD   isosorbide mononitrate (IMDUR) 30 mg 24 hr tablet Take 1 tablet (30 mg total) by mouth daily 9/10/19   Ministerio Anderson MD   metoprolol tartrate (LOPRESSOR) 25 mg tablet Take 0 5 tablets (12 5 mg total) by mouth every 12 (twelve) hours 11/14/19   Carissa Cain PA-C   Multiple Vitamin (MULTIVITAMIN) tablet Take 1 tablet by mouth daily    Historical Provider, MD   pregabalin (LYRICA) 50 mg capsule Take 1 capsule (50 mg total) by mouth 3 (three) times a day 11/14/19 12/14/19  Carissa Cain PA-C   ranolazine (RANEXA) 500 mg 12 hr tablet take 1 tablet by mouth twice a day 6/15/19   Ministerio Anderson MD   rosuvastatin (CRESTOR) 5 mg tablet take 1 tablet by mouth once daily 12/7/19   Ministerio Anderson MD   triamterene-hydrochlorothiazide (DYAZIDE) 37 5-25 mg per capsule take 1 capsule by mouth once daily 10/29/19   Laurita Parsons DO       Physical Exam:    General: Alert, oriented, well developed, no acute distress  HEENT/NECK:  PERRLA  No jugular venous distention  Cardiac:Regular rate and rhythm, No murmurs rubs or gallops  Pulmonary:Breath sounds clear bilaterally  Abdomen:  Non-tender, Non-distended  Positive bowel sounds  Upper extremities: 2+ radial pulses; brisk capillary refill  Lower extremities: Extremities warm/dry  Bilateral femoral pulses 1+, no hematoma or  bruit; PT/DP pulses 1+ bilaterally  No edema B/L  Incisions: Inguinal incision is clean, dry, and intact  Neuro: Alert and oriented X 3  Sensation is grossly intact  No focal deficits  Skin: Warm/Dry, without rashes or lesions  Lab Results:   CBC & BMP pending    Imaging Studies:     Transthoracic Echocardiogram: (Prelim)  TAVR prosthesis well seated with normal function and no significant PVL    EKG:   pending    I have personally reviewed pertinent films in PACS    TAVR evaluation Assessment:     Alin Moreno 122: I    Assessment: Aortic stenosis, Non-Rheumatic  S/P transfemoral transcatheter aortic valve replacement;    Plan:     Irwin Holstein is making good progress in his recover following transfemoral transcatheter aortic valve replacement  He is at NYHA functional class I  Groin incisions are well healed  Weight and VS are stable  Recent echocardiogram demonstrates stable appearance of TAVR prosthesis with normal function and no significant PVL   ECG, BMP & CBC results are pending   I reviewed their medications and made no changes  Recommended Plavix therapy after TAVR is for 90 days however, he was on this medication pre op for PAD and therefore he should continue as per his vascular surgeon  I have discussed the benefits of participating in cardiac rehabilitation and have encouraged him to to do so  Irwin Holstein may resume driving and all normal activities  Irwin Holstein has already been evaluated by his primary care physician and his cardiologist for ongoing medical care   Arrangements will be made for one year follow-up in our office with repeat echocardiogram, ECG, CBC & BMP  I have advised him to notify his PCP with any new concerns that may arise in the interim and to maintain routine follow up with his cardiologist  Asaf Navarro was comfortable with our recommendations and his questions were answered to his satisfaction  Referral to GI for rouitne colonoscopy has been done by his PCP    TRICIA Erwin  12/11/19  11:57 AM  Attestation signed by Jerry Lindsey DO at 12/11/2019  2:03 PM:  The patient was seen and examined, and I agree with the midlevel's history, physical exam, assessment and plan with the following additions:    Mr Mario Dutta was seen in the office today after his TAVR  His echocardiogram was reviewed by myself personally and discussed with him  This demonstrates a well-functioning bioprosthetic TAVR, and no significant perivalvular leak  He will enter into outpatient cardiac rehab  He is ready to return to his full activities    He will return to the office in 1 year with another echocardiogram

## 2019-12-11 NOTE — PROGRESS NOTES
POST OP FOLLOW UP VISIT S/P TAVR    Procedure: S/P transfemoral transcatheter aortic valve replacement #29 mm Medeiros MIKEL 3 bioprosthesis via right femoral artery cut-down, performed on 11/12/19  History: Claudia Currie is a 68y o  year old male who presents to our office today for routine follow up care from transfemoral transcatheter aortic valve replacement  He tolerated procedure well  Post op echo was stable and his post op course was uneventful  He was discharge home on 11/14/19 with a prescription for Pregabalin for right groin pain and paraesthesias  Today he reports he is feeling well  He states he feels better, breathing easier and more energy  He has been active and denies chest pain, RODRIGUEZ,  Lightheadedness, weight gain or edema  He reports his groin pain and thigh paraesthesias are slowly improving and the Pregabalin has been effective  Review of System:     History obtained from chart review and the patient  General ROS: negative  Psychological ROS: negative  Ophthalmic ROS: positive for - uses glasses  ENT ROS: negative  Hematological and Lymphatic ROS: negative for - bleeding problems, bruising or jaundice  Respiratory ROS: no cough, shortness of breath, or wheezing  Cardiovascular ROS: no chest pain or dyspnea on exertion  Gastrointestinal ROS: no abdominal pain, change in bowel habits, or black or bloody stools  Genito-Urinary ROS: no dysuria, trouble voiding, or hematuria  Musculoskeletal ROS: negative  Neurological ROS: paresthesias right thigh    Vital Signs:     Vitals:    12/11/19 1100 12/11/19 1128   BP: 132/60 132/62   BP Location: Left arm Right arm   Cuff Size: Adult Adult   Pulse: 64    Resp: 12    Temp: (!) 96 9 °F (36 1 °C)    TempSrc: Oral    SpO2: 98%    Weight: 77 6 kg (171 lb)    Height: 5' 6" (1 676 m)        Home Medications:     Prior to Admission medications    Medication Sig Start Date End Date Taking?  Authorizing Provider   acetaminophen (TYLENOL) 325 mg tablet Take 650 mg by mouth every 6 (six) hours as needed for mild pain    Historical Provider, MD   aspirin (ECOTRIN LOW STRENGTH) 81 mg EC tablet Take 81 mg by mouth daily    Historical Provider, MD   Cholecalciferol (VITAMIN D3) 2000 units TABS Take 2,000 Units by mouth daily    Historical Provider, MD   clopidogrel (PLAVIX) 75 mg tablet Take 1 tablet (75 mg total) by mouth daily 7/16/19   Roxanne Tirado MD   cyanocobalamin (VITAMIN B-12) 500 mcg tablet Take 500 mcg by mouth daily    Historical Provider, MD   isosorbide mononitrate (IMDUR) 30 mg 24 hr tablet Take 1 tablet (30 mg total) by mouth daily 9/10/19   Gwendolyn Jones MD   metoprolol tartrate (LOPRESSOR) 25 mg tablet Take 0 5 tablets (12 5 mg total) by mouth every 12 (twelve) hours 11/14/19   Jaycee Viramontes PA-C   Multiple Vitamin (MULTIVITAMIN) tablet Take 1 tablet by mouth daily    Historical Provider, MD   pregabalin (LYRICA) 50 mg capsule Take 1 capsule (50 mg total) by mouth 3 (three) times a day 11/14/19 12/14/19  Jaycee Viramontes PA-C   ranolazine (RANEXA) 500 mg 12 hr tablet take 1 tablet by mouth twice a day 6/15/19   Gwendolyn Jones MD   rosuvastatin (CRESTOR) 5 mg tablet take 1 tablet by mouth once daily 12/7/19   Gwendolyn Jones MD   triamterene-hydrochlorothiazide (DYAZIDE) 37 5-25 mg per capsule take 1 capsule by mouth once daily 10/29/19   Midge Duverney, DO       Physical Exam:    General: Alert, oriented, well developed, no acute distress  HEENT/NECK:  PERRLA  No jugular venous distention  Cardiac:Regular rate and rhythm, No murmurs rubs or gallops  Pulmonary:Breath sounds clear bilaterally  Abdomen:  Non-tender, Non-distended  Positive bowel sounds  Upper extremities: 2+ radial pulses; brisk capillary refill  Lower extremities: Extremities warm/dry  Bilateral femoral pulses 1+, no hematoma or  bruit; PT/DP pulses 1+ bilaterally  No edema B/L  Incisions: Inguinal incision is clean, dry, and intact  Neuro: Alert and oriented X 3  Sensation is grossly intact  No focal deficits  Skin: Warm/Dry, without rashes or lesions  Lab Results:   CBC & BMP pending    Imaging Studies:     Transthoracic Echocardiogram: (Prelim)  TAVR prosthesis well seated with normal function and no significant PVL    EKG:   pending    I have personally reviewed pertinent films in PACS    TAVR evaluation Assessment:     Alin Moreno 122: I    Assessment: Aortic stenosis, Non-Rheumatic  S/P transfemoral transcatheter aortic valve replacement;    Plan:     Mohit Ch is making good progress in his recover following transfemoral transcatheter aortic valve replacement  He is at NYHA functional class I  Groin incisions are well healed  Weight and VS are stable  Recent echocardiogram demonstrates stable appearance of TAVR prosthesis with normal function and no significant PVL   ECG, BMP & CBC results are pending   I reviewed their medications and made no changes  Recommended Plavix therapy after TAVR is for 90 days however, he was on this medication pre op for PAD and therefore he should continue as per his vascular surgeon  I have discussed the benefits of participating in cardiac rehabilitation and have encouraged him to to do so  Mohit Ch may resume driving and all normal activities  Mohit Ch has already been evaluated by his primary care physician and his cardiologist for ongoing medical care  Arrangements will be made for one year follow-up in our office with repeat echocardiogram, ECG, CBC & BMP  I have advised him to notify his PCP with any new concerns that may arise in the interim and to maintain routine follow up with his cardiologist  Mohit Ch was comfortable with our recommendations and his questions were answered to his satisfaction      Referral to GI for rouitne colonoscopy has been done by his PCP    TRICIA House  12/11/19  11:57 AM

## 2019-12-13 ENCOUNTER — APPOINTMENT (OUTPATIENT)
Dept: PULMONOLOGY | Facility: HOSPITAL | Age: 69
End: 2019-12-13
Payer: MEDICARE

## 2019-12-16 ENCOUNTER — CLINICAL SUPPORT (OUTPATIENT)
Dept: PULMONOLOGY | Facility: HOSPITAL | Age: 69
End: 2019-12-16
Payer: MEDICARE

## 2019-12-16 ENCOUNTER — OFFICE VISIT (OUTPATIENT)
Dept: FAMILY MEDICINE CLINIC | Facility: CLINIC | Age: 69
End: 2019-12-16
Payer: MEDICARE

## 2019-12-16 VITALS
HEART RATE: 57 BPM | DIASTOLIC BLOOD PRESSURE: 70 MMHG | OXYGEN SATURATION: 98 % | HEIGHT: 66 IN | SYSTOLIC BLOOD PRESSURE: 132 MMHG | BODY MASS INDEX: 27.61 KG/M2 | TEMPERATURE: 97.7 F | WEIGHT: 171.8 LBS

## 2019-12-16 DIAGNOSIS — Z95.2 S/P TAVR (TRANSCATHETER AORTIC VALVE REPLACEMENT): Primary | ICD-10-CM

## 2019-12-16 DIAGNOSIS — I70.218 ATHSCL NATIVE ARTERIES OF EXTRM W INTRMT CLAUD, OTH EXTRM (HCC): Primary | ICD-10-CM

## 2019-12-16 PROCEDURE — 93668 PERIPHERAL VASCULAR REHAB: CPT

## 2019-12-16 PROCEDURE — 99213 OFFICE O/P EST LOW 20 MIN: CPT | Performed by: FAMILY MEDICINE

## 2019-12-16 NOTE — PROGRESS NOTES
Assessment/Plan:    No problem-specific Assessment & Plan notes found for this encounter  Diagnoses and all orders for this visit:    S/P TAVR (transcatheter aortic valve replacement)  Comments:  significant improvement          PHQ-9 Depression Screening    PHQ-9:    Frequency of the following problems over the past two weeks:                 Subjective:      Patient ID: Darius Beach is a 68 y o  male  Follow up for aortic valve replacement, pt has followed up with his surgeon and cardiologist and is undergoing cardiac rehab, pt's exercise capacity has increased significantly      The following portions of the patient's history were reviewed and updated as appropriate: allergies, current medications, past family history, past medical history, past social history, past surgical history and problem list     Review of Systems   Respiratory: Negative for shortness of breath and wheezing  Cardiovascular: Negative for chest pain and palpitations  Objective:    /70   Pulse 57   Temp 97 7 °F (36 5 °C) (Tympanic)   Ht 5' 6" (1 676 m)   Wt 77 9 kg (171 lb 12 8 oz)   SpO2 98%   BMI 27 73 kg/m²      Physical Exam   Constitutional: He is oriented to person, place, and time  He appears well-developed and well-nourished  No distress  HENT:   Head: Normocephalic and atraumatic  Eyes: Pupils are equal, round, and reactive to light  Conjunctivae and EOM are normal  No scleral icterus  Neck: Normal range of motion  Neck supple  Cardiovascular: Normal rate, regular rhythm and normal heart sounds  No murmur heard  Pulmonary/Chest: Effort normal and breath sounds normal  No respiratory distress  He has no wheezes  He has no rales  Abdominal: Soft  Bowel sounds are normal  He exhibits no distension and no mass  There is no tenderness  There is no rebound and no guarding  Musculoskeletal: He exhibits no edema  Lymphadenopathy:     He has no cervical adenopathy     Neurological: He is alert and oriented to person, place, and time  Skin: Skin is warm and dry  He is not diaphoretic  Psychiatric: He has a normal mood and affect  His behavior is normal  Judgment and thought content normal    Nursing note and vitals reviewed

## 2019-12-16 NOTE — PROGRESS NOTES
Cardiac/PAD Rehabilitation Plan of Care   Care Plan       Today's date: 2019   Visits:   Patient name: Michelle Peters      : 1950  Age: 68 y o  MRN: [id number]  Referring Physician: Barb Rose MD  Cardiologist: Dr Aleisha Delvalle  Provider: Estelle Winkler  Clinician: Rey Pate MPT    Dx: PAD  Date of onset: 2019      SUMMARY OF PROGRESS:  Patient has completed  sessions as of today  He has correct hemodynamic responses to activity  Patient's resting vitals were HR 66 and /62  His peak vitals were  and /62  Vitals upon recovery were HR 75 and /58  Patient rates his activity a 4 to 6  on the 1-10 RPE Scale  Patient denied any SOB or angina  Patient rated his claudication as 4/5  He was able to attain a 3 06 MET Level  Patient is an excellent rehabilitation candidate due to high prior level of function and willingness to progress  Patient's program will be progressed as he is able to tolerate  Patient will receive education specific to his disease process        Medication compliance: Yes   Comments: Patient admits to medication compliance  Fall Risk: Low   Comments: Patient exhibits good dynamic standing balance and 3+/5 BLE strength    EKG changes: Telemetry reveals NSR      EXERCISE ASSESSMENT and PLAN    Current Exercise Program in Rehab:       Frequency: 3 days/week        Minutes: 35-40        METS: 3 to 3 5            HR: 20-30 > RHR  100-110   RPE: 4-6         Modalities: Treadmill, Lifecycle, NuStep and Recumbent bike      Exercise Progression 30 Day Goals :    Frequency: 5 days/week   Minutes: 40-45   METS: 3 5 to 4   HR: 100-110   RPE: 4-6   Modalities: Treadmill, Airdyne bike, Lifecycle and NuStep    Strength trainin-3 days / week  12-15 repetitions  1-2 sets per modality    Modalities: Leg Press    Progressing: Action    Home Exercise: None    Goals: 10% improvement in functional capacity, improved DASI score by 10%, Increase in peak CR METs by 40%, Resume ADLs with increased strength and Exercise 5 days/wk, >150mins/wk  Education: Benefit of exercise for CAD risk factors, home exercise guidelines, signs and sxs, RPE scale, class: Risk Factors for Heart Disease and Exercise instructions/guidelines for discharge    Plan:education on home exercise guidelines, home exercise 30+ mins 2 days opposite CR and Education class: Risk Factors for Heart Disease  Readiness to change: Action      NUTRITION ASSESSMENT AND PLAN    Weight control:    Starting weight: 188   Current weight:   188  Waist circumference:   Startin   Current:  42  Diabetes: N/A  Lipid management: Discussed diet and lipid management  Goals:LDL <100, HDL >40, TRG <150 and CHOL <200  Education: heart healthy eating  low sodium diet  hydration  diet and lipid management  wt  loss  healthy choices while dining out  portion control  class: Heart Healthy Eating  class:  Label Reading  Progressing: In Progress  Plan: Education class: Reading Food Labels, Education Class: Heart Healthy Eating, Increase PUFA and MUFA, Decrease SFA, Increase whole grains and increase fruits/vegs  Readiness to change: Action      PSYCHOSOCIAL ASSESSMENT AND PLAN    Emotional:  PHQ-9 = 5-9 = Mild Depression  Self-reported stress level: 8 Patient is very stressed about his wife's health at this time  Social support: Very Good  Goals:  Reduce perceived stress to 1-3/10, improved [facility] QOL < 27, continue medical therapy, Feelings in [facility] Score < 3, Physical Fitness in [facility] Score < 3, Overall Health in [facility] Score < 3, Quality of Life in [facility] Score < 3 , Change in Health in [facility] Score < 3 , Increased interest in doing things, improved sleep, improved positive thoughts of well being and increased energy  Education: signs/sxs of depression, benefits of positive support system, coping mechanisms, depression and CAD, class:  Stress and Your Health  and class:  Relaxation  Progressing: In Progress  Plan: Class: Stress and Your Health, Class: Relaxation and Practice relaxation techniques  Readiness to change: Action      OTHER CORE COMPONENTS     Tobacco:   Social History     Tobacco Use   Smoking Status Former Smoker    Packs/day: 2 00    Years: 20 00    Pack years: 40 00    Types: Cigarettes    Last attempt to quit: Jovani Klein Years since quittin 9   Smokeless Tobacco Former User    Types: Chew       Tobacco Use Intervention: Referral to tobacco expert:   N/A    Blood pressure:    Restin/62   Exercise: 154/62   Recovery: 120/58     Goals: consistent BP < 130/80, reduced dietary sodium <2300mg, consistent exercise >150 mins/wk and medication compliance  Education:  understanding HTN and CAD, low sodium diet and HTN, Education class:  Common Heart Medications and Education class: Understanding Heart Disease  Progressing: In Progress  Plan: Class: Understanding Heart Disease and Class: Common Heart Medications  Readiness to change: Action

## 2019-12-18 ENCOUNTER — APPOINTMENT (OUTPATIENT)
Dept: PULMONOLOGY | Facility: HOSPITAL | Age: 69
End: 2019-12-18
Payer: MEDICARE

## 2019-12-20 ENCOUNTER — CLINICAL SUPPORT (OUTPATIENT)
Dept: PULMONOLOGY | Facility: HOSPITAL | Age: 69
End: 2019-12-20
Payer: MEDICARE

## 2019-12-20 DIAGNOSIS — I70.218 ATHSCL NATIVE ARTERIES OF EXTRM W INTRMT CLAUD, OTH EXTRM (HCC): ICD-10-CM

## 2019-12-20 PROCEDURE — 93668 PERIPHERAL VASCULAR REHAB: CPT

## 2019-12-23 ENCOUNTER — APPOINTMENT (OUTPATIENT)
Dept: PULMONOLOGY | Facility: HOSPITAL | Age: 69
End: 2019-12-23
Payer: MEDICARE

## 2019-12-25 ENCOUNTER — APPOINTMENT (OUTPATIENT)
Dept: PULMONOLOGY | Facility: HOSPITAL | Age: 69
End: 2019-12-25
Payer: MEDICARE

## 2019-12-27 ENCOUNTER — CLINICAL SUPPORT (OUTPATIENT)
Dept: PULMONOLOGY | Facility: HOSPITAL | Age: 69
End: 2019-12-27
Payer: MEDICARE

## 2019-12-27 DIAGNOSIS — I70.218 ATHSCL NATIVE ARTERIES OF EXTRM W INTRMT CLAUD, OTH EXTRM (HCC): ICD-10-CM

## 2019-12-27 PROCEDURE — 93668 PERIPHERAL VASCULAR REHAB: CPT

## 2019-12-30 ENCOUNTER — CLINICAL SUPPORT (OUTPATIENT)
Dept: PULMONOLOGY | Facility: HOSPITAL | Age: 69
End: 2019-12-30
Payer: MEDICARE

## 2019-12-30 DIAGNOSIS — I70.218 ATHSCL NATIVE ARTERIES OF EXTRM W INTRMT CLAUD, OTH EXTRM (HCC): ICD-10-CM

## 2019-12-30 PROCEDURE — 93668 PERIPHERAL VASCULAR REHAB: CPT

## 2020-01-01 ENCOUNTER — APPOINTMENT (OUTPATIENT)
Dept: PULMONOLOGY | Facility: HOSPITAL | Age: 70
End: 2020-01-01
Payer: MEDICARE

## 2020-01-03 ENCOUNTER — CLINICAL SUPPORT (OUTPATIENT)
Dept: PULMONOLOGY | Facility: HOSPITAL | Age: 70
End: 2020-01-03
Payer: MEDICARE

## 2020-01-03 DIAGNOSIS — I70.218 ATHSCL NATIVE ARTERIES OF EXTRM W INTRMT CLAUD, OTH EXTRM (HCC): ICD-10-CM

## 2020-01-03 PROCEDURE — 93668 PERIPHERAL VASCULAR REHAB: CPT

## 2020-01-06 ENCOUNTER — APPOINTMENT (OUTPATIENT)
Dept: PULMONOLOGY | Facility: HOSPITAL | Age: 70
End: 2020-01-06
Payer: MEDICARE

## 2020-01-08 ENCOUNTER — CLINICAL SUPPORT (OUTPATIENT)
Dept: PULMONOLOGY | Facility: HOSPITAL | Age: 70
End: 2020-01-08
Payer: MEDICARE

## 2020-01-08 DIAGNOSIS — Z95.2 S/P TAVR (TRANSCATHETER AORTIC VALVE REPLACEMENT): ICD-10-CM

## 2020-01-08 DIAGNOSIS — I70.218 ATHSCL NATIVE ARTERIES OF EXTRM W INTRMT CLAUD, OTH EXTRM (HCC): ICD-10-CM

## 2020-01-08 PROCEDURE — 93668 PERIPHERAL VASCULAR REHAB: CPT

## 2020-01-08 RX ORDER — PREGABALIN 50 MG/1
CAPSULE ORAL
Qty: 90 CAPSULE | Refills: 0 | OUTPATIENT
Start: 2020-01-08

## 2020-01-09 RX ORDER — PREGABALIN 50 MG/1
50 CAPSULE ORAL 3 TIMES DAILY
Qty: 90 CAPSULE | Refills: 1 | Status: SHIPPED | OUTPATIENT
Start: 2020-01-09 | End: 2020-01-29 | Stop reason: ALTCHOICE

## 2020-01-10 ENCOUNTER — CLINICAL SUPPORT (OUTPATIENT)
Dept: PULMONOLOGY | Facility: HOSPITAL | Age: 70
End: 2020-01-10
Payer: MEDICARE

## 2020-01-10 ENCOUNTER — APPOINTMENT (OUTPATIENT)
Dept: PULMONOLOGY | Facility: HOSPITAL | Age: 70
End: 2020-01-10
Payer: MEDICARE

## 2020-01-10 DIAGNOSIS — I70.218 ATHSCL NATIVE ARTERIES OF EXTRM W INTRMT CLAUD, OTH EXTRM (HCC): ICD-10-CM

## 2020-01-10 PROCEDURE — 93668 PERIPHERAL VASCULAR REHAB: CPT

## 2020-01-13 ENCOUNTER — APPOINTMENT (OUTPATIENT)
Dept: PULMONOLOGY | Facility: HOSPITAL | Age: 70
End: 2020-01-13
Payer: MEDICARE

## 2020-01-13 NOTE — PROGRESS NOTES
Progress Notes   Cosign Needed      Cardiac/PAD Rehabilitation Plan of Care   Care Plan         Today's date: 2020   Visits:   Patient name: Pao Lott                                                : 1950  Age: 68 y o  MRN: [id number]  Referring Physician: No ref  provider found  Cardiologist: Dr Jelena Ba  Provider: Wilian Gudino  Clinician: Abhi tSaton, MPT     Dx: PAD  Date of onset: 2019        SUMMARY OF PROGRESS:  Mr Sue Aschoff will continue his PAD rehabilitation in preparation for his transition to CR  Currently due to PAD symptoms, his activity level his limited  Mr Sue Aschoff would not be able to fully participate in a CR program at this time  Patient is making progress w/skilled PAD interventions  Patient's POC at this time is to continue his PAD rehabilitation and reevaluate his progress at  visits to determine appropriateness for transition to CR  Patient is agreeable with this POC      Patient has completed  sessions of PAD as of today  He has correct hemodynamic responses to activity  Patient's resting vitals were HR 81 and /60  His peak vitals were  and /72  Vitals upon recovery were HR 75 and /66  Patient rates his activity a 4 to 6  on the 1-10 RPE Scale  Patient denied any SOB or angina  Patient continues to rate his claudication as 4/5  He was able to attain a  2 3 to 3 1 MET Level  Patient is an excellent rehabilitation candidate due to high prior level of function and willingness to progress  Patient's program will be progressed as he is able to tolerate   Patient will receive education specific to his disease process         Medication compliance: Yes              Comments: Patient admits to medication compliance  Fall Risk: Low              Comments: Patient exhibits good dynamic standing balance and 4-/5 BLE strength     EKG changes: Telemetry reveals NSR        EXERCISE ASSESSMENT and PLAN     Current Exercise Program in Rehab:                                                           Frequency: 3 to 4 days/week                                                                Minutes: 30-35                                                              METS: 3 to 3 5                                                                                                                          HR: 20-30 > RHR  100-110              RPE: 4-6                                                                                   Modalities: Treadmill, Lifecycle, NuStep and Recumbent bike                          Exercise Progression 30 Day Goals :               Frequency: 5 days/week              Minutes: 40-45              METS: 3 5 to 4              HR: 100-110              RPE: 4-6              Modalities: Treadmill, Airdyne bike, Lifecycle and NuStep     Strength trainin-3 days / week  12-15 repetitions  1-2 sets per modality               Modalities: Leg Press     Progressing: Action     Home Exercise: None    Goals: 10% improvement in functional capacity, improved DASI score by 10%, Increase in peak CR METs by 40%, Resume ADLs with increased strength and Exercise 5 days/wk, >150mins/wk  Education: Benefit of exercise for CAD risk factors, home exercise guidelines, signs and sxs, RPE scale, class: Risk Factors for Heart Disease and Exercise instructions/guidelines for discharge    Plan:education on home exercise guidelines, home exercise 30+ mins 2 days opposite CR and Education class: Risk Factors for Heart Disease  Readiness to change: Action        NUTRITION ASSESSMENT AND PLAN     Weight control:               Starting weight: 188              Current weight:   170  Waist circumference:              Startin              Current:  42  Diabetes: N/A  Lipid management: Discussed diet and lipid management  Goals:LDL <100, HDL >40, TRG <150 and CHOL <200  Education: heart healthy eating  low sodium diet  hydration  diet and lipid management  wt  loss  healthy choices while dining out  portion control  class: Heart Healthy Eating  class:  Label Reading  Progressing: In Progress  Plan: Education class: Reading Food Labels, Education Class: Heart Healthy Eating, Increase PUFA and MUFA, Decrease SFA, Increase whole grains and increase fruits/vegs  Readiness to change: Action        PSYCHOSOCIAL ASSESSMENT AND PLAN     Emotional:  PHQ-9 = 5-9 = Mild Depression  Self-reported stress level: 8 Patient is very stressed about his wife's health at this time  Social support: Very Good  Goals:  Reduce perceived stress to 1-3/10, improved [facility] QOL < 27, continue medical therapy, Feelings in [facility] Score < 3, Physical Fitness in [facility] Score < 3, Overall Health in [facility] Score < 3, Quality of Life in [facility] Score < 3 , Change in Health in [place] Score < 3 , Increased interest in doing things, improved sleep, improved positive thoughts of well being and increased energy  Education: signs/sxs of depression, benefits of positive support system, coping mechanisms, depression and CAD, class:  Stress and Your Health  and class:  Relaxation  Progressing: In Progress  Plan: Class: Stress and Your Health, Class: Relaxation and Practice relaxation techniques  Readiness to change: Action        OTHER CORE COMPONENTS      Tobacco:   Social History           Tobacco Use   Smoking Status Former Smoker    Packs/day: 2 00    Years: 20 00    Pack years: 40 00    Types: Cigarettes    Last attempt to quit:     Years since quittin 0   Smokeless Tobacco Former User    Types: Chew         Tobacco Use Intervention: Referral to tobacco expert:   N/A     Blood pressure:               Restin/60              Exercise: 148/72              Recovery: 130/66     Goals: consistent BP < 130/80, reduced dietary sodium <2300mg, consistent exercise >150 mins/wk and medication compliance  Education:  understanding HTN and CAD, low sodium diet and HTN, Education class:  Common Heart Medications and Education class: Understanding Heart Disease  Progressing: In Progress  Plan: Class: Understanding Heart Disease and Class: Common Heart Medications  Readiness to change: Action

## 2020-01-13 NOTE — PROGRESS NOTES
Cardiac/PAD Rehabilitation Plan of Care   Care Plan       Today's date: 2020   Visits:   Patient name: Christiane Bolaños      : 1950  Age: 68 y o  MRN: [id number]  Referring Physician: No ref  provider found  Cardiologist: Dr August Pittman  Provider: Franci Mahoney  Clinician: Rossie Kaufman Colletta Ricks, MPT    Dx: PAD  Date of onset: 2019      SUMMARY OF PROGRESS:  Mr Db Ashraf will continue his PAD rehabilitation in preparation for his transition to CR  Currently due to PAD symptoms, his activity level his limited  Mr Db Ashraf would not be able to fully participate in a CR program at this time  Patient is making progress w/skilled PAD interventions  Patient's POC at this time is to continue his PAD rehabilitation and reevaluate his progress at  visits to determine appropriateness for transition to CR  Patient is agreeable with this POC  Patient has completed  sessions of PAD as of today  He has correct hemodynamic responses to activity  Patient's resting vitals were HR 81 and /60  His peak vitals were  and /72  Vitals upon recovery were HR 75 and /66  Patient rates his activity a 4 to 6  on the 1-10 RPE Scale  Patient denied any SOB or angina  Patient continues to rate his claudication as 4/5  He was able to attain a  2 3 to 3 1 MET Level  Patient is an excellent rehabilitation candidate due to high prior level of function and willingness to progress  Patient's program will be progressed as he is able to tolerate  Patient will receive education specific to his disease process  Medication compliance: Yes   Comments: Patient admits to medication compliance  Fall Risk: Low   Comments: Patient exhibits good dynamic standing balance and 4-/5 BLE strength    EKG changes: Telemetry reveals NSR      EXERCISE ASSESSMENT and PLAN    Current Exercise Program in Rehab:       Frequency: 3 to 4 days/week        Minutes: 30-35       METS: 3 to 3 5            HR: 20-30 > RHR  100-110   RPE: 4-6         Modalities: Treadmill, Lifecycle, NuStep and Recumbent bike      Exercise Progression 30 Day Goals :    Frequency: 5 days/week   Minutes: 40-45   METS: 3 5 to 4   HR: 100-110   RPE: 4-6   Modalities: Treadmill, Airdyne bike, Lifecycle and NuStep    Strength trainin-3 days / week  12-15 repetitions  1-2 sets per modality    Modalities: Leg Press    Progressing: Action    Home Exercise: None    Goals: 10% improvement in functional capacity, improved DASI score by 10%, Increase in peak CR METs by 40%, Resume ADLs with increased strength and Exercise 5 days/wk, >150mins/wk  Education: Benefit of exercise for CAD risk factors, home exercise guidelines, signs and sxs, RPE scale, class: Risk Factors for Heart Disease and Exercise instructions/guidelines for discharge    Plan:education on home exercise guidelines, home exercise 30+ mins 2 days opposite CR and Education class: Risk Factors for Heart Disease  Readiness to change: Action      NUTRITION ASSESSMENT AND PLAN    Weight control:    Starting weight: 188   Current weight:   170  Waist circumference:   Startin   Current:  42  Diabetes: N/A  Lipid management: Discussed diet and lipid management  Goals:LDL <100, HDL >40, TRG <150 and CHOL <200  Education: heart healthy eating  low sodium diet  hydration  diet and lipid management  wt  loss  healthy choices while dining out  portion control  class: Heart Healthy Eating  class:  Label Reading  Progressing: In Progress  Plan: Education class: Reading Food Labels, Education Class: Heart Healthy Eating, Increase PUFA and MUFA, Decrease SFA, Increase whole grains and increase fruits/vegs  Readiness to change: Action      PSYCHOSOCIAL ASSESSMENT AND PLAN    Emotional:  PHQ-9 = 5-9 = Mild Depression  Self-reported stress level: 8 Patient is very stressed about his wife's health at this time    Social support: Very Good  Goals:  Reduce perceived stress to 1-3/10, improved [facility] QOL < 27, continue medical therapy, Feelings in [facility] Score < 3, Physical Fitness in [facility] Score < 3, Overall Health in [facility] Score < 3, Quality of Life in [facility] Score < 3 , Change in Health in [place] Score < 3 , Increased interest in doing things, improved sleep, improved positive thoughts of well being and increased energy  Education: signs/sxs of depression, benefits of positive support system, coping mechanisms, depression and CAD, class:  Stress and Your Health  and class:  Relaxation  Progressing: In Progress  Plan: Class: Stress and Your Health, Class: Relaxation and Practice relaxation techniques  Readiness to change: Action      OTHER CORE COMPONENTS     Tobacco:   Social History     Tobacco Use   Smoking Status Former Smoker    Packs/day: 2 00    Years: 20 00    Pack years: 40 00    Types: Cigarettes    Last attempt to quit: Silvia Wright Years since quittin 0   Smokeless Tobacco Former User    Types: Chew       Tobacco Use Intervention: Referral to tobacco expert:   N/A    Blood pressure:    Restin/60   Exercise: 148/72   Recovery: 130/66    Goals: consistent BP < 130/80, reduced dietary sodium <2300mg, consistent exercise >150 mins/wk and medication compliance  Education:  understanding HTN and CAD, low sodium diet and HTN, Education class:  Common Heart Medications and Education class: Understanding Heart Disease  Progressing: In Progress  Plan: Class: Understanding Heart Disease and Class: Common Heart Medications  Readiness to change: Action

## 2020-01-15 ENCOUNTER — CLINICAL SUPPORT (OUTPATIENT)
Dept: PULMONOLOGY | Facility: HOSPITAL | Age: 70
End: 2020-01-15
Payer: MEDICARE

## 2020-01-15 DIAGNOSIS — I70.218 ATHSCL NATIVE ARTERIES OF EXTRM W INTRMT CLAUD, OTH EXTRM (HCC): ICD-10-CM

## 2020-01-15 PROCEDURE — 93668 PERIPHERAL VASCULAR REHAB: CPT

## 2020-01-17 ENCOUNTER — CLINICAL SUPPORT (OUTPATIENT)
Dept: PULMONOLOGY | Facility: HOSPITAL | Age: 70
End: 2020-01-17
Payer: MEDICARE

## 2020-01-17 DIAGNOSIS — I70.218 ATHSCL NATIVE ARTERIES OF EXTRM W INTRMT CLAUD, OTH EXTRM (HCC): ICD-10-CM

## 2020-01-17 PROCEDURE — 93668 PERIPHERAL VASCULAR REHAB: CPT

## 2020-01-20 ENCOUNTER — APPOINTMENT (OUTPATIENT)
Dept: PULMONOLOGY | Facility: HOSPITAL | Age: 70
End: 2020-01-20
Payer: MEDICARE

## 2020-01-22 ENCOUNTER — HOSPITAL ENCOUNTER (OUTPATIENT)
Dept: NON INVASIVE DIAGNOSTICS | Facility: HOSPITAL | Age: 70
Discharge: HOME | End: 2020-01-22
Payer: MEDICARE

## 2020-01-22 ENCOUNTER — CLINICAL SUPPORT (OUTPATIENT)
Dept: PULMONOLOGY | Facility: HOSPITAL | Age: 70
End: 2020-01-22
Payer: MEDICARE

## 2020-01-22 DIAGNOSIS — I70.218 ATHSCL NATIVE ARTERIES OF EXTRM W INTRMT CLAUD, OTH EXTRM (HCC): ICD-10-CM

## 2020-01-22 DIAGNOSIS — I73.9 PAD (PERIPHERAL ARTERY DISEASE) (HCC): ICD-10-CM

## 2020-01-22 DIAGNOSIS — I73.9 CLAUDICATION IN PERIPHERAL VASCULAR DISEASE (HCC): ICD-10-CM

## 2020-01-22 PROCEDURE — 93668 PERIPHERAL VASCULAR REHAB: CPT

## 2020-01-22 PROCEDURE — 93923 UPR/LXTR ART STDY 3+ LVLS: CPT

## 2020-01-22 PROCEDURE — 93925 LOWER EXTREMITY STUDY: CPT

## 2020-01-22 PROCEDURE — 93925 LOWER EXTREMITY STUDY: CPT | Performed by: SURGERY

## 2020-01-22 PROCEDURE — 93922 UPR/L XTREMITY ART 2 LEVELS: CPT | Performed by: SURGERY

## 2020-01-24 ENCOUNTER — APPOINTMENT (OUTPATIENT)
Dept: PULMONOLOGY | Facility: HOSPITAL | Age: 70
End: 2020-01-24
Payer: MEDICARE

## 2020-01-24 ENCOUNTER — TELEPHONE (OUTPATIENT)
Dept: VASCULAR SURGERY | Facility: CLINIC | Age: 70
End: 2020-01-24

## 2020-01-24 ENCOUNTER — OFFICE VISIT (OUTPATIENT)
Dept: FAMILY MEDICINE CLINIC | Facility: CLINIC | Age: 70
End: 2020-01-24
Payer: MEDICARE

## 2020-01-24 ENCOUNTER — APPOINTMENT (OUTPATIENT)
Dept: LAB | Facility: CLINIC | Age: 70
End: 2020-01-24
Payer: MEDICARE

## 2020-01-24 VITALS
DIASTOLIC BLOOD PRESSURE: 75 MMHG | HEART RATE: 67 BPM | WEIGHT: 170 LBS | OXYGEN SATURATION: 98 % | TEMPERATURE: 98.1 F | BODY MASS INDEX: 27.32 KG/M2 | SYSTOLIC BLOOD PRESSURE: 130 MMHG | HEIGHT: 66 IN

## 2020-01-24 DIAGNOSIS — M35.3 POLYMYALGIA RHEUMATICA (HCC): ICD-10-CM

## 2020-01-24 DIAGNOSIS — M25.50 ARTHRALGIA, UNSPECIFIED JOINT: ICD-10-CM

## 2020-01-24 DIAGNOSIS — Z95.2 S/P TAVR (TRANSCATHETER AORTIC VALVE REPLACEMENT): ICD-10-CM

## 2020-01-24 DIAGNOSIS — E66.3 OVERWEIGHT (BMI 25.0-29.9): ICD-10-CM

## 2020-01-24 DIAGNOSIS — R53.83 FATIGUE, UNSPECIFIED TYPE: ICD-10-CM

## 2020-01-24 DIAGNOSIS — I10 ESSENTIAL HYPERTENSION: Primary | ICD-10-CM

## 2020-01-24 LAB
CRP SERPL QL: 46.6 MG/L
ERYTHROCYTE [SEDIMENTATION RATE] IN BLOOD: 28 MM/HOUR (ref 0–10)

## 2020-01-24 PROCEDURE — 86618 LYME DISEASE ANTIBODY: CPT

## 2020-01-24 PROCEDURE — 36415 COLL VENOUS BLD VENIPUNCTURE: CPT

## 2020-01-24 PROCEDURE — 86140 C-REACTIVE PROTEIN: CPT

## 2020-01-24 PROCEDURE — 86430 RHEUMATOID FACTOR TEST QUAL: CPT

## 2020-01-24 PROCEDURE — 99214 OFFICE O/P EST MOD 30 MIN: CPT | Performed by: FAMILY MEDICINE

## 2020-01-24 PROCEDURE — 85652 RBC SED RATE AUTOMATED: CPT

## 2020-01-24 PROCEDURE — 86038 ANTINUCLEAR ANTIBODIES: CPT

## 2020-01-24 NOTE — PROGRESS NOTES
Assessment/Plan:    No problem-specific Assessment & Plan notes found for this encounter  Diagnoses and all orders for this visit:    Essential hypertension  Comments:  no change in the medication    Polymyalgia rheumatica (HCC)  -     Lyme Antibody Profile with reflex to WB; Future  -     Sedimentation rate, automated; Future  -     C-reactive protein; Future    Arthralgia, unspecified joint  -     Lyme Antibody Profile with reflex to WB; Future  -     Sedimentation rate, automated; Future  -     C-reactive protein; Future  -     RF Screen w/ Reflex to Titer; Future  -     MANA Screen w/ Reflex to Titer/Pattern; Future    Overweight (BMI 25 0-29  9)    Fatigue, unspecified type  -     Echo complete with contrast if indicated; Future    S/P TAVR (transcatheter aortic valve replacement)  -     Echo complete with contrast if indicated; Future          PHQ-9 Depression Screening    PHQ-9:    Frequency of the following problems over the past two weeks:       Little interest or pleasure in doing things:  3 - nearly every day  Feeling down, depressed, or hopeless:  1 - several days  Trouble falling or staying asleep, or sleeping too much:  2 - more than half the days  Feeling tired or having little energy:  3 - nearly every day  Poor appetite or overeatin - not at all  Feeling bad about yourself - or that you are a failure or have let yourself or your family down:  0 - not at all  Trouble concentrating on things, such as reading the newspaper or watching television:  0 - not at all  Moving or speaking so slowly that other people could have noticed  Or the opposite - being so fidgety or restless that you have been moving around a lot more than usual:  0 - not at all  Thoughts that you would be better off dead, or of hurting yourself in some way:  0 - not at all  PHQ-2 Score:  4  PHQ-9 Score:  9      BMI Counseling: Body mass index is 27 44 kg/m²   The BMI is above normal  Nutrition recommendations include reducing portion sizes and 3-5 servings of fruits/vegetables daily  Exercise recommendations include moderate aerobic physical activity for 150 minutes/week and exercising 3-5 times per week  Depression Screening Follow-up Plan: Patient's depression screening was positive with a PHQ-2 score of 4  Their PHQ-9 score was 9  Patient assessed for underlying major depression  They have no active suicidal ideations  Brief counseling provided and recommend additional follow-up/re-evaluation next office visit  Subjective:      Patient ID: Sarkis Mcclelland is a 68 y o  male  Pt complains of joint pain in every joint, pt has a history of poly myalgia rheumatica, pt is not on steroids, pt checks Bps at home and see an average of 130 over 75    Hypertension   This is a chronic problem  The current episode started more than 1 year ago  Pertinent negatives include no chest pain or palpitations  The following portions of the patient's history were reviewed and updated as appropriate: allergies, current medications, past family history, past medical history, past social history, past surgical history and problem list     Review of Systems   Constitutional: Positive for chills  Negative for fever  Cardiovascular: Negative for chest pain and palpitations  Gastrointestinal: Negative for nausea and vomiting  Musculoskeletal: Positive for arthralgias  Objective:    /75   Pulse 67   Temp 98 1 °F (36 7 °C)   Ht 5' 6" (1 676 m)   Wt 77 1 kg (170 lb)   SpO2 98%   BMI 27 44 kg/m²      Physical Exam   Constitutional: He is oriented to person, place, and time  He appears well-developed and well-nourished  No distress  HENT:   Head: Normocephalic and atraumatic  Eyes: Pupils are equal, round, and reactive to light  Conjunctivae and EOM are normal  No scleral icterus  Neck: Normal range of motion  Neck supple  Cardiovascular: Normal rate, regular rhythm and normal heart sounds     No murmur heard   Pulmonary/Chest: Effort normal and breath sounds normal  No respiratory distress  He has no wheezes  He has no rales  Abdominal: Soft  Bowel sounds are normal  He exhibits no distension and no mass  There is no tenderness  There is no rebound and no guarding  Musculoskeletal: He exhibits no edema  Lymphadenopathy:     He has no cervical adenopathy  Neurological: He is alert and oriented to person, place, and time  Skin: Skin is warm and dry  He is not diaphoretic  Psychiatric: He has a normal mood and affect  His behavior is normal  Judgment and thought content normal    Nursing note and vitals reviewed

## 2020-01-24 NOTE — TELEPHONE ENCOUNTER
Information and warnings  Appointment selection checkbox  Date Time Kyle Providers Departments Visit Type                    [x] 1/28/20 11:30 AM 30 Ling Gomes MD [[id number]] [facility] [[id number]] RES REVIEW [[id number]]                 domer unavail due to surgery called pt to rs - left message needs to be rs to 1/29

## 2020-01-25 LAB — B BURGDOR IGG+IGM SER-ACNC: <0.91 ISR (ref 0–0.9)

## 2020-01-27 ENCOUNTER — APPOINTMENT (OUTPATIENT)
Dept: PULMONOLOGY | Facility: HOSPITAL | Age: 70
End: 2020-01-27
Payer: MEDICARE

## 2020-01-27 LAB
RHEUMATOID FACT SER QL LA: NEGATIVE
RYE IGE QN: NEGATIVE

## 2020-01-29 ENCOUNTER — HOSPITAL ENCOUNTER (OUTPATIENT)
Dept: NEUROLOGY | Facility: CLINIC | Age: 70
Discharge: HOME | End: 2020-01-29
Payer: MEDICARE

## 2020-01-29 ENCOUNTER — OFFICE VISIT (OUTPATIENT)
Dept: VASCULAR SURGERY | Facility: CLINIC | Age: 70
End: 2020-01-29
Payer: MEDICARE

## 2020-01-29 ENCOUNTER — APPOINTMENT (OUTPATIENT)
Dept: PULMONOLOGY | Facility: HOSPITAL | Age: 70
End: 2020-01-29
Payer: MEDICARE

## 2020-01-29 VITALS
DIASTOLIC BLOOD PRESSURE: 82 MMHG | TEMPERATURE: 98.2 F | HEART RATE: 66 BPM | RESPIRATION RATE: 16 BRPM | HEIGHT: 67 IN | BODY MASS INDEX: 26.53 KG/M2 | SYSTOLIC BLOOD PRESSURE: 148 MMHG | WEIGHT: 169 LBS

## 2020-01-29 DIAGNOSIS — R29.898 RIGHT HAND WEAKNESS: ICD-10-CM

## 2020-01-29 DIAGNOSIS — I65.23 BILATERAL CAROTID ARTERY STENOSIS: ICD-10-CM

## 2020-01-29 DIAGNOSIS — I73.9 PAD (PERIPHERAL ARTERY DISEASE) (HCC): Primary | ICD-10-CM

## 2020-01-29 DIAGNOSIS — I73.9 CLAUDICATION IN PERIPHERAL VASCULAR DISEASE (HCC): ICD-10-CM

## 2020-01-29 PROBLEM — I65.29 CAROTID ARTERY STENOSIS: Status: ACTIVE | Noted: 2020-01-29

## 2020-01-29 PROCEDURE — 99214 OFFICE O/P EST MOD 30 MIN: CPT | Performed by: SURGERY

## 2020-01-29 PROCEDURE — 95886 MUSC TEST DONE W/N TEST COMP: CPT | Performed by: PSYCHIATRY & NEUROLOGY

## 2020-01-29 PROCEDURE — 95912 NRV CNDJ TEST 11-12 STUDIES: CPT | Performed by: PSYCHIATRY & NEUROLOGY

## 2020-01-29 NOTE — ASSESSMENT & PLAN NOTE
Asymptomatic bilateral carotid stenosis  Continue surveillance duplex  Next scheduled for April 2020

## 2020-01-29 NOTE — PROGRESS NOTES
Assessment/Plan:    Carotid artery stenosis  Asymptomatic bilateral carotid stenosis  Continue surveillance duplex  Next scheduled for April 2020  Claudication in peripheral vascular disease ([facility] [address] )  Continue with supervised walking exercise program   Patient on dual anti-platelet therapy and statin  Patient is nonsmoker  Will continue with surveillance  Diagnoses and all orders for this visit:    PAD (peripheral artery disease) ([facility])  -     VAS lower limb arterial duplex, complete bilateral; Future  -     VAS carotid complete study; Future    Claudication in peripheral vascular disease (HCC)  -     VAS lower limb arterial duplex, complete bilateral; Future  -     VAS carotid complete study; Future    Bilateral carotid artery stenosis  -     VAS lower limb arterial duplex, complete bilateral; Future  -     VAS carotid complete study; Future          Subjective:      Patient ID: Rock Chairez is a 68 y o  male  Patient with history of bilateral femoral to below knee popliteal artery bypass with saphenous vein  Patient underwent atherectomy of pre occlusive lesions in the external/common femoral arteries bilaterally  The right lower extremity intervention was performed on 09/17/2019 and included a jet stream 2 3 mm atherectomy device followed by a 7 mm angioplasty balloon  The left external iliac/common femoral artery atherectomy was similarly performed with a 2 3 mm jet stream atherectomy device and subsequent balloon angioplasty  Patient states this initially significantly diminished his bilateral lower extremity claudication  However, he underwent TAVR in October of 2019 and 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  Patient complains of pain and numbness along the medial anterior aspect of his right thigh  Patient had a JEFFREY on 1/22/2020 reveals an ZAHC of 0 66 there are elevated velocities noted in the profundus femoral artery    Velocities through the graft ranged from 39-89 centimeters/second  Velocities at the proximal and distal anastomosis are within normal limits  Left lower extremity ZACH is measured at 1 07  The velocity the proximal anastomosis is somewhat elevated at 212 centimeters/second with velocities within the bypass ranging from 35-54 centimeters/second  Clinical examination reveals easily palpable graft pulses bilaterally  Patient tells me he has diffuse arthritic pain and is being worked up for polyarthralgia with Dr Whit Breen  Pt c/o right calf pain after walking 100-150 yards on occasion and rest for 15-30 seconds, but not as severe as prior to Backand in 8/2019   Pt denies any rest pain or pain when elevating legs or ischemic ulcers  Pt is on ASA 81 mg, Plavix, and Crestor  In the presence of diffuse atherosclerotic disease we have discussed continuing on the dual anti-platelet therapy  Patient is also on statin therapy  Presently patient is participating in a PAD supervised walking exercise program   I have encouraged him to continue to do so  He states due to his polyarthralgia he may not be able to continue  Due to the extent of his atherosclerotic disease and persistent symptoms we will continue surveillance program at 3 month intervals  Due to his multiple medical comorbidities further interventions would be considered fairly high risk and will be undertaken with extreme caution  Pt had a TAVR Nov 2019 and has been having numbness from right thigh to knee  The following portions of the patient's history were reviewed and updated as appropriate: allergies, current medications, past family history, past medical history, past social history, past surgical history and problem list     Review of Systems   Constitutional: Positive for chills and fatigue  HENT: Negative  Eyes: Negative  Respiratory: Negative  Cardiovascular: Positive for leg swelling  Gastrointestinal: Negative  Endocrine: Negative      Genitourinary: Negative  Musculoskeletal: Positive for arthralgias and myalgias  Leg pain  Leg cramping when walking   Skin: Negative  Allergic/Immunologic: Negative  Neurological: Positive for dizziness and numbness (right thigh and hands)  Hematological: Negative  Psychiatric/Behavioral: Negative  Objective:      /82 (BP Location: Left arm, Patient Position: Sitting)   Pulse 66   Temp 98 2 °F (36 8 °C) (Tympanic)   Resp 16   Ht 5' 6 5" (1 689 m)   Wt 76 7 kg (169 lb)   BMI 26 87 kg/m²          Physical Exam   Constitutional: He is oriented to person, place, and time  He appears well-developed and well-nourished  HENT:   Head: Normocephalic and atraumatic  Mouth/Throat: Oropharynx is clear and moist    Eyes: Pupils are equal, round, and reactive to light  Conjunctivae and EOM are normal    Neck: Normal range of motion  Neck supple  No JVD present  Cardiovascular: Normal rate, regular rhythm and normal heart sounds  Pulmonary/Chest: Effort normal and breath sounds normal  No stridor  No respiratory distress  He has no wheezes  He has no rales  He exhibits no tenderness  Abdominal: Soft  He exhibits no distension and no mass  There is no tenderness  There is no rebound and no guarding  Musculoskeletal: Normal range of motion  He exhibits no tenderness or deformity  Neurological: He is alert and oriented to person, place, and time  Skin: Skin is warm and dry  No rash noted  No erythema  Psychiatric: He has a normal mood and affect  His behavior is normal  Thought content normal    Nursing note and vitals reviewed

## 2020-01-29 NOTE — LETTER
January 29, 2020     Referral [address] [place] [id number]    Patient: Bimal Glass   YOB: 1950   Date of Visit: 1/29/2020       Dear Dr Livingston Field:    Thank you for referring Vickie Barroso to me for evaluation  Below are the relevant portions of my assessment and plan of care  Patient with history of bilateral femoral to below knee popliteal artery bypass with saphenous vein  Patient underwent atherectomy of pre occlusive lesions in the external/common femoral arteries bilaterally  The right lower extremity intervention was performed on 09/17/2019 and included a jet stream 2 3 mm atherectomy device followed by a 7 mm angioplasty balloon  The left external iliac/common femoral artery atherectomy was similarly performed with a 2 3 mm jet stream atherectomy device and subsequent balloon angioplasty  Patient states this initially significantly diminished his bilateral lower extremity claudication  However, he underwent TAVR in October of 2019 and 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  Patient complains of pain and numbness along the medial anterior aspect of his right thigh  Patient had a JEFFREY on 1/22/2020 reveals an ZACH of 0 66 there are elevated velocities noted in the profundus femoral artery  Velocities through the graft ranged from 39-89 centimeters/second  Velocities at the proximal and distal anastomosis are within normal limits  Left lower extremity ZACH is measured at 1 07  The velocity the proximal anastomosis is somewhat elevated at 212 centimeters/second with velocities within the bypass ranging from 35-54 centimeters/second  Clinical examination reveals easily palpable graft pulses bilaterally  Patient tells me he has diffuse arthritic pain and is being worked up for polyarthralgia with Dr Martinez Yuan    Pt c/o right calf pain after walking 100-150 yards on occasion and rest for 15-30 seconds, but not as severe as prior to [place] in 8/2019   Pt denies any rest pain or pain when elevating legs or ischemic ulcers  Pt is on ASA 81 mg, Plavix, and Crestor  In the presence of diffuse atherosclerotic disease we have discussed continuing on the dual anti-platelet therapy  Patient is also on statin therapy  Presently patient is participating in a PAD supervised walking exercise program   I have encouraged him to continue to do so  He states due to his polyarthralgia he may not be able to continue  Due to the extent of his atherosclerotic disease and persistent symptoms we will continue surveillance program at 3 month intervals  Due to his multiple medical comorbidities further interventions would be considered fairly high risk and will be undertaken with extreme caution  Pt had a TAVR Nov 2019 and has been having numbness from right thigh to knee  Assessment/Plan:    Carotid artery stenosis  Asymptomatic bilateral carotid stenosis  Continue surveillance duplex  Next scheduled for April 2020  Claudication in peripheral vascular disease ([facility] [address] )  Continue with supervised walking exercise program   Patient on dual anti-platelet therapy and statin  Patient is nonsmoker  Will continue with surveillance  Diagnoses and all orders for this visit:    PAD (peripheral artery disease) ([facility])  -     VAS lower limb arterial duplex, complete bilateral; Future  -     VAS carotid complete study; Future    Claudication in peripheral vascular disease (HCC)  -     VAS lower limb arterial duplex, complete bilateral; Future  -     VAS carotid complete study; Future    Bilateral carotid artery stenosis  -     VAS lower limb arterial duplex, complete bilateral; Future  -     VAS carotid complete study; Future    If you have questions, please do not hesitate to call me  I look forward to following Efren Little along with you           Sincerely,        Carol Arellano MD        CC: No Recipients

## 2020-01-29 NOTE — ASSESSMENT & PLAN NOTE
Continue with supervised walking exercise program   Patient on dual anti-platelet therapy and statin  Patient is nonsmoker  Will continue with surveillance

## 2020-01-31 ENCOUNTER — APPOINTMENT (OUTPATIENT)
Dept: PULMONOLOGY | Facility: HOSPITAL | Age: 70
End: 2020-01-31
Payer: MEDICARE

## 2020-02-04 ENCOUNTER — HOSPITAL ENCOUNTER (OUTPATIENT)
Dept: NON INVASIVE DIAGNOSTICS | Facility: HOSPITAL | Age: 70
Discharge: HOME | End: 2020-02-04
Attending: FAMILY MEDICINE
Payer: MEDICARE

## 2020-02-04 DIAGNOSIS — R53.83 FATIGUE, UNSPECIFIED TYPE: ICD-10-CM

## 2020-02-04 DIAGNOSIS — Z95.2 S/P TAVR (TRANSCATHETER AORTIC VALVE REPLACEMENT): ICD-10-CM

## 2020-02-04 PROCEDURE — 93308 TTE F-UP OR LMTD: CPT

## 2020-02-04 PROCEDURE — 93321 DOPPLER ECHO F-UP/LMTD STD: CPT | Performed by: INTERNAL MEDICINE

## 2020-02-04 PROCEDURE — 93325 DOPPLER ECHO COLOR FLOW MAPG: CPT | Performed by: INTERNAL MEDICINE

## 2020-02-04 PROCEDURE — 93308 TTE F-UP OR LMTD: CPT | Performed by: INTERNAL MEDICINE

## 2020-02-04 RX ADMIN — PERFLUTREN 0.6 ML/MIN: 6.52 INJECTION, SUSPENSION INTRAVENOUS at 15:19

## 2020-02-06 NOTE — PROGRESS NOTES
Cardiac/PAD Rehabilitation Plan of Care   Care Plan       Today's date: 2020   Visits: 36  Patient name: Claudia Currie      : 1950  Age: 68 y o  MRN: [id number]  Referring Physician: Alejandrina Crane MD  Cardiologist: Dr Kristyn Ross  Provider: Trell Lehman  Clinician: ELO Deluca    Dx: PAD  Date of onset: 2019      SUMMARY OF PROGRESS:  Mr Jessy Lara will continue his PAD rehabilitation in preparation for his transition to CR  Currently due to PAD symptoms, his activity level his limited  Mr Jessy Lara would not be able to fully participate in a CR program at this time  Patient is making progress w/skilled PAD interventions  Patient's POC at this time is to continue his PAD rehabilitation and reevaluate his progress at  visits to determine appropriateness for transition to CR  Patient is agreeable with this POC  Patient has completed 36 sessions of PAD as of today  He has correct hemodynamic responses to activity  Patient's resting vitals were HR 81 and /60  His peak vitals were  and /72  Vitals upon recovery were HR 75 and /66  Patient rates his activity a 4 to 6  on the 1-10 RPE Scale  Patient denied any SOB or angina  Patient continues to rate his claudication as 4/5  He was able to attain a  2 3 to 3 1 MET Level  Patient is an excellent rehabilitation candidate due to high prior level of function and willingness to progress  Patient's program will be progressed as he is able to tolerate  Patient will receive education specific to his disease process  Medication compliance: Yes   Comments: Patient admits to medication compliance  Fall Risk: Low   Comments: Patient exhibits good dynamic standing balance and 4-/5 BLE strength    EKG changes: Telemetry reveals NSR      EXERCISE ASSESSMENT and PLAN    Current Exercise Program in Rehab:       Frequency: 3 to 4 days/week        Minutes: 30-35       METS: 3 to 3 5            HR: 20-30 > RHR  100-110   RPE: 4-6         Modalities: Treadmill, Lifecycle, NuStep and Recumbent bike      Exercise Progression 30 Day Goals :    Frequency: 5 days/week   Minutes: 40-45   METS: 3 5 to 4   HR: 100-110   RPE: 4-6   Modalities: Treadmill, Airdyne bike, Lifecycle and NuStep    Strength trainin-3 days / week  12-15 repetitions  1-2 sets per modality    Modalities: Leg Press    Progressing: Action    Home Exercise: None    Goals: 10% improvement in functional capacity, improved DASI score by 10%, Increase in peak CR METs by 40%, Resume ADLs with increased strength and Exercise 5 days/wk, >150mins/wk  Education: Benefit of exercise for CAD risk factors, home exercise guidelines, signs and sxs, RPE scale, class: Risk Factors for Heart Disease and Exercise instructions/guidelines for discharge    Plan:education on home exercise guidelines, home exercise 30+ mins 2 days opposite CR and Education class: Risk Factors for Heart Disease  Readiness to change: Action      NUTRITION ASSESSMENT AND PLAN    Weight control:    Starting weight: 188   Current weight:   170  Waist circumference:   Startin   Current:  42  Diabetes: N/A  Lipid management: Discussed diet and lipid management  Goals:LDL <100, HDL >40, TRG <150 and CHOL <200  Education: heart healthy eating  low sodium diet  hydration  diet and lipid management  wt  loss  healthy choices while dining out  portion control  class: Heart Healthy Eating  class:  Label Reading  Progressing: In Progress  Plan: Education class: Reading Food Labels, Education Class: Heart Healthy Eating, Increase PUFA and MUFA, Decrease SFA, Increase whole grains and increase fruits/vegs  Readiness to change: Action      PSYCHOSOCIAL ASSESSMENT AND PLAN    Emotional:  PHQ-9 = 5-9 = Mild Depression  Self-reported stress level: 8 Patient is very stressed about his wife's health at this time    Social support: Very Good  Goals:  Reduce perceived stress to 1-3/10, improved [facility] QOL < 27, continue medical therapy, Feelings in [facility] Score < 3, Physical Fitness in [facility] Score < 3, Overall Health in [facility] Score < 3, Quality of Life in [facility] Score < 3 , Change in Health in [place] Score < 3 , Increased interest in doing things, improved sleep, improved positive thoughts of well being and increased energy  Education: signs/sxs of depression, benefits of positive support system, coping mechanisms, depression and CAD, class:  Stress and Your Health  and class:  Relaxation  Progressing: In Progress  Plan: Class: Stress and Your Health, Class: Relaxation and Practice relaxation techniques  Readiness to change: Action      OTHER CORE COMPONENTS     Tobacco:   Social History     Tobacco Use   Smoking Status Former Smoker    Packs/day: 2 00    Years: 20 00    Pack years: 40 00    Types: Cigarettes    Last attempt to quit: Diego Archuleta Years since quittin 1   Smokeless Tobacco Former User    Types: Chew       Tobacco Use Intervention: Referral to tobacco expert:   N/A    Blood pressure:    Restin/60   Exercise: 148/72   Recovery: 130/66    Goals: consistent BP < 130/80, reduced dietary sodium <2300mg, consistent exercise >150 mins/wk and medication compliance  Education:  understanding HTN and CAD, low sodium diet and HTN, Education class:  Common Heart Medications and Education class: Understanding Heart Disease  Progressing: In Progress  Plan: Class: Understanding Heart Disease and Class: Common Heart Medications  Readiness to change: Action

## 2020-02-10 NOTE — PROGRESS NOTES
Roxanne Gold      Dear Dr Arlene Stuart,    Thank you for referring your patient to our cardiac rehabilitation program  The patient has been placed on a medical hold due to various health issues  He has a follow up appointment w/Dr Caroline Morel on 2/28/2020  Amee Perez is to contact the CR staff w/an update at that time  Please contact us at [phone] if you have any questions about this patents case or if/when it is appropriate to re-start the patients rehab program at a later date  Thank you for your continued support of cardiac rehabilitation  Sincerely,      Darylene Ormond Cottie Magyar, MPT

## 2020-02-28 ENCOUNTER — OFFICE VISIT (OUTPATIENT)
Dept: FAMILY MEDICINE CLINIC | Facility: CLINIC | Age: 70
End: 2020-02-28
Payer: MEDICARE

## 2020-02-28 VITALS
TEMPERATURE: 97.7 F | HEART RATE: 71 BPM | WEIGHT: 169 LBS | OXYGEN SATURATION: 99 % | HEIGHT: 66 IN | BODY MASS INDEX: 27.16 KG/M2

## 2020-02-28 DIAGNOSIS — M35.3 POLYMYALGIA RHEUMATICA (HCC): Primary | ICD-10-CM

## 2020-02-28 DIAGNOSIS — Z12.5 SCREENING FOR PROSTATE CANCER: ICD-10-CM

## 2020-02-28 DIAGNOSIS — I10 ESSENTIAL HYPERTENSION: ICD-10-CM

## 2020-02-28 DIAGNOSIS — I50.9 CONGESTIVE HEART FAILURE, UNSPECIFIED HF CHRONICITY, UNSPECIFIED HEART FAILURE TYPE (HCC): ICD-10-CM

## 2020-02-28 PROCEDURE — 1036F TOBACCO NON-USER: CPT | Performed by: FAMILY MEDICINE

## 2020-02-28 PROCEDURE — 3078F DIAST BP <80 MM HG: CPT | Performed by: FAMILY MEDICINE

## 2020-02-28 PROCEDURE — 99213 OFFICE O/P EST LOW 20 MIN: CPT | Performed by: FAMILY MEDICINE

## 2020-02-28 PROCEDURE — 1160F RVW MEDS BY RX/DR IN RCRD: CPT | Performed by: FAMILY MEDICINE

## 2020-02-28 PROCEDURE — 3077F SYST BP >= 140 MM HG: CPT | Performed by: FAMILY MEDICINE

## 2020-02-28 PROCEDURE — 3008F BODY MASS INDEX DOCD: CPT | Performed by: FAMILY MEDICINE

## 2020-02-28 PROCEDURE — 4040F PNEUMOC VAC/ADMIN/RCVD: CPT | Performed by: FAMILY MEDICINE

## 2020-02-28 RX ORDER — TRAMADOL HYDROCHLORIDE 50 MG/1
50 TABLET ORAL EVERY 6 HOURS PRN
Qty: 30 TABLET | Refills: 0 | Status: SHIPPED | OUTPATIENT
Start: 2020-02-28 | End: 2020-06-09 | Stop reason: ALTCHOICE

## 2020-02-28 NOTE — PROGRESS NOTES
Assessment/Plan:    No problem-specific Assessment & Plan notes found for this encounter  Diagnoses and all orders for this visit:    Polymyalgia rheumatica ([facility] )  Comments:  keep follow up for rheumatology  Orders:  -     traMADol (ULTRAM) 50 mg tablet; Take 1 tablet (50 mg total) by mouth every 6 (six) hours as needed for moderate pain    Congestive heart failure, unspecified HF chronicity, unspecified heart failure type ([facility] )  Comments:  EF still 20%, keep follow up with cardiology    Screening for prostate cancer  -     PSA, Total Screen; Future    Essential hypertension  -     CBC and differential; Future  -     Comprehensive metabolic panel; Future  -     Lipid panel; Future  -     TSH, 3rd generation with Free T4 reflex; Future          PHQ-9 Depression Screening    PHQ-9:    Frequency of the following problems over the past two weeks:       Little interest or pleasure in doing things:  1 - several days  Feeling down, depressed, or hopeless:  1 - several days  PHQ-2 Score:  2            Subjective:      Patient ID: Dana Goodpasture is a 65 y o  male  Pt complains of joint pain in every joint to the point where he has a had time dressing himself, it started about 3 months ago, it is not getting better, it is worse with activity, pt has tried tylenol which helps a little    Fatigue   This is a chronic problem  The current episode started more than 1 month ago  The problem occurs constantly  The problem has been unchanged  Associated symptoms include arthralgias, fatigue and myalgias  Pertinent negatives include no chest pain, chills or fever  The following portions of the patient's history were reviewed and updated as appropriate: allergies, current medications, past family history, past medical history, past social history, past surgical history and problem list     Review of Systems   Constitutional: Positive for fatigue  Negative for chills and fever     Respiratory: Negative for shortness of breath and wheezing  Cardiovascular: Negative for chest pain and palpitations  Musculoskeletal: Positive for arthralgias and myalgias  Objective:    Pulse 71   Temp 97 7 °F (36 5 °C)   Ht 5' 5 6" (1 666 m)   Wt 76 7 kg (169 lb)   SpO2 99%   BMI 27 61 kg/m²      Physical Exam   Constitutional: He is oriented to person, place, and time  He appears well-developed and well-nourished  No distress  HENT:   Head: Normocephalic and atraumatic  Eyes: Pupils are equal, round, and reactive to light  Conjunctivae and EOM are normal  No scleral icterus  Neck: Normal range of motion  Neck supple  Cardiovascular: Normal rate, regular rhythm and normal heart sounds  No murmur heard  Pulmonary/Chest: Effort normal and breath sounds normal  No respiratory distress  He has no wheezes  He has no rales  Abdominal: Soft  Bowel sounds are normal  He exhibits no distension and no mass  There is no tenderness  There is no rebound and no guarding  Musculoskeletal: He exhibits no edema  Lymphadenopathy:     He has no cervical adenopathy  Neurological: He is alert and oriented to person, place, and time  Skin: Skin is warm and dry  He is not diaphoretic  Psychiatric: He has a normal mood and affect  His behavior is normal  Judgment and thought content normal    Nursing note and vitals reviewed

## 2020-03-02 ENCOUNTER — APPOINTMENT (OUTPATIENT)
Dept: LAB | Facility: CLINIC | Age: 70
End: 2020-03-02
Payer: MEDICARE

## 2020-03-02 ENCOUNTER — OFFICE VISIT (OUTPATIENT)
Dept: RHEUMATOLOGY | Facility: CLINIC | Age: 70
End: 2020-03-02
Payer: MEDICARE

## 2020-03-02 ENCOUNTER — TELEPHONE (OUTPATIENT)
Dept: RHEUMATOLOGY | Facility: CLINIC | Age: 70
End: 2020-03-02

## 2020-03-02 VITALS
SYSTOLIC BLOOD PRESSURE: 156 MMHG | BODY MASS INDEX: 27.16 KG/M2 | WEIGHT: 169 LBS | HEIGHT: 66 IN | DIASTOLIC BLOOD PRESSURE: 80 MMHG

## 2020-03-02 DIAGNOSIS — M54.41 CHRONIC BILATERAL LOW BACK PAIN WITH BILATERAL SCIATICA: ICD-10-CM

## 2020-03-02 DIAGNOSIS — G89.29 CHRONIC BILATERAL LOW BACK PAIN WITH BILATERAL SCIATICA: Primary | ICD-10-CM

## 2020-03-02 DIAGNOSIS — M15.0 PRIMARY GENERALIZED (OSTEO)ARTHRITIS: ICD-10-CM

## 2020-03-02 DIAGNOSIS — M54.41 CHRONIC BILATERAL LOW BACK PAIN WITH BILATERAL SCIATICA: Primary | ICD-10-CM

## 2020-03-02 DIAGNOSIS — Z79.52 LONG TERM SYSTEMIC STEROID USER: ICD-10-CM

## 2020-03-02 DIAGNOSIS — M51.36 LUMBAR DEGENERATIVE DISC DISEASE: ICD-10-CM

## 2020-03-02 DIAGNOSIS — M35.3 POLYMYALGIA RHEUMATICA (HCC): ICD-10-CM

## 2020-03-02 DIAGNOSIS — G89.29 CHRONIC BILATERAL LOW BACK PAIN WITH BILATERAL SCIATICA: ICD-10-CM

## 2020-03-02 DIAGNOSIS — M54.42 CHRONIC BILATERAL LOW BACK PAIN WITH BILATERAL SCIATICA: Primary | ICD-10-CM

## 2020-03-02 DIAGNOSIS — M35.3 POLYMYALGIA RHEUMATICA (HCC): Primary | ICD-10-CM

## 2020-03-02 DIAGNOSIS — M85.852 OSTEOPENIA OF LEFT HIP: ICD-10-CM

## 2020-03-02 DIAGNOSIS — M54.42 CHRONIC BILATERAL LOW BACK PAIN WITH BILATERAL SCIATICA: ICD-10-CM

## 2020-03-02 LAB
BASOPHILS # BLD AUTO: 0.06 THOUSANDS/ΜL (ref 0–0.1)
BASOPHILS NFR BLD AUTO: 1 % (ref 0–1)
CRP SERPL QL: 3.8 MG/L
EOSINOPHIL # BLD AUTO: 0.11 THOUSAND/ΜL (ref 0–0.61)
EOSINOPHIL NFR BLD AUTO: 1 % (ref 0–6)
ERYTHROCYTE [DISTWIDTH] IN BLOOD BY AUTOMATED COUNT: 13.7 % (ref 11.6–15.1)
ERYTHROCYTE [SEDIMENTATION RATE] IN BLOOD: 14 MM/HOUR (ref 0–10)
HCT VFR BLD AUTO: 44.3 % (ref 36.5–49.3)
HGB BLD-MCNC: 14.7 G/DL (ref 12–17)
IMM GRANULOCYTES # BLD AUTO: 0.02 THOUSAND/UL (ref 0–0.2)
IMM GRANULOCYTES NFR BLD AUTO: 0 % (ref 0–2)
LYMPHOCYTES # BLD AUTO: 1.37 THOUSANDS/ΜL (ref 0.6–4.47)
LYMPHOCYTES NFR BLD AUTO: 17 % (ref 14–44)
MCH RBC QN AUTO: 30.6 PG (ref 26.8–34.3)
MCHC RBC AUTO-ENTMCNC: 33.2 G/DL (ref 31.4–37.4)
MCV RBC AUTO: 92 FL (ref 82–98)
MONOCYTES # BLD AUTO: 0.61 THOUSAND/ΜL (ref 0.17–1.22)
MONOCYTES NFR BLD AUTO: 8 % (ref 4–12)
NEUTROPHILS # BLD AUTO: 5.8 THOUSANDS/ΜL (ref 1.85–7.62)
NEUTS SEG NFR BLD AUTO: 73 % (ref 43–75)
NRBC BLD AUTO-RTO: 0 /100 WBCS
PLATELET # BLD AUTO: 206 THOUSANDS/UL (ref 149–390)
PMV BLD AUTO: 9.5 FL (ref 8.9–12.7)
RBC # BLD AUTO: 4.81 MILLION/UL (ref 3.88–5.62)
WBC # BLD AUTO: 7.97 THOUSAND/UL (ref 4.31–10.16)

## 2020-03-02 PROCEDURE — 1036F TOBACCO NON-USER: CPT | Performed by: INTERNAL MEDICINE

## 2020-03-02 PROCEDURE — 99215 OFFICE O/P EST HI 40 MIN: CPT | Performed by: INTERNAL MEDICINE

## 2020-03-02 PROCEDURE — 4040F PNEUMOC VAC/ADMIN/RCVD: CPT | Performed by: INTERNAL MEDICINE

## 2020-03-02 PROCEDURE — 85025 COMPLETE CBC W/AUTO DIFF WBC: CPT | Performed by: INTERNAL MEDICINE

## 2020-03-02 PROCEDURE — 3079F DIAST BP 80-89 MM HG: CPT | Performed by: INTERNAL MEDICINE

## 2020-03-02 PROCEDURE — 36415 COLL VENOUS BLD VENIPUNCTURE: CPT | Performed by: INTERNAL MEDICINE

## 2020-03-02 PROCEDURE — 3077F SYST BP >= 140 MM HG: CPT | Performed by: INTERNAL MEDICINE

## 2020-03-02 PROCEDURE — 86140 C-REACTIVE PROTEIN: CPT | Performed by: INTERNAL MEDICINE

## 2020-03-02 PROCEDURE — 1160F RVW MEDS BY RX/DR IN RCRD: CPT | Performed by: INTERNAL MEDICINE

## 2020-03-02 PROCEDURE — 85652 RBC SED RATE AUTOMATED: CPT | Performed by: INTERNAL MEDICINE

## 2020-03-02 PROCEDURE — 3008F BODY MASS INDEX DOCD: CPT | Performed by: INTERNAL MEDICINE

## 2020-03-02 NOTE — TELEPHONE ENCOUNTER
Please let claire know his markers of inflammation have now significantly improved compared to the level he had at end of January, especially the CRP  This was without any specific treatment including steroids  Because of this, I feel much less concerned about the vasculitis we talked about  I do not think he needs that biopsy we discussed and we can hold off on this  However given his symptoms and the markers are still mildly elevated, it would be reasonable to restart some prednisone to see if it helps him for possible recurrence of his PMR  I also still feel a lot of his symptoms could be coming from his degenerative spine disease, and would recommend a spine/pain management evaluation that we talked about, because I am not convinced all symptoms are coming from PMR  Please see what he wants to do: does he want to restart some prednisone, and also is he willing to see the spine/pain department for treatment options of the spine arthritis?

## 2020-03-02 NOTE — PROGRESS NOTES
Assessment and Plan:   Patient is a 26-year-old male who presents for rheumatology follow-up for history of PMR  At our last visit, he did not have any symptoms at that point suggestive of active PMR and had a mostly normal inflammatory markers prior to starting prednisone, so we discussed weaning off the prednisone which she did  After that point he progressively worsened over the last several weeks and again feels very stiff in his shoulders and hips, but is also having increase in his neck and lower back pain with radiation of pain down both of his arms and legs  He had inflammatory markers checked at the end January with a CRP of 46 and ESR 28  He also is describing new symptoms of more frequent headaches which are mainly in the occipital region possibly cervicogenic in nature, but also some headaches in the frontal and temporal regions  His exam today does not reveal any obvious concerns for active vasculitis, but I did discuss with him that in a small % of patients with PMR we can see temporal arteritis  I would like him to repeat inflammatory markers today to see where they are at this point and determine next steps  We discussed that if they are still very high, then I would recommend pursuing temporal artery biopsies to rule out giant cell arteritis since this can have more severe complications such as permanent vision loss  Fortunately he has not had any signs or symptoms of this thus far  If they have normalized, this would go strongly against active PMR and giant cell arteritis  If they are still elevated then I would recommend restarting on a prednisone dose to treat PMR but also resuming the temporal artery biopsies within 2 weeks of starting prednisone to get accurate result  We will basis decision off the lab results  He was understanding and agreeable with that plan    I also still feel that given the advanced degenerative spine disease he has on imaging that this likely contributes also to a component of his shoulder/arm and bilateral leg pain  We will clarify the ongoing Rheumatology issues but again I feel in the future he would benefit from a spine and Pain Management evaluation if he is willing and able to do this  Plan:  Diagnoses and all orders for this visit:    Polymyalgia rheumatica ([facility] [address] )  -     CBC and differential  -     C-reactive protein  -     Sedimentation rate, automated    Primary generalized (osteo)arthritis    Osteopenia of left hip    Chronic bilateral low back pain with bilateral sciatica    Long term systemic steroid user        Follow-up plan: 6 weeks        Rheumatic Disease Summary  1  PMR  -Established care- June, 2017- dx with PMR by PCP and placed on steroids the March prior to consult  Presented on Prednisone 20 mg daily    -Weaned Prednisone over several months and current on Prednisone 5 mg daily (12/2018)  -XRs hands/feet 5/2019: OA, no inflammatory changes   -Labs 12/2018: negative hep panel, HIV  -Labs 5/3/19: negative RF, CCP, SSA, SSB, ESR 8, CRP<3  -XR hands/feet 5/3/19: OA, no inflammatory changes   -Weaned off pred completely in 7/2019  -Visit 8/23/19: feeling worse off pred, unclear if sx related to PMR, was restarted on pred 5mg (normal CRP, ESR 15 prior to pred)  -Visit 11/1/19: no significant improvement in arthralgias after restarting pred 5mg x2 months; mainly having radicular sx of RUE and burning pain through both legs, also OA at multiple joints; low suspicion for active PMR, advised weaning pred 2 5mg x2 weeks, then stop  -Labs 1/24/20: CRP 46, ESR 28, negative RF, MANA, lyme   -Visit 3/2/20: high CRP/ESR with possible PMR recurrence and headaches, repeat labs, then consider TA biopsy to rule out GCA  2   Osteopenia with h/o chronic systemic steroid use  -DEXA 6/12/19: T -2 4 hip, FRAX 4/12% for hip/major OP fracture    -weaned off pred again as of 11/2019  -To consider tx given high FRAX risk, especially if PMR recurs requiring systemic steroids  -DEXA due 6/2021  3  OA, lumbar DDD  -Taking tylenol with Tramadol  4  Comorbidities: depression, PVD s/p angioplasty, on dual antiplatelet, HLD, HTN, ischemic CM with EF 20%, severe AS s/p TAVR 11/12/19, CAD     HPI  Clara Rojas is a 65 y o   male who presents for rheumatology follow-up for history of PMR  At last visit he had been back on low-dose prednisone since August due to concern of possible flare of PMR but had not noted any significant improvement in his pain and had normal markers of inflammation  I advised him to wean back off the prednisone and monitor  I felt at that point his symptoms are more suggestive of radiculopathy  Since he was here he also had TAVR in 11/2019  He reports he did wean his prednisone to 2 5mg daily and then stopped it, but start feeling worse again with increased arthralgias everywhere since then  Reports worsening neck pain, bilateral shoulder pain with pain down the arms, pain at lateral hips bilaterally and has trouble laying on either side at night  He has stiffness essentially all over in the morning for several hours and feels stiff currently  He feels his shoulder motion is limited due to stiffness  He saw his family doctor recently who is concern for recurrence of PMR but held off on prednisone therapy given his recent aortic valve replacement and upcoming appointment with me  Patient does feel that the prednisone was helping somewhat  Last visit we also talked about seeing spine and Pain Management for his advanced degenerative arthritis in the spine which likely contributes partially to some of his symptoms, but again he cares for his wife who is on dialysis and also has an upcoming knee replacement later this month and so does not feel ES time for this right now  Further discussion reveals more frequent headaches that are mainly in the occipital region but sometimes frontal into the temporal regions    He denies any symptoms suggestive of jaw claudication, and has not had any vision loss or changes  He denies any fevers or unusual weight loss  He still does feel that he is very busy with his wife which takes precedence over caring for his own issues  The following portions of the patient's history were reviewed and updated as appropriate: allergies, current medications, past family history, past medical history, past social history, past surgical history and problem list     Review of Systems:   Review of Systems   Constitutional: Negative for fatigue and unexpected weight change  HENT: Negative for mouth sores  Respiratory: Negative for cough and shortness of breath  Gastrointestinal: Negative for constipation and diarrhea  Musculoskeletal: Positive for arthralgias, back pain and neck pain  Negative for joint swelling and myalgias  Skin: Negative for color change and rash  Neurological: Positive for headaches  Negative for weakness  Psychiatric/Behavioral: Negative for sleep disturbance         Home Medications:    Current Outpatient Medications:     acetaminophen (TYLENOL) 325 mg tablet, Take 650 mg by mouth every 6 (six) hours as needed for mild pain, Disp: , Rfl:     aspirin (ECOTRIN LOW STRENGTH) 81 mg EC tablet, Take 81 mg by mouth daily, Disp: , Rfl:     Cholecalciferol (VITAMIN D3) 2000 units TABS, Take 2,000 Units by mouth daily, Disp: , Rfl:     clopidogrel (PLAVIX) 75 mg tablet, Take 1 tablet (75 mg total) by mouth daily, Disp: 180 tablet, Rfl: 3    cyanocobalamin (VITAMIN B-12) 500 mcg tablet, Take 500 mcg by mouth daily, Disp: , Rfl:     isosorbide mononitrate (IMDUR) 30 mg 24 hr tablet, Take 1 tablet (30 mg total) by mouth daily, Disp: 90 tablet, Rfl: 3    metoprolol tartrate (LOPRESSOR) 25 mg tablet, Take 0 5 tablets (12 5 mg total) by mouth every 12 (twelve) hours, Disp: 180 tablet, Rfl: 0    Multiple Vitamin (MULTIVITAMIN) tablet, Take 1 tablet by mouth daily, Disp: , Rfl:     ranolazine (RANEXA) 500 mg 12 hr tablet, take 1 tablet by mouth twice a day, Disp: 180 tablet, Rfl: 3    rosuvastatin (CRESTOR) 5 mg tablet, take 1 tablet by mouth once daily, Disp: 90 tablet, Rfl: 3    traMADol (ULTRAM) 50 mg tablet, Take 1 tablet (50 mg total) by mouth every 6 (six) hours as needed for moderate pain, Disp: 30 tablet, Rfl: 0    triamterene-hydrochlorothiazide (DYAZIDE) 37 5-25 mg per capsule, take 1 capsule by mouth once daily, Disp: 90 capsule, Rfl: 1    Objective:    Vitals:    03/02/20 0953   BP: 156/80   BP Location: Left arm   Patient Position: Sitting   Cuff Size: Standard   Weight: 76 7 kg (169 lb)   Height: 5' 5 6" (1 666 m)       Physical Exam   Constitutional: He appears well-developed and well-nourished  He is cooperative  No distress  HENT:   Head: Normocephalic and atraumatic  Mouth/Throat: Oropharynx is clear and moist and mucous membranes are normal    Minimal reproducible left-sided temporal tenderness with palpation, temporal artery pulses intact symmetrically  Eyes: Conjunctivae, EOM and lids are normal  No scleral icterus  Neck: Neck supple  No spinous process tenderness and no muscular tenderness present  Cardiovascular: Normal rate, regular rhythm, S1 normal and S2 normal  Exam reveals no friction rub  No murmur heard  Pulses:       Radial pulses are 2+ on the right side, and 2+ on the left side  No carotid or subclavian bruits    Pulmonary/Chest: Effort normal and breath sounds normal  No tachypnea  No respiratory distress  He has no wheezes  He has no rhonchi  He has no rales  He exhibits no tenderness  Musculoskeletal:   Advanced osteoarthritic changes in the fingers  No joint swelling or synovitis anywhere on exam   Limited active range of motion of both shoulders due to stiffness and pain  Neurological: He is alert  No sensory deficit  Skin: Skin is warm and dry  No rash noted  Nails show no clubbing  Psychiatric: He has a normal mood and affect   His behavior is normal  Imaging:   XR lumbar 12/6/19:   IMPRESSION:  No acute compression collapse vertebra  Multilevel degenerative disc disease with disc space narrowing at L4-5, L3-4 and L2-3  Degenerative thoracolumbar scoliosis    Labs:   Component      Latest Ref Rng & Units 1/24/2020   Lyme IgG/IgM Ab      0 00 - 0 90 ISR <0 91   Sed Rate      0 - 10 mm/hour 28 (H)   C-REACTIVE PROTEIN      <3 0 mg/L 46 6 (H)   RHEUMATOID FACTOR      Negative Negative   ANTI-NUCLEAR ANTIBODY (MANA)      Negative Negative

## 2020-03-02 NOTE — PROGRESS NOTES
Nieves Conner      Dear Dr Nathalia Spencer,     Thank you for referring your patient to our cardiac rehabilitation program  The patient has completed 9 visits  However, rehab is being discontinued at this time due to:    Medical Withdrawl: The patient is too sick to continue do to:   Multiple medical conditions of his own, especially polymyalgia rheumatica  Patient also consumed w/his wife's medical issues and limited functional status  CR staff spoke w/patient at length this date  Informed patient that if his circumstances change in the future he may return w/a new prescription  Patient is agreeable  Please contact us at [phone] if you have any questions about this patents case or if/when it is appropriate to re-start the patients rehab program at a later date  Thank you for your continued support of cardiac rehabilitation  Sincerely,      Matty Hicks, MPT

## 2020-03-03 RX ORDER — PREDNISONE 1 MG/1
TABLET ORAL
Qty: 90 TABLET | Refills: 1 | Status: SHIPPED | OUTPATIENT
Start: 2020-03-03 | End: 2020-04-15 | Stop reason: SDUPTHER

## 2020-03-03 NOTE — TELEPHONE ENCOUNTER
Spoke with patient and relayed all information  He expressed understanding and had no other questions  Please send Prednisone to pharmacy and place a referral for Spine and Pain

## 2020-03-04 ENCOUNTER — OFFICE VISIT (OUTPATIENT)
Dept: CARDIOLOGY CLINIC | Facility: CLINIC | Age: 70
End: 2020-03-04
Payer: MEDICARE

## 2020-03-04 VITALS
HEART RATE: 60 BPM | SYSTOLIC BLOOD PRESSURE: 154 MMHG | WEIGHT: 167 LBS | HEIGHT: 66 IN | DIASTOLIC BLOOD PRESSURE: 60 MMHG | BODY MASS INDEX: 26.84 KG/M2

## 2020-03-04 DIAGNOSIS — I35.0 NONRHEUMATIC AORTIC VALVE STENOSIS: ICD-10-CM

## 2020-03-04 DIAGNOSIS — I25.10 CORONARY ARTERY DISEASE INVOLVING NATIVE CORONARY ARTERY OF NATIVE HEART WITHOUT ANGINA PECTORIS: Primary | ICD-10-CM

## 2020-03-04 DIAGNOSIS — R06.00 DOE (DYSPNEA ON EXERTION): Chronic | ICD-10-CM

## 2020-03-04 DIAGNOSIS — I42.0 DILATED CARDIOMYOPATHY (HCC): ICD-10-CM

## 2020-03-04 DIAGNOSIS — T73.3XXD FATIGUE DUE TO EXCESSIVE EXERTION, SUBSEQUENT ENCOUNTER: ICD-10-CM

## 2020-03-04 DIAGNOSIS — Z95.2 S/P TAVR (TRANSCATHETER AORTIC VALVE REPLACEMENT): ICD-10-CM

## 2020-03-04 PROBLEM — T73.3XXA FATIGUE DUE TO EXCESSIVE EXERTION: Status: ACTIVE | Noted: 2020-03-04

## 2020-03-04 PROBLEM — I25.5 CARDIOMYOPATHY, ISCHEMIC: Chronic | Status: RESOLVED | Noted: 2017-09-06 | Resolved: 2020-03-04

## 2020-03-04 PROCEDURE — 99214 OFFICE O/P EST MOD 30 MIN: CPT | Performed by: INTERNAL MEDICINE

## 2020-03-04 PROCEDURE — 3077F SYST BP >= 140 MM HG: CPT | Performed by: INTERNAL MEDICINE

## 2020-03-04 PROCEDURE — 1036F TOBACCO NON-USER: CPT | Performed by: INTERNAL MEDICINE

## 2020-03-04 PROCEDURE — 3008F BODY MASS INDEX DOCD: CPT | Performed by: INTERNAL MEDICINE

## 2020-03-04 PROCEDURE — 3078F DIAST BP <80 MM HG: CPT | Performed by: INTERNAL MEDICINE

## 2020-03-04 PROCEDURE — 1160F RVW MEDS BY RX/DR IN RCRD: CPT | Performed by: INTERNAL MEDICINE

## 2020-03-04 PROCEDURE — 4040F PNEUMOC VAC/ADMIN/RCVD: CPT | Performed by: INTERNAL MEDICINE

## 2020-03-04 NOTE — PROGRESS NOTES
Cardiology Follow Up    Irwin Holstein  1950  [id number]  [address] [address] [phone]    1  Coronary artery disease involving native coronary artery of native heart without angina pectoris     2  Nonrheumatic aortic valve stenosis     3  RODRIGUEZ (dyspnea on exertion)     4  S/P TAVR (transcatheter aortic valve replacement)     5  Fatigue due to excessive exertion, subsequent encounter     6  Dilated cardiomyopathy ([facility] )           Discussion/Summary: His cardiac status appears to be stable  We did discuss trying him on Entresto but will hold off and see how he responds to the Prednisone  I will d/c his Crestor to see if this helps  RTO 3 months  Interval History: He is having chronic diffuse pain, excessive fatigue  He still exercises on his treadmill and bike  He denies CP  He has known CAD and as, s/p TAVR on 11/12/2019  EF remains 20%  He has no signs or symptoms of heart failure  He does not take any diuretics for CHF  He had been on prednisone prior to his TAVR for ? Reason  He is to restart Prednisone today as per rheumatology  Patient Active Problem List   Diagnosis    RODRIGUEZ (dyspnea on exertion)    Essential hypertension    Coronary artery disease involving native coronary artery of native heart without angina pectoris    Polymyalgia rheumatica ([facility] )    Primary generalized (osteo)arthritis    Long term systemic steroid user    Depression    PAD (peripheral artery disease) ([facility] )    [facility] in [facility]    Osteopenia of left hip    Aortic valve stenosis    S/P TAVR (transcatheter aortic valve replacement)    Postoperative anemia due to acute blood loss    Right leg numbness    Encounter for postoperative care    Overweight (BMI 25 0-29  9)    Carotid artery stenosis    Right hand weakness    Bilateral carpal tunnel syndrome    Ulnar neuropathy of right upper extremity    Cervical radiculopathy    Fatigue due to excessive exertion    Dilated cardiomyopathy (HCC)     Past Medical History:   Diagnosis Date    Anxiety     Benign essential hypertension     Carotid stenosis, asymptomatic, bilateral     24-63% GLENN, <76% LICA    Coronary artery disease     Depression     Diffuse arthralgia     Last Assessed: 2017    Former tobacco use     History of alcohol use     3-6 beers/day x20 years, no residual liver disease    History of Lyme disease     History of rheumatic fever     Hypercholesterolemia     Hyperlipidemia     Hyperlipidemia     Ischemic cardiomyopathy     Osteoarthritis, hip, bilateral     PAD (peripheral artery disease) (HCC)     s/p fem-below the knee bypass & percutaneous tx right CFA/SFA & left external iliac/CFA    Polymyalgia rheumatica (HCC)     on chronic steroids    RBBB     Rotator cuff disorder     b/l    Severe aortic stenosis     Wears hearing aid     b/l     Social History     Socioeconomic History    Marital status: /Civil Union     Spouse name: Not on file    Number of children: 11    Years of education: Not on file    Highest education level: Not on file   Occupational History    Occupation: Environmental Supervisor   Social Needs    Financial resource strain: Not on file    Food insecurity:     Worry: Not on file     Inability: Not on file   [facility] needs:     Medical: Not on file     Non-medical: Not on file   Tobacco Use    Smoking status: Former Smoker     Packs/day: 2 00     Years: 20 00     Pack years: 40 00     Types: Cigarettes     Last attempt to quit:      Years since quittin 1    Smokeless tobacco: Former User     Types: Chew   Substance and Sexual Activity    Alcohol use: Not Currently     Frequency: Never     Comment: 3-6 cans beer x20 years, quit , no residual liver issues    Drug use: Never    Sexual activity: Not Currently   Lifestyle    Physical activity:     Days per week: Not on file     Minutes per session: Not on file    Stress: Not on file   Relationships    Social connections:     Talks on phone: Not on file     Gets together: Not on file     Attends [employer] service: Not on file     Active member of club or organization: Not on file     Attends meetings of clubs or organizations: Not on file     Relationship status: Not on file    Intimate partner violence:     Fear of current or ex partner: Not on file     Emotionally abused: Not on file     Physically abused: Not on file     Forced sexual activity: Not on file   Other Topics Concern    Not on file   Social History Narrative    Daily Caffeine use- 2-3 cups of coffee, soda on occasion      Family History   Problem Relation Age of Onset    Cancer Mother         cervix    Coronary artery disease Father     Heart attack Father     Cancer Brother     Coronary artery disease Maternal Grandfather     Heart disease Paternal Grandfather     Stroke Paternal Grandfather         CVA    Heart attack Paternal Grandfather     Coronary artery disease Other          at age 78 per [employer]     Past Surgical History:   Procedure Laterality Date    CARDIAC CATHETERIZATION      FEMORAL BYPASS Bilateral     fem-below knee w/ GSV    IR ABDOMINAL ANGIOGRAPHY / INTERVENTION  8/15/2019    IR ABDOMINAL ANGIOGRAPHY / INTERVENTION  2019    ND ECHO TRANSESOPHAG R-T 2D W/PRB IMG ACQUISJ I&R N/A 2019    Procedure: TRANSESOPHAGEAL ECHOCARDIOGRAM (SANAZ);   Surgeon: Mari Martinez DO;  [facility];  Service: Cardiac Surgery    ND REPLACE AORTIC VALVE OPENFEMORAL ARTERY APPROACH N/A 2019    Procedure: REPLACEMENT AORTIC VALVE TRANSCATHETER (TAVR) TRANSFEMORAL WITH 29 MM MEEKS MIKEL S3 VALVE (ACCESS ON THE RIGHT); RIGHT GROIN CUT DOWN;  Surgeon: Mari Martinez DO;  [facility];  Service: Cardiac Surgery    ND [facility] 3RD+ ORD SLCTV ABDL PEL/LXTR [facility] Right 8/15/2019    Procedure: LEFT GROIN ACCESS RIGHT LEG ARTERIOGRAM WITH ARTHRECTOMY, LEFT LEG RUNOFF;  Surgeon: Bartolo Beauchamp MD;  [facility];  Service: Vascular    TONSILLECTOMY AND ADENOIDECTOMY         Current Outpatient Medications:     acetaminophen (TYLENOL) 325 mg tablet, Take 650 mg by mouth every 6 (six) hours as needed for mild pain, Disp: , Rfl:     aspirin (ECOTRIN LOW STRENGTH) 81 mg EC tablet, Take 81 mg by mouth daily, Disp: , Rfl:     Cholecalciferol (VITAMIN D3) 2000 units TABS, Take 2,000 Units by mouth daily, Disp: , Rfl:     clopidogrel (PLAVIX) 75 mg tablet, Take 1 tablet (75 mg total) by mouth daily, Disp: 180 tablet, Rfl: 3    cyanocobalamin (VITAMIN B-12) 500 mcg tablet, Take 500 mcg by mouth daily, Disp: , Rfl:     isosorbide mononitrate (IMDUR) 30 mg 24 hr tablet, Take 1 tablet (30 mg total) by mouth daily, Disp: 90 tablet, Rfl: 3    metoprolol tartrate (LOPRESSOR) 25 mg tablet, Take 0 5 tablets (12 5 mg total) by mouth every 12 (twelve) hours, Disp: 180 tablet, Rfl: 0    Multiple Vitamin (MULTIVITAMIN) tablet, Take 1 tablet by mouth daily, Disp: , Rfl:     predniSONE 5 mg tablet, Take 15mg (3 tablets) daily for 4 weeks, then decrease to 10mg (2 tablets) and stay on this dose, Disp: 90 tablet, Rfl: 1    ranolazine (RANEXA) 500 mg 12 hr tablet, take 1 tablet by mouth twice a day, Disp: 180 tablet, Rfl: 3    rosuvastatin (CRESTOR) 5 mg tablet, take 1 tablet by mouth once daily, Disp: 90 tablet, Rfl: 3    traMADol (ULTRAM) 50 mg tablet, Take 1 tablet (50 mg total) by mouth every 6 (six) hours as needed for moderate pain, Disp: 30 tablet, Rfl: 0    triamterene-hydrochlorothiazide (DYAZIDE) 37 5-25 mg per capsule, take 1 capsule by mouth once daily, Disp: 90 capsule, Rfl: 1  Allergies   Allergen Reactions    Atorvastatin Myalgia and Arthralgia    Avelox [Moxifloxacin] Diarrhea    Pravastatin Myalgia and Arthralgia    Rosuvastatin Calcium Myalgia and Arthralgia    Simvastatin Myalgia and Arthralgia     Vitals:    03/04/20 0824   BP: 154/60   BP Location: Right arm   Cuff Size: Standard   Pulse: 60   Weight: 75 8 kg (167 lb)   Height: 5' 5 6" (1 666 m)     Weight (last 2 days)     Date/Time   Weight    03/04/20 0824   75 8 (167)             Blood pressure 154/60, pulse 60, height 5' 5 6" (1 666 m), weight 75 8 kg (167 lb)  , Body mass index is 27 28 kg/m²      Labs:  Office Visit on 03/02/2020   Component Date Value    WBC 03/02/2020 7 97     RBC 03/02/2020 4 81     Hemoglobin 03/02/2020 14 7     Hematocrit 03/02/2020 44 3     MCV 03/02/2020 92     MCH 03/02/2020 30 6     MCHC 03/02/2020 33 2     RDW 03/02/2020 13 7     MPV 03/02/2020 9 5     Platelets 56/03/0705 206     nRBC 03/02/2020 0     Neutrophils Relative 03/02/2020 73     Immat GRANS % 03/02/2020 0     Lymphocytes Relative 03/02/2020 17     Monocytes Relative 03/02/2020 8     Eosinophils Relative 03/02/2020 1     Basophils Relative 03/02/2020 1     Neutrophils Absolute 03/02/2020 5 80     Immature Grans Absolute 03/02/2020 0 02     Lymphocytes Absolute 03/02/2020 1 37     Monocytes Absolute 03/02/2020 0 61     Eosinophils Absolute 03/02/2020 0 11     Basophils Absolute 03/02/2020 0 06     CRP 03/02/2020 3 8*    Sed Rate 03/02/2020 14*   Appointment on 01/24/2020   Component Date Value    Lyme IgG/IgM Ab 01/24/2020 <0 91     Sed Rate 01/24/2020 28*    CRP 01/24/2020 46 6*    Rheumatoid Factor 01/24/2020 Negative     MANA 01/24/2020 Negative    Hospital Outpatient Visit on 12/11/2019   Component Date Value    Ventricular Rate 12/11/2019 57     Atrial Rate 12/11/2019 57     OH Interval 12/11/2019 216     QRSD Interval 12/11/2019 150     QT Interval 12/11/2019 456     QTC Interval 12/11/2019 443     P Axis 12/11/2019 52     QRS Axis 12/11/2019 -30     T Wave [place] 12/11/2019 86    Appointment on 12/11/2019   Component Date Value    Sodium 12/11/2019 144     Potassium 12/11/2019 4 9     Chloride 12/11/2019 110*    CO2 12/11/2019 28     ANION GAP 12/11/2019 6     BUN 12/11/2019 14     Creatinine 12/11/2019 0 89     Glucose, Fasting 12/11/2019 105*    Calcium 12/11/2019 9 8     eGFR 12/11/2019 86     WBC 12/11/2019 7 16     RBC 12/11/2019 4 35     Hemoglobin 12/11/2019 13 4     Hematocrit 12/11/2019 41 8     MCV 12/11/2019 96     MCH 12/11/2019 30 8     MCHC 12/11/2019 32 1     RDW 12/11/2019 13 4     Platelets 37/73/7928 170     MPV 12/11/2019 10 4    Admission on 11/12/2019, Discharged on 11/14/2019   Component Date Value    Unit Product Code 11/14/2019 W6032Y33     Unit Number 11/14/2019 [id number]     Unit ABO 11/14/2019 AB     Unit RH 11/14/2019 NEG     Crossmatch 11/14/2019 Compatible     Unit Dispense Status 11/14/2019 Return to [address] [place] [address] Unit Product Code 11/14/2019 [id number]     Unit Number 11/14/2019 [id number]     Unit ABO 11/14/2019 AB     Unit RH 11/14/2019 POS     Crossmatch 11/14/2019 Compatible     Unit Dispense Status 11/14/2019 Return to [address] [place] [address] Unit Product Code 11/14/2019 [id number]     Unit Number 11/14/2019 [id number]     Unit ABO 11/14/2019 AB     Unit RH 11/14/2019 POS     Crossmatch 11/14/2019 Compatible     Unit Dispense Status 11/14/2019 Return to [address] [place] [address] Unit Product Code 11/14/2019 [id number]     Unit Number 11/14/2019 [id number]     Unit ABO 11/14/2019 AB     Unit RH 11/14/2019 NEG     Crossmatch 11/14/2019 Compatible     Unit Dispense Status 11/14/2019 Return to Inv     Sodium 11/12/2019 140     Potassium 11/12/2019 3 5     Chloride 11/12/2019 109*    CO2 11/12/2019 24     ANION GAP 11/12/2019 7     BUN 11/12/2019 15     Creatinine 11/12/2019 0 69     Glucose 11/12/2019 154*    Calcium 11/12/2019 8 7     eGFR 11/12/2019 96     Hemoglobin 11/12/2019 13 0     Hematocrit 11/12/2019 39 2     Platelets 40/74/7473 161     MPV 11/12/2019 9 8     POC Glucose 11/12/2019 150*    POC Glucose 11/12/2019 144*    pH, Art i-STAT 11/12/2019 7 407     pCO2, Art i-STAT 11/12/2019 41 4     pO2, ART i-STAT 11/12/2019 169 0*    BE, i-STAT 11/12/2019 1     HCO3, Art i-STAT 11/12/2019 26 1     CO2, i-STAT 11/12/2019 27     O2 Sat, i-STAT 11/12/2019 100*    SODIUM, I-STAT 11/12/2019 140     Potassium, i-STAT 11/12/2019 3 4*    Calcium, Ionized i-STAT 11/12/2019 1 20     Hct, i-STAT 11/12/2019 35*    Hgb, i-STAT 11/12/2019 11 9*    Glucose, i-STAT 11/12/2019 121     Specimen Type 11/12/2019 ARTERIAL     pH, Art i-STAT 11/12/2019 7 359     pCO2, Art i-STAT 11/12/2019 41 4     pO2, ART i-STAT 11/12/2019 112 0     BE, i-STAT 11/12/2019 -2     HCO3, Art i-STAT 11/12/2019 23 3     CO2, i-STAT 11/12/2019 25     O2 Sat, i-STAT 11/12/2019 98*    SODIUM, I-STAT 11/12/2019 140     Potassium, i-STAT 11/12/2019 3 3*    Calcium, Ionized i-STAT 11/12/2019 1 13     Hct, i-STAT 11/12/2019 33*    Hgb, i-STAT 11/12/2019 11 2*    Glucose, i-STAT 11/12/2019 148*    Specimen Type 11/12/2019 ARTERIAL     Activated Clotting Time,* 11/12/2019 126     Specimen Type 11/12/2019 ARTERIAL     Activated Clotting Time,* 11/12/2019 316*    Specimen Type 11/12/2019 ARTERIAL     Activated Clotting Time,* 11/12/2019 126     Specimen Type 11/12/2019 ARTERIAL     Hemoglobin 11/12/2019 12 4     Hematocrit 11/12/2019 37 2     POC Glucose 11/12/2019 118     Ventricular Rate 11/12/2019 83     Atrial Rate 11/12/2019 83     [place] Interval 11/12/2019 171     QRSD Interval 11/12/2019 158     QT Interval 11/12/2019 388     QTC Interval 11/12/2019 456     P Axis 11/12/2019 65     QRS Axis 11/12/2019 -40     T Wave Axis 11/12/2019 96     POC Glucose 11/12/2019 118     Sodium 11/13/2019 140     Potassium 11/13/2019 3 5     Chloride 11/13/2019 104     CO2 11/13/2019 25     ANION GAP 11/13/2019 11     BUN 11/13/2019 15     Creatinine 11/13/2019 0 67     Glucose 11/13/2019 102     Calcium 11/13/2019 8 7     eGFR 11/13/2019 97     WBC 11/13/2019 9 25     RBC 11/13/2019 3 77*    Hemoglobin 11/13/2019 11 8*    Hematocrit 11/13/2019 35 8*    MCV 11/13/2019 95     MCH 11/13/2019 31 3     MCHC 11/13/2019 33 0     RDW 11/13/2019 13 6     Platelets 73/20/9683 156     MPV 11/13/2019 10 5     Magnesium 11/13/2019 1 9     POC Glucose 11/13/2019 112     Ventricular Rate 11/13/2019 68     Atrial Rate 11/13/2019 68     [place] Interval 11/13/2019 174     QRSD Interval 11/13/2019 148     QT Interval 11/13/2019 408     QTC Interval 11/13/2019 433     P Axis 11/13/2019 56     QRS Axis 11/13/2019 11     T Wave Axis 11/13/2019 118     POC Glucose 11/13/2019 125     POC Glucose 11/13/2019 112     POC Glucose 11/13/2019 115     Sodium 11/14/2019 138     Potassium 11/14/2019 3 5     Chloride 11/14/2019 102     CO2 11/14/2019 27     ANION GAP 11/14/2019 9     BUN 11/14/2019 12     Creatinine 11/14/2019 0 73     Glucose 11/14/2019 101     Calcium 11/14/2019 9 1     eGFR 11/14/2019 93     WBC 11/14/2019 8 50     RBC 11/14/2019 4 03     Hemoglobin 11/14/2019 12 6     Hematocrit 11/14/2019 37 6     MCV 11/14/2019 93     MCH 11/14/2019 31 3     MCHC 11/14/2019 33 5     RDW 11/14/2019 13 4     Platelets 11/12/7692 149     MPV 11/14/2019 10 3     POC Glucose 11/14/2019 102     POC Glucose 11/14/2019 127    Appointment on 11/06/2019   Component Date Value    MRSA Culture Only 11/06/2019 Methicillin Resistant Staphylococcus aureus isolated*    MRSA Culture Only 11/06/2019 Please note: This patient requires contact precautions       ABO Grouping 11/06/2019 AB     Rh Factor 11/06/2019 Positive     Antibody Screen 11/06/2019 Negative     Specimen Expiration Date 11/06/2019 [id number]    Office Visit on 11/06/2019   Component Date Value    Color, UA 11/06/2019 Yellow     Clarity, UA 11/06/2019 Clear     Specific [place], UA 11/06/2019 <=1 005*    pH, UA 11/06/2019 7 0     Leukocytes, UA 11/06/2019 Negative     Nitrite, UA 11/06/2019 Negative     Protein, UA 11/06/2019 Negative     Glucose, UA 11/06/2019 Negative     Ketones, UA 11/06/2019 Negative     Urobilinogen, UA 11/06/2019 0 2     Bilirubin, UA 11/06/2019 Negative     Blood, UA 11/06/2019 Negative    Admission on 10/28/2019, Discharged on 10/28/2019   Component Date Value    Ventricular Rate 10/28/2019 74     Atrial Rate 10/28/2019 74     SC Interval 10/28/2019 180     QRSD Interval 10/28/2019 154     QT Interval 10/28/2019 436     QTC Interval 10/28/2019 483     P Axis 10/28/2019 70     QRS Axis 10/28/2019 -9     T Wave [place] 10/28/2019 89    Hospital Outpatient Visit on 10/24/2019   Component Date Value    pH, Art i-STAT 10/24/2019 7 422     pCO2, Art i-STAT 10/24/2019 39 7     pO2, ART i-STAT 10/24/2019 95 0     BE, i-STAT 10/24/2019 1     HCO3, Art i-STAT 10/24/2019 25 9     CO2, i-STAT 10/24/2019 27     O2 Sat, i-STAT 10/24/2019 98*    SODIUM, I-STAT 10/24/2019 138     Potassium, i-STAT 10/24/2019 3 7     Calcium, Ionized i-STAT 10/24/2019 1 27     Hct, i-STAT 10/24/2019 40     Hgb, i-STAT 10/24/2019 13 6     Glucose, i-STAT 10/24/2019 122     POC FIO2 10/24/2019 21     Specimen Type 10/24/2019 ARTERIAL     SITE 10/24/2019 Right Radial     REMBERTO TEST 10/24/2019 Postive Remberto Test    Appointment on 10/21/2019   Component Date Value    WBC 10/21/2019 7 30     RBC 10/21/2019 4 53     Hemoglobin 10/21/2019 14 3     Hematocrit 10/21/2019 42 1     MCV 10/21/2019 93     MCH 10/21/2019 31 5     MCHC 10/21/2019 33 9     RDW 10/21/2019 13 5     MPV 10/21/2019 8 2*    Platelets 47/08/1800 208     Neutrophils Relative 10/21/2019 79*    Lymphocytes Relative 10/21/2019 16*    Monocytes Relative 10/21/2019 5     Eosinophils Relative 10/21/2019 0     Basophils Relative 10/21/2019 0     Neutrophils Absolute 10/21/2019 5 80     Lymphocytes Absolute 10/21/2019 1 10     Monocytes Absolute 10/21/2019 0 30  Eosinophils Absolute 10/21/2019 0 00     Basophils Absolute 10/21/2019 0 00     Sodium 10/21/2019 137     Potassium 10/21/2019 4 4     Chloride 10/21/2019 100     CO2 10/21/2019 31     ANION GAP 10/21/2019 6     BUN 10/21/2019 18     Creatinine 10/21/2019 0 91     Glucose 10/21/2019 117*    Calcium 10/21/2019 10 2     Corrected Calcium 10/21/2019 9 8     AST 10/21/2019 20     ALT 10/21/2019 17     Alkaline Phosphatase 10/21/2019 40*    Total Protein 10/21/2019 7 1     Albumin 10/21/2019 4 5     Total Bilirubin 10/21/2019 0 40     eGFR 10/21/2019 84    There may be more visits with results that are not included  Imaging: No results found  Review of Systems:  Review of Systems   Constitutional: Positive for fatigue  Negative for diaphoresis, fever and unexpected weight change  HENT: Negative  Respiratory: Negative for cough, shortness of breath and wheezing  Cardiovascular: Negative for chest pain, palpitations and leg swelling  Gastrointestinal: Negative for abdominal pain, diarrhea and nausea  Musculoskeletal: Positive for myalgias  Negative for gait problem  Skin: Negative for rash  Neurological: Negative for dizziness and numbness  Psychiatric/Behavioral: Negative  Physical Exam:  Physical Exam   Constitutional: He is oriented to person, place, and time  He appears well-developed and well-nourished  HENT:   Head: Normocephalic and atraumatic  Eyes: Pupils are equal, round, and reactive to light  Neck: Normal range of motion  Neck supple  No JVD present  Cardiovascular: Regular rhythm, S1 normal, S2 normal and normal pulses  Pulses:       Carotid pulses are 2+ on the right side, and 2+ on the left side  Pulmonary/Chest: Effort normal and breath sounds normal  He has no wheezes  He has no rales  Abdominal: Soft  Bowel sounds are normal  There is no tenderness  Musculoskeletal: Normal range of motion  He exhibits no edema or tenderness  Neurological: He is alert and oriented to person, place, and time  He has normal reflexes  No cranial nerve deficit  Skin: Skin is warm  Psychiatric: He has a normal mood and affect

## 2020-03-05 ENCOUNTER — TELEPHONE (OUTPATIENT)
Dept: CARDIOLOGY CLINIC | Facility: CLINIC | Age: 70
End: 2020-03-05

## 2020-03-05 NOTE — TELEPHONE ENCOUNTER
MD Olman Carballo             Tell him to stop his Crestor  It may be contributing to his muscle pains  Tx      I called & spoke with Chay Hernandez, he will try stopping crestor  Mode: repeat paint Post-Care Instructions: I reviewed with the patient in detail post-care instructions. Patient should stay away from the sun and wear sun protection until treated areas are fully healed. Fluence Units: J/cm2 Location #2: hands Spot Size: 2 x 2 cm Consent: Written consent obtained, risks reviewed including but not limited to crusting, scabbing, blistering, scarring, darker or lighter pigmentary change, incidental hair removal, bruising, and/or incomplete removal. Total Square Area In Cm2 (Required For Proper Billing- Whole Numbers Only Please): 132 Total Pulses (Optional): 76 Comments: Patient states that she tolerated her last treatment well. Fluence #1 (J/Cm2 Or Mj/Cm2): 400 Device Serial Number (Optional): [id number] Location #1: lower lip Fluence #2 (J/Cm2 Or Mj/Cm2): 600 Treatment Number: 7 Detail Level: Zone

## 2020-03-20 NOTE — TELEPHONE ENCOUNTER
F/U with Ata Nino  He does feel better since he stopped the Crestor but he is also on Prednisone  His wife fell and is in the hospital   Hopefully she will get rehab which will help her get "somewhat mobile" again

## 2020-03-20 NOTE — TELEPHONE ENCOUNTER
Stay off the crestor for now  Have him restart it one week after he is off of prednisone and follow from there

## 2020-04-15 ENCOUNTER — TELEMEDICINE (OUTPATIENT)
Dept: RHEUMATOLOGY | Facility: CLINIC | Age: 70
End: 2020-04-15
Payer: MEDICARE

## 2020-04-15 DIAGNOSIS — M15.0 PRIMARY GENERALIZED (OSTEO)ARTHRITIS: ICD-10-CM

## 2020-04-15 DIAGNOSIS — M51.36 LUMBAR DEGENERATIVE DISC DISEASE: ICD-10-CM

## 2020-04-15 DIAGNOSIS — M85.852 OSTEOPENIA OF LEFT HIP: ICD-10-CM

## 2020-04-15 DIAGNOSIS — M35.3 POLYMYALGIA RHEUMATICA (HCC): Primary | ICD-10-CM

## 2020-04-15 DIAGNOSIS — Z79.52 LONG TERM SYSTEMIC STEROID USER: ICD-10-CM

## 2020-04-15 PROCEDURE — 99442 PR PHYS/QHP TELEPHONE EVALUATION 11-20 MIN: CPT | Performed by: INTERNAL MEDICINE

## 2020-04-15 RX ORDER — PREDNISONE 1 MG/1
TABLET ORAL
Qty: 120 TABLET | Refills: 1 | Status: SHIPPED | OUTPATIENT
Start: 2020-04-15 | End: 2020-08-03 | Stop reason: SDUPTHER

## 2020-04-30 DIAGNOSIS — I10 ESSENTIAL HYPERTENSION: ICD-10-CM

## 2020-04-30 RX ORDER — TRIAMTERENE AND HYDROCHLOROTHIAZIDE 37.5; 25 MG/1; MG/1
CAPSULE ORAL
Qty: 90 CAPSULE | Refills: 1 | Status: SHIPPED | OUTPATIENT
Start: 2020-04-30 | End: 2020-05-04 | Stop reason: SDUPTHER

## 2020-05-04 ENCOUNTER — TELEPHONE (OUTPATIENT)
Dept: VASCULAR SURGERY | Facility: CLINIC | Age: 70
End: 2020-05-04

## 2020-05-04 DIAGNOSIS — Z95.2 S/P TAVR (TRANSCATHETER AORTIC VALVE REPLACEMENT): ICD-10-CM

## 2020-05-04 DIAGNOSIS — I10 ESSENTIAL HYPERTENSION: ICD-10-CM

## 2020-05-04 RX ORDER — TRIAMTERENE AND HYDROCHLOROTHIAZIDE 37.5; 25 MG/1; MG/1
1 CAPSULE ORAL DAILY
Qty: 90 CAPSULE | Refills: 3 | Status: SHIPPED | OUTPATIENT
Start: 2020-05-04 | End: 2020-09-03

## 2020-05-05 DIAGNOSIS — Z95.2 S/P TAVR (TRANSCATHETER AORTIC VALVE REPLACEMENT): ICD-10-CM

## 2020-05-07 DIAGNOSIS — Z95.2 S/P TAVR (TRANSCATHETER AORTIC VALVE REPLACEMENT): ICD-10-CM

## 2020-05-18 ENCOUNTER — TELEMEDICINE (OUTPATIENT)
Dept: PAIN MEDICINE | Facility: CLINIC | Age: 70
End: 2020-05-18
Payer: MEDICARE

## 2020-05-18 DIAGNOSIS — G62.9 PERIPHERAL POLYNEUROPATHY: Primary | ICD-10-CM

## 2020-05-18 DIAGNOSIS — M51.36 LUMBAR DEGENERATIVE DISC DISEASE: ICD-10-CM

## 2020-05-18 DIAGNOSIS — M54.16 LUMBAR RADICULOPATHY: ICD-10-CM

## 2020-05-18 DIAGNOSIS — G89.4 CHRONIC PAIN SYNDROME: ICD-10-CM

## 2020-05-18 PROCEDURE — 99203 OFFICE O/P NEW LOW 30 MIN: CPT | Performed by: ANESTHESIOLOGY

## 2020-05-18 RX ORDER — AMITRIPTYLINE HYDROCHLORIDE 25 MG/1
25 TABLET, FILM COATED ORAL
Qty: 30 TABLET | Refills: 1 | Status: SHIPPED | OUTPATIENT
Start: 2020-05-18 | End: 2020-06-22

## 2020-06-08 ENCOUNTER — HOSPITAL ENCOUNTER (OUTPATIENT)
Dept: NON INVASIVE DIAGNOSTICS | Facility: HOSPITAL | Age: 70
Discharge: HOME | End: 2020-06-08
Payer: MEDICARE

## 2020-06-08 DIAGNOSIS — I73.9 CLAUDICATION IN PERIPHERAL VASCULAR DISEASE (HCC): ICD-10-CM

## 2020-06-08 DIAGNOSIS — I73.9 PAD (PERIPHERAL ARTERY DISEASE) (HCC): ICD-10-CM

## 2020-06-08 DIAGNOSIS — I65.23 BILATERAL CAROTID ARTERY STENOSIS: ICD-10-CM

## 2020-06-08 PROCEDURE — 93925 LOWER EXTREMITY STUDY: CPT

## 2020-06-08 PROCEDURE — 93880 EXTRACRANIAL BILAT STUDY: CPT | Performed by: SURGERY

## 2020-06-08 PROCEDURE — 93922 UPR/L XTREMITY ART 2 LEVELS: CPT | Performed by: SURGERY

## 2020-06-08 PROCEDURE — 93923 UPR/LXTR ART STDY 3+ LVLS: CPT

## 2020-06-08 PROCEDURE — 93880 EXTRACRANIAL BILAT STUDY: CPT

## 2020-06-08 PROCEDURE — 93925 LOWER EXTREMITY STUDY: CPT | Performed by: SURGERY

## 2020-06-09 ENCOUNTER — TELEPHONE (OUTPATIENT)
Dept: ADMINISTRATIVE | Facility: HOSPITAL | Age: 70
End: 2020-06-09

## 2020-06-09 ENCOUNTER — TELEMEDICINE (OUTPATIENT)
Dept: VASCULAR SURGERY | Facility: CLINIC | Age: 70
End: 2020-06-09
Payer: MEDICARE

## 2020-06-09 ENCOUNTER — TELEMEDICINE (OUTPATIENT)
Dept: CARDIOLOGY CLINIC | Facility: CLINIC | Age: 70
End: 2020-06-09
Payer: MEDICARE

## 2020-06-09 VITALS
SYSTOLIC BLOOD PRESSURE: 176 MMHG | DIASTOLIC BLOOD PRESSURE: 77 MMHG | HEART RATE: 72 BPM | HEIGHT: 66 IN | WEIGHT: 165 LBS | TEMPERATURE: 97.3 F | BODY MASS INDEX: 26.52 KG/M2

## 2020-06-09 VITALS
DIASTOLIC BLOOD PRESSURE: 82 MMHG | WEIGHT: 165 LBS | HEIGHT: 66 IN | BODY MASS INDEX: 26.52 KG/M2 | SYSTOLIC BLOOD PRESSURE: 142 MMHG | HEART RATE: 72 BPM

## 2020-06-09 DIAGNOSIS — I73.9 CLAUDICATION IN PERIPHERAL VASCULAR DISEASE (HCC): ICD-10-CM

## 2020-06-09 DIAGNOSIS — I35.0 NONRHEUMATIC AORTIC VALVE STENOSIS: ICD-10-CM

## 2020-06-09 DIAGNOSIS — I42.0 DILATED CARDIOMYOPATHY (HCC): Primary | ICD-10-CM

## 2020-06-09 DIAGNOSIS — I10 ESSENTIAL HYPERTENSION: ICD-10-CM

## 2020-06-09 DIAGNOSIS — I25.10 CORONARY ARTERY DISEASE INVOLVING NATIVE CORONARY ARTERY OF NATIVE HEART WITHOUT ANGINA PECTORIS: ICD-10-CM

## 2020-06-09 DIAGNOSIS — R06.00 DOE (DYSPNEA ON EXERTION): Chronic | ICD-10-CM

## 2020-06-09 DIAGNOSIS — I65.23 BILATERAL CAROTID ARTERY STENOSIS: Primary | ICD-10-CM

## 2020-06-09 DIAGNOSIS — T73.3XXD FATIGUE DUE TO EXCESSIVE EXERTION, SUBSEQUENT ENCOUNTER: ICD-10-CM

## 2020-06-09 DIAGNOSIS — Z95.2 S/P TAVR (TRANSCATHETER AORTIC VALVE REPLACEMENT): ICD-10-CM

## 2020-06-09 PROCEDURE — 99442 PR PHYS/QHP TELEPHONE EVALUATION 11-20 MIN: CPT | Performed by: SURGERY

## 2020-06-09 PROCEDURE — 99443 PR PHYS/QHP TELEPHONE EVALUATION 21-30 MIN: CPT | Performed by: INTERNAL MEDICINE

## 2020-06-09 NOTE — TELEPHONE ENCOUNTER
Check out staff:     PHYSICIAN/HOSPITAL: Dr Bernardino Adams [facility]    SURGERY/TESTING: angio/intervention, right groin    URGENCY: standard    REP: Product/Implant: [employer] rep for [employer]    ASSISTANT SURGEON: none    ALLERGIES: Pregabalin; Atorvastatin; Avelox [moxifloxacin];  Pravastatin; Rosuvastatin calcium; and Simvastatin    INSTRUCTIONS GIVEN: NO BOWEL PREP    BLOOD THINNERS/MED HOLD: Medication Instructions:  Aspirin:   Continue (do not hold)  Plavix:  Continue (do not hold    HYDRATION REQUIRED: none     If on dialysis, where & what days? none    CLEARANCE(S): (Please list specialty, physician, appointment date, and fax #)  None       *Please send COMPLETE CONSENT to Vascular Surgery Schedulers email group*  *Please send CLEARANCE FORM(S) to Vascular Nursing email group*

## 2020-06-15 DIAGNOSIS — I10 ESSENTIAL HYPERTENSION: ICD-10-CM

## 2020-06-15 DIAGNOSIS — I25.10 CORONARY ARTERY DISEASE INVOLVING NATIVE CORONARY ARTERY OF NATIVE HEART WITHOUT ANGINA PECTORIS: ICD-10-CM

## 2020-06-15 DIAGNOSIS — I35.0 AORTIC VALVE STENOSIS, ETIOLOGY OF CARDIAC VALVE DISEASE UNSPECIFIED: ICD-10-CM

## 2020-06-15 DIAGNOSIS — R06.00 DOE (DYSPNEA ON EXERTION): Chronic | ICD-10-CM

## 2020-06-15 DIAGNOSIS — I10 ESSENTIAL HYPERTENSION: Primary | ICD-10-CM

## 2020-06-16 ENCOUNTER — TELEPHONE (OUTPATIENT)
Dept: CARDIOLOGY CLINIC | Facility: CLINIC | Age: 70
End: 2020-06-16

## 2020-06-17 ENCOUNTER — DOCUMENTATION (OUTPATIENT)
Dept: CARDIOLOGY CLINIC | Facility: HOSPITAL | Age: 70
End: 2020-06-17

## 2020-06-17 ENCOUNTER — TELEPHONE (OUTPATIENT)
Dept: VASCULAR SURGERY | Facility: CLINIC | Age: 70
End: 2020-06-17

## 2020-06-17 ENCOUNTER — PREP FOR PROCEDURE (OUTPATIENT)
Dept: VASCULAR SURGERY | Facility: CLINIC | Age: 70
End: 2020-06-17

## 2020-06-17 DIAGNOSIS — Z11.59 SCREENING FOR VIRAL DISEASE: Primary | ICD-10-CM

## 2020-06-17 DIAGNOSIS — I65.23 BILATERAL CAROTID ARTERY OCCLUSION: Primary | ICD-10-CM

## 2020-06-19 ENCOUNTER — APPOINTMENT (OUTPATIENT)
Dept: LAB | Facility: HOSPITAL | Age: 70
End: 2020-06-19
Payer: MEDICARE

## 2020-06-19 ENCOUNTER — LAB (OUTPATIENT)
Dept: LAB | Facility: HOSPITAL | Age: 70
End: 2020-06-19
Payer: MEDICARE

## 2020-06-19 DIAGNOSIS — I65.23 BILATERAL CAROTID ARTERY OCCLUSION: ICD-10-CM

## 2020-06-19 DIAGNOSIS — I10 ESSENTIAL HYPERTENSION: ICD-10-CM

## 2020-06-19 DIAGNOSIS — Z12.5 SCREENING FOR PROSTATE CANCER: ICD-10-CM

## 2020-06-19 LAB
ALBUMIN SERPL BCP-MCNC: 4.4 G/DL (ref 3.5–5.7)
ALP SERPL-CCNC: 38 U/L (ref 55–165)
ALT SERPL W P-5'-P-CCNC: 17 U/L (ref 7–52)
ANION GAP SERPL CALCULATED.3IONS-SCNC: 9 MMOL/L (ref 4–13)
AST SERPL W P-5'-P-CCNC: 21 U/L (ref 13–39)
BASOPHILS # BLD AUTO: 0.1 THOUSANDS/ΜL (ref 0–0.1)
BASOPHILS NFR BLD AUTO: 1 % (ref 0–2)
BILIRUB SERPL-MCNC: 0.6 MG/DL (ref 0.2–1)
BUN SERPL-MCNC: 18 MG/DL (ref 7–25)
CALCIUM SERPL-MCNC: 9.6 MG/DL (ref 8.6–10.5)
CHLORIDE SERPL-SCNC: 103 MMOL/L (ref 98–107)
CHOLEST SERPL-MCNC: 209 MG/DL (ref 0–200)
CO2 SERPL-SCNC: 25 MMOL/L (ref 21–31)
CREAT SERPL-MCNC: 0.77 MG/DL (ref 0.7–1.3)
CRP SERPL QL: <5 MG/L
EOSINOPHIL # BLD AUTO: 0.1 THOUSAND/ΜL (ref 0–0.61)
EOSINOPHIL NFR BLD AUTO: 1 % (ref 0–5)
ERYTHROCYTE [DISTWIDTH] IN BLOOD BY AUTOMATED COUNT: 13.7 % (ref 11.5–14.5)
ERYTHROCYTE [SEDIMENTATION RATE] IN BLOOD: 5 MM/HOUR (ref 0–20)
GFR SERPL CREATININE-BSD FRML MDRD: 91 ML/MIN/1.73SQ M
GLUCOSE P FAST SERPL-MCNC: 110 MG/DL (ref 65–99)
HCT VFR BLD AUTO: 43.8 % (ref 42–47)
HDLC SERPL-MCNC: 58 MG/DL
HGB BLD-MCNC: 15.2 G/DL (ref 14–18)
INR PPP: 0.99 (ref 0.84–1.19)
LDLC SERPL CALC-MCNC: 129 MG/DL (ref 0–100)
LYMPHOCYTES # BLD AUTO: 1.2 THOUSANDS/ΜL (ref 0.6–4.47)
LYMPHOCYTES NFR BLD AUTO: 15 % (ref 21–51)
MCH RBC QN AUTO: 32.8 PG (ref 26–34)
MCHC RBC AUTO-ENTMCNC: 34.8 G/DL (ref 31–37)
MCV RBC AUTO: 94 FL (ref 81–99)
MONOCYTES # BLD AUTO: 0.5 THOUSAND/ΜL (ref 0.17–1.22)
MONOCYTES NFR BLD AUTO: 6 % (ref 2–12)
NEUTROPHILS # BLD AUTO: 6.4 THOUSANDS/ΜL (ref 1.4–6.5)
NEUTS SEG NFR BLD AUTO: 78 % (ref 42–75)
NONHDLC SERPL-MCNC: 151 MG/DL
PLATELET # BLD AUTO: 197 THOUSANDS/UL (ref 149–390)
PMV BLD AUTO: 8 FL (ref 8.6–11.7)
POTASSIUM SERPL-SCNC: 4.2 MMOL/L (ref 3.5–5.5)
PROT SERPL-MCNC: 6.8 G/DL (ref 6.4–8.9)
PROTHROMBIN TIME: 12.6 SECONDS (ref 11.6–14.5)
PSA SERPL-MCNC: 0.6 NG/ML (ref 0–4)
RBC # BLD AUTO: 4.65 MILLION/UL (ref 4.3–5.9)
SODIUM SERPL-SCNC: 137 MMOL/L (ref 134–143)
TRIGL SERPL-MCNC: 108 MG/DL (ref 44–166)
WBC # BLD AUTO: 8.3 THOUSAND/UL (ref 4.8–10.8)

## 2020-06-19 PROCEDURE — G0103 PSA SCREENING: HCPCS

## 2020-06-19 PROCEDURE — 93005 ELECTROCARDIOGRAM TRACING: CPT

## 2020-06-19 PROCEDURE — 86140 C-REACTIVE PROTEIN: CPT | Performed by: INTERNAL MEDICINE

## 2020-06-19 PROCEDURE — 80061 LIPID PANEL: CPT

## 2020-06-19 PROCEDURE — 36415 COLL VENOUS BLD VENIPUNCTURE: CPT | Performed by: INTERNAL MEDICINE

## 2020-06-19 PROCEDURE — 80053 COMPREHEN METABOLIC PANEL: CPT

## 2020-06-19 PROCEDURE — 85025 COMPLETE CBC W/AUTO DIFF WBC: CPT

## 2020-06-19 PROCEDURE — 85652 RBC SED RATE AUTOMATED: CPT | Performed by: INTERNAL MEDICINE

## 2020-06-19 PROCEDURE — 85610 PROTHROMBIN TIME: CPT

## 2020-06-22 ENCOUNTER — OFFICE VISIT (OUTPATIENT)
Dept: PAIN MEDICINE | Facility: CLINIC | Age: 70
End: 2020-06-22
Payer: MEDICARE

## 2020-06-22 VITALS
HEIGHT: 66 IN | BODY MASS INDEX: 27.06 KG/M2 | SYSTOLIC BLOOD PRESSURE: 112 MMHG | WEIGHT: 168.4 LBS | DIASTOLIC BLOOD PRESSURE: 68 MMHG | TEMPERATURE: 98.6 F

## 2020-06-22 DIAGNOSIS — G89.4 CHRONIC PAIN SYNDROME: Primary | ICD-10-CM

## 2020-06-22 DIAGNOSIS — Z11.59 SCREENING FOR VIRAL DISEASE: ICD-10-CM

## 2020-06-22 DIAGNOSIS — M54.16 LUMBAR RADICULOPATHY: ICD-10-CM

## 2020-06-22 DIAGNOSIS — G62.9 PERIPHERAL POLYNEUROPATHY: ICD-10-CM

## 2020-06-22 DIAGNOSIS — M51.36 LUMBAR DEGENERATIVE DISC DISEASE: ICD-10-CM

## 2020-06-22 PROCEDURE — 1036F TOBACCO NON-USER: CPT | Performed by: NURSE PRACTITIONER

## 2020-06-22 PROCEDURE — 3008F BODY MASS INDEX DOCD: CPT | Performed by: NURSE PRACTITIONER

## 2020-06-22 PROCEDURE — 4040F PNEUMOC VAC/ADMIN/RCVD: CPT | Performed by: NURSE PRACTITIONER

## 2020-06-22 PROCEDURE — 3078F DIAST BP <80 MM HG: CPT | Performed by: NURSE PRACTITIONER

## 2020-06-22 PROCEDURE — 3074F SYST BP LT 130 MM HG: CPT | Performed by: NURSE PRACTITIONER

## 2020-06-22 PROCEDURE — 99213 OFFICE O/P EST LOW 20 MIN: CPT | Performed by: NURSE PRACTITIONER

## 2020-06-22 PROCEDURE — 1160F RVW MEDS BY RX/DR IN RCRD: CPT | Performed by: NURSE PRACTITIONER

## 2020-06-22 PROCEDURE — U0003 INFECTIOUS AGENT DETECTION BY NUCLEIC ACID (DNA OR RNA); SEVERE ACUTE RESPIRATORY SYNDROME CORONAVIRUS 2 (SARS-COV-2) (CORONAVIRUS DISEASE [COVID-19]), AMPLIFIED PROBE TECHNIQUE, MAKING USE OF HIGH THROUGHPUT TECHNOLOGIES AS DESCRIBED BY CMS-2020-01-R: HCPCS

## 2020-06-22 RX ORDER — DULOXETIN HYDROCHLORIDE 30 MG/1
30 CAPSULE, DELAYED RELEASE ORAL DAILY
Qty: 30 CAPSULE | Refills: 1 | Status: SHIPPED | OUTPATIENT
Start: 2020-06-22 | End: 2020-08-03

## 2020-06-23 ENCOUNTER — ANESTHESIA EVENT (OUTPATIENT)
Dept: PERIOP | Facility: HOSPITAL | Age: 70
End: 2020-06-23
Payer: MEDICARE

## 2020-06-23 ENCOUNTER — TRANSCRIBE ORDERS (OUTPATIENT)
Dept: LAB | Facility: HOSPITAL | Age: 70
End: 2020-06-23

## 2020-06-25 ENCOUNTER — TELEPHONE (OUTPATIENT)
Dept: VASCULAR SURGERY | Facility: CLINIC | Age: 70
End: 2020-06-25

## 2020-06-25 LAB — SARS-COV-2 RNA SPEC QL NAA+PROBE: NOT DETECTED

## 2020-06-29 ENCOUNTER — APPOINTMENT (OUTPATIENT)
Dept: RADIOLOGY | Facility: HOSPITAL | Age: 70
End: 2020-06-29
Attending: SURGERY
Payer: MEDICARE

## 2020-06-29 ENCOUNTER — HOSPITAL ENCOUNTER (OUTPATIENT)
Facility: HOSPITAL | Age: 70
Setting detail: OUTPATIENT SURGERY
Discharge: HOME | End: 2020-06-29
Attending: SURGERY | Admitting: SURGERY
Payer: MEDICARE

## 2020-06-29 ENCOUNTER — ANESTHESIA (OUTPATIENT)
Dept: PERIOP | Facility: HOSPITAL | Age: 70
End: 2020-06-29
Payer: MEDICARE

## 2020-06-29 VITALS
DIASTOLIC BLOOD PRESSURE: 64 MMHG | RESPIRATION RATE: 16 BRPM | HEART RATE: 59 BPM | HEIGHT: 67 IN | SYSTOLIC BLOOD PRESSURE: 144 MMHG | TEMPERATURE: 98.2 F | OXYGEN SATURATION: 97 % | BODY MASS INDEX: 26.37 KG/M2 | WEIGHT: 168 LBS

## 2020-06-29 DIAGNOSIS — I65.23 BILATERAL CAROTID ARTERY OCCLUSION: ICD-10-CM

## 2020-06-29 DIAGNOSIS — I73.9 PAD (PERIPHERAL ARTERY DISEASE) (HCC): ICD-10-CM

## 2020-06-29 LAB
KCT BLD-ACNC: 231 SEC (ref 89–137)
SPECIMEN SOURCE: ABNORMAL

## 2020-06-29 PROCEDURE — C1769 GUIDE WIRE: HCPCS | Performed by: SURGERY

## 2020-06-29 PROCEDURE — 75716 ARTERY X-RAYS ARMS/LEGS: CPT

## 2020-06-29 PROCEDURE — NC001 PR NO CHARGE: Performed by: SURGERY

## 2020-06-29 PROCEDURE — 85347 COAGULATION TIME ACTIVATED: CPT

## 2020-06-29 PROCEDURE — C1894 INTRO/SHEATH, NON-LASER: HCPCS | Performed by: SURGERY

## 2020-06-29 PROCEDURE — 76937 US GUIDE VASCULAR ACCESS: CPT

## 2020-06-29 PROCEDURE — 75716 ARTERY X-RAYS ARMS/LEGS: CPT | Performed by: SURGERY

## 2020-06-29 PROCEDURE — C1887 CATHETER, GUIDING: HCPCS

## 2020-06-29 PROCEDURE — C1887 CATHETER, GUIDING: HCPCS | Performed by: SURGERY

## 2020-06-29 PROCEDURE — C1884 EMBOLIZATION PROTECT SYST: HCPCS | Performed by: SURGERY

## 2020-06-29 PROCEDURE — 76937 US GUIDE VASCULAR ACCESS: CPT | Performed by: SURGERY

## 2020-06-29 PROCEDURE — C2623 CATH, TRANSLUMIN, DRUG-COAT: HCPCS | Performed by: SURGERY

## 2020-06-29 PROCEDURE — 37225 PR REVSC OPN/PRQ FEM/POP W/ATHRC/ANGIOP SM VSL: CPT | Performed by: SURGERY

## 2020-06-29 PROCEDURE — C1760 CLOSURE DEV, VASC: HCPCS | Performed by: SURGERY

## 2020-06-29 DEVICE — MYNX CONTROL VCD 6F 7F
Type: IMPLANTABLE DEVICE | Site: GROIN | Status: FUNCTIONAL
Brand: MYNX CONTROL

## 2020-06-29 RX ORDER — LIDOCAINE HYDROCHLORIDE 10 MG/ML
0.5 INJECTION, SOLUTION EPIDURAL; INFILTRATION; INTRACAUDAL; PERINEURAL ONCE AS NEEDED
Status: DISCONTINUED | OUTPATIENT
Start: 2020-06-29 | End: 2020-06-29 | Stop reason: HOSPADM

## 2020-06-29 RX ORDER — SODIUM CHLORIDE, SODIUM LACTATE, POTASSIUM CHLORIDE, CALCIUM CHLORIDE 600; 310; 30; 20 MG/100ML; MG/100ML; MG/100ML; MG/100ML
50 INJECTION, SOLUTION INTRAVENOUS CONTINUOUS
Status: DISCONTINUED | OUTPATIENT
Start: 2020-06-29 | End: 2020-06-29 | Stop reason: HOSPADM

## 2020-06-29 RX ORDER — MIDAZOLAM HYDROCHLORIDE 2 MG/2ML
INJECTION, SOLUTION INTRAMUSCULAR; INTRAVENOUS AS NEEDED
Status: DISCONTINUED | OUTPATIENT
Start: 2020-06-29 | End: 2020-06-29 | Stop reason: SURG

## 2020-06-29 RX ORDER — ONDANSETRON 2 MG/ML
4 INJECTION INTRAMUSCULAR; INTRAVENOUS ONCE AS NEEDED
Status: DISCONTINUED | OUTPATIENT
Start: 2020-06-29 | End: 2020-06-29 | Stop reason: HOSPADM

## 2020-06-29 RX ORDER — HEPARIN SODIUM 1000 [USP'U]/ML
INJECTION, SOLUTION INTRAVENOUS; SUBCUTANEOUS AS NEEDED
Status: DISCONTINUED | OUTPATIENT
Start: 2020-06-29 | End: 2020-06-29 | Stop reason: SURG

## 2020-06-29 RX ORDER — HEPARIN SODIUM 200 [USP'U]/100ML
INJECTION, SOLUTION INTRAVENOUS
Status: COMPLETED | OUTPATIENT
Start: 2020-06-29 | End: 2020-06-29

## 2020-06-29 RX ORDER — LIDOCAINE HYDROCHLORIDE 10 MG/ML
INJECTION, SOLUTION EPIDURAL; INFILTRATION; INTRACAUDAL; PERINEURAL AS NEEDED
Status: DISCONTINUED | OUTPATIENT
Start: 2020-06-29 | End: 2020-06-29 | Stop reason: HOSPADM

## 2020-06-29 RX ORDER — FENTANYL CITRATE/PF 50 MCG/ML
25 SYRINGE (ML) INJECTION
Status: DISCONTINUED | OUTPATIENT
Start: 2020-06-29 | End: 2020-06-29 | Stop reason: HOSPADM

## 2020-06-29 RX ORDER — FENTANYL CITRATE 50 UG/ML
INJECTION, SOLUTION INTRAMUSCULAR; INTRAVENOUS AS NEEDED
Status: DISCONTINUED | OUTPATIENT
Start: 2020-06-29 | End: 2020-06-29 | Stop reason: SURG

## 2020-06-29 RX ORDER — ACETAMINOPHEN 325 MG/1
650 TABLET ORAL EVERY 4 HOURS PRN
Status: DISCONTINUED | OUTPATIENT
Start: 2020-06-29 | End: 2020-06-29 | Stop reason: HOSPADM

## 2020-06-29 RX ORDER — SODIUM CHLORIDE 9 MG/ML
75 INJECTION, SOLUTION INTRAVENOUS CONTINUOUS
Status: DISCONTINUED | OUTPATIENT
Start: 2020-06-29 | End: 2020-06-29 | Stop reason: HOSPADM

## 2020-06-29 RX ORDER — MAGNESIUM HYDROXIDE 1200 MG/15ML
LIQUID ORAL AS NEEDED
Status: DISCONTINUED | OUTPATIENT
Start: 2020-06-29 | End: 2020-06-29 | Stop reason: HOSPADM

## 2020-06-29 RX ADMIN — HEPARIN SODIUM 1000 UNITS: 1000 INJECTION INTRAVENOUS; SUBCUTANEOUS at 08:22

## 2020-06-29 RX ADMIN — SODIUM CHLORIDE, SODIUM LACTATE, POTASSIUM CHLORIDE, AND CALCIUM CHLORIDE: .6; .31; .03; .02 INJECTION, SOLUTION INTRAVENOUS at 07:55

## 2020-06-29 RX ADMIN — ACETAMINOPHEN 650 MG: 325 TABLET ORAL at 15:06

## 2020-06-29 RX ADMIN — FENTANYL CITRATE 25 MCG: 50 INJECTION, SOLUTION INTRAMUSCULAR; INTRAVENOUS at 08:55

## 2020-06-29 RX ADMIN — HEPARIN SODIUM 1000 UNITS: 1000 INJECTION INTRAVENOUS; SUBCUTANEOUS at 08:47

## 2020-06-29 RX ADMIN — FENTANYL CITRATE 25 MCG: 50 INJECTION, SOLUTION INTRAMUSCULAR; INTRAVENOUS at 08:14

## 2020-06-29 RX ADMIN — MIDAZOLAM 1 MG: 1 INJECTION INTRAMUSCULAR; INTRAVENOUS at 08:04

## 2020-06-29 RX ADMIN — HEPARIN SODIUM 6000 UNITS: 1000 INJECTION INTRAVENOUS; SUBCUTANEOUS at 08:35

## 2020-06-29 RX ADMIN — SODIUM CHLORIDE 75 ML/HR: 0.9 INJECTION, SOLUTION INTRAVENOUS at 11:14

## 2020-06-29 NOTE — H&P (VIEW-ONLY)
Assessment/Plan:     For angiogram, possible intervention  Carotid artery stenosis  Asymptomatic bilateral carotid stenosis  Continue surveillance duplex  Next scheduled for April 2020       Claudication in peripheral vascular disease ([address] )  Continue with supervised walking exercise program   Patient on dual anti-platelet therapy and statin  Patient is nonsmoker  Will continue with surveillance          Diagnoses and all orders for this visit:     PAD (peripheral artery disease) ([facility])  -     VAS lower limb arterial duplex, complete bilateral; Future  -     VAS carotid complete study; Future     Claudication in peripheral vascular disease (HCC)  -     VAS lower limb arterial duplex, complete bilateral; Future  -     VAS carotid complete study; Future     Bilateral carotid artery stenosis  -     VAS lower limb arterial duplex, complete bilateral; Future  -     VAS carotid complete study; Future            Subjective:       Patient ID: Luke Moore is a 68 y o  male            Patient with history of bilateral femoral to below knee popliteal artery bypass with saphenous vein  Patient underwent atherectomy of pre occlusive lesions in the external/common femoral arteries bilaterally  The right lower extremity intervention was performed on 09/17/2019 and included a jet stream 2 3 mm atherectomy device followed by a 7 mm angioplasty balloon  The left external iliac/common femoral artery atherectomy was similarly performed with a 2 3 mm jet stream atherectomy device and subsequent balloon angioplasty  Patient states this initially significantly diminished his bilateral lower extremity claudication  However, he underwent TAVR in October of 2019 and 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  Patient complains of pain and numbness along the medial anterior aspect of his right thigh    Patient had a JEFFREY on 1/22/2020 reveals an ZACH of 0 66 there are elevated velocities noted in the profundus femoral artery  Velocities through the graft ranged from 39-89 centimeters/second  Velocities at the proximal and distal anastomosis are within normal limits  Left lower extremity ZACH is measured at 1 07  The velocity the proximal anastomosis is somewhat elevated at 212 centimeters/second with velocities within the bypass ranging from 35-54 centimeters/second  Clinical examination reveals easily palpable graft pulses bilaterally  Patient tells me he has diffuse arthritic pain and is being worked up for polyarthralgia with Dr Shara Castellanos  Pt c/o right calf pain after walking 100-150 yards on occasion and rest for 15-30 seconds, but not as severe as prior to [facility] in 8/2019   Pt denies any rest pain or pain when elevating legs or ischemic ulcers  Pt is on ASA 81 mg, Plavix, and Crestor  In the presence of diffuse atherosclerotic disease we have discussed continuing on the dual anti-platelet therapy  Patient is also on statin therapy  Presently patient is participating in a PAD supervised walking exercise program   I have encouraged him to continue to do so  He states due to his polyarthralgia he may not be able to continue  Due to the extent of his atherosclerotic disease and persistent symptoms we will continue surveillance program at 3 month intervals  Due to his multiple medical comorbidities further interventions would be considered fairly high risk and will be undertaken with extreme caution  Pt had a TAVR Nov 2019 and has been having numbness from right thigh to knee           The following portions of the patient's history were reviewed and updated as appropriate: allergies, current medications, past family history, past medical history, past social history, past surgical history and problem list      Review of Systems   Constitutional: Positive for chills and fatigue  HENT: Negative  Eyes: Negative  Respiratory: Negative  Cardiovascular: Positive for leg swelling  Gastrointestinal: Negative  Endocrine: Negative  Genitourinary: Negative  Musculoskeletal: Positive for arthralgias and myalgias  Leg pain  Leg cramping when walking   Skin: Negative  Allergic/Immunologic: Negative  Neurological: Positive for dizziness and numbness (right thigh and hands)  Hematological: Negative  Psychiatric/Behavioral: Negative  There were no vitals taken for this visit        Objective:            Physical Exam   Constitutional: He is oriented to person, place, and time  He appears well-developed and well-nourished  HENT:   Head: Normocephalic and atraumatic  Mouth/Throat: Oropharynx is clear and moist    Eyes: Pupils are equal, round, and reactive to light  Conjunctivae and EOM are normal    Neck: Normal range of motion  Neck supple  No JVD present  Cardiovascular: Normal rate, regular rhythm and normal heart sounds  Pulmonary/Chest: Effort normal and breath sounds normal  No stridor  No respiratory distress  He has no wheezes  He has no rales  He exhibits no tenderness  Abdominal: Soft  He exhibits no distension and no mass  There is no tenderness  There is no rebound and no guarding  Musculoskeletal: Normal range of motion  He exhibits no tenderness or deformity  Neurological: He is alert and oriented to person, place, and time  Skin: Skin is warm and dry  No rash noted  No erythema  Psychiatric: He has a normal mood and affect   His behavior is normal  Thought content normal

## 2020-06-30 ENCOUNTER — TELEPHONE (OUTPATIENT)
Dept: VASCULAR SURGERY | Facility: CLINIC | Age: 70
End: 2020-06-30

## 2020-06-30 ENCOUNTER — ANESTHESIA EVENT (OUTPATIENT)
Dept: PERIOP | Facility: HOSPITAL | Age: 70
End: 2020-06-30
Payer: MEDICARE

## 2020-06-30 LAB
ATRIAL RATE: 75 BPM
P AXIS: 56 DEGREES
PR INTERVAL: 166 MS
QRS AXIS: -26 DEGREES
QRSD INTERVAL: 148 MS
QT INTERVAL: 416 MS
QTC INTERVAL: 464 MS
T WAVE AXIS: 87 DEGREES
VENTRICULAR RATE: 75 BPM

## 2020-06-30 PROCEDURE — 93010 ELECTROCARDIOGRAM REPORT: CPT | Performed by: INTERNAL MEDICINE

## 2020-06-30 PROCEDURE — 1123F ACP DISCUSS/DSCN MKR DOCD: CPT | Performed by: SURGERY

## 2020-07-01 DIAGNOSIS — Z11.59 SCREENING FOR VIRAL DISEASE: ICD-10-CM

## 2020-07-01 PROCEDURE — U0003 INFECTIOUS AGENT DETECTION BY NUCLEIC ACID (DNA OR RNA); SEVERE ACUTE RESPIRATORY SYNDROME CORONAVIRUS 2 (SARS-COV-2) (CORONAVIRUS DISEASE [COVID-19]), AMPLIFIED PROBE TECHNIQUE, MAKING USE OF HIGH THROUGHPUT TECHNOLOGIES AS DESCRIBED BY CMS-2020-01-R: HCPCS

## 2020-07-06 ENCOUNTER — APPOINTMENT (OUTPATIENT)
Dept: RADIOLOGY | Facility: HOSPITAL | Age: 70
End: 2020-07-06
Attending: SURGERY
Payer: MEDICARE

## 2020-07-06 ENCOUNTER — ANESTHESIA (OUTPATIENT)
Dept: PERIOP | Facility: HOSPITAL | Age: 70
End: 2020-07-06
Payer: MEDICARE

## 2020-07-06 ENCOUNTER — HOSPITAL ENCOUNTER (OUTPATIENT)
Facility: HOSPITAL | Age: 70
Setting detail: OUTPATIENT SURGERY
Discharge: HOME | End: 2020-07-06
Attending: SURGERY | Admitting: SURGERY
Payer: MEDICARE

## 2020-07-06 VITALS
DIASTOLIC BLOOD PRESSURE: 65 MMHG | TEMPERATURE: 97.8 F | RESPIRATION RATE: 20 BRPM | HEART RATE: 56 BPM | OXYGEN SATURATION: 98 % | SYSTOLIC BLOOD PRESSURE: 116 MMHG

## 2020-07-06 DIAGNOSIS — I73.9 PERIPHERAL VASCULAR DISEASE, UNSPECIFIED (HCC): ICD-10-CM

## 2020-07-06 DIAGNOSIS — Z11.59 SCREENING FOR VIRAL DISEASE: Primary | ICD-10-CM

## 2020-07-06 LAB
KCT BLD-ACNC: 219 SEC (ref 89–137)
SARS-COV-2 RNA RESP QL NAA+PROBE: NEGATIVE
SPECIMEN SOURCE: ABNORMAL

## 2020-07-06 PROCEDURE — C1887 CATHETER, GUIDING: HCPCS | Performed by: SURGERY

## 2020-07-06 PROCEDURE — C1769 GUIDE WIRE: HCPCS | Performed by: SURGERY

## 2020-07-06 PROCEDURE — C1760 CLOSURE DEV, VASC: HCPCS | Performed by: SURGERY

## 2020-07-06 PROCEDURE — 76937 US GUIDE VASCULAR ACCESS: CPT

## 2020-07-06 PROCEDURE — U0003 INFECTIOUS AGENT DETECTION BY NUCLEIC ACID (DNA OR RNA); SEVERE ACUTE RESPIRATORY SYNDROME CORONAVIRUS 2 (SARS-COV-2) (CORONAVIRUS DISEASE [COVID-19]), AMPLIFIED PROBE TECHNIQUE, MAKING USE OF HIGH THROUGHPUT TECHNOLOGIES AS DESCRIBED BY CMS-2020-01-R: HCPCS | Performed by: SURGERY

## 2020-07-06 PROCEDURE — 37228 PR REVASCULARIZE TIBIAL/PERON ARTERY,ANGIOPLASTY INITIAL: CPT | Performed by: SURGERY

## 2020-07-06 PROCEDURE — C1894 INTRO/SHEATH, NON-LASER: HCPCS | Performed by: SURGERY

## 2020-07-06 PROCEDURE — G9197 ORDER FOR CEPH: HCPCS | Performed by: SURGERY

## 2020-07-06 PROCEDURE — 75710 ARTERY X-RAYS ARM/LEG: CPT

## 2020-07-06 PROCEDURE — 85347 COAGULATION TIME ACTIVATED: CPT

## 2020-07-06 PROCEDURE — C1725 CATH, TRANSLUMIN NON-LASER: HCPCS

## 2020-07-06 PROCEDURE — NC001 PR NO CHARGE: Performed by: SURGERY

## 2020-07-06 DEVICE — PERCLOSE PROGLIDE™ SUTURE-MEDIATED CLOSURE SYSTEM
Type: IMPLANTABLE DEVICE | Site: GROIN | Status: FUNCTIONAL
Brand: PERCLOSE PROGLIDE™

## 2020-07-06 RX ORDER — SODIUM CHLORIDE, SODIUM LACTATE, POTASSIUM CHLORIDE, CALCIUM CHLORIDE 600; 310; 30; 20 MG/100ML; MG/100ML; MG/100ML; MG/100ML
INJECTION, SOLUTION INTRAVENOUS CONTINUOUS PRN
Status: DISCONTINUED | OUTPATIENT
Start: 2020-07-06 | End: 2020-07-06 | Stop reason: SURG

## 2020-07-06 RX ORDER — MIDAZOLAM HYDROCHLORIDE 2 MG/2ML
INJECTION, SOLUTION INTRAMUSCULAR; INTRAVENOUS AS NEEDED
Status: DISCONTINUED | OUTPATIENT
Start: 2020-07-06 | End: 2020-07-06 | Stop reason: SURG

## 2020-07-06 RX ORDER — SODIUM CHLORIDE 9 MG/ML
75 INJECTION, SOLUTION INTRAVENOUS CONTINUOUS
Status: CANCELLED | OUTPATIENT
Start: 2020-07-06 | End: 2020-07-06

## 2020-07-06 RX ORDER — FENTANYL CITRATE 50 UG/ML
INJECTION, SOLUTION INTRAMUSCULAR; INTRAVENOUS AS NEEDED
Status: DISCONTINUED | OUTPATIENT
Start: 2020-07-06 | End: 2020-07-06 | Stop reason: SURG

## 2020-07-06 RX ORDER — HEPARIN SODIUM 1000 [USP'U]/ML
INJECTION, SOLUTION INTRAVENOUS; SUBCUTANEOUS AS NEEDED
Status: DISCONTINUED | OUTPATIENT
Start: 2020-07-06 | End: 2020-07-06 | Stop reason: SURG

## 2020-07-06 RX ORDER — ACETAMINOPHEN 325 MG/1
650 TABLET ORAL EVERY 4 HOURS PRN
Status: DISCONTINUED | OUTPATIENT
Start: 2020-07-06 | End: 2020-07-06 | Stop reason: HOSPADM

## 2020-07-06 RX ORDER — LIDOCAINE HYDROCHLORIDE 10 MG/ML
INJECTION, SOLUTION EPIDURAL; INFILTRATION; INTRACAUDAL; PERINEURAL AS NEEDED
Status: DISCONTINUED | OUTPATIENT
Start: 2020-07-06 | End: 2020-07-06 | Stop reason: HOSPADM

## 2020-07-06 RX ORDER — ONDANSETRON 2 MG/ML
4 INJECTION INTRAMUSCULAR; INTRAVENOUS ONCE AS NEEDED
Status: DISCONTINUED | OUTPATIENT
Start: 2020-07-06 | End: 2020-07-06 | Stop reason: HOSPADM

## 2020-07-06 RX ORDER — FENTANYL CITRATE/PF 50 MCG/ML
25 SYRINGE (ML) INJECTION
Status: DISCONTINUED | OUTPATIENT
Start: 2020-07-06 | End: 2020-07-06 | Stop reason: HOSPADM

## 2020-07-06 RX ORDER — CEFAZOLIN SODIUM 1 G/3ML
INJECTION, POWDER, FOR SOLUTION INTRAMUSCULAR; INTRAVENOUS AS NEEDED
Status: DISCONTINUED | OUTPATIENT
Start: 2020-07-06 | End: 2020-07-06 | Stop reason: SURG

## 2020-07-06 RX ADMIN — HEPARIN SODIUM 2000 UNITS: 1000 INJECTION INTRAVENOUS; SUBCUTANEOUS at 09:10

## 2020-07-06 RX ADMIN — HEPARIN SODIUM 1000 UNITS: 1000 INJECTION INTRAVENOUS; SUBCUTANEOUS at 08:54

## 2020-07-06 RX ADMIN — FENTANYL CITRATE 25 MCG: 50 INJECTION, SOLUTION INTRAMUSCULAR; INTRAVENOUS at 08:38

## 2020-07-06 RX ADMIN — ACETAMINOPHEN 650 MG: 325 TABLET ORAL at 16:04

## 2020-07-06 RX ADMIN — HEPARIN SODIUM 6000 UNITS: 1000 INJECTION INTRAVENOUS; SUBCUTANEOUS at 08:56

## 2020-07-06 RX ADMIN — CEFAZOLIN 1000 MG: 1 INJECTION, POWDER, FOR SOLUTION INTRAVENOUS at 09:08

## 2020-07-06 RX ADMIN — SODIUM CHLORIDE, SODIUM LACTATE, POTASSIUM CHLORIDE, AND CALCIUM CHLORIDE: .6; .31; .03; .02 INJECTION, SOLUTION INTRAVENOUS at 07:55

## 2020-07-06 RX ADMIN — FENTANYL CITRATE 25 MCG: 50 INJECTION, SOLUTION INTRAMUSCULAR; INTRAVENOUS at 08:30

## 2020-07-06 RX ADMIN — ACETAMINOPHEN 650 MG: 325 TABLET ORAL at 11:08

## 2020-07-06 RX ADMIN — MIDAZOLAM 1 MG: 1 INJECTION INTRAMUSCULAR; INTRAVENOUS at 08:02

## 2020-07-06 NOTE — INTERVAL H&P NOTE
H&P reviewed  After examining the patient I find no changes in the patients condition since the H&P had been written  Plan: For RLE angiogram, possible intervention      Vitals:    07/06/20 0606   BP: 114/71   Pulse: 57   Resp: 16   Temp: (!) 96 °F (35 6 °C)   SpO2: 99%

## 2020-07-06 NOTE — ANESTHESIA PREPROCEDURE EVALUATION
Review of Systems/Medical History  Patient summary reviewed  Chart reviewed  No history of anesthetic complications     Cardiovascular  EKG reviewed, Exercise tolerance (METS): >4,  Hyperlipidemia, Hypertension controlled, Valve replacement aortic valve  replacement, Past MI , CAD , RODRIGUEZ, PVD,   Comment: ICM (EF ~20%)  RBBB  B/l carotid artery stenosis    Echo (2/4/20):  LEFT VENTRICLE:  The ventricle was moderately dilated  Systolic function was severely reduced in a global manner  However the inferoapex is less kinetic than the remaining segments  Ejection fraction was estimated to be 20 %     LEFT ATRIUM:  The atrium was dilated      AORTIC VALVE:  The valve is a #29 Mino Medeiros S3  The peak velocity across the valve was 2 2 m/sec and the valve area by continuity equation was 1 2 cm2  It is well seated and stable   No insufficiency is present    ,  Pulmonary  Smoker (quit 1994) ex-smoker  ,        GI/Hepatic  Negative GI/hepatic ROS          Negative  ROS        Endo/Other  Negative endo/other ROS      GYN       Hematology  Negative hematology ROS      Musculoskeletal  Back pain , Osteoarthritis,   Comment: PMR      Neurology  Negative neurology ROS      Psychology   Anxiety, Depression , being treated for depression,   Chronic pain,            Physical Exam    Airway    Mallampati score: II  TM Distance: >3 FB  Neck ROM: full     Dental   No notable dental hx     Cardiovascular  Rhythm: regular, Rate: normal, Cardiovascular exam normal    Pulmonary  Pulmonary exam normal Breath sounds clear to auscultation,     Other Findings      Lab Results   Component Value Date    WBC 8 30 06/19/2020    HGB 15 2 06/19/2020     06/19/2020     Lab Results   Component Value Date    SODIUM 137 06/19/2020    K 4 2 06/19/2020    BUN 18 06/19/2020    CREATININE 0 77 06/19/2020    EGFR 91 06/19/2020    GLUCOSE 148 (H) 11/12/2019     No results found for: PTT   Lab Results   Component Value Date    INR 0 99 06/19/2020 No results found for: HGBA1C    Type and Screen:  Lab Results   Component Value Date    ABO AB 11/06/2019    RH Positive 11/06/2019    ABS Negative 11/06/2019    SPECIMEXP [id number] 11/06/2019       Anesthesia Plan  ASA Score- 4     Anesthesia Type- IV sedation with anesthesia with ASA Monitors  Additional Monitors:   Airway Plan:     Comment: I, Ulises Madden MD, discussed risks (reviewed with patient on the anesthesia consent form), benefits and alternatives of monitored sedation including the possibility under sedation to have recall or mild discomfort        Plan Factors-    Induction- intravenous  Postoperative Plan- Plan for postoperative opioid use  Informed Consent- Anesthetic plan and risks discussed with patient  I personally reviewed this patient with the CRNA  Discussed and agreed on the Anesthesia Plan with the CRNA  Shashi Bateman

## 2020-07-06 NOTE — ANESTHESIA POSTPROCEDURE EVALUATION
Post-Op Assessment Note    CV Status:  Stable  Pain Score: 0    Pain management: adequate     Mental Status:  Alert and awake   Hydration Status:  Euvolemic   PONV Controlled:  Controlled   Airway Patency:  Patent   Post Op Vitals Reviewed: Yes      Staff: CRNA   Comments: pt able to maintain own airway, VSS, report to PACU RN          BP   143/62   Temp  98 2   Pulse  54   Resp   12   SpO2   100%

## 2020-07-06 NOTE — DISCHARGE INSTRUCTIONS
Angiogram   WHAT YOU SHOULD KNOW:   An angiogram is a procedure to look at arteries in your body  Arteries are the blood vessels that carry blood from your heart to your body  AFTER YOU LEAVE:   Follow up with your primary healthcare provider or cardiologist as directed:  Write down your questions so you remember to ask them during your visits  Self-care:   · Limit activity:  Rest for the remainder of the day of your procedure  Keep your arm or leg straight as much as possible  If the angiogram catheter was put in your leg, do not use stairs for a few days after your angiogram  When you must use stairs, step up with the leg that was not used for the angiogram  Straighten this leg to move the other leg up to the next step without putting stress on it  You may be told to avoid lifting more than 15 pounds for 5 days after your procedure  · Keep your wound clean and dry:  Ask your cardiologist when you can bathe  When you are allowed to bathe, carefully wash the incisions with soap and water  Dry the area and put on new, clean bandages as directed  Change your bandages if they get wet or dirty  · Watch for bleeding and bruising: It is normal to have a bruise and soreness where the angiogram catheter went in  Contact your cardiologist if your bruise gets larger  If your wound bleeds, use your hand to put pressure on the bandage  If you do not have a bandage, use a clean cloth to put pressure over and just above the puncture site  Seek care immediately  · Drink liquids as directed:  Ask your primary healthcare provider how much liquid to drink after your angiogram  This will help your body get rid of the dye used during the procedure  Limit caffeine  Do no drink alcohol for 24 hours after your angiogram   Contact your primary healthcare provider or cardiologist if:   · You have a fever  · Your catheter site is red, leaks pus, or smells bad  · You have increasing pain at your catheter site  · You have questions or concerns about your condition or care  Seek care immediately or call 911 if:   · The leg or arm used for your angiogram is numb, painful, or changes color  · The bruise at your catheter site gets bigger or becomes swollen  · Your catheter site bleeds  · You have weakness in an arm or leg  · You become confused or have difficulty speaking  · You have dizziness, a severe headache, or vision loss  © 2014 [employer] is for End User's use only and may not be sold, redistributed or otherwise used for commercial purposes  All illustrations and images included in CareNotes® are the copyrighted property of A D A M [employer]  or [employer]  The above information is an  only  It is not intended as medical advice for individual conditions or treatments  Talk to your doctor, nurse or pharmacist before following any medical regimen to see if it is safe and effective for you

## 2020-07-07 LAB — SARS-COV-2 RNA SPEC QL NAA+PROBE: NOT DETECTED

## 2020-07-07 NOTE — OP NOTE
OPERATIVE REPORT  PATIENT NAME: Queenie Villasenor    :  1950  MRN: [id number]  Pt Location: [facility] OR ROOM 02    SURGERY DATE: 2020    Surgeon(s) and Role:     * Staci Levi MD - Primary     * Ronak Davies DPM - Assisting    INDICATIONS:  Occlusion of distal popliteal artery, just distal to distal anastomosis of SFA to below knee popliteal artery bypass    PROCEDURE PERFORMED:  1  Ultrasound-guided access of the left common femoral artery  2  Diagnostic aortogram with bilateral pelvic runoff  3  Right lower extremity angiogram  4  Crossing of occluded popliteal artery and proximal anterior tibial artery  5  Angioplasty of proximal anterior tibial artery and popliteal artery using a 3 mm x 4 cm length chocolate balloon  6  Mynx closure left common femoral artery   7  Limited angiogram left lower extremity     SURGEON:  Luisito Alvarado MD    ANESTHESIA:  Local with sedation (MAC) directed by the anesthesia service  OPERATIVE PROCEDURE:  After patient identification and informed consent, the patient was taken to the procedure room in the interventional radiology suite and placed in a supine position  Adequate sedation was administered via IV route  The patients bilateral groins were cleaned and draped in sterile surgical fashion using Chlorhexidine  1% local Lidocaine was injected into the skin and subcutaneous tissue overlying the left femoral pulsation  Under ultrasound guidance a micro access needle was used to access the  left common femoral artery  There was diffuse calcific plaque in the posterior aspect of the common femoral artery, however it was widely patent  After obtaining pulsatile flow, a micro guidewire was inserted through the needle  The needle was removed and a 4 Western Fany micro access sheath was inserted over the guidewire using Seldinger technique    A micro sheath angiogram was then performed to ensure we were in the proper portion of the femoral artery and proximal to the proximal anastomosis: this was ensured  The micro wire was removed and a Cabrera wire was advanced under fluoroscopic guidance through the micro sheath and parked in the proximal abdominal aorta under fluoroscopic guidance  The micro sheath was removed and a 5 Jordanian sheath was inserted again using Seldinger technique over the wire  An Omni Flush catheter was next advanced over the guidewire and fluoroscopic guidance was used to park the catheter in the proximal abdominal aorta  The Omni flush catheter went up and over and selected the right external iliac artery  right lower extremity runoff was performed  A [employer] wire was then placed into the right SFA to popliteal artery bypass  A 6 Jordanian 65 cm destination sheath was then passed over the wire and placed into the mid superficial femoral artery just proximal to the proximal anastomosis  Patient then received 7000 units of intravenous heparin  Under roadmap technique a 0 014 Advantage wire and a 150 cm length 0 014 CXI catheter were used to cross the occluded distal popliteal and proximal anterior tibial artery  A 3 mm x 4 cm length chocolate balloon was then placed  Slow inflation to 14 atmospheres was then performed  Balloon was left in place for approximately 3 minutes  Completion angiogram now showed good flow through the bypass into the reconstituted distal popliteal and anterior tibial artery with runoff into the foot  At this point the 6 Jordanian sheath was replaced by a short 6 Western Fany sheath  A Mynx closure device was then used and hemostasis ensured  Patient tolerated the procedure well and left the hybrid suite in stable condition with an excellent right dorsalis pedis signal     A Mynx device was used to seal the left common femoral artery         RRADIOGRAPHIC SUPERVISION AND INTERPRETATION OF TEST:    1   Pelvic findings -   Left common iliac artery-patent  Left internal iliac artery-not well visualized  Left external iliac artery-patent    2  Left lower extremity angiogram findings-    common femoral artery- patent  Profunda femorals artery- patent  Superficial femoral artery-not well visualized  The proximal anastomosis of the femoral to below-knee popliteal artery bypass is not well visualized but appears to be patent    3  Right lower extremity angiogram findings-    common femoral artery- patent  Profunda femorals artery- patent  Superficial femoral artery-patent  The proximal anastomosis of the superficial femoral artery to below-knee popliteal artery bypass is noted to be widely patent  The bypass is noted to be widely patent  The popliteal artery distal, to the distal anastomosis is occluded  There is retrograde filling of the more proximal popliteal artery  Anterior tibial artery-occluded at its origin with reconstitution and continuous runoff into a dorsalis pedis  Tibioperoneal trunk-appears to be occluded  Posterior tibial artery-appears to reconstitute in the proximal leg  Peroneal artery-not well visualized  Plantar arch-not well visualized      Contrast Type/Amount -  16 CC VISIPAQUE 320    Fluoro Time (Mandatory) -  7 MIN  Radiation: 27mGy    Devices - MYNX       PLAN:    Continue aspirin, statin, Plavix  Patient will continue to undergo surveillance studies  Plan for discharge home later today         I was present for the entire procedure and A qualified resident physician was not available    Patient Disposition:  PACU     SIGNATURE: Demarco Mendoza MD  DATE: July 7, 2020  TIME: 9:02 AM

## 2020-07-13 DIAGNOSIS — I20.8 ANGINA AT REST (HCC): ICD-10-CM

## 2020-07-14 ENCOUNTER — TELEPHONE (OUTPATIENT)
Dept: VASCULAR SURGERY | Facility: CLINIC | Age: 70
End: 2020-07-14

## 2020-07-14 RX ORDER — RANOLAZINE 500 MG/1
TABLET, EXTENDED RELEASE ORAL
Qty: 180 TABLET | Refills: 4 | Status: SHIPPED | OUTPATIENT
Start: 2020-07-14

## 2020-07-15 ENCOUNTER — TELEMEDICINE (OUTPATIENT)
Dept: VASCULAR SURGERY | Facility: CLINIC | Age: 70
End: 2020-07-15
Payer: MEDICARE

## 2020-07-15 VITALS
TEMPERATURE: 97 F | DIASTOLIC BLOOD PRESSURE: 74 MMHG | HEIGHT: 67 IN | HEART RATE: 69 BPM | BODY MASS INDEX: 25.9 KG/M2 | WEIGHT: 165 LBS | SYSTOLIC BLOOD PRESSURE: 125 MMHG

## 2020-07-15 DIAGNOSIS — I73.9 PAD (PERIPHERAL ARTERY DISEASE) (HCC): ICD-10-CM

## 2020-07-15 DIAGNOSIS — Z11.59 SCREENING FOR VIRAL DISEASE: Primary | ICD-10-CM

## 2020-07-15 PROCEDURE — 1160F RVW MEDS BY RX/DR IN RCRD: CPT | Performed by: SURGERY

## 2020-07-15 PROCEDURE — 3074F SYST BP LT 130 MM HG: CPT | Performed by: SURGERY

## 2020-07-15 PROCEDURE — 99212 OFFICE O/P EST SF 10 MIN: CPT | Performed by: SURGERY

## 2020-07-15 PROCEDURE — 4040F PNEUMOC VAC/ADMIN/RCVD: CPT | Performed by: SURGERY

## 2020-07-15 PROCEDURE — 3078F DIAST BP <80 MM HG: CPT | Performed by: SURGERY

## 2020-07-15 PROCEDURE — 1036F TOBACCO NON-USER: CPT | Performed by: SURGERY

## 2020-07-15 PROCEDURE — 3008F BODY MASS INDEX DOCD: CPT | Performed by: SURGERY

## 2020-07-15 NOTE — PROGRESS NOTES
Virtual Regular Visit      Assessment/Plan:    Problem List Items Addressed This Visit        Cardiovascular and Mediastinum    PAD (peripheral artery disease) ([facility] [address] )     Status post intervention of bilateral lower extremities  Will continue to monitor was surveillance testing  Continue aspirin, Plavix and Crestor  Relevant Orders    VAS lower limb arterial duplex, complete bilateral      Other Visit Diagnoses     Screening for viral disease    -  Primary               Reason for visit is   Chief Complaint   Patient presents with    result review    Virtual Regular Visit        Encounter provider Nader Roberts MD    Provider located at [address] [address] [phone]      Recent Visits  No visits were found meeting these conditions  Showing recent visits within past 7 days and meeting all other requirements     Today's Visits  Date Type Provider Dept   07/15/20 Telemedicine Nader Roberts  [facility] today's visits and meeting all other requirements     Future Appointments  No visits were found meeting these conditions  Showing future appointments within next 150 days and meeting all other requirements        The patient was identified by name and date of birth  Lety Perry was informed that this is a telemedicine visit and that the visit is being conducted through [employer] and patient was informed that this is not a secure, HIPAA-complaint platform  He agrees to proceed     My office door was closed  No one else was in the room  He acknowledged consent and understanding of privacy and security of the video platform  The patient has agreed to participate and understands they can discontinue the visit at any time  Patient is aware this is a billable service       Subjective  Lety Perry is a 65 y o  male with history of bilateral lower extremity SFA to below knee popliteal artery bypass   Patient underwent atherectomy of pre occlusive lesions in the common femoral arteries bilaterally  Procedure was performed for recurrence claudication  Initial surveillance studies showed significant improvement  However most recent study performed 06/08/2020 shows restenosis of the left common femoral artery  Patient has subsequently undergone bilateral lower extremity and intervention on 06/29/2020 patient underwent jet stream atherectomy of the left common femoral artery proximal to the graft origin  This was followed by a 7 mm DCB with an excellent result  On 07/06/2020 patient underwent recannulization of an occluded popliteal artery just distal to the popliteal artery anastomosis  Angioplasty using a 3 mm chocolate balloon was performed across this lesion into the proximal anterior tibial artery  Procedure showed excellent results  Patient is on aspirin, Plavix and Crestor  Will obtain noninvasive testing in 6 weeks  The velocity is measured at 378 centimeters/second  In January of this year the velocity was measured at 196 centimeters/second  The ZACH has decreased from 1 07-0 85  On the right there is no significant change to the ZACH measured at 0 6  There is significantly elevated velocities at the profundus however the common femoral appears patent  The bilateral bypass is are patent  There does appear to be significant tibioperoneal occlusive disease  Patient does complain of worsening claudication now only able to walk approximately 6 minutes on the treadmill  He remains very active as he is the primary caregiver for his wife who is minimally ambulatory  I have recommended to the patient he undergo repeat angiogram with intervention  He will get his affairs in order in listing family members to assist with care for his wife  We will schedule the angiogram/intervention in the next few weeks once he has established appropriate care for his wife    He continues on aspirin, Plavix and statin  Naranjito Delacruz HPI     Past Medical History:   Diagnosis Date    Anxiety     Benign essential hypertension     Carotid stenosis, asymptomatic, bilateral     97-69% GLENN, <36% LICA    Coronary artery disease     Depression     Diffuse arthralgia     Last Assessed: 1/23/2017    Former tobacco use     History of alcohol use     3-6 beers/day x20 years, no residual liver disease    History of Lyme disease     History of rheumatic fever     Hypercholesterolemia     Hyperlipidemia     Hyperlipidemia     Hypertension     Ischemic cardiomyopathy     Myocardial infarction ([address] )     Osteoarthritis, hip, bilateral     PAD (peripheral artery disease) (HCC)     s/p fem-below the knee bypass & percutaneous tx right CFA/SFA & left external iliac/CFA    Pneumonia     Polymyalgia rheumatica (HCC)     on chronic steroids    RBBB     Rotator cuff disorder     b/l    Severe aortic stenosis     Wears hearing aid     b/l       Past Surgical History:   Procedure Laterality Date    CARDIAC CATHETERIZATION      FEMORAL BYPASS Bilateral     fem-below knee w/ GSV    IR ABDOMINAL ANGIOGRAPHY / INTERVENTION  8/15/2019    IR ABDOMINAL ANGIOGRAPHY / INTERVENTION  9/17/2019    IR ABDOMINAL ANGIOGRAPHY / INTERVENTION  6/29/2020    IR ABDOMINAL ANGIOGRAPHY / INTERVENTION  7/6/2020    DC ECHO TRANSESOPHAG R-T 2D W/PRB IMG ACQUISJ I&R N/A 11/12/2019    Procedure: TRANSESOPHAGEAL ECHOCARDIOGRAM (SANAZ);   Surgeon: Bryson Sheikh DO;  [facility];  Service: Cardiac Surgery    DC REPLACE AORTIC VALVE OPENFEMORAL ARTERY APPROACH N/A 11/12/2019    Procedure: REPLACEMENT AORTIC VALVE TRANSCATHETER (TAVR) TRANSFEMORAL WITH 29 MM MEEKS MIKEL S3 VALVE (ACCESS ON THE RIGHT); RIGHT GROIN CUT DOWN;  Surgeon: Bryson Sheikh DO;  [facility];  Service: Cardiac Surgery    DC SLCTV CATHJ 3RD+ ORD SLCTV ABDL PEL/LXTR [facility] Right 8/15/2019    Procedure: LEFT GROIN ACCESS RIGHT LEG ARTERIOGRAM WITH ARTHRECTOMY, LEFT LEG RUNOFF;  Surgeon: Radha Saunders MD;  [facility];  Service: Vascular    ADELITA Loyd Yennifer 3RD+ ORD SLCTV ABDL PEL/[facility] Right 6/29/2020    Procedure: ARTERIOGRAM Angiogram/Intervention Right Groin;  Surgeon: Radha Saunders MD;  [facility];  Service: Vascular    VA Rach Yennifer 3RD+ ORD [address] PEL/[facility] Left 7/6/2020    Procedure: ARTERIOGRAM right groin approach imaging on patients left Angiopasty of popliteal and tibial artery;  Surgeon: Radha Saunders MD;  [facility];  Service: Vascular    TONSILLECTOMY AND ADENOIDECTOMY  childhood       Current Outpatient Medications   Medication Sig Dispense Refill    acetaminophen (TYLENOL) 325 mg tablet Take 2,000 mg by mouth every 6 (six) hours as needed for mild pain       aspirin (ECOTRIN LOW STRENGTH) 81 mg EC tablet Take 81 mg by mouth daily      Cholecalciferol (VITAMIN D3) 2000 units TABS Take 2,000 Units by mouth daily      clopidogrel (PLAVIX) 75 mg tablet Take 1 tablet (75 mg total) by mouth daily 180 tablet 3    cyanocobalamin (VITAMIN B-12) 500 mcg tablet Take 500 mcg by mouth daily      DULoxetine (CYMBALTA) 30 mg delayed release capsule Take 1 capsule (30 mg total) by mouth daily At bedtime 30 capsule 1    isosorbide mononitrate (IMDUR) 30 mg 24 hr tablet Take 1 tablet (30 mg total) by mouth daily 90 tablet 3    metoprolol tartrate (LOPRESSOR) 25 mg tablet Take 0 5 tablets (12 5 mg total) by mouth every 12 (twelve) hours 180 tablet 0    Multiple Vitamin (MULTIVITAMIN) tablet Take 1 tablet by mouth daily      predniSONE 5 mg tablet Take 10mg (2 tabs) for 2 weeks, then 7 5mg (1 5 tabs) for 4 weeks, then 5mg daily and stay on this dose   (Patient taking differently: Take 5 mg by mouth daily ) 120 tablet 1    ranolazine (RANEXA) 500 mg 12 hr tablet take 1 tablet by mouth twice a day 180 tablet 4    rosuvastatin (CRESTOR) 5 mg tablet take 1 tablet by mouth once daily (Patient taking differently: Take 1 tablet every other day) 90 tablet 3    sacubitril-valsartan (ENTRESTO) 24-26 MG TABS Take 1 tablet by mouth 2 (two) times a day 60 tablet 3    triamterene-hydrochlorothiazide (DYAZIDE) 37 5-25 mg per capsule Take 1 capsule by mouth daily 90 capsule 3     No current facility-administered medications for this visit  Allergies   Allergen Reactions    Pregabalin GI Intolerance, Dizziness, Drowsiness and Lightheadedness     Lyrica    Atorvastatin Myalgia and Arthralgia    Avelox [Moxifloxacin] Diarrhea    Gabapentin Dizziness    Pravastatin Myalgia and Arthralgia    Rosuvastatin Calcium Myalgia and Arthralgia    Simvastatin Myalgia and Arthralgia       Review of Systems    Video Exam    Vitals:    07/15/20 0850   BP: 125/74   BP Location: Left arm   Patient Position: Sitting   Cuff Size: Standard   Pulse: 69   Temp: (!) 97 °F (36 1 °C)   TempSrc: Temporal   Weight: 74 8 kg (165 lb)   Height: 5' 6 5" (1 689 m)       Physical Exam     I spent 15 minutes directly with the patient during this visit      VIRTUAL VISIT [address] acknowledges that he has consented to an online visit or consultation  He understands that the online visit is based solely on information provided by him, and that, in the absence of a face-to-face physical evaluation by the physician, the diagnosis he receives is both limited and provisional in terms of accuracy and completeness  This is not intended to replace a full medical face-to-face evaluation by the physician  Elvia Kelley understands and accepts these terms      Review of Systems -   General ROS: negative  Psychological ROS: negative  Ophthalmic ROS: negative  ENT ROS: negative  Allergy and Immunology ROS: negative  Hematological and Lymphatic ROS: negative  Endocrine ROS: negative  Respiratory ROS: no cough, shortness of breath, or wheezing  Cardiovascular ROS: no chest pain or dyspnea on exertion  Gastrointestinal ROS: no abdominal pain, change in bowel habits, or black or bloody stools  Genito-Urinary ROS: no dysuria, trouble voiding, or hematuria  Musculoskeletal ROS: negative  Neurological ROS: no TIA or stroke symptoms  Dermatological ROS: negative        Physical Exam:    Vitals: Blood pressure 125/74, pulse 69, temperature (!) 97 °F (36 1 °C), temperature source Temporal, height 5' 6 5" (1 689 m), weight 74 8 kg (165 lb)  , Body mass index is 26 23 kg/m² ,   Wt Readings from Last 3 Encounters:   07/15/20 74 8 kg (165 lb)   06/29/20 76 2 kg (168 lb)   06/26/20 76 2 kg (168 lb)     /74 (BP Location: Left arm, Patient Position: Sitting, Cuff Size: Standard) Comment: Per patient BP machine-virtual visit Comment (BP Location): per patient virtual visit Comment (Patient Position): per patient virtual visit Comment (Cuff Size): per patient virtual visit  Pulse 69 Comment: per patient bp machine virtual visit  Temp (!) 97 °F (36 1 °C) (Temporal) Comment: per patient virtual visit Comment (Src): per patient virtual visit  Ht 5' 6 5" (1 689 m) Comment: per patient virtual visit  Wt 74 8 kg (165 lb) Comment: per patient virtual visit  BMI 26 23 kg/m²           Labs & Results:      Imaging review:      Assessment/Plan:    PAD (peripheral artery disease) ([facility] [address] )  Status post intervention of bilateral lower extremities  Will continue to monitor was surveillance testing  Continue aspirin, Plavix and Crestor  Thank you for the opportunity to participate in the care of this patient

## 2020-07-15 NOTE — ASSESSMENT & PLAN NOTE
Status post intervention of bilateral lower extremities  Will continue to monitor was surveillance testing  Continue aspirin, Plavix and Crestor

## 2020-08-03 ENCOUNTER — OFFICE VISIT (OUTPATIENT)
Dept: PAIN MEDICINE | Facility: CLINIC | Age: 70
End: 2020-08-03
Payer: MEDICARE

## 2020-08-03 ENCOUNTER — TELEPHONE (OUTPATIENT)
Dept: OBGYN CLINIC | Facility: HOSPITAL | Age: 70
End: 2020-08-03

## 2020-08-03 VITALS
SYSTOLIC BLOOD PRESSURE: 124 MMHG | DIASTOLIC BLOOD PRESSURE: 74 MMHG | HEIGHT: 67 IN | WEIGHT: 168 LBS | TEMPERATURE: 98.4 F | BODY MASS INDEX: 26.37 KG/M2

## 2020-08-03 DIAGNOSIS — G89.4 CHRONIC PAIN SYNDROME: Primary | ICD-10-CM

## 2020-08-03 DIAGNOSIS — M51.36 LUMBAR DEGENERATIVE DISC DISEASE: ICD-10-CM

## 2020-08-03 DIAGNOSIS — M35.3 POLYMYALGIA RHEUMATICA (HCC): ICD-10-CM

## 2020-08-03 DIAGNOSIS — I73.9 PAD (PERIPHERAL ARTERY DISEASE) (HCC): ICD-10-CM

## 2020-08-03 DIAGNOSIS — G62.9 PERIPHERAL POLYNEUROPATHY: ICD-10-CM

## 2020-08-03 DIAGNOSIS — M54.16 LUMBAR RADICULOPATHY: ICD-10-CM

## 2020-08-03 PROCEDURE — 3078F DIAST BP <80 MM HG: CPT | Performed by: NURSE PRACTITIONER

## 2020-08-03 PROCEDURE — 4040F PNEUMOC VAC/ADMIN/RCVD: CPT | Performed by: NURSE PRACTITIONER

## 2020-08-03 PROCEDURE — 1036F TOBACCO NON-USER: CPT | Performed by: NURSE PRACTITIONER

## 2020-08-03 PROCEDURE — 3074F SYST BP LT 130 MM HG: CPT | Performed by: NURSE PRACTITIONER

## 2020-08-03 PROCEDURE — 3008F BODY MASS INDEX DOCD: CPT | Performed by: NURSE PRACTITIONER

## 2020-08-03 PROCEDURE — 1160F RVW MEDS BY RX/DR IN RCRD: CPT | Performed by: NURSE PRACTITIONER

## 2020-08-03 PROCEDURE — 99213 OFFICE O/P EST LOW 20 MIN: CPT | Performed by: NURSE PRACTITIONER

## 2020-08-03 RX ORDER — PREDNISONE 1 MG/1
5 TABLET ORAL DAILY
Qty: 90 TABLET | Refills: 0 | Status: SHIPPED | OUTPATIENT
Start: 2020-08-03

## 2020-08-03 RX ORDER — DULOXETINE 40 MG/1
40 CAPSULE, DELAYED RELEASE ORAL DAILY
Qty: 30 CAPSULE | Refills: 2 | Status: SHIPPED | OUTPATIENT
Start: 2020-08-03 | End: 2020-09-21

## 2020-08-03 NOTE — TELEPHONE ENCOUNTER
DR Kenny Shock   Re: Refill   #   [phone]     Patient called asking if appt on 09 09 can be virtual?? Refill    predniSONE 5 mg tablet [[id number]]     Order Details   Dose, Route, Frequency: As Directed    Dispense Quantity: 120 tablet  Refills: 1  Fills remaining: --             Sig: Take 10mg (2 tabs) for 2 weeks, then 7 5mg (1 5 tabs) for 4 weeks, then 5mg daily and stay on this dose  Patient taking differently: Take 5 mg by mouth daily           [facility] [address] [place] [address], [address] [place] [address]  DR OHARA#7  [phone]  Associate Signed OrdersPatient EstimateProvidersCurrent Interactions

## 2020-08-03 NOTE — PROGRESS NOTES
Assessment:  1  Chronic pain syndrome    2  PAD (peripheral artery disease) ([facility] [address] )    3  Peripheral polyneuropathy    4  Lumbar radiculopathy    5  Lumbar degenerative disc disease        Plan:   While the patient was in the office today, I did have a thorough conversation regarding their chronic pain syndrome, medication management, and treatment plan options  Patient is a 26-year-old male with chronic pain syndrome related to peripheral vascular disease, peripheral polyneuropathy, lumbar radiculopathy  He was last seen here on 06/22/2020 at which time he was switched from amitriptyline to Cymbalta 30 mg at bedtime  He tells me that the Cymbalta is helping his overall pain by 25-50%, depending on the day  As such, we will continue to provide him with this prescription  We will increase Cymbalta to 40 mg at bedtime  Prescription was sent electronically to his pharmacy with 2 refills  He recently underwent bilateral lower extremity angioplasty which has also provided him with some additional pain relief in his lower extremities  He states that he is able to walk longer periods without pain now  The patient will follow-up in 6 weeks for medication prescription refill and reevaluation  The patient was advised to contact the office should their symptoms worsen in the interim  The patient was agreeable and verbalized an understanding  History of Present Illness: The patient is a 65 y o  male who presents for a follow up office visit in regards to Shoulder Pain; Arm Pain; Hand Pain; Back Pain; Foot Pain; and Leg Pain  The patients current symptoms include  Low back and bilateral lower extremity pain, bilateral shoulder pain, right upper extremity pain  Current pain level is a 6 -7/10  Pain is described as burning, dull, aching, throbbing, shooting, pins and needles  Current pain medications includes:  Cymbalta 30 mg at bedtime      The patient reports that this regimen is providing 25-50%% pain relief  The patient is reporting no side effects from this pain medication regimen  I have personally reviewed and/or updated the patient's past medical history, past surgical history, family history, social history, current medications, allergies, and vital signs today  Review of Systems  Review of Systems   Musculoskeletal: Positive for myalgias  Decreased range of motion  Joint stiffness  Pain in extremity   Skin: Positive for rash  Neurological: Positive for dizziness  All other systems reviewed and are negative          Past Medical History:   Diagnosis Date    Anxiety     Benign essential hypertension     Carotid stenosis, asymptomatic, bilateral     36-03% GLENN, <98% LICA    Coronary artery disease     Depression     Diffuse arthralgia     Last Assessed: 1/23/2017    Former tobacco use     History of alcohol use     3-6 beers/day x20 years, no residual liver disease    History of Lyme disease     History of rheumatic fever     Hypercholesterolemia     Hyperlipidemia     Hyperlipidemia     Hypertension     Ischemic cardiomyopathy     Myocardial infarction ([facility] [address] )     Osteoarthritis, hip, bilateral     PAD (peripheral artery disease) (HCC)     s/p fem-below the knee bypass & percutaneous tx right CFA/SFA & left external iliac/CFA    Pneumonia     Polymyalgia rheumatica (HCC)     on chronic steroids    RBBB     Rotator cuff disorder     b/l    Severe aortic stenosis     Wears hearing aid     b/l       Past Surgical History:   Procedure Laterality Date    CARDIAC CATHETERIZATION      FEMORAL BYPASS Bilateral     fem-below knee w/ GSV    IR ABDOMINAL ANGIOGRAPHY / INTERVENTION  8/15/2019    IR ABDOMINAL ANGIOGRAPHY / INTERVENTION  9/17/2019    IR ABDOMINAL ANGIOGRAPHY / INTERVENTION  6/29/2020    IR ABDOMINAL ANGIOGRAPHY / INTERVENTION  7/6/2020    MO ECHO TRANSESOPHAG R-T 2D W/PRB IMG JOB I&R N/A 11/12/2019    Procedure: TRANSESOPHAGEAL ECHOCARDIOGRAM (SANAZ); Surgeon: Rowan Chung DO;  [facility];  Service: Cardiac Surgery    CT REPLACE AORTIC VALVE OPENFEMORAL ARTERY APPROACH N/A 2019    Procedure: REPLACEMENT AORTIC VALVE TRANSCATHETER (TAVR) TRANSFEMORAL WITH 29 MM MEEKS MIKEL S3 VALVE (ACCESS ON THE RIGHT); RIGHT GROIN CUT DOWN;  Surgeon: Rowan Chung DO;  Location: [facility];  Service: Cardiac Surgery    [facility] Right 8/15/2019    Procedure: LEFT GROIN ACCESS RIGHT LEG ARTERIOGRAM WITH ARTHRECTOMY, LEFT LEG RUNOFF;  Surgeon: Sandra Adrian MD;  [facility];  Service: Vascular    [facility] [address] [facility] Right 2020    Procedure: ARTERIOGRAM Angiogram/Intervention Right Groin;  Surgeon: Sandra Adrian MD;  [facility];  Service: Vascular    [address] 3RD+ ORD [address] PEL/[facility] Left 2020    Procedure: ARTERIOGRAM right groin approach imaging on patients left Angiopasty of popliteal and tibial artery;  Surgeon: Sandra Adrian MD;  [facility];  Service: Vascular    TONSILLECTOMY AND ADENOIDECTOMY  childhood       Family History   Problem Relation Age of Onset    Cancer Mother         cervix    Coronary artery disease Father     Heart attack Father     Cancer Brother     Coronary artery disease Maternal Grandfather     Heart disease Paternal Grandfather     Stroke Paternal Grandfather         CVA    Heart attack Paternal Grandfather     Coronary artery disease Other          at age 78 per [employer]       Social History     Occupational History    Occupation: Environmental Supervisor   Tobacco Use    Smoking status: Former Smoker     Packs/day: 2 00     Years: 20 00     Pack years: 40 00     Types: Cigarettes     Last attempt to quit:      Years since quittin 6    Smokeless tobacco: Former User     Types: Chew   Substance and Sexual Activity    Alcohol use: Not Currently     Frequency: Never     Comment: 3-6 elfego hudson x20 years, quit 1992, no residual liver issues    Drug use: Never    Sexual activity: Not Currently         Current Outpatient Medications:     acetaminophen (TYLENOL) 325 mg tablet, Take 2,000 mg by mouth every 6 (six) hours as needed for mild pain , Disp: , Rfl:     aspirin (ECOTRIN LOW STRENGTH) 81 mg EC tablet, Take 81 mg by mouth daily, Disp: , Rfl:     Cholecalciferol (VITAMIN D3) 2000 units TABS, Take 2,000 Units by mouth daily, Disp: , Rfl:     clopidogrel (PLAVIX) 75 mg tablet, Take 1 tablet (75 mg total) by mouth daily, Disp: 180 tablet, Rfl: 3    cyanocobalamin (VITAMIN B-12) 500 mcg tablet, Take 500 mcg by mouth daily, Disp: , Rfl:     DULoxetine 40 MG CPEP, Take 1 capsule (40 mg total) by mouth daily At bedtime, Disp: 30 capsule, Rfl: 2    isosorbide mononitrate (IMDUR) 30 mg 24 hr tablet, Take 1 tablet (30 mg total) by mouth daily, Disp: 90 tablet, Rfl: 3    metoprolol tartrate (LOPRESSOR) 25 mg tablet, Take 0 5 tablets (12 5 mg total) by mouth every 12 (twelve) hours, Disp: 180 tablet, Rfl: 0    Multiple Vitamin (MULTIVITAMIN) tablet, Take 1 tablet by mouth daily, Disp: , Rfl:     predniSONE 5 mg tablet, Take 10mg (2 tabs) for 2 weeks, then 7 5mg (1 5 tabs) for 4 weeks, then 5mg daily and stay on this dose   (Patient taking differently: Take 5 mg by mouth daily ), Disp: 120 tablet, Rfl: 1    ranolazine (RANEXA) 500 mg 12 hr tablet, take 1 tablet by mouth twice a day, Disp: 180 tablet, Rfl: 4    rosuvastatin (CRESTOR) 5 mg tablet, take 1 tablet by mouth once daily (Patient taking differently: Take 1 tablet every other day), Disp: 90 tablet, Rfl: 3    sacubitril-valsartan (ENTRESTO) 24-26 MG TABS, Take 1 tablet by mouth 2 (two) times a day, Disp: 60 tablet, Rfl: 3    triamterene-hydrochlorothiazide (DYAZIDE) 37 5-25 mg per capsule, Take 1 capsule by mouth daily, Disp: 90 capsule, Rfl: 3    Allergies   Allergen Reactions    Pregabalin GI Intolerance, Dizziness, Drowsiness and Lightheadedness     Lyrica    Atorvastatin Myalgia and Arthralgia    Avelox [Moxifloxacin] Diarrhea    Gabapentin Dizziness    Pravastatin Myalgia and Arthralgia    Rosuvastatin Calcium Myalgia and Arthralgia    Simvastatin Myalgia and Arthralgia       Physical Exam:    /74   Temp 98 4 °F (36 9 °C)   Ht 5' 6 5"   Wt 76 2 kg (168 lb)   BMI 26 71 kg/m²     Constitutional:normal, well developed, well nourished, alert, in no distress and non-toxic and no overt pain behavior  Eyes:anicteric  HEENT:grossly intact  Neck:supple, symmetric, trachea midline and no masses   Pulmonary:even and unlabored  Cardiovascular:No edema or pitting edema present  Skin:Normal without rashes or lesions and well hydrated  Psychiatric:Mood and affect appropriate  Neurologic:Cranial Nerves II-XII grossly intact  Musculoskeletal:normal    Imaging  No orders to display       No orders of the defined types were placed in this encounter

## 2020-08-13 DIAGNOSIS — G89.4 CHRONIC PAIN SYNDROME: ICD-10-CM

## 2020-08-13 DIAGNOSIS — M51.36 LUMBAR DEGENERATIVE DISC DISEASE: ICD-10-CM

## 2020-08-13 DIAGNOSIS — M54.16 LUMBAR RADICULOPATHY: ICD-10-CM

## 2020-08-13 DIAGNOSIS — G62.9 PERIPHERAL POLYNEUROPATHY: ICD-10-CM

## 2020-08-17 RX ORDER — DULOXETIN HYDROCHLORIDE 30 MG/1
CAPSULE, DELAYED RELEASE ORAL
Qty: 30 CAPSULE | Refills: 1 | OUTPATIENT
Start: 2020-08-17

## 2020-08-26 DIAGNOSIS — I25.118 CORONARY ARTERY DISEASE OF NATIVE ARTERY OF NATIVE HEART WITH STABLE ANGINA PECTORIS (HCC): ICD-10-CM

## 2020-08-26 RX ORDER — ISOSORBIDE MONONITRATE 30 MG/1
TABLET, EXTENDED RELEASE ORAL
Qty: 90 TABLET | Refills: 3 | Status: SHIPPED | OUTPATIENT
Start: 2020-08-26

## 2020-09-03 ENCOUNTER — OFFICE VISIT (OUTPATIENT)
Dept: FAMILY MEDICINE CLINIC | Facility: CLINIC | Age: 70
End: 2020-09-03
Payer: MEDICARE

## 2020-09-03 VITALS
WEIGHT: 169 LBS | HEART RATE: 78 BPM | SYSTOLIC BLOOD PRESSURE: 92 MMHG | HEIGHT: 67 IN | BODY MASS INDEX: 26.53 KG/M2 | DIASTOLIC BLOOD PRESSURE: 58 MMHG | TEMPERATURE: 98 F | OXYGEN SATURATION: 98 %

## 2020-09-03 DIAGNOSIS — Z00.00 MEDICARE ANNUAL WELLNESS VISIT, SUBSEQUENT: Primary | ICD-10-CM

## 2020-09-03 DIAGNOSIS — Z13.31 NEGATIVE DEPRESSION SCREENING: ICD-10-CM

## 2020-09-03 DIAGNOSIS — I10 ESSENTIAL HYPERTENSION: ICD-10-CM

## 2020-09-03 DIAGNOSIS — R73.9 HYPERGLYCEMIA: ICD-10-CM

## 2020-09-03 DIAGNOSIS — E78.00 HYPERCHOLESTEROLEMIA: ICD-10-CM

## 2020-09-03 PROCEDURE — G0439 PPPS, SUBSEQ VISIT: HCPCS | Performed by: FAMILY MEDICINE

## 2020-09-03 PROCEDURE — 99214 OFFICE O/P EST MOD 30 MIN: CPT | Performed by: FAMILY MEDICINE

## 2020-09-03 RX ORDER — A/SINGAPORE/GP1908/2015 IVR-180 (AN A/MICHIGAN/45/2015 (H1N1)PDM09-LIKE VIRUS, A/HONG KONG/4801/2014, NYMC X-263B (H3N2) (AN A/HONG KONG/4801/2014-LIKE VIRUS), AND B/BRISBANE/60/2008, WILD TYPE (A B/BRISBANE/60/2008-LIKE VIRUS) 15; 15; 15 UG/.5ML; UG/.5ML; UG/.5ML
INJECTION, SUSPENSION INTRAMUSCULAR
COMMUNITY
Start: 2020-09-01 | End: 2020-11-16 | Stop reason: CLARIF

## 2020-09-03 NOTE — PROGRESS NOTES
Assessment/Plan:    No problem-specific Assessment & Plan notes found for this encounter  Diagnoses and all orders for this visit:    Medicare annual wellness visit, subsequent    Essential hypertension  Comments:  BP too low stop dyazide    Hypercholesterolemia  Comments:  pt counseled on diet and exercise    Negative depression screening    Hyperglycemia  Comments:  pt counseled on diet and exercise    Other orders  -     Fluad Quadrivalent 0 5 ML PRSY; inject 0 5 milliliters intramuscularly          PHQ-9 Depression Screening    PHQ-9:    Frequency of the following problems over the past two weeks:       Little interest or pleasure in doing things:  1 - several days  Feeling down, depressed, or hopeless:  1 - several days  PHQ-2 Score:  2            Subjective:      Patient ID: Kamlesh Dave is a 65 y o  male  Hypertension   This is a chronic problem  The current episode started more than 1 year ago  The problem is unchanged  The problem is controlled  Pertinent negatives include no chest pain, palpitations or shortness of breath  There are no associated agents to hypertension  Risk factors for coronary artery disease include male gender and sedentary lifestyle  Past treatments include angiotensin blockers, beta blockers and diuretics  Compliance problems include diet and exercise  The following portions of the patient's history were reviewed and updated as appropriate: allergies, current medications, past family history, past medical history, past social history, past surgical history and problem list     Review of Systems   Constitutional: Negative  Negative for chills, fatigue and fever  HENT: Negative  Eyes: Negative  Respiratory: Negative for shortness of breath and wheezing  Cardiovascular: Negative for chest pain and palpitations  Gastrointestinal: Negative for abdominal pain, blood in stool, constipation, diarrhea, nausea and vomiting  Endocrine: Negative  Genitourinary: Negative for difficulty urinating and dysuria  Musculoskeletal: Negative for arthralgias and myalgias  Skin: Negative  Allergic/Immunologic: Negative  Neurological: Negative for seizures and syncope  Hematological: Negative for adenopathy  Psychiatric/Behavioral: Negative  Objective:    BP 92/58   Pulse 78   Temp 98 °F (36 7 °C)   Ht 5' 6 5" (1 689 m)   Wt 76 7 kg (169 lb)   SpO2 98%   BMI 26 87 kg/m²      Physical Exam  Vitals signs and nursing note reviewed  Constitutional:       General: He is not in acute distress  Appearance: Normal appearance  He is well-developed  He is not ill-appearing, toxic-appearing or diaphoretic  HENT:      Head: Normocephalic and atraumatic  Right Ear: Tympanic membrane, ear canal and external ear normal  There is no impacted cerumen  Left Ear: Tympanic membrane, ear canal and external ear normal  There is no impacted cerumen  Nose: Nose normal  No congestion or rhinorrhea  Mouth/Throat:      Mouth: Mucous membranes are moist       Pharynx: No oropharyngeal exudate or posterior oropharyngeal erythema  Eyes:      General: No scleral icterus  Conjunctiva/sclera: Conjunctivae normal       Pupils: Pupils are equal, round, and reactive to light  Neck:      Musculoskeletal: Normal range of motion and neck supple  No neck rigidity  Cardiovascular:      Rate and Rhythm: Normal rate and regular rhythm  Heart sounds: Normal heart sounds  No murmur  No friction rub  No gallop  Pulmonary:      Effort: Pulmonary effort is normal  No respiratory distress  Breath sounds: Normal breath sounds  No wheezing or rales  Abdominal:      General: Bowel sounds are normal  There is no distension  Palpations: Abdomen is soft  There is no mass  Tenderness: There is no abdominal tenderness  There is no guarding or rebound  Musculoskeletal: Normal range of motion  Right lower leg: No edema  Left lower leg: No edema  Lymphadenopathy:      Cervical: No cervical adenopathy  Skin:     General: Skin is warm and dry  Findings: No rash  Neurological:      General: No focal deficit present  Mental Status: He is alert and oriented to person, place, and time  Sensory: No sensory deficit  Motor: No weakness  Coordination: Coordination normal       Gait: Gait normal       Deep Tendon Reflexes: Reflexes are normal and symmetric  Psychiatric:         Mood and Affect: Mood normal          Behavior: Behavior normal          Thought Content: Thought content normal          Judgment: Judgment normal          Answers for HPI/ROS submitted by the patient on 9/2/2020   How would you rate your overall health?: poor  Compared to last year, how is your physical health?: slightly better  Compared to last year, how is your eyesight?: same  Compared to last year, how is your hearing?: same  Compared to last year, how is your emotional/mental health?: same  In the past 7 days, how much pain have you experienced?: some  If you answered "some" or "a lot", please rate the severity of your pain on a scale of 1 to 10 (1 being the least severe pain and 10 being the most intense pain)  : 7/10  In the past 6 months, have you lost or gained 10 pounds without trying?: Yes  One or more falls in the last year: No  In the past 6 months, have you accidentally leaked urine?: No  Do you have trouble with the stairs inside or outside your home?: No  Does your home have working smoke alarms?: Yes  Does your home have a carbon monoxide monitor?: Yes  Which safety hazards (if any) have you experienced in your home? Please select all that apply : medications that cause fatigue  How would you describe your current diet?  Please select all that apply : Low Cholesterol, Low Saturated Fat, Limited junk food  In addition to prescription medications, are you taking any over-the-counter supplements?: No  Can you manage your medications?: Yes  Can you walk and transfer into and out of your bed and chair?: Yes  Can you dress and groom yourself?: Yes  Can you bathe or shower yourself?: Yes  Can you feed yourself?: Yes  Can you do your laundry/ housekeeping?: Yes  Can you manage your money, pay your bills, and track your expenses?: Yes  Can you make your own meals?: Yes  Can you do your own shopping?: Yes  Additional Comments: must do all due to spouse somewhat disabled  Within the last 12 months, have you had any hospitalizations or Emergency Department visits?: Yes  If yes, how many times have you been hospitalized within the past year?: 3-4  Do you have a living will?: Yes  Do you have a Durable POA (Abbie 74) for healthcare decisions?: Yes  Do you have an Advanced Directive for end of life decisions?: Yes

## 2020-09-03 NOTE — PROGRESS NOTES
Assessment and Plan:     Problem List Items Addressed This Visit        Other    Medicare annual wellness visit, subsequent - Primary           Preventive health issues were discussed with patient, and age appropriate screening tests were ordered as noted in patient's After Visit Summary  Personalized health advice and appropriate referrals for health education or preventive services given if needed, as noted in patient's After Visit Summary  History of Present Illness:     Patient presents for Medicare Annual Wellness visit    Patient Care Team:  Santy Tidwell DO as PCP - General  Karen Arellano DO (Cardiothoracic Surgery)  DO Clarence Aguilera MD (Cardiology)  Jacques Tang MD (Vascular Surgery)  Tamika Stout PA-C (Rheumatology)     Problem List:     Patient Active Problem List   Diagnosis    RODRIGUEZ (dyspnea on exertion)    Essential hypertension    Coronary artery disease involving native coronary artery of native heart without angina pectoris    [facility]ymy[facility]a ([facility] [address] [facility]    Primary generalized (osteo)arthritis    Long term systemic steroid user    Depression    PAD (peripheral artery disease) ([facility] [address] [facility]    Claudication in peripheral vascular disease ([facility] [address] [facility]    Osteopenia of left hip    Aortic valve stenosis    S/P TAVR (transcatheter aortic valve replacement)    Postoperative anemia due to acute blood loss    Right leg numbness    Encounter for postoperative care    Overweight (BMI 25 0-29  9)    Carotid artery stenosis    Right hand weakness    Bilateral carpal tunnel syndrome    Ulnar neuropathy of right upper extremity    Cervical radiculopathy    Fatigue due to excessive exertion    Dilated cardiomyopathy (HCC)    Lumbar radiculopathy    Peripheral polyneuropathy    Lumbar degenerative disc disease    Chronic pain syndrome    Medicare annual wellness visit, subsequent      Past Medical and Surgical History:     Past Medical History:   Diagnosis Date    Anxiety     Benign essential hypertension     Carotid stenosis, asymptomatic, bilateral     66-92% GLENN, <17% LICA    Coronary artery disease     Depression     Diffuse arthralgia     Last Assessed: 1/23/2017    Former tobacco use     History of alcohol use     3-6 beers/day x20 years, no residual liver disease    History of Lyme disease     History of rheumatic fever     Hypercholesterolemia     Hyperlipidemia     Hyperlipidemia     Hypertension     Ischemic cardiomyopathy     Myocardial infarction ([address] )     Osteoarthritis, hip, bilateral     PAD (peripheral artery disease) (HCC)     s/p fem-below the knee bypass & percutaneous tx right CFA/SFA & left external iliac/CFA    Pneumonia     Polymyalgia rheumatica (HCC)     on chronic steroids    RBBB     Rotator cuff disorder     b/l    Severe aortic stenosis     Wears hearing aid     b/l     Past Surgical History:   Procedure Laterality Date    CARDIAC CATHETERIZATION      FEMORAL BYPASS Bilateral     fem-below knee w/ GSV    IR AORTAGRAM WITH RUN-OFF  8/15/2019    IR AORTAGRAM WITH RUN-OFF  9/17/2019    IR AORTAGRAM WITH RUN-OFF  6/29/2020    IR AORTAGRAM WITH RUN-OFF  7/6/2020    NM ECHO TRANSESOPHAG R-T 2D W/PRB IMG ACQUISJ I&R N/A 11/12/2019    Procedure: TRANSESOPHAGEAL ECHOCARDIOGRAM (SANAZ);   Surgeon: Karen Arellano DO;  [facility];  Service: Cardiac Surgery    NM REPLACE AORTIC VALVE OPENFEMORAL ARTERY APPROACH N/A 11/12/2019    Procedure: REPLACEMENT AORTIC VALVE TRANSCATHETER (TAVR) TRANSFEMORAL WITH 29 MM MEEKS MIKEL S3 VALVE (ACCESS ON THE RIGHT); RIGHT GROIN CUT DOWN;  Surgeon: Karen Arellano DO;  [facility];  Service: Cardiac Surgery    NM [facility] [place] 3RD+ ORD SLCTV ABDL PEL/LXTR [facility] Right 8/15/2019    Procedure: LEFT GROIN ACCESS RIGHT LEG ARTERIOGRAM WITH ARTHRECTOMY, LEFT LEG RUNOFF;  Surgeon: Jacques Tang MD;  [facility];  Service: Vascular    [facility] Right 2020    Procedure: ARTERIOGRAM Angiogram/Intervention Right Groin;  Surgeon: Alberto Garg MD;  [facility];  Service: Vascular    [facility] Left 2020    Procedure: ARTERIOGRAM right groin approach imaging on patients left Angiopasty of popliteal and tibial artery;  Surgeon: Alberto Garg MD;  [facility];  Service: Vascular    TONSILLECTOMY AND ADENOIDECTOMY  childhood      Family History:     Family History   Problem Relation Age of Onset    Cancer Mother         cervix    Coronary artery disease Father     Heart attack Father     Cancer Brother     Coronary artery disease Maternal Grandfather     Heart disease Paternal Grandfather     Stroke Paternal Grandfather         CVA    Heart attack Paternal Grandfather     Coronary artery disease Other          at age 78 per [employer]      Social History:     E-Cigarette/Vaping    E-Cigarette Use Never User      E-Cigarette/Vaping Substances    Nicotine No     THC No     CBD No     Flavoring No     Other No     Unknown No      Social History     Socioeconomic History    Marital status: /Civil Union     Spouse name: None    Number of children: 11    Years of education: None    Highest education level: None   Occupational History    Occupation: Environmental Supervisor   Social Needs    Financial resource strain: None    Food insecurity     Worry: None     Inability: None    Transportation needs     Medical: None     Non-medical: None   Tobacco Use    Smoking status: Former Smoker     Packs/day: 2 00     Years: 20 00     Pack years: 40 00     Types: Cigarettes     Last attempt to quit:      Years since quittin 6    Smokeless tobacco: Former User     Types: Chew   Substance and Sexual Activity    Alcohol use: Not Currently     Frequency: Never     Comment: 3-6 cans beer x20 years, quit , no residual liver issues    Drug use: Never    Sexual activity: Not Currently   Lifestyle    Physical activity     Days per week: None     Minutes per session: None    Stress: None   Relationships    Social connections     Talks on phone: None     Gets together: None     Attends Jew service: None     Active member of club or organization: None     Attends meetings of clubs or organizations: None     Relationship status: None    Intimate partner violence     Fear of current or ex partner: None     Emotionally abused: None     Physically abused: None     Forced sexual activity: None   Other Topics Concern    None   Social History Narrative    Daily Caffeine use- 2-3 cups of coffee, soda on occasion      Medications and Allergies:     Current Outpatient Medications   Medication Sig Dispense Refill    acetaminophen (TYLENOL) 325 mg tablet Take 325 mg by mouth every 6 (six) hours as needed for mild pain       aspirin (ECOTRIN LOW STRENGTH) 81 mg EC tablet Take 81 mg by mouth daily      Cholecalciferol (VITAMIN D3) 2000 units TABS Take 2,000 Units by mouth daily      clopidogrel (PLAVIX) 75 mg tablet Take 1 tablet (75 mg total) by mouth daily 180 tablet 3    cyanocobalamin (VITAMIN B-12) 500 mcg tablet Take 500 mcg by mouth daily      DULoxetine 40 MG CPEP Take 1 capsule (40 mg total) by mouth daily At bedtime 30 capsule 2    isosorbide mononitrate (IMDUR) 30 mg 24 hr tablet take 1 tablet by mouth once daily 90 tablet 3    metoprolol tartrate (LOPRESSOR) 25 mg tablet Take 0 5 tablets (12 5 mg total) by mouth every 12 (twelve) hours 180 tablet 0    Multiple Vitamin (MULTIVITAMIN) tablet Take 1 tablet by mouth daily      predniSONE 5 mg tablet Take 1 tablet (5 mg total) by mouth daily 90 tablet 0    ranolazine (RANEXA) 500 mg 12 hr tablet take 1 tablet by mouth twice a day 180 tablet 4    rosuvastatin (CRESTOR) 5 mg tablet take 1 tablet by mouth once daily (Patient taking differently: Take 1 tablet every other day) 90 tablet 3  sacubitril-valsartan (ENTRESTO) 24-26 MG TABS Take 1 tablet by mouth 2 (two) times a day 60 tablet 3    triamterene-hydrochlorothiazide (DYAZIDE) 37 5-25 mg per capsule Take 1 capsule by mouth daily 90 capsule 3    Fluad Quadrivalent 0 5 ML PRSY inject 0 5 milliliters intramuscularly       No current facility-administered medications for this visit  Allergies   Allergen Reactions    Pregabalin GI Intolerance, Dizziness, Drowsiness and Lightheadedness     Lyrica    Atorvastatin Myalgia and Arthralgia    Avelox [Moxifloxacin] Diarrhea    Gabapentin Dizziness    Pravastatin Myalgia and Arthralgia    Rosuvastatin Calcium Myalgia and Arthralgia    Simvastatin Myalgia and Arthralgia      Immunizations:     Immunization History   Administered Date(s) Administered    INFLUENZA 09/09/2016, 09/07/2018    Influenza Split High Dose Preservative Free IM 10/10/2016    Influenza TIV (IM) 08/24/2017    Pneumococcal Conjugate PCV 7 11/13/2017    Pneumococcal Polysaccharide PPV23 10/18/2019    Tdap 09/01/2020    influenza, trivalent, adjuvanted 09/22/2019      Health Maintenance:         Topic Date Due    Hepatitis C Screening  Completed         Topic Date Due    DTaP,Tdap,and Td Vaccines (1 - Tdap) 02/19/1971    Influenza Vaccine  07/01/2020      Medicare Health Risk Assessment:     BP 92/58   Pulse 78   Temp 98 °F (36 7 °C)   Ht 5' 6 5" (1 689 m)   Wt 76 7 kg (169 lb)   SpO2 98%   BMI 26 87 kg/m²      Chasity Munguia is here for his Subsequent Wellness visit  Last Medicare Wellness visit information reviewed, patient interviewed and updates made to the record today  Health Risk Assessment:   Patient rates overall health as poor  Patient feels that their physical health rating is slightly better  Eyesight was rated as same  Hearing was rated as same  Patient feels that their emotional and mental health rating is same  Pain experienced in the last 7 days has been some   Patient's pain rating has been 7/10  Patient states that he has experienced weight loss or gain in last 6 months  Depression Screening:   PHQ-2 Score: 2      Fall Risk Screening: In the past year, patient has experienced: no history of falling in past year      Home Safety:  Patient does not have trouble with stairs inside or outside of their home  Patient has working smoke alarms and has working carbon monoxide detector  Home safety hazards include: medications that cause fatigue  Nutrition:   Current diet is Low Cholesterol, Low Saturated Fat and Limited junk food  Medications:   Patient is not currently taking any over-the-counter supplements  Patient is able to manage medications  Activities of Daily Living (ADLs)/Instrumental Activities of Daily Living (IADLs):   Walk and transfer into and out of bed and chair?: Yes  Dress and groom yourself?: Yes    Bathe or shower yourself?: Yes    Feed yourself? Yes  Do your laundry/housekeeping?: Yes  Manage your money, pay your bills and track your expenses?: Yes  Make your own meals?: Yes    Do your own shopping?: Yes    ADL comments: must do all due to spouse somewhat disabled    Previous Hospitalizations:   Any hospitalizations or ED visits within the last 12 months?: Yes    How many hospitalizations have you had in the last year?: 3-4    Advance Care Planning:   Living will: Yes    Durable POA for healthcare:  Yes    Advanced directive: Yes    Advanced directive counseling given: No    Five wishes given: No    Patient declined ACP directive: No    End of Life Decisions reviewed with patient: Yes    Provider agrees with end of life decisions: Yes      Cognitive Screening:   Provider or family/friend/caregiver concerned regarding cognition?: No    PREVENTIVE SCREENINGS      Cardiovascular Screening:    General: Screening Current      Diabetes Screening:     General: Screening Current      Colorectal Cancer Screening:     General: Screening Current      Prostate Cancer Screening: General: Screening Current      Osteoporosis Screening:    General: Screening Not Indicated      Abdominal Aortic Aneurysm (AAA) Screening:    Risk factors include: age between 73-67 yo and tobacco use        Lung Cancer Screening:     General: Screening Not Indicated      Hepatitis C Screening:    General: Screening Current      Radha Castro DO

## 2020-09-09 ENCOUNTER — TELEMEDICINE (OUTPATIENT)
Dept: RHEUMATOLOGY | Facility: CLINIC | Age: 70
End: 2020-09-09
Payer: MEDICARE

## 2020-09-09 ENCOUNTER — OFFICE VISIT (OUTPATIENT)
Dept: CARDIOLOGY CLINIC | Facility: CLINIC | Age: 70
End: 2020-09-09
Payer: MEDICARE

## 2020-09-09 VITALS
HEART RATE: 56 BPM | SYSTOLIC BLOOD PRESSURE: 140 MMHG | DIASTOLIC BLOOD PRESSURE: 58 MMHG | TEMPERATURE: 96.8 F | BODY MASS INDEX: 27.15 KG/M2 | WEIGHT: 173 LBS | HEIGHT: 67 IN

## 2020-09-09 DIAGNOSIS — Z79.52 LONG TERM SYSTEMIC STEROID USER: ICD-10-CM

## 2020-09-09 DIAGNOSIS — I25.10 CORONARY ARTERY DISEASE INVOLVING NATIVE CORONARY ARTERY OF NATIVE HEART WITHOUT ANGINA PECTORIS: ICD-10-CM

## 2020-09-09 DIAGNOSIS — I42.0 DILATED CARDIOMYOPATHY (HCC): ICD-10-CM

## 2020-09-09 DIAGNOSIS — Z95.2 S/P TAVR (TRANSCATHETER AORTIC VALVE REPLACEMENT): ICD-10-CM

## 2020-09-09 DIAGNOSIS — M35.3 POLYMYALGIA RHEUMATICA (HCC): Primary | ICD-10-CM

## 2020-09-09 DIAGNOSIS — M85.852 OSTEOPENIA OF LEFT HIP: ICD-10-CM

## 2020-09-09 DIAGNOSIS — I10 ESSENTIAL HYPERTENSION: ICD-10-CM

## 2020-09-09 DIAGNOSIS — I35.0 NONRHEUMATIC AORTIC VALVE STENOSIS: ICD-10-CM

## 2020-09-09 DIAGNOSIS — R60.0 BILATERAL LEG EDEMA: Primary | ICD-10-CM

## 2020-09-09 DIAGNOSIS — M51.36 LUMBAR DEGENERATIVE DISC DISEASE: ICD-10-CM

## 2020-09-09 PROCEDURE — 99441 PR PHYS/QHP TELEPHONE EVALUATION 5-10 MIN: CPT | Performed by: INTERNAL MEDICINE

## 2020-09-09 PROCEDURE — 99214 OFFICE O/P EST MOD 30 MIN: CPT | Performed by: INTERNAL MEDICINE

## 2020-09-09 RX ORDER — PREDNISONE 1 MG/1
4 TABLET ORAL DAILY
Qty: 120 TABLET | Refills: 2 | Status: SHIPPED | OUTPATIENT
Start: 2020-09-09 | End: 2020-11-04 | Stop reason: SDUPTHER

## 2020-09-09 RX ORDER — FUROSEMIDE 20 MG/1
20 TABLET ORAL DAILY
Qty: 60 TABLET | Refills: 4 | Status: SHIPPED | OUTPATIENT
Start: 2020-09-09

## 2020-09-09 NOTE — PROGRESS NOTES
Cardiology Follow Up    Lety Perry  1950  [id number]  [address] [facility]  [address] [address] [address] [phone]    1  Bilateral leg edema  furosemide (LASIX) 20 mg tablet   2  Nonrheumatic aortic valve stenosis     3  Essential hypertension     4  Dilated cardiomyopathy ([facility] )     5  Coronary artery disease involving native coronary artery of native heart without angina pectoris     6  S/P TAVR (transcatheter aortic valve replacement)           Discussion/Summary:  1  Stop Imdur  2  Increase Entresto to 49- 51 mg BID  3  Lasix 20 mg 3 x a week  4  Increase activity as tolerated  5  RTO 8 weeks      Interval History: His Diazide was recently stopped and he has been getting some LE edema and fullness in his abd  His weight is up from from 167 to 173  He recently had LE revascularization and is now walking 1/2 mile  He denies CP or significant SOB  He has CAD and AS with TAVR on 11/12/2019  EF is ~ 20%  Patient Active Problem List   Diagnosis    RODRIGUEZ (dyspnea on exertion)    Essential hypertension    Coronary artery disease involving native coronary artery of native heart without angina pectoris    Polymyalgia rheumatica ([facility] )    Primary generalized (osteo)arthritis    Long term systemic steroid user    Depression    [facility]    Hypercholesterolemia    Claudication in peripheral vascular disease ([facility]    Osteopenia of left hip    Aortic valve stenosis    S/P TAVR (transcatheter aortic valve replacement)    Postoperative anemia due to acute blood loss    Right leg numbness    Encounter for postoperative care    Overweight (BMI 25 0-29  9)    Carotid artery stenosis    Right hand weakness    Bilateral carpal tunnel syndrome    Ulnar neuropathy of right upper extremity    Cervical radiculopathy    Fatigue due to excessive exertion    Dilated cardiomyopathy ([facility] )    Lumbar radiculopathy    Peripheral polyneuropathy    Lumbar degenerative disc disease    Chronic pain syndrome    Medicare annual wellness visit, subsequent    Negative depression screening    Hyperglycemia    Bilateral leg edema     Past Medical History:   Diagnosis Date    Anxiety     Benign essential hypertension     Carotid stenosis, asymptomatic, bilateral     26-89% GLENN, <77% LICA    Coronary artery disease     Depression     Diffuse arthralgia     Last Assessed: 2017    Former tobacco use     History of alcohol use     3-6 beers/day x20 years, no residual liver disease    History of Lyme disease     History of rheumatic fever     Hypercholesterolemia     Hyperlipidemia     Hyperlipidemia     Hypertension     Ischemic cardiomyopathy     Myocardial infarction ([facility] )     Osteoarthritis, hip, bilateral     PAD (peripheral artery disease) ([facility])     s/p fem-below the knee bypass & percutaneous tx right CFA/SFA & left external iliac/CFA    Pneumonia     Polymyalgia rheumatica (HCC)     on chronic steroids    RBBB     Rotator cuff disorder     b/l    Severe aortic stenosis     Wears hearing aid     b/l     Social History     Socioeconomic History    Marital status: /Civil Union     Spouse name: Not on file    Number of children: 11    Years of education: Not on file    Highest education level: Not on file   Occupational History    Occupation: Environmental Supervisor   Social Needs    Financial resource strain: Not on file    Food insecurity     Worry: Not on file     Inability: Not on file   [employer] needs     Medical: Not on file     Non-medical: Not on file   Tobacco Use    Smoking status: Former Smoker     Packs/day: 2 00     Years: 20 00     Pack years: 40 00     Types: Cigarettes     Last attempt to quit:      Years since quittin 7    Smokeless tobacco: Former User     Types: Chew   Substance and Sexual Activity    Alcohol use: Not Currently     Frequency: Never     Comment: 3-6 cans beer x20 years, quit , no residual liver issues    Drug use: Never    Sexual activity: Not Currently   Lifestyle    Physical activity     Days per week: Not on file     Minutes per session: Not on file    Stress: Not on file   Relationships    Social connections     Talks on phone: Not on file     Gets together: Not on file     Attends Scientologist service: Not on file     Active member of club or organization: Not on file     Attends meetings of clubs or organizations: Not on file     Relationship status: Not on file    Intimate partner violence     Fear of current or ex partner: Not on file     Emotionally abused: Not on file     Physically abused: Not on file     Forced sexual activity: Not on file   Other Topics Concern    Not on file   Social History Narrative    Daily Caffeine use- 2-3 cups of coffee, soda on occasion      Family History   Problem Relation Age of Onset    Cancer Mother         cervix    Coronary artery disease Father     Heart attack Father     Cancer Brother     Coronary artery disease Maternal Grandfather     Heart disease Paternal Grandfather     Stroke Paternal Grandfather         CVA    Heart attack Paternal Grandfather     Coronary artery disease Other          at age 78 per [employer]     Past Surgical History:   Procedure Laterality Date    CARDIAC CATHETERIZATION      FEMORAL BYPASS Bilateral     fem-below knee w/ GSV    IR AORTAGRAM WITH RUN-OFF  8/15/2019    IR AORTAGRAM WITH RUN-OFF  2019    IR AORTAGRAM WITH RUN-OFF  2020    IR AORTAGRAM WITH RUN-OFF  2020    AZ ECHO TRANSESOPHAG R-T 2D W/PRB IMG ACQUISJ I&R N/A 2019    Procedure: TRANSESOPHAGEAL ECHOCARDIOGRAM (SANAZ);   Surgeon: Frederick Kendrick DO;  [facility];  Service: Cardiac Surgery    AZ REPLACE AORTIC VALVE OPENFEMORAL ARTERY APPROACH N/A 2019    Procedure: REPLACEMENT AORTIC VALVE TRANSCATHETER (TAVR) TRANSFEMORAL WITH 29 MM MEEKS MIKEL S3 VALVE (ACCESS ON THE RIGHT); RIGHT GROIN CUT DOWN;  Surgeon: Veronika Chan DO;  [facility];  Service: Cardiac Surgery    [place] [facility] Right 8/15/2019    Procedure: LEFT GROIN ACCESS RIGHT LEG ARTERIOGRAM WITH ARTHRECTOMY, LEFT LEG RUNOFF;  Surgeon: Nader Roberts MD;  [facility];  Service: Vascular    MA [facility] CATHJ 3RD+ ORD [facility]CTV ABDL [facility] Right 6/29/2020    Procedure: ARTERIOGRAM Angiogram/Intervention Right Groin;  Surgeon: Nader Roberts MD;  [facility];  Service: Vascular    MA [address] 3RD+ ORD [facility] Left 7/6/2020    Procedure: ARTERIOGRAM right groin approach imaging on patients left Angiopasty of popliteal and tibial artery;  Surgeon: Nader Roberts MD;  [facility];  Service: Vascular    TONSILLECTOMY AND ADENOIDECTOMY  childhood       Current Outpatient Medications:     acetaminophen (TYLENOL) 325 mg tablet, Take 325 mg by mouth every 6 (six) hours as needed for mild pain , Disp: , Rfl:     aspirin (ECOTRIN LOW STRENGTH) 81 mg EC tablet, Take 81 mg by mouth daily, Disp: , Rfl:     Cholecalciferol (VITAMIN D3) 2000 units TABS, Take 2,000 Units by mouth daily, Disp: , Rfl:     clopidogrel (PLAVIX) 75 mg tablet, Take 1 tablet (75 mg total) by mouth daily, Disp: 180 tablet, Rfl: 3    cyanocobalamin (VITAMIN B-12) 500 mcg tablet, Take 500 mcg by mouth daily, Disp: , Rfl:     DULoxetine 40 MG CPEP, Take 1 capsule (40 mg total) by mouth daily At bedtime, Disp: 30 capsule, Rfl: 2    Fluad Quadrivalent 0 5 ML PRSY, inject 0 5 milliliters intramuscularly, Disp: , Rfl:     isosorbide mononitrate (IMDUR) 30 mg 24 hr tablet, take 1 tablet by mouth once daily, Disp: 90 tablet, Rfl: 3    metoprolol tartrate (LOPRESSOR) 25 mg tablet, Take 0 5 tablets (12 5 mg total) by mouth every 12 (twelve) hours, Disp: 180 tablet, Rfl: 0   Multiple Vitamin (MULTIVITAMIN) tablet, Take 1 tablet by mouth daily, Disp: , Rfl:     predniSONE 5 mg tablet, Take 1 tablet (5 mg total) by mouth daily, Disp: 90 tablet, Rfl: 0    ranolazine (RANEXA) 500 mg 12 hr tablet, take 1 tablet by mouth twice a day, Disp: 180 tablet, Rfl: 4    rosuvastatin (CRESTOR) 5 mg tablet, take 1 tablet by mouth once daily (Patient taking differently: Take 1 tablet every other day), Disp: 90 tablet, Rfl: 3    sacubitril-valsartan (ENTRESTO) 24-26 MG TABS, Take 1 tablet by mouth 2 (two) times a day, Disp: 60 tablet, Rfl: 3    furosemide (LASIX) 20 mg tablet, Take 1 tablet (20 mg total) by mouth daily, Disp: 60 tablet, Rfl: 4  Allergies   Allergen Reactions    Pregabalin GI Intolerance, Dizziness, Drowsiness and Lightheadedness     Lyrica    Atorvastatin Myalgia and Arthralgia    Avelox [Moxifloxacin] Diarrhea    Gabapentin Dizziness    Pravastatin Myalgia and Arthralgia    Rosuvastatin Calcium Myalgia and Arthralgia    Simvastatin Myalgia and Arthralgia     Vitals:    09/09/20 0829   BP: 140/58   BP Location: Left arm   Cuff Size: Standard   Pulse: 56   Temp: (!) 96 8 °F (36 °C)   Weight: 78 5 kg (173 lb)   Height: 5' 6 5" (1 689 m)     Weight (last 2 days)     Date/Time   Weight    09/09/20 0829   78 5 (173)             Blood pressure 140/58, pulse 56, temperature (!) 96 8 °F (36 °C), height 5' 6 5" (1 689 m), weight 78 5 kg (173 lb)  , Body mass index is 27 5 kg/m²      Labs:  Admission on 07/06/2020, Discharged on 07/06/2020   Component Date Value    SARS-CoV-2 07/06/2020 Negative     Activated Clotting Time,* 07/06/2020 219*    Specimen Type 07/06/2020 VENOUS    Orders Only on 07/01/2020   Component Date Value    SARS-CoV-2  07/01/2020 Not Detected    Admission on 06/29/2020, Discharged on 06/29/2020   Component Date Value    Activated Clotting Time,* 06/29/2020 231*    Specimen Type 06/29/2020 VENOUS    Orders Only on 06/22/2020   Component Date Value    SARS-CoV-2 06/22/2020 Not Detected    Lab on 06/19/2020   Component Date Value    Protime 06/19/2020 12 6     INR 06/19/2020 0 99     WBC 06/19/2020 8 30     RBC 06/19/2020 4 65     Hemoglobin 06/19/2020 15 2     Hematocrit 06/19/2020 43 8     MCV 06/19/2020 94     MCH 06/19/2020 32 8     MCHC 06/19/2020 34 8     RDW 06/19/2020 13 7     MPV 06/19/2020 8 0*    Platelets 35/22/5511 197     Neutrophils Relative 06/19/2020 78*    Lymphocytes Relative 06/19/2020 15*    Monocytes Relative 06/19/2020 6     Eosinophils Relative 06/19/2020 1     Basophils Relative 06/19/2020 1     Neutrophils Absolute 06/19/2020 6 40     Lymphocytes Absolute 06/19/2020 1 20     Monocytes Absolute 06/19/2020 0 50     Eosinophils Absolute 06/19/2020 0 10     Basophils Absolute 06/19/2020 0 10     Sodium 06/19/2020 137     Potassium 06/19/2020 4 2     Chloride 06/19/2020 103     CO2 06/19/2020 25     ANION GAP 06/19/2020 9     BUN 06/19/2020 18     Creatinine 06/19/2020 0 77     Glucose, Fasting 06/19/2020 110*    Calcium 06/19/2020 9 6     AST 06/19/2020 21     ALT 06/19/2020 17     Alkaline Phosphatase 06/19/2020 38*    Total Protein 06/19/2020 6 8     Albumin 06/19/2020 4 4     Total Bilirubin 06/19/2020 0 60     eGFR 06/19/2020 91     PSA 06/19/2020 0 6     Cholesterol 06/19/2020 209*    Triglycerides 06/19/2020 108     HDL, Direct 06/19/2020 58     LDL Calculated 06/19/2020 129*    Non-HDL-Chol (CHOL-HDL) 06/19/2020 151     Ventricular Rate 06/19/2020 75     Atrial Rate 06/19/2020 75     DC Interval 06/19/2020 166     QRSD Interval 06/19/2020 148     QT Interval 06/19/2020 416     QTC Interval 06/19/2020 464     P Axis 06/19/2020 56     QRS Axis 06/19/2020 -26     T Wave Axis 06/19/2020 87    Telemedicine on 04/15/2020   Component Date Value    Sed Rate 06/19/2020 5     CRP 06/19/2020 <5 0      Imaging: No results found      Review of Systems:  Review of Systems   Constitutional: Negative for diaphoresis, fatigue, fever and unexpected weight change  HENT: Negative  Respiratory: Negative for cough, shortness of breath and wheezing  Cardiovascular: Positive for leg swelling  Negative for chest pain and palpitations  Gastrointestinal: Negative for abdominal pain, diarrhea and nausea  Musculoskeletal: Negative for gait problem and myalgias  Skin: Negative for rash  Neurological: Negative for dizziness and numbness  Psychiatric/Behavioral: Negative  Physical Exam:  Physical Exam  Constitutional:       Appearance: He is well-developed  HENT:      Head: Normocephalic and atraumatic  Eyes:      Pupils: Pupils are equal, round, and reactive to light  Neck:      Musculoskeletal: Normal range of motion and neck supple  Vascular: No JVD  Cardiovascular:      Rate and Rhythm: Regular rhythm  Pulses: Normal pulses  Carotid pulses are 2+ on the right side and 2+ on the left side  Heart sounds: S1 normal and S2 normal    Pulmonary:      Effort: Pulmonary effort is normal       Breath sounds: Normal breath sounds  No wheezing or rales  Abdominal:      General: Bowel sounds are normal       Palpations: Abdomen is soft  Tenderness: There is no abdominal tenderness  Musculoskeletal: Normal range of motion  General: Swelling present  No tenderness  Comments: 1+ B/L le EDEMA   Skin:     General: Skin is warm  Neurological:      Mental Status: He is alert and oriented to person, place, and time  Cranial Nerves: No cranial nerve deficit  Deep Tendon Reflexes: Reflexes are normal and symmetric

## 2020-09-09 NOTE — PATIENT INSTRUCTIONS
Reduce prednisone to 4mg daily for 1 month, then 3mg daily for 1 month, then 2mg daily for 1 month, then 1mg daily for 1 month, then stop  Get labs done 1 week before next appointment, lab orders are in your chart

## 2020-09-09 NOTE — PROGRESS NOTES
Virtual Brief Visit    Assessment and Plan:   Patient is a 78-year-old male with multilevel degenerative disease in the spine with peripheral neuropathy symptoms who presents for rheumatology follow-up for PMR  At our last visit, since he was stable on the prednisone, I advised him to gradually reduce his dose down to 5 mg daily which she is on at this time  He reports no issues with the tapering and did not know any worsening symptoms from the PMR  He had labs done in June which show normal ESR and CRP  His clinical picture is confusing as he does have other chronic pain issues from degenerative spine disease and neuropathy, and has establish with pain management who started Cymbalta  He has had improvement with the Cymbalta but is still having symptoms and they may consider MRI  Otherwise it does seem his PMR is inactive and stable at this time and so we will continue reducing his prednisone  I advised him to reduce by 1 mg every 1 month with a goal of stopping in the future  We will also check his inflammatory markers before his next visit      Problem List Items Addressed This Visit        Musculoskeletal and Integument    Osteopenia of left hip    Lumbar degenerative disc disease       Other    Polymyalgia rheumatica ([facility] [address] ) - Primary    Relevant Medications    predniSONE 1 mg tablet    Other Relevant Orders    C-reactive protein    Sedimentation rate, automated    Long term systemic steroid user            Follow-up plan: 3 months     Reason for visit is   Chief Complaint   Patient presents with    Virtual Brief Visit        Encounter provider Jessenia Dong DO    Provider located at [address] [place] [id number]    Recent Visits  Date Type Provider Dept   09/03/20 Office Visit Amandeep Perez DO [facility]   Showing recent visits within past 7 days and meeting all other requirements     Today's Visits  Date Type Provider Dept   09/09/20 Telemedicine Azam Raymond DO [facility]   Showing today's visits and meeting all other requirements     Future Appointments  No visits were found meeting these conditions  Showing future appointments within next 150 days and meeting all other requirements      It was my intent to perform this visit via video technology but the patient was not able to do a video connection so the visit was completed via audio telephone only  After connecting through telephone, the patient was identified by name and date of birth  Kamlesh Dave was informed that this is a telemedicine visit and that the visit is being conducted through telephone  My office door was closed  No one else was in the room  He acknowledged consent and understanding of privacy and security of the platform  The patient has agreed to participate and understands he can discontinue the visit at any time  Patient is aware this is a billable service  Rheumatic Disease Summary:  1  PMR  -Established care- June, 2017- dx with PMR by PCP and placed on steroids the March prior to consult   Presented on Prednisone 20 mg daily    -Weaned Prednisone over several months and current on Prednisone 5 mg daily (12/2018)  -XRs hands/feet 5/2019: OA, no inflammatory changes   -Labs 12/2018: negative hep panel, HIV  -Labs 5/3/19: negative RF, CCP, SSA, SSB, ESR 8, CRP<3  -XR hands/feet 5/3/19: OA, no inflammatory changes   -Weaned off pred completely in 7/2019  -Visit 8/23/19: feeling worse off pred, unclear if sx related to PMR, was restarted on pred 5mg (normal CRP, ESR 15 prior to pred)  -Visit 11/1/19: no significant improvement in arthralgias after restarting pred 5mg x2 months; mainly having radicular sx of RUE and burning pain through both legs, also OA at multiple joints; low suspicion for active PMR, advised weaning pred 2 5mg x2 weeks, then stop  -Labs 1/24/20: CRP 46, ESR 28, negative RF, MANA, lyme   -Visit 3/2/20: worsening shoulder and hip arthralgias off prednisone, high CRP/ESR with possible PMR recurrence and headaches, repeat labs, then consider TA biopsy to rule out GCA  -Labs 3/2/20: ESR 14 (<--28), CRP 3 8 (<--46); advised to restart prednisone for possible PMR, 15mg x4 weeks, then 10mg daily; deferred TA biopsies for now given improved systemic inflammation without intervention or typical sx of GCA; also referred to spine/PM due to suspicion of radiculopathy confounding sx   -Visit 4/15/20 (telemedicine): doing ok on 10mg, advised 2 more weeks 10mg, then 7 5mg x4 weeks, then 5mg daily; referred again to spine/PM  -Visit 9/9/20: doing ok on pred 5mg, normal ESR/CRP, advised to cont reducing by 1mg q1 month  2  Osteopenia with h/o chronic systemic steroid use   -DEXA 6/12/19: T -2 4 hip, FRAX 4/12% for hip/major OP fracture    -weaned off pred again as of 11/2019  -To consider tx given high FRAX risk, especially if PMR recurs requiring systemic steroids  -DEXA due 6/2021  3  OA, lumbar DDD with neuropathy   -Taking tylenol with Tramadol  -Est with spine/PM, no benefit from amitriptyline, started on cymbalta with improvement in lower extremity neuropathy sx  4  Comorbidities: depression, PVD s/p angioplasty, on dual antiplatelet, HLD, HTN, ischemic CM with EF 20%, severe AS s/p TAVR 11/12/19, CAD    Sherry    Edwardo Mcfadden is a 65 y o  male who presents for rheumatology follow-up for history of PMR  Last visit, heaves doing okay on prednisone 10 mg and so I advised him to continue that dose for 2 more weeks, then 7 5 mg for 4 weeks, then to stay on 5 mg daily  He had markers of inflammation checked in June which were normal     He continues to have multiple issues with chronic pain in his neck into his arms, lower back pain into both legs, and generalized joint pain     Since our last visit he saw pain management and started cymbalta with improvement in lower extremity neuropathy symptoms  He reports they are considering MRI if he is still not doing much better at their next visit  As he reduced his prednisone since last visit he reports he did not note any significant worsening or change in symptoms  He is currently on the 5 mg daily  He still has the pain in his neck and shoulders when he wakes up in the morning but it does not really improved with physical activity in his present most of the day  Denies any new headaches or temporal tenderness, vision changes or symptoms of jaw claudication  He underwent angioplasty with lower extremity revascularization with vascular over the summer  He saw cardiology earlier today for increased lower extremity edema and abdominal fullness, they increased his lasix  Continues to be the primary caregiver for his wife which does cause him stress and make it challenging for him to take care of his own medical issues      Past Medical History:   Diagnosis Date    Anxiety     Benign essential hypertension     Carotid stenosis, asymptomatic, bilateral     92-00% GLENN, <71% LICA    Coronary artery disease     Depression     Diffuse arthralgia     Last Assessed: 1/23/2017    Former tobacco use     History of alcohol use     3-6 beers/day x20 years, no residual liver disease    History of Lyme disease     History of rheumatic fever     Hypercholesterolemia     Hyperlipidemia     Hyperlipidemia     Hypertension     Ischemic cardiomyopathy     Myocardial infarction ([address] )     Osteoarthritis, hip, bilateral     PAD (peripheral artery disease) (HCC)     s/p fem-below the knee bypass & percutaneous tx right CFA/SFA & left external iliac/CFA    Pneumonia     Polymyalgia rheumatica (HCC)     on chronic steroids    RBBB     Rotator cuff disorder     b/l    Severe aortic stenosis     Wears hearing aid     b/l       Past Surgical History:   Procedure Laterality Date    CARDIAC CATHETERIZATION      FEMORAL BYPASS Bilateral     fem-below knee w/ GSV    IR AORTAGRAM WITH RUN-OFF  8/15/2019    IR AORTAGRAM WITH RUN-OFF  9/17/2019    IR AORTAGRAM WITH RUN-OFF  6/29/2020    IR AORTAGRAM WITH RUN-OFF  7/6/2020    DE ECHO TRANSESOPHAG R-T 2D W/PRB IMG ACQUISJ I&R N/A 11/12/2019    Procedure: TRANSESOPHAGEAL ECHOCARDIOGRAM (SANAZ);   Surgeon: Sloane Addison DO;  [facility];  Service: Cardiac Surgery    DE REPLACE AORTIC VALVE OPENFEMORAL ARTERY APPROACH N/A 11/12/2019    Procedure: REPLACEMENT AORTIC VALVE TRANSCATHETER (TAVR) TRANSFEMORAL WITH 29 MM MEEKS MIKEL S3 VALVE (ACCESS ON THE RIGHT); RIGHT GROIN CUT DOWN;  Surgeon: Sloane Addison DO;  [facility];  Service: Cardiac Surgery    DE Virgil Bennett 3RD+ ORD [facility] Right 8/15/2019    Procedure: LEFT GROIN ACCESS RIGHT LEG ARTERIOGRAM WITH ARTHRECTOMY, LEFT LEG RUNOFF;  Surgeon: Reshma Parks MD;  [facility];  Service: Vascular    [facility] 6/29/2020    Procedure: ARTERIOGRAM Angiogram/Intervention Right Groin;  Surgeon: Reshma Parks MD;  [facility];  Service: Vascular    [facility] Left 7/6/2020    Procedure: ARTERIOGRAM right groin approach imaging on patients left Angiopasty of popliteal and tibial artery;  Surgeon: Reshma Parks MD;  [facility];  Service: Vascular    TONSILLECTOMY AND ADENOIDECTOMY  childhood       Current Outpatient Medications   Medication Sig Dispense Refill    acetaminophen (TYLENOL) 325 mg tablet Take 325 mg by mouth every 6 (six) hours as needed for mild pain       aspirin (ECOTRIN LOW STRENGTH) 81 mg EC tablet Take 81 mg by mouth daily      Cholecalciferol (VITAMIN D3) 2000 units TABS Take 2,000 Units by mouth daily      clopidogrel (PLAVIX) 75 mg tablet Take 1 tablet (75 mg total) by mouth daily 180 tablet 3    cyanocobalamin (VITAMIN B-12) 500 mcg tablet Take 500 mcg by mouth daily      DULoxetine 40 MG CPEP Take 1 capsule (40 mg total) by mouth daily At bedtime 30 capsule 2    Fluad Quadrivalent 0 5 ML PRSY inject 0 5 milliliters intramuscularly      furosemide (LASIX) 20 mg tablet Take 1 tablet (20 mg total) by mouth daily 60 tablet 4    isosorbide mononitrate (IMDUR) 30 mg 24 hr tablet take 1 tablet by mouth once daily 90 tablet 3    metoprolol tartrate (LOPRESSOR) 25 mg tablet Take 0 5 tablets (12 5 mg total) by mouth every 12 (twelve) hours 180 tablet 0    Multiple Vitamin (MULTIVITAMIN) tablet Take 1 tablet by mouth daily      predniSONE 1 mg tablet Take 4 tablets (4 mg total) by mouth daily 120 tablet 2    predniSONE 5 mg tablet Take 1 tablet (5 mg total) by mouth daily 90 tablet 0    ranolazine (RANEXA) 500 mg 12 hr tablet take 1 tablet by mouth twice a day 180 tablet 4    rosuvastatin (CRESTOR) 5 mg tablet take 1 tablet by mouth once daily (Patient taking differently: Take 1 tablet every other day) 90 tablet 3    sacubitril-valsartan (ENTRESTO) 24-26 MG TABS Take 1 tablet by mouth 2 (two) times a day 60 tablet 3     No current facility-administered medications for this visit  Allergies   Allergen Reactions    Pregabalin GI Intolerance, Dizziness, Drowsiness and Lightheadedness     Lyrica    Atorvastatin Myalgia and Arthralgia    Avelox [Moxifloxacin] Diarrhea    Gabapentin Dizziness    Pravastatin Myalgia and Arthralgia    Rosuvastatin Calcium Myalgia and Arthralgia    Simvastatin Myalgia and Arthralgia       Review of Systems   Constitutional: Negative for fatigue and unexpected weight change  HENT: Negative for mouth sores  Respiratory: Negative for cough and shortness of breath  Cardiovascular: Positive for leg swelling  Gastrointestinal: Negative for constipation and diarrhea  Musculoskeletal: Positive for arthralgias, back pain and myalgias  Negative for joint swelling  Skin: Negative for color change and rash     Neurological: Negative for weakness  Psychiatric/Behavioral: Negative for sleep disturbance  There were no vitals filed for this visit      Labs:   Component      Latest Ref Rng & Units 6/19/2020   WBC      4 80 - 10 80 Thousand/uL 8 30   Red Blood Cell Count      4 30 - 5 90 Million/uL 4 65   Hemoglobin      14 0 - 18 0 g/dL 15 2   HCT      42 0 - 47 0 % 43 8   MCV      81 - 99 fL 94   MCH      26 0 - 34 0 pg 32 8   MCHC      31 0 - 37 0 g/dL 34 8   RDW      11 5 - 14 5 % 13 7   MPV      8 6 - 11 7 fL 8 0 (L)   Platelet Count      890 - 390 Thousands/uL 197   Neutrophils %      42 - 75 % 78 (H)   Lymphocytes Relative      21 - 51 % 15 (L)   Monocytes Relative      2 - 12 % 6   Eosinophils      0 - 5 % 1   Basophils Relative      0 - 2 % 1   Absolute Neutrophils      1 40 - 6 50 Thousands/µL 6 40   Lymphocytes Absolute      0 60 - 4 47 Thousands/µL 1 20   Absolute Monocytes      0 17 - 1 22 Thousand/µL 0 50   Absolute Eosinophils      0 00 - 0 61 Thousand/µL 0 10   Basophils Absolute      0 00 - 0 10 Thousands/µL 0 10   Sodium      134 - 143 mmol/L 137   Potassium      3 5 - 5 5 mmol/L 4 2   Chloride      98 - 107 mmol/L 103   CO2      21 - 31 mmol/L 25   Anion Gap      4 - 13 mmol/L 9   BUN      7 - 25 mg/dL 18   Creatinine      0 70 - 1 30 mg/dL 0 77   GLUCOSE FASTING      65 - 99 mg/dL 110 (H)   Calcium      8 6 - 10 5 mg/dL 9 6   AST      13 - 39 U/L 21   ALT      7 - 52 U/L 17   Alkaline Phosphatase      55 - 165 U/L 38 (L)   Total Protein      6 4 - 8 9 g/dL 6 8   Albumin      3 5 - 5 7 g/dL 4 4   TOTAL BILIRUBIN      0 20 - 1 00 mg/dL 0 60   eGFR      ml/min/1 73sq m 91   Sed Rate      0 - 20 mm/hour 5   C-REACTIVE PROTEIN      <7 5 mg/L <5 0     Component      Latest Ref Rng & Units 3/2/2020   WBC      4 31 - 10 16 Thousand/uL 7 97   Red Blood Cell Count      3 88 - 5 62 Million/uL 4 81   Hemoglobin      12 0 - 17 0 g/dL 14 7   HCT      36 5 - 49 3 % 44 3   MCV      82 - 98 fL 92   MCH      26 8 - 34 3 pg 30 6   MCHC      31 4 - 37 4 g/dL 33 2   RDW      11 6 - 15 1 % 13 7   MPV      8 9 - 12 7 fL 9 5   Platelet Count      729 - 390 Thousands/uL 206   nRBC      /100 WBCs 0   Neutrophils %      43 - 75 % 73   Immat GRANS %      0 - 2 % 0   Lymphocytes Relative      14 - 44 % 17   Monocytes Relative      4 - 12 % 8   Eosinophils      0 - 6 % 1   Basophils Relative      0 - 1 % 1   Absolute Neutrophils      1 85 - 7 62 Thousands/µL 5 80   Immature Grans Absolute      0 00 - 0 20 Thousand/uL 0 02   Lymphocytes Absolute      0 60 - 4 47 Thousands/µL 1 37   Absolute Monocytes      0 17 - 1 22 Thousand/µL 0 61   Absolute Eosinophils      0 00 - 0 61 Thousand/µL 0 11   Basophils Absolute      0 00 - 0 10 Thousands/µL 0 06   C-REACTIVE PROTEIN      <3 0 mg/L 3 8 (H)   Sed Rate      0 - 10 mm/hour 14 (H)         I spent 10 minutes with patient today in which greater than 50% of the time was spent in counseling/coordination of care regarding treatment plan    VIRTUAL VISIT [address] acknowledges that he has consented to an online visit or consultation  He understands that the online visit is based solely on information provided by him, and that, in the absence of a face-to-face physical evaluation by the physician, the diagnosis he receives is both limited and provisional in terms of accuracy and completeness  This is not intended to replace a full medical face-to-face evaluation by the physician  Aris Villar understands and accepts these terms

## 2020-09-11 ENCOUNTER — HOSPITAL ENCOUNTER (OUTPATIENT)
Dept: NON INVASIVE DIAGNOSTICS | Facility: HOSPITAL | Age: 70
Discharge: HOME | End: 2020-09-11
Payer: MEDICARE

## 2020-09-11 DIAGNOSIS — I73.9 PAD (PERIPHERAL ARTERY DISEASE) (HCC): ICD-10-CM

## 2020-09-11 PROCEDURE — 93925 LOWER EXTREMITY STUDY: CPT

## 2020-09-11 PROCEDURE — 93923 UPR/LXTR ART STDY 3+ LVLS: CPT

## 2020-09-14 PROCEDURE — 93925 LOWER EXTREMITY STUDY: CPT | Performed by: SURGERY

## 2020-09-14 PROCEDURE — 93922 UPR/L XTREMITY ART 2 LEVELS: CPT | Performed by: SURGERY

## 2020-09-16 ENCOUNTER — TELEMEDICINE (OUTPATIENT)
Dept: VASCULAR SURGERY | Facility: CLINIC | Age: 70
End: 2020-09-16
Payer: MEDICARE

## 2020-09-16 VITALS
BODY MASS INDEX: 26.84 KG/M2 | HEART RATE: 58 BPM | HEIGHT: 67 IN | WEIGHT: 171 LBS | TEMPERATURE: 98.6 F | DIASTOLIC BLOOD PRESSURE: 63 MMHG | SYSTOLIC BLOOD PRESSURE: 122 MMHG

## 2020-09-16 DIAGNOSIS — R20.0 RIGHT LEG NUMBNESS: ICD-10-CM

## 2020-09-16 DIAGNOSIS — I73.9 PAD (PERIPHERAL ARTERY DISEASE) (HCC): ICD-10-CM

## 2020-09-16 DIAGNOSIS — I10 ESSENTIAL HYPERTENSION: ICD-10-CM

## 2020-09-16 DIAGNOSIS — F32.A DEPRESSION, UNSPECIFIED DEPRESSION TYPE: ICD-10-CM

## 2020-09-16 DIAGNOSIS — M54.12 CERVICAL RADICULOPATHY: ICD-10-CM

## 2020-09-16 DIAGNOSIS — Z79.52 LONG TERM SYSTEMIC STEROID USER: ICD-10-CM

## 2020-09-16 DIAGNOSIS — I42.0 DILATED CARDIOMYOPATHY (HCC): ICD-10-CM

## 2020-09-16 DIAGNOSIS — M15.0 PRIMARY GENERALIZED (OSTEO)ARTHRITIS: ICD-10-CM

## 2020-09-16 DIAGNOSIS — Z95.2 S/P TAVR (TRANSCATHETER AORTIC VALVE REPLACEMENT): ICD-10-CM

## 2020-09-16 DIAGNOSIS — G62.9 PERIPHERAL POLYNEUROPATHY: ICD-10-CM

## 2020-09-16 DIAGNOSIS — I73.9 CLAUDICATION IN PERIPHERAL VASCULAR DISEASE (HCC): ICD-10-CM

## 2020-09-16 DIAGNOSIS — G89.4 CHRONIC PAIN SYNDROME: ICD-10-CM

## 2020-09-16 DIAGNOSIS — R06.00 DOE (DYSPNEA ON EXERTION): Chronic | ICD-10-CM

## 2020-09-16 DIAGNOSIS — R29.898 RIGHT HAND WEAKNESS: ICD-10-CM

## 2020-09-16 DIAGNOSIS — M51.36 LUMBAR DEGENERATIVE DISC DISEASE: ICD-10-CM

## 2020-09-16 DIAGNOSIS — I25.10 CORONARY ARTERY DISEASE INVOLVING NATIVE CORONARY ARTERY OF NATIVE HEART WITHOUT ANGINA PECTORIS: ICD-10-CM

## 2020-09-16 DIAGNOSIS — I35.0 NONRHEUMATIC AORTIC VALVE STENOSIS: Primary | ICD-10-CM

## 2020-09-16 DIAGNOSIS — R73.9 HYPERGLYCEMIA: ICD-10-CM

## 2020-09-16 DIAGNOSIS — M85.852 OSTEOPENIA OF LEFT HIP: ICD-10-CM

## 2020-09-16 DIAGNOSIS — I65.23 BILATERAL CAROTID ARTERY STENOSIS: ICD-10-CM

## 2020-09-16 DIAGNOSIS — G56.03 BILATERAL CARPAL TUNNEL SYNDROME: ICD-10-CM

## 2020-09-16 DIAGNOSIS — M54.16 LUMBAR RADICULOPATHY: ICD-10-CM

## 2020-09-16 DIAGNOSIS — E78.00 HYPERCHOLESTEROLEMIA: ICD-10-CM

## 2020-09-16 PROCEDURE — 99442 PR PHYS/QHP TELEPHONE EVALUATION 11-20 MIN: CPT | Performed by: SURGERY

## 2020-09-16 NOTE — ASSESSMENT & PLAN NOTE
Significant bilateral lower extremity arterial occlusive disease  Patient has undergone bilateral SFA to below knee popliteal artery bypasses  Patient is also undergone atherectomy of the bilateral common femoral arteries  Bypass is remain patent  Patient to continue on a biannual surveillance program   Patient to continue aspirin, statin, Plavix  Patient encouraged to continue walking exercise program   Presently patient walks approximately 20 minutes on his treadmill each day

## 2020-09-16 NOTE — PROGRESS NOTES
It was my intent to perform this visit via video technology but the patient was not able to do a video connection so the visit was completed via audio telephone only  Previous intervention/surgeries:  2020-07-06 Right CFA atherectomy  2020-06-29 Left CFA atherectomy  2019-11-12 TAVR  2010-04-09 Left Percutaneous mechanical graft thrombectomy  2009-10-01 Vac Dressing   2009-10-01 Left Wound incision & debridement  2009-08-13 Left FemPop Bypass BK Greater saphenous  2009-08-13 Left Lt Common Femoral to Below knee popliteal artery insitu bypass graft  2009-06-18 Right FemPop Bypass BK Greater saphenous  2009-06-18 Right Rt Femoral to below knee Popliteal Bypass   B/L CFA atherectomy/angioplasty      Review of Systems -   General ROS: negative  Psychological ROS: negative  Ophthalmic ROS: negative  ENT ROS: negative  Allergy and Immunology ROS: negative  Hematological and Lymphatic ROS: negative  Endocrine ROS: negative  Respiratory ROS: no cough, shortness of breath, or wheezing  Cardiovascular ROS: no chest pain or dyspnea on exertion  Gastrointestinal ROS: no abdominal pain, change in bowel habits, or black or bloody stools  Genito-Urinary ROS: no dysuria, trouble voiding, or hematuria  Musculoskeletal ROS: positive for - pain in thigh/calf at 0 5miles on treadmill - right  Neurological ROS: no TIA or stroke symptoms  Dermatological ROS: negative        Physical Exam:    Vitals: Blood pressure 122/63, pulse 58, temperature 98 6 °F (37 °C), temperature source Tympanic, height 5' 6 5" (1 689 m), weight 77 6 kg (171 lb)  , Body mass index is 27 19 kg/m² ,   Wt Readings from Last 3 Encounters:   09/16/20 77 6 kg (171 lb)   09/09/20 78 5 kg (173 lb)   09/03/20 76 7 kg (169 lb)     /63 (BP Location: Left arm, Patient Position: Sitting, Cuff Size: Adult)   Pulse 58   Temp 98 6 °F (37 °C) (Tympanic)   Ht 5' 6 5" (1 689 m)   Wt 77 6 kg (171 lb)   BMI 27 19 kg/m²           Labs & Results:      Imaging review:      Assessment/Plan:      Carotid artery stenosis  Remains asymptomatic  Continue biannual surveillance  Right ICA suggestive of 50-69% stenosis with less than 50% stenosis on the left  Continue aspirin, Plavix, statin  PAD (peripheral artery disease) (HCC)  Significant bilateral lower extremity arterial occlusive disease  Patient has undergone bilateral SFA to below knee popliteal artery bypasses  Patient is also undergone atherectomy of the bilateral common femoral arteries  Bypass is remain patent  Patient to continue on a biannual surveillance program   Patient to continue aspirin, statin, Plavix  Patient encouraged to continue walking exercise program   Presently patient walks approximately 20 minutes on his treadmill each day  Claudication in peripheral vascular disease ([facility] [address] )  Claudication significantly improved after undergoing recanalization of the below knee popliteal artery occlusion  The occlusion was just distal to the distal anastomosis of the SFA to popliteal bypass  Significant bilateral lower extremity arterial occlusive disease  Patient has undergone bilateral SFA to below knee popliteal artery bypasses  Patient is also undergone atherectomy of the bilateral common femoral arteries  Bypass is remain patent  Patient to continue on a biannual surveillance program   Patient to continue aspirin, statin, Plavix  Patient encouraged to continue walking exercise program   Presently patient walks approximately 20 minutes on his treadmill each day  Thank you for the opportunity to participate in the care of this patient          Virtual Brief Visit    Assessment/Plan:    Problem List Items Addressed This Visit        Cardiovascular and Mediastinum    Essential hypertension    Coronary artery disease involving native coronary artery of native heart without angina pectoris    PAD (peripheral artery disease) ([facility] [address] )     Significant bilateral lower extremity arterial occlusive disease  Patient has undergone bilateral SFA to below knee popliteal artery bypasses  Patient is also undergone atherectomy of the bilateral common femoral arteries  Bypass is remain patent  Patient to continue on a biannual surveillance program   Patient to continue aspirin, statin, Plavix  Patient encouraged to continue walking exercise program   Presently patient walks approximately 20 minutes on his treadmill each day  Relevant Orders    VAS lower limb arterial duplex, complete bilateral    VAS carotid complete study    Aortic valve stenosis - Primary    Carotid artery stenosis     Remains asymptomatic  Continue biannual surveillance  Right ICA suggestive of 50-69% stenosis with less than 50% stenosis on the left  Continue aspirin, Plavix, statin  Relevant Orders    VAS lower limb arterial duplex, complete bilateral    VAS carotid complete study    Dilated cardiomyopathy (HCC)       Nervous and Auditory    Bilateral carpal tunnel syndrome    Cervical radiculopathy    Lumbar radiculopathy    Peripheral polyneuropathy       Musculoskeletal and Integument    Primary generalized (osteo)arthritis    Osteopenia of left hip    Lumbar degenerative disc disease       Other    RODRIGUEZ (dyspnea on exertion) (Chronic)    Long term systemic steroid user    Depression    Hypercholesterolemia    Claudication in peripheral vascular disease (HCC)     Claudication significantly improved after undergoing recanalization of the below knee popliteal artery occlusion  The occlusion was just distal to the distal anastomosis of the SFA to popliteal bypass  Significant bilateral lower extremity arterial occlusive disease  Patient has undergone bilateral SFA to below knee popliteal artery bypasses  Patient is also undergone atherectomy of the bilateral common femoral arteries  Bypass is remain patent  Patient to continue on a biannual surveillance program   Patient to continue aspirin, statin, Plavix    Patient encouraged to continue walking exercise program   Presently patient walks approximately 20 minutes on his treadmill each day  S/P TAVR (transcatheter aortic valve replacement)    Right leg numbness    Right hand weakness    Chronic pain syndrome    Hyperglycemia                Reason for visit is   Chief Complaint   Patient presents with    Virtual Brief Visit        Encounter provider Mary Chirinos MD    Provider located at [address] [address]  [phone]    Recent Visits  No visits were found meeting these conditions  Showing recent visits within past 7 days and meeting all other requirements     Today's Visits  Date Type Provider Dept   09/16/20 Telemedicine Mary Chirinos  [facility] today's visits and meeting all other requirements     Future Appointments  No visits were found meeting these conditions  Showing future appointments within next 150 days and meeting all other requirements        After connecting through telephone, the patient was identified by name and date of birth  Harris Posada was informed that this is a telemedicine visit and that the visit is being conducted by telephone and patient was informed that this is not a secure, HIPAA-complaint platform  He agrees to proceed     My office door was closed  No one else was in the room  He acknowledged consent and understanding of privacy and security of the platform  The patient has agreed to participate and understands he can discontinue the visit at any time  Patient is aware this is a billable service  Subjective    Harris Posada is a 65 y o  male with bilateral SFA to popliteal artery bypasses  Patient is now undergone atherectomy of the bilateral common femoral arteries as well as recanalization of an occluded popliteal artery distal to the distal anastomosis of the bypass graft    This was performed on 07/06/2020 where a 3 x 4 mm chocolate balloon angioplasty was performed of the distal popliteal artery into the anterior tibial artery  Patient reports now being able to walk 1/2 mi on the treadmill  Prior to the procedure patient was experiencing claudication in the right calf at approximately 2-3 minutes on the treadmill  LEAD study performed on 09/11/2020 shows an ZACH of 0 77 previously measured at 0 66  On the left the ZACH is 1 0 previously 1 07  There is elevated velocity within the right profundus femoral artery consistent with 75% stenosis  Patient also underwent carotid duplex on 06/08/2020 which shows a 50-69% stenosis on the right and less than 50% stenosis on the left  Patient denies TIA or CVA like symptoms  Patient is on aspirin, Plavix and statin  He is a nonsmoker  Patient encouraged to continue daily walking exercise program on the treadmill and continue his medical regimen  Will continue biannual surveillance of the carotid arteries and lower extremity arterial occlusive disease      HPI     Past Medical History:   Diagnosis Date    Anxiety     Benign essential hypertension     Carotid stenosis, asymptomatic, bilateral     00-10% GLENN, <05% LICA    Coronary artery disease     Depression     Diffuse arthralgia     Last Assessed: 1/23/2017    Former tobacco use     History of alcohol use     3-6 beers/day x20 years, no residual liver disease    History of Lyme disease     History of rheumatic fever     Hypercholesterolemia     Hyperlipidemia     Hyperlipidemia     Hypertension     Ischemic cardiomyopathy     Myocardial infarction ([address] )     Osteoarthritis, hip, bilateral     PAD (peripheral artery disease) (HCC)     s/p fem-below the knee bypass & percutaneous tx right CFA/SFA & left external iliac/CFA    Pneumonia     Polymyalgia rheumatica (HCC)     on chronic steroids    RBBB     Rotator cuff disorder     b/l    Severe aortic stenosis     Wears hearing aid     b/l Past Surgical History:   Procedure Laterality Date    CARDIAC CATHETERIZATION      FEMORAL BYPASS Bilateral     fem-below knee w/ GSV    IR AORTAGRAM WITH RUN-OFF  8/15/2019    IR AORTAGRAM WITH RUN-OFF  9/17/2019    IR AORTAGRAM WITH RUN-OFF  6/29/2020    IR AORTAGRAM WITH RUN-OFF  7/6/2020    MD ECHO TRANSESOPHAG R-T 2D W/PRB IMG ACQUISJ I&R N/A 11/12/2019    Procedure: TRANSESOPHAGEAL ECHOCARDIOGRAM (SANAZ);   Surgeon: Jeremiah Domingo DO;  [facility]: [facility];  Service: Cardiac Surgery    MD REPLACE AORTIC VALVE OPENFEMORAL ARTERY APPROACH N/A 11/12/2019    Procedure: REPLACEMENT AORTIC VALVE TRANSCATHETER (TAVR) TRANSFEMORAL WITH 29 MM MEEKS MIKEL S3 VALVE (ACCESS ON THE RIGHT); RIGHT GROIN CUT DOWN;  Surgeon: Jeremiah Domingo DO;  [facility];  Service: Cardiac Surgery    MD Muaro [place] 3RD+ ORD SLCTV ABDL [facility] Right 8/15/2019    Procedure: LEFT GROIN ACCESS RIGHT LEG ARTERIOGRAM WITH ARTHRECTOMY, LEFT LEG RUNOFF;  Surgeon: Jody Mackay MD;  [facility];  Service: Vascular    MD Mauro Notch 3RD+ ORD SLCTV ABDL [facility] Right 6/29/2020    Procedure: ARTERIOGRAM Angiogram/Intervention Right Groin;  Surgeon: Jody Mackay MD;  [facility];  Service: Vascular    MD Mauro Notch 3RD+ ORD [address] [facility] Left 7/6/2020    Procedure: ARTERIOGRAM right groin approach imaging on patients left Angiopasty of popliteal and tibial artery;  Surgeon: Jdoy Mackay MD;  [facility];  Service: Vascular    TONSILLECTOMY AND ADENOIDECTOMY  childhood       Current Outpatient Medications   Medication Sig Dispense Refill    acetaminophen (TYLENOL) 325 mg tablet Take 325 mg by mouth every 6 (six) hours as needed for mild pain       aspirin (ECOTRIN LOW STRENGTH) 81 mg EC tablet Take 81 mg by mouth daily      Cholecalciferol (VITAMIN D3) 2000 units TABS Take 2,000 Units by mouth daily      clopidogrel (PLAVIX) 75 mg tablet Take 1 tablet (75 mg total) by mouth daily 180 tablet 3    cyanocobalamin (VITAMIN B-12) 500 mcg tablet Take 500 mcg by mouth daily      DULoxetine 40 MG CPEP Take 1 capsule (40 mg total) by mouth daily At bedtime 30 capsule 2    Fluad Quadrivalent 0 5 ML PRSY inject 0 5 milliliters intramuscularly      furosemide (LASIX) 20 mg tablet Take 1 tablet (20 mg total) by mouth daily 60 tablet 4    metoprolol tartrate (LOPRESSOR) 25 mg tablet Take 0 5 tablets (12 5 mg total) by mouth every 12 (twelve) hours 180 tablet 0    Multiple Vitamin (MULTIVITAMIN) tablet Take 1 tablet by mouth daily      predniSONE 1 mg tablet Take 4 tablets (4 mg total) by mouth daily 120 tablet 2    ranolazine (RANEXA) 500 mg 12 hr tablet take 1 tablet by mouth twice a day 180 tablet 4    rosuvastatin (CRESTOR) 5 mg tablet take 1 tablet by mouth once daily (Patient taking differently: Take 1 tablet every other day) 90 tablet 3    sacubitril-valsartan (ENTRESTO) 24-26 MG TABS Take 1 tablet by mouth 2 (two) times a day 60 tablet 3    isosorbide mononitrate (IMDUR) 30 mg 24 hr tablet take 1 tablet by mouth once daily (Patient not taking: Reported on 9/16/2020) 90 tablet 3    predniSONE 5 mg tablet Take 1 tablet (5 mg total) by mouth daily (Patient not taking: Reported on 9/16/2020) 90 tablet 0     No current facility-administered medications for this visit           Allergies   Allergen Reactions    Pregabalin GI Intolerance, Dizziness, Drowsiness and Lightheadedness     Lyrica    Atorvastatin Myalgia and Arthralgia    Avelox [Moxifloxacin] Diarrhea    Gabapentin Dizziness    Pravastatin Myalgia and Arthralgia    Rosuvastatin Calcium Myalgia and Arthralgia    Simvastatin Myalgia and Arthralgia       Review of Systems    Vitals:    09/16/20 0815   BP: 122/63   BP Location: Left arm   Patient Position: Sitting   Cuff Size: Adult   Pulse: 58   Temp: 98 6 °F (37 °C)   TempSrc: Tympanic   Weight: 77 6 kg (171 lb)   Height: 5' 6 5" (1 689 m)         I spent 20 minutes directly with the patient during this visit    [id number] acknowledges that he has consented to an online visit or consultation  He understands that the online visit is based solely on information provided by him, and that, in the absence of a face-to-face physical evaluation by the physician, the diagnosis he receives is both limited and provisional in terms of accuracy and completeness  This is not intended to replace a full medical face-to-face evaluation by the physician  Ino Roberts understands and accepts these terms

## 2020-09-16 NOTE — ASSESSMENT & PLAN NOTE
Remains asymptomatic  Continue biannual surveillance  Right ICA suggestive of 50-69% stenosis with less than 50% stenosis on the left  Continue aspirin, Plavix, statin

## 2020-09-16 NOTE — LETTER
September 16, 2020     Storm Wilson, [address] [place] [address]    Patient: Merna Hidalgo   YOB: 1950   Date of Visit: 9/16/2020       Dear Dr Anna Lugo:    Thank you for referring Kadie James to me for evaluation  Below are the relevant portions of my assessment and plan of care  Merna Hidalgo is a 65 y o  male with bilateral SFA to popliteal artery bypasses  Patient is now undergone atherectomy of the bilateral common femoral arteries as well as recanalization of an occluded popliteal artery distal to the distal anastomosis of the bypass graft  This was performed on 07/06/2020 where a 3 x 4 mm chocolate balloon angioplasty was performed of the distal popliteal artery into the anterior tibial artery  Patient reports now being able to walk 1/2 mi on the treadmill  Prior to the procedure patient was experiencing claudication in the right calf at approximately 2-3 minutes on the treadmill  LEAD study performed on 09/11/2020 shows an ZACH of 0 77 previously measured at 0 66  On the left the ZACH is 1 0 previously 1 07  There is elevated velocity within the right profundus femoral artery consistent with 75% stenosis  Patient also underwent carotid duplex on 06/08/2020 which shows a 50-69% stenosis on the right and less than 50% stenosis on the left  Patient denies TIA or CVA like symptoms  Patient is on aspirin, Plavix and statin  He is a nonsmoker  Patient encouraged to continue daily walking exercise program on the treadmill and continue his medical regimen  Will continue biannual surveillance of the carotid arteries and lower extremity arterial occlusive disease  If you have questions, please do not hesitate to call me  I look forward to following Eugenia Wagner along with you           Sincerely,        Lisette Edmond MD        CC: No Recipients

## 2020-09-16 NOTE — ASSESSMENT & PLAN NOTE
Claudication significantly improved after undergoing recanalization of the below knee popliteal artery occlusion  The occlusion was just distal to the distal anastomosis of the SFA to popliteal bypass  Significant bilateral lower extremity arterial occlusive disease  Patient has undergone bilateral SFA to below knee popliteal artery bypasses  Patient is also undergone atherectomy of the bilateral common femoral arteries  Bypass is remain patent  Patient to continue on a biannual surveillance program   Patient to continue aspirin, statin, Plavix  Patient encouraged to continue walking exercise program   Presently patient walks approximately 20 minutes on his treadmill each day

## 2020-09-21 ENCOUNTER — OFFICE VISIT (OUTPATIENT)
Dept: PAIN MEDICINE | Facility: CLINIC | Age: 70
End: 2020-09-21
Payer: MEDICARE

## 2020-09-21 VITALS
HEIGHT: 67 IN | SYSTOLIC BLOOD PRESSURE: 138 MMHG | TEMPERATURE: 95.9 F | WEIGHT: 168.2 LBS | BODY MASS INDEX: 26.4 KG/M2 | DIASTOLIC BLOOD PRESSURE: 60 MMHG

## 2020-09-21 DIAGNOSIS — I73.9 CLAUDICATION IN PERIPHERAL VASCULAR DISEASE (HCC): ICD-10-CM

## 2020-09-21 DIAGNOSIS — M51.36 LUMBAR DEGENERATIVE DISC DISEASE: ICD-10-CM

## 2020-09-21 DIAGNOSIS — G62.9 PERIPHERAL POLYNEUROPATHY: ICD-10-CM

## 2020-09-21 DIAGNOSIS — G89.4 CHRONIC PAIN SYNDROME: Primary | ICD-10-CM

## 2020-09-21 DIAGNOSIS — M54.16 LUMBAR RADICULOPATHY: ICD-10-CM

## 2020-09-21 PROCEDURE — 99213 OFFICE O/P EST LOW 20 MIN: CPT | Performed by: NURSE PRACTITIONER

## 2020-09-21 RX ORDER — DULOXETIN HYDROCHLORIDE 60 MG/1
60 CAPSULE, DELAYED RELEASE ORAL DAILY
Qty: 30 CAPSULE | Refills: 2 | Status: SHIPPED | OUTPATIENT
Start: 2020-09-21 | End: 2020-11-09

## 2020-09-21 NOTE — PROGRESS NOTES
Assessment:  1  Chronic pain syndrome    2  Claudication in peripheral vascular disease ([address] )    3  Lumbar radiculopathy    4  Peripheral polyneuropathy        Plan:    While the patient was in the office today, I did have a thorough conversation regarding their chronic pain syndrome, medication management, and treatment plan options  Patient is a 75-year-old male with chronic pain syndrome related to peripheral vascular disease, peripheral polyneuropathy, lumbar radiculopathy  He was last seen here on 08/03/2020 which Cymbalta was increased to 40 mg daily  Today, he tells me that his pain is 40-50% improved  He denies any side effects with Cymbalta  Continue Cymbalta to address the neuropathic component of pain  Will increase Cymbalta to 60 mg daily  Prescription was sent electronically to his pharmacy with refills  The patient will follow-up in 6 weeks for medication prescription refill and reevaluation  The patient was advised to contact the office should their symptoms worsen in the interim  The patient was agreeable and verbalized an understanding  History of Present Illness: The patient is a 65 y o  male who presents for a follow up office visit in regards to Back Pain; Leg Pain; Arm Pain; and Shoulder Pain  The patients current symptoms include Complaints of low back and bilateral leg pain  Current pain level is 7-8/10  Pain is described as burning, sharp, numb, pins and needles  Current pain medications includes:  Cymbalta 40 mg at bedtime    The patient reports that this regimen is providing 40-50% pain relief  The patient is reporting no side effects from this pain medication regimen  I have personally reviewed and/or updated the patient's past medical history, past surgical history, family history, social history, current medications, allergies, and vital signs today           Review of Systems  Review of Systems   Constitutional: Negative for fatigue and unexpected weight change  HENT: Negative for dental problem, ear pain, hearing loss and sneezing  Eyes: Negative for visual disturbance  Respiratory: Negative for cough and chest tightness  Cardiovascular: Negative for leg swelling  Gastrointestinal: Negative for anal bleeding  Endocrine: Negative for heat intolerance  Genitourinary: Negative for flank pain and genital sores  Musculoskeletal: Positive for gait problem  Decreased range of motion  Joint stiffness  Pain in extremity   Skin: Negative for wound  Allergic/Immunologic: Negative for immunocompromised state  Neurological: Positive for dizziness  Negative for speech difficulty and light-headedness  Hematological: Negative for adenopathy  Psychiatric/Behavioral: Negative for confusion  The patient is not hyperactive  All other systems reviewed and are negative          Past Medical History:   Diagnosis Date    Anxiety     Benign essential hypertension     Carotid stenosis, asymptomatic, bilateral     88-76% GLENN, <73% LICA    Coronary artery disease     Depression     Diffuse arthralgia     Last Assessed: 1/23/2017    Former tobacco use     History of alcohol use     3-6 beers/day x20 years, no residual liver disease    History of Lyme disease     History of rheumatic fever     Hypercholesterolemia     Hyperlipidemia     Hyperlipidemia     Hypertension     Ischemic cardiomyopathy     Myocardial infarction ([address] )     Osteoarthritis, hip, bilateral     PAD (peripheral artery disease) (HCC)     s/p fem-below the knee bypass & percutaneous tx right CFA/SFA & left external iliac/CFA    Pneumonia     Polymyalgia rheumatica (HCC)     on chronic steroids    RBBB     Rotator cuff disorder     b/l    Severe aortic stenosis     Wears hearing aid     b/l       Past Surgical History:   Procedure Laterality Date    CARDIAC CATHETERIZATION      FEMORAL BYPASS Bilateral     fem-below knee w/ GSV    IR AORTAGRAM WITH RUN-OFF 8/15/2019    IR AORTAGRAM WITH RUN-OFF  2019    IR AORTAGRAM WITH RUN-OFF  2020    IR AORTAGRAM WITH RUN-OFF  2020    MD ECHO TRANSESOPHAG R-T 2D W/PRB IMG ACQUISJ I&R N/A 2019    Procedure: TRANSESOPHAGEAL ECHOCARDIOGRAM (SANAZ);   Surgeon: Andrew Pugh DO;  [facility];  Service: Cardiac Surgery    MD REPLACE AORTIC VALVE OPENFEMORAL ARTERY APPROACH N/A 2019    Procedure: REPLACEMENT AORTIC VALVE TRANSCATHETER (TAVR) TRANSFEMORAL WITH 29 MM MEEKS MIKEL S3 VALVE (ACCESS ON THE RIGHT); RIGHT GROIN CUT DOWN;  Surgeon: Andrew Pugh DO;  [facility];  Service: Cardiac Surgery    MD SLCTV CATHJ 3RD+ ORD [facility] Right 8/15/2019    Procedure: LEFT GROIN ACCESS RIGHT LEG ARTERIOGRAM WITH ARTHRECTOMY, LEFT LEG RUNOFF;  Surgeon: Abril Mcguire MD;  [facility];  Service: Vascular    MD SLCTV CATHJ 3RD+ ORD [address] [facility] Right 2020    Procedure: ARTERIOGRAM Angiogram/Intervention Right Groin;  Surgeon: Abril Mcguire MD;  [facility];  Service: Vascular    MD Miguel Burks 3RD+ ORD [address] [facility] Left 2020    Procedure: ARTERIOGRAM right groin approach imaging on patients left Angiopasty of popliteal and tibial artery;  Surgeon: Abril Mcguire MD;  [facility];  Service: Vascular    TONSILLECTOMY AND ADENOIDECTOMY  childhood       Family History   Problem Relation Age of Onset    Cancer Mother         cervix    Coronary artery disease Father     Heart attack Father     Cancer Brother     Coronary artery disease Maternal Grandfather     Heart disease Paternal Grandfather     Stroke Paternal Grandfather         CVA    Heart attack Paternal Grandfather     Coronary artery disease Other          at age 78 per [employer]       Social History     Occupational History    Occupation: Environmental Supervisor   Tobacco Use    Smoking status: Former Smoker     Packs/day: 2 00 Years: 20 00     Pack years: 40 00     Types: Cigarettes     Last attempt to quit:      Years since quittin 7    Smokeless tobacco: Former User     Types: Chew   Substance and Sexual Activity    Alcohol use: Not Currently     Frequency: Never     Comment: 3-6 cans beer x20 years, quit , no residual liver issues    Drug use: Never    Sexual activity: Not Currently         Current Outpatient Medications:     acetaminophen (TYLENOL) 325 mg tablet, Take 325 mg by mouth every 6 (six) hours as needed for mild pain , Disp: , Rfl:     aspirin (ECOTRIN LOW STRENGTH) 81 mg EC tablet, Take 81 mg by mouth daily, Disp: , Rfl:     Cholecalciferol (VITAMIN D3) 2000 units TABS, Take 2,000 Units by mouth daily, Disp: , Rfl:     clopidogrel (PLAVIX) 75 mg tablet, Take 1 tablet (75 mg total) by mouth daily, Disp: 180 tablet, Rfl: 3    cyanocobalamin (VITAMIN B-12) 500 mcg tablet, Take 500 mcg by mouth daily, Disp: , Rfl:     DULoxetine 40 MG CPEP, Take 1 capsule (40 mg total) by mouth daily At bedtime, Disp: 30 capsule, Rfl: 2    Fluad Quadrivalent 0 5 ML PRSY, inject 0 5 milliliters intramuscularly, Disp: , Rfl:     furosemide (LASIX) 20 mg tablet, Take 1 tablet (20 mg total) by mouth daily, Disp: 60 tablet, Rfl: 4    metoprolol tartrate (LOPRESSOR) 25 mg tablet, Take 0 5 tablets (12 5 mg total) by mouth every 12 (twelve) hours, Disp: 180 tablet, Rfl: 0    Multiple Vitamin (MULTIVITAMIN) tablet, Take 1 tablet by mouth daily, Disp: , Rfl:     predniSONE 1 mg tablet, Take 4 tablets (4 mg total) by mouth daily, Disp: 120 tablet, Rfl: 2    predniSONE 5 mg tablet, Take 1 tablet (5 mg total) by mouth daily (Patient taking differently: Take 4 mg by mouth daily ), Disp: 90 tablet, Rfl: 0    ranolazine (RANEXA) 500 mg 12 hr tablet, take 1 tablet by mouth twice a day, Disp: 180 tablet, Rfl: 4    rosuvastatin (CRESTOR) 5 mg tablet, take 1 tablet by mouth once daily (Patient taking differently: Take 1 tablet every other day), Disp: 90 tablet, Rfl: 3    sacubitril-valsartan (ENTRESTO) 24-26 MG TABS, Take 1 tablet by mouth 2 (two) times a day, Disp: 60 tablet, Rfl: 3    isosorbide mononitrate (IMDUR) 30 mg 24 hr tablet, take 1 tablet by mouth once daily (Patient not taking: Reported on 9/16/2020), Disp: 90 tablet, Rfl: 3    Allergies   Allergen Reactions    Pregabalin GI Intolerance, Dizziness, Drowsiness and Lightheadedness     Lyrica    Atorvastatin Myalgia and Arthralgia    Avelox [Moxifloxacin] Diarrhea    Gabapentin Dizziness    Pravastatin Myalgia and Arthralgia    Rosuvastatin Calcium Myalgia and Arthralgia    Simvastatin Myalgia and Arthralgia       Physical Exam:    Temp (!) 95 9 °F (35 5 °C)   Ht 5' 6 5" (1 689 m)   Wt 76 3 kg (168 lb 3 2 oz)   BMI 26 74 kg/m²     Constitutional:normal, well developed, well nourished, alert, in no distress and non-toxic and no overt pain behavior  Eyes:anicteric  HEENT:grossly intact  Neck:supple, symmetric, trachea midline and no masses   Pulmonary:even and unlabored  Cardiovascular:No edema or pitting edema present  Skin:Normal without rashes or lesions and well hydrated  Psychiatric:Mood and affect appropriate  Neurologic:Cranial Nerves II-XII grossly intact  Musculoskeletal:normal    Imaging  No orders to display       No orders of the defined types were placed in this encounter

## 2020-09-22 DIAGNOSIS — I73.9 PAD (PERIPHERAL ARTERY DISEASE) (HCC): ICD-10-CM

## 2020-10-05 DIAGNOSIS — R06.00 DOE (DYSPNEA ON EXERTION): Chronic | ICD-10-CM

## 2020-10-05 DIAGNOSIS — I35.0 AORTIC VALVE STENOSIS, ETIOLOGY OF CARDIAC VALVE DISEASE UNSPECIFIED: ICD-10-CM

## 2020-10-05 DIAGNOSIS — I25.10 CORONARY ARTERY DISEASE INVOLVING NATIVE CORONARY ARTERY OF NATIVE HEART WITHOUT ANGINA PECTORIS: ICD-10-CM

## 2020-10-05 DIAGNOSIS — I10 ESSENTIAL HYPERTENSION: ICD-10-CM

## 2020-10-05 RX ORDER — SACUBITRIL AND VALSARTAN 24; 26 MG/1; MG/1
TABLET, FILM COATED ORAL
Qty: 180 TABLET | Refills: 4 | Status: SHIPPED | OUTPATIENT
Start: 2020-10-05 | End: 2020-11-04

## 2020-10-05 NOTE — PATIENT INSTRUCTIONS
Dr Arellano forwarded these results to the patient via [employer].  He will discuss the results with the patient in person at the next appointment.  The patient was instructed to make an appointment if she does not already have one scheduled. Medicare Preventive Visit Patient Instructions  Thank you for completing your Welcome to Medicare Visit or Medicare Annual Wellness Visit today  Your next wellness visit will be due in one year (9/3/2021)  The screening/preventive services that you may require over the next 5-10 years are detailed below  Some tests may not apply to you based off risk factors and/or age  Screening tests ordered at today's visit but not completed yet may show as past due  Also, please note that scanned in results may not display below  Preventive Screenings:  Service Recommendations Previous Testing/Comments   Colorectal Cancer Screening  · Colonoscopy    · Fecal Occult Blood Test (FOBT)/Fecal Immunochemical Test (FIT)  · Fecal DNA/Cologuard Test  · Flexible Sigmoidoscopy Age: 54-65 years old   Colonoscopy: every 10 years (May be performed more frequently if at higher risk)  OR  FOBT/FIT: every 1 year  OR  Cologuard: every 3 years  OR  Sigmoidoscopy: every 5 years  Screening may be recommended earlier than age 48 if at higher risk for colorectal cancer  Also, an individualized decision between you and your healthcare provider will decide whether screening between the ages of 74-80 would be appropriate   Colonoscopy: Not on file  FOBT/FIT: Not on file  Cologuard: 07/31/2019  Sigmoidoscopy: Not on file    Screening Current     Prostate Cancer Screening Individualized decision between patient and health care provider in men between ages of 53-78   Medicare will cover every 12 months beginning on the day after your 50th birthday PSA: 0 6 ng/mL     Screening Current     Hepatitis C Screening Once for adults born between 1945 and 1965  More frequently in patients at high risk for Hepatitis C Hep C Antibody: 12/14/2018    Screening Current   Diabetes Screening 1-2 times per year if you're at risk for diabetes or have pre-diabetes Fasting glucose: 110 mg/dL   A1C: No results in last 5 years    Screening Current   Cholesterol Screening Once every 5 years if you don't have a lipid disorder  May order more often based on risk factors  Lipid panel: 06/19/2020    Screening Current      Other Preventive Screenings Covered by Medicare:  1  Abdominal Aortic Aneurysm (AAA) Screening: covered once if your at risk  You're considered to be at risk if you have a family history of AAA or a male between the age of 73-68 who smoking at least 100 cigarettes in your lifetime  2  Lung Cancer Screening: covers low dose CT scan once per year if you meet all of the following conditions: (1) Age 50-69; (2) No signs or symptoms of lung cancer; (3) Current smoker or have quit smoking within the last 15 years; (4) You have a tobacco smoking history of at least 30 pack years (packs per day x number of years you smoked); (5) You get a written order from a healthcare provider  3  Glaucoma Screening: covered annually if you're considered high risk: (1) You have diabetes OR (2) Family history of glaucoma OR (3)  aged 48 and older OR (3)  American aged 72 and older  3  Osteoporosis Screening: covered every 2 years if you meet one of the following conditions: (1) Have a vertebral abnormality; (2) On glucocorticoid therapy for more than 3 months; (3) Have primary hyperparathyroidism; (4) On osteoporosis medications and need to assess response to drug therapy  5  HIV Screening: covered annually if you're between the age of 12-76  Also covered annually if you are younger than 13 and older than 72 with risk factors for HIV infection  For pregnant patients, it is covered up to 3 times per pregnancy      Immunizations:  Immunization Recommendations   Influenza Vaccine Annual influenza vaccination during flu season is recommended for all persons aged >= 6 months who do not have contraindications   Pneumococcal Vaccine (Prevnar and Pneumovax)  * Prevnar = PCV13  * Pneumovax = PPSV23 Adults 25-60 years old: 1-3 doses may be recommended based on certain risk factors  Adults 72 years old: Prevnar (PCV13) vaccine recommended followed by Pneumovax (PPSV23) vaccine  If already received PPSV23 since turning 65, then PCV13 recommended at least one year after PPSV23 dose  Hepatitis B Vaccine 3 dose series if at intermediate or high risk (ex: diabetes, end stage renal disease, liver disease)   Tetanus (Td) Vaccine - COST NOT COVERED BY MEDICARE PART B Following completion of primary series, a booster dose should be given every 10 years to maintain immunity against tetanus  Td may also be given as tetanus wound prophylaxis  Tdap Vaccine - COST NOT COVERED BY MEDICARE PART B Recommended at least once for all adults  For pregnant patients, recommended with each pregnancy  Shingles Vaccine (Shingrix) - COST NOT COVERED BY MEDICARE PART B  2 shot series recommended in those aged 48 and above     Health Maintenance Due:      Topic Date Due    Hepatitis C Screening  Completed     Immunizations Due:      Topic Date Due    DTaP,Tdap,and Td Vaccines (1 - Tdap) 02/19/1971    Influenza Vaccine  07/01/2020     Advance Directives   What are advance directives? Advance directives are legal documents that state your wishes and plans for medical care  These plans are made ahead of time in case you lose your ability to make decisions for yourself  Advance directives can apply to any medical decision, such as the treatments you want, and if you want to donate organs  What are the types of advance directives? There are many types of advance directives, and each state has rules about how to use them  You may choose a combination of any of the following:  · Living will: This is a written record of the treatment you want  You can also choose which treatments you do not want, which to limit, and which to stop at a certain time  This includes surgery, medicine, IV fluid, and tube feedings  · Durable power of  for healthcare [facility]):   This is a written record that states who you want to make healthcare choices for you when you are unable to make them for yourself  This person, called a proxy, is usually a family member or a friend  You may choose more than 1 proxy  · Do not resuscitate (DNR) order:  A DNR order is used in case your heart stops beating or you stop breathing  It is a request not to have certain forms of treatment, such as CPR  A DNR order may be included in other types of advance directives  · Medical directive: This covers the care that you want if you are in a coma, near death, or unable to make decisions for yourself  You can list the treatments you want for each condition  Treatment may include pain medicine, surgery, blood transfusions, dialysis, IV or tube feedings, and a ventilator (breathing machine)  · Values history: This document has questions about your views, beliefs, and how you feel and think about life  This information can help others choose the care that you would choose  Why are advance directives important? An advance directive helps you control your care  Although spoken wishes may be used, it is better to have your wishes written down  Spoken wishes can be misunderstood, or not followed  Treatments may be given even if you do not want them  An advance directive may make it easier for your family to make difficult choices about your care  Weight Management   Why it is important to manage your weight:  Being overweight increases your risk of health conditions such as heart disease, high blood pressure, type 2 diabetes, and certain types of cancer  It can also increase your risk for osteoarthritis, sleep apnea, and other respiratory problems  Aim for a slow, steady weight loss  Even a small amount of weight loss can lower your risk of health problems  How to lose weight safely:  A safe and healthy way to lose weight is to eat fewer calories and get regular exercise   You can lose up about 1 pound a week by decreasing the number of calories you eat by 500 calories each day  Healthy meal plan for weight management:  A healthy meal plan includes a variety of foods, contains fewer calories, and helps you stay healthy  A healthy meal plan includes the following:  · Eat whole-grain foods more often  A healthy meal plan should contain fiber  Fiber is the part of grains, fruits, and vegetables that is not broken down by your body  Whole-grain foods are healthy and provide extra fiber in your diet  Some examples of whole-grain foods are whole-wheat breads and pastas, oatmeal, brown rice, and bulgur  · Eat a variety of vegetables every day  Include dark, leafy greens such as spinach, kale, marcella greens, and mustard greens  Eat yellow and orange vegetables such as carrots, sweet potatoes, and winter squash  · Eat a variety of fruits every day  Choose fresh or canned fruit (canned in its own juice or light syrup) instead of juice  Fruit juice has very little or no fiber  · Eat low-fat dairy foods  Drink fat-free (skim) milk or 1% milk  Eat fat-free yogurt and low-fat cottage cheese  Try low-fat cheeses such as mozzarella and other reduced-fat cheeses  · Choose meat and other protein foods that are low in fat  Choose beans or other legumes such as split peas or lentils  Choose fish, skinless poultry (chicken or turkey), or lean cuts of red meat (beef or pork)  Before you cook meat or poultry, cut off any visible fat  · Use less fat and oil  Try baking foods instead of frying them  Add less fat, such as margarine, sour cream, regular salad dressing and mayonnaise to foods  Eat fewer high-fat foods  Some examples of high-fat foods include french fries, doughnuts, ice cream, and cakes  · Eat fewer sweets  Limit foods and drinks that are high in sugar  This includes candy, cookies, regular soda, and sweetened drinks  Exercise:  Exercise at least 30 minutes per day on most days of the week   Some examples of exercise include walking, biking, dancing, and swimming  You can also fit in more physical activity by taking the stairs instead of the elevator or parking farther away from stores  Ask your healthcare provider about the best exercise plan for you  © Copyright [employer] 2018 Information is for End User's use only and may not be sold, redistributed or otherwise used for commercial purposes   All illustrations and images included in CareNotes® are the copyrighted property of [employer]  or [address] [place] [address] [employer]

## 2020-10-15 DIAGNOSIS — I73.9 PAD (PERIPHERAL ARTERY DISEASE) (HCC): ICD-10-CM

## 2020-10-15 RX ORDER — CLOPIDOGREL BISULFATE 75 MG/1
75 TABLET ORAL DAILY
Qty: 180 TABLET | Refills: 0 | Status: SHIPPED | OUTPATIENT
Start: 2020-10-15 | End: 2020-11-04 | Stop reason: SDUPTHER

## 2020-10-22 RX ORDER — CLOPIDOGREL BISULFATE 75 MG/1
TABLET ORAL
Qty: 180 TABLET | Refills: 3 | Status: SHIPPED | OUTPATIENT
Start: 2020-10-22

## 2020-10-28 DIAGNOSIS — I10 ESSENTIAL HYPERTENSION: ICD-10-CM

## 2020-10-28 DIAGNOSIS — Z95.2 S/P TAVR (TRANSCATHETER AORTIC VALVE REPLACEMENT): ICD-10-CM

## 2020-10-29 RX ORDER — TRIAMTERENE AND HYDROCHLOROTHIAZIDE 37.5; 25 MG/1; MG/1
CAPSULE ORAL
Qty: 90 CAPSULE | Refills: 1 | Status: SHIPPED | OUTPATIENT
Start: 2020-10-29 | End: 2020-11-04 | Stop reason: ALTCHOICE

## 2020-11-04 ENCOUNTER — OFFICE VISIT (OUTPATIENT)
Dept: CARDIOLOGY CLINIC | Facility: CLINIC | Age: 70
End: 2020-11-04
Payer: MEDICARE

## 2020-11-04 VITALS
DIASTOLIC BLOOD PRESSURE: 60 MMHG | WEIGHT: 176 LBS | SYSTOLIC BLOOD PRESSURE: 128 MMHG | BODY MASS INDEX: 27.62 KG/M2 | HEART RATE: 56 BPM | TEMPERATURE: 96.9 F | HEIGHT: 67 IN

## 2020-11-04 DIAGNOSIS — I10 ESSENTIAL HYPERTENSION: ICD-10-CM

## 2020-11-04 DIAGNOSIS — Z95.2 S/P TAVR (TRANSCATHETER AORTIC VALVE REPLACEMENT): ICD-10-CM

## 2020-11-04 DIAGNOSIS — R42 DIZZINESS: ICD-10-CM

## 2020-11-04 DIAGNOSIS — I25.10 CORONARY ARTERY DISEASE INVOLVING NATIVE CORONARY ARTERY OF NATIVE HEART WITHOUT ANGINA PECTORIS: ICD-10-CM

## 2020-11-04 DIAGNOSIS — I42.0 DILATED CARDIOMYOPATHY (HCC): Primary | ICD-10-CM

## 2020-11-04 DIAGNOSIS — I35.0 NONRHEUMATIC AORTIC VALVE STENOSIS: ICD-10-CM

## 2020-11-04 PROCEDURE — 99214 OFFICE O/P EST MOD 30 MIN: CPT | Performed by: INTERNAL MEDICINE

## 2020-11-04 RX ORDER — SACUBITRIL AND VALSARTAN 49; 51 MG/1; MG/1
1 TABLET, FILM COATED ORAL 2 TIMES DAILY
Qty: 180 TABLET | Refills: 4 | Status: SHIPPED | OUTPATIENT
Start: 2020-11-04

## 2020-11-04 RX ORDER — SACUBITRIL AND VALSARTAN 49; 51 MG/1; MG/1
1 TABLET, FILM COATED ORAL 2 TIMES DAILY
COMMUNITY
End: 2020-11-04 | Stop reason: SDUPTHER

## 2020-11-05 ENCOUNTER — HOSPITAL ENCOUNTER (OUTPATIENT)
Dept: NON INVASIVE DIAGNOSTICS | Facility: HOSPITAL | Age: 70
Discharge: HOME | End: 2020-11-05
Payer: MEDICARE

## 2020-11-05 DIAGNOSIS — R42 DIZZINESS: ICD-10-CM

## 2020-11-05 PROCEDURE — 93226 XTRNL ECG REC<48 HR SCAN A/R: CPT

## 2020-11-05 PROCEDURE — 93225 XTRNL ECG REC<48 HRS REC: CPT

## 2020-11-09 ENCOUNTER — OFFICE VISIT (OUTPATIENT)
Dept: PAIN MEDICINE | Facility: CLINIC | Age: 70
End: 2020-11-09
Payer: MEDICARE

## 2020-11-09 VITALS
BODY MASS INDEX: 27.97 KG/M2 | HEIGHT: 67 IN | SYSTOLIC BLOOD PRESSURE: 151 MMHG | DIASTOLIC BLOOD PRESSURE: 75 MMHG | WEIGHT: 178.2 LBS | TEMPERATURE: 97.5 F | HEART RATE: 61 BPM

## 2020-11-09 DIAGNOSIS — M35.3 POLYMYALGIA RHEUMATICA (HCC): ICD-10-CM

## 2020-11-09 DIAGNOSIS — I73.9 PAD (PERIPHERAL ARTERY DISEASE) (HCC): ICD-10-CM

## 2020-11-09 DIAGNOSIS — M51.36 LUMBAR DEGENERATIVE DISC DISEASE: ICD-10-CM

## 2020-11-09 DIAGNOSIS — M54.16 LUMBAR RADICULOPATHY: ICD-10-CM

## 2020-11-09 DIAGNOSIS — G62.9 PERIPHERAL POLYNEUROPATHY: ICD-10-CM

## 2020-11-09 DIAGNOSIS — G89.4 CHRONIC PAIN SYNDROME: Primary | ICD-10-CM

## 2020-11-09 PROCEDURE — 99213 OFFICE O/P EST LOW 20 MIN: CPT | Performed by: NURSE PRACTITIONER

## 2020-11-09 RX ORDER — DULOXETIN HYDROCHLORIDE 30 MG/1
30 CAPSULE, DELAYED RELEASE ORAL DAILY
Qty: 30 CAPSULE | Refills: 2 | Status: SHIPPED | OUTPATIENT
Start: 2020-11-09

## 2020-11-10 PROCEDURE — 93227 XTRNL ECG REC<48 HR R&I: CPT | Performed by: INTERNAL MEDICINE

## 2020-11-13 ENCOUNTER — LAB (OUTPATIENT)
Dept: LAB | Facility: CLINIC | Age: 70
End: 2020-11-13
Payer: MEDICARE

## 2020-11-13 DIAGNOSIS — I10 ESSENTIAL HYPERTENSION: ICD-10-CM

## 2020-11-13 LAB
CRP SERPL QL: <3 MG/L
ERYTHROCYTE [SEDIMENTATION RATE] IN BLOOD: 8 MM/HOUR (ref 0–19)
TSH SERPL DL<=0.05 MIU/L-ACNC: 1.18 UIU/ML (ref 0.36–3.74)

## 2020-11-13 PROCEDURE — 84443 ASSAY THYROID STIM HORMONE: CPT

## 2020-11-13 PROCEDURE — 86140 C-REACTIVE PROTEIN: CPT | Performed by: INTERNAL MEDICINE

## 2020-11-13 PROCEDURE — 36415 COLL VENOUS BLD VENIPUNCTURE: CPT | Performed by: INTERNAL MEDICINE

## 2020-11-13 PROCEDURE — 85652 RBC SED RATE AUTOMATED: CPT | Performed by: INTERNAL MEDICINE

## 2020-11-16 ENCOUNTER — OFFICE VISIT (OUTPATIENT)
Dept: FAMILY MEDICINE CLINIC | Facility: CLINIC | Age: 70
End: 2020-11-16
Payer: MEDICARE

## 2020-11-16 VITALS
WEIGHT: 174.13 LBS | OXYGEN SATURATION: 98 % | HEART RATE: 60 BPM | TEMPERATURE: 95.1 F | HEIGHT: 67 IN | DIASTOLIC BLOOD PRESSURE: 62 MMHG | BODY MASS INDEX: 27.33 KG/M2 | SYSTOLIC BLOOD PRESSURE: 112 MMHG

## 2020-11-16 DIAGNOSIS — Z12.5 SCREENING FOR PROSTATE CANCER: ICD-10-CM

## 2020-11-16 DIAGNOSIS — E78.00 HYPERCHOLESTEROLEMIA: ICD-10-CM

## 2020-11-16 DIAGNOSIS — I10 ESSENTIAL HYPERTENSION: Primary | ICD-10-CM

## 2020-11-16 DIAGNOSIS — G56.03 BILATERAL CARPAL TUNNEL SYNDROME: ICD-10-CM

## 2020-11-16 PROCEDURE — 99214 OFFICE O/P EST MOD 30 MIN: CPT | Performed by: FAMILY MEDICINE

## 2020-12-09 ENCOUNTER — TELEMEDICINE (OUTPATIENT)
Dept: RHEUMATOLOGY | Facility: CLINIC | Age: 70
End: 2020-12-09
Payer: MEDICARE

## 2020-12-09 DIAGNOSIS — M85.852 OSTEOPENIA OF LEFT HIP: ICD-10-CM

## 2020-12-09 DIAGNOSIS — M35.3 POLYMYALGIA RHEUMATICA (HCC): Primary | ICD-10-CM

## 2020-12-09 DIAGNOSIS — Z79.52 LONG TERM SYSTEMIC STEROID USER: ICD-10-CM

## 2020-12-09 PROCEDURE — 99441 PR PHYS/QHP TELEPHONE EVALUATION 5-10 MIN: CPT | Performed by: INTERNAL MEDICINE

## 2020-12-16 ENCOUNTER — HOSPITAL ENCOUNTER (OUTPATIENT)
Dept: NON INVASIVE DIAGNOSTICS | Facility: HOSPITAL | Age: 70
Discharge: HOME | End: 2020-12-16
Payer: MEDICARE

## 2020-12-16 ENCOUNTER — OFFICE VISIT (OUTPATIENT)
Dept: LAB | Facility: HOSPITAL | Age: 70
End: 2020-12-16
Payer: MEDICARE

## 2020-12-16 DIAGNOSIS — Z95.2 S/P TAVR (TRANSCATHETER AORTIC VALVE REPLACEMENT): ICD-10-CM

## 2020-12-16 DIAGNOSIS — I35.0 NONRHEUMATIC AORTIC VALVE STENOSIS: ICD-10-CM

## 2020-12-16 PROCEDURE — 93005 ELECTROCARDIOGRAM TRACING: CPT

## 2020-12-16 PROCEDURE — 93306 TTE W/DOPPLER COMPLETE: CPT

## 2020-12-17 LAB
ATRIAL RATE: 54 BPM
P AXIS: 25 DEGREES
PR INTERVAL: 172 MS
QRS AXIS: -29 DEGREES
QRSD INTERVAL: 142 MS
QT INTERVAL: 446 MS
QTC INTERVAL: 422 MS
T WAVE AXIS: 52 DEGREES
VENTRICULAR RATE: 54 BPM

## 2020-12-17 PROCEDURE — 93306 TTE W/DOPPLER COMPLETE: CPT | Performed by: INTERNAL MEDICINE

## 2020-12-17 PROCEDURE — 93010 ELECTROCARDIOGRAM REPORT: CPT | Performed by: INTERNAL MEDICINE

## 2021-05-23 NOTE — PROGRESS NOTES
Cardiology Follow Up    Lety Perry  1950  [id number]  [facility]  [address] [place] [address] [address]    1  Cardiomyopathy, ischemic     2  Nonrheumatic aortic valve stenosis     3  Stable angina ([facility] [address] )     4  Essential hypertension           Discussion/Summary: All of his assessed cardiac problems appear to be stable  I did provide him with Ranexa samples 1000 mg BID  The rest of his medications remain the same  No cardiac testing is ordered  RTO 6 months  Interval History: He has not had any major cardiac problems since his last OV  He has had 6 episodes of CP, all occurring when shoveling snow  His BP is high today in the office but he checks it at home and it is controlled  Cardiac evaluation in 9/2017 - Cath- 100 % Proximal LAD with R to L collaterals, EF 35 %  Viability study - no significant anterior wall viability  CT surgery did not feel that CABG would provide benefit  SANAZ - mild AS  He is on Ranexa 1000 mg BID which has helped him  Patient Active Problem List   Diagnosis    Nonrheumatic aortic valve stenosis    RODRIGUEZ (dyspnea on exertion)    Cardiomyopathy, ischemic    Stable angina ([address] )    Essential hypertension     Past Medical History:   Diagnosis Date    Benign essential hypertension     Cardiomyopathy ([facility] [address] )     Coronary artery disease     Depression with anxiety     Hypercholesterolemia     Lyme disease     Polymyalgia rheumatica (HCC)      Social History     Social History    Marital status: /Civil Union     Spouse name: N/A    Number of children: N/A    Years of education: N/A     Occupational History    Not on file       Social History Main Topics    Smoking status: Former Smoker    Smokeless tobacco: Never Used    Alcohol use Not on file    Drug use: Unknown    Sexual activity: Not on file     Other Topics Concern    Not on file     Social History Narrative    No narrative on file      Family History   Problem Relation Age of Onset    Cancer Mother     Coronary artery disease Father     Heart attack Father     Cancer Brother     Coronary artery disease Maternal Grandfather     Heart disease Paternal Grandfather     Coronary artery disease Other      Past Surgical History:   Procedure Laterality Date    TONSILLECTOMY AND ADENOIDECTOMY         Current Outpatient Prescriptions:     amLODIPine (NORVASC) 10 mg tablet, take 1 tablet by mouth once daily, Disp: 30 tablet, Rfl: 2    aspirin 325 mg tablet, Take 325 mg by mouth daily at bedtime, Disp: , Rfl:     Cholecalciferol (VITAMIN D3) 2000 units TABS, Take 2,000 Units by mouth daily, Disp: , Rfl:     isosorbide mononitrate (IMDUR) 30 mg 24 hr tablet, Take 1 tablet (30 mg total) by mouth 2 (two) times a day, Disp: 180 tablet, Rfl: 3    Multiple Vitamin (MULTIVITAMIN) tablet, Take 1 tablet by mouth daily, Disp: , Rfl:     predniSONE 2 5 mg tablet, Take 5 mg by mouth daily  , Disp: , Rfl:     triamterene-hydrochlorothiazide (DYAZIDE) 37 5-25 mg per capsule, Take 1 capsule by mouth daily, Disp: , Rfl:     rosuvastatin (CRESTOR) 10 MG tablet, Take 10 mg by mouth daily at bedtime, Disp: , Rfl:     traMADol (ULTRAM) 50 mg tablet, Take 1 tablet by mouth 3 (three) times a day as needed, Disp: , Rfl:   Allergies   Allergen Reactions    Atorvastatin Myalgia     Annotation - [id number]: Joint pain, general pain    Avelox [Moxifloxacin] Diarrhea    Pravastatin     Rosuvastatin Calcium Myalgia     Annotation - [id number]: Joint pain, general pain    Simvastatin        Labs:  Appointment on 12/05/2017   Component Date Value    WBC 12/05/2017 9 02     RBC 12/05/2017 4 71     Hemoglobin 12/05/2017 14 8     Hematocrit 12/05/2017 42 2     MCV 12/05/2017 90     [address] 12/05/2017 31 4     MCHC 12/05/2017 35 1     RDW 12/05/2017 13 2     MPV 12/05/2017 9 9     Platelets 32/55/7211 268     nRBC 12/05/2017 0  Neutrophils Relative 12/05/2017 81*    Lymphocytes Relative 12/05/2017 13*    Monocytes Relative 12/05/2017 6     Eosinophils Relative 12/05/2017 0     Basophils Relative 12/05/2017 0     Neutrophils Absolute 12/05/2017 7 30     Lymphocytes Absolute 12/05/2017 1 14     Monocytes Absolute 12/05/2017 0 50     Eosinophils Absolute 12/05/2017 0 02     Basophils Absolute 12/05/2017 0 04     Sodium 12/05/2017 134*    Potassium 12/05/2017 4 4     Chloride 12/05/2017 99*    CO2 12/05/2017 28     Anion Gap 12/05/2017 7     BUN 12/05/2017 14     Creatinine 12/05/2017 0 83     Glucose, Fasting 12/05/2017 119*    Calcium 12/05/2017 9 8     AST 12/05/2017 23     ALT 12/05/2017 26     Alkaline Phosphatase 12/05/2017 58     Total Protein 12/05/2017 7 5     Albumin 12/05/2017 4 0     Total Bilirubin 12/05/2017 0 51     eGFR 12/05/2017 90     CRP 12/05/2017 <3 0     Sed Rate 12/05/2017 11*   Appointment on 10/12/2017   Component Date Value    WBC 10/12/2017 8 11     RBC 10/12/2017 4 78     Hemoglobin 10/12/2017 15 1     Hematocrit 10/12/2017 43 3     MCV 10/12/2017 91     MCH 10/12/2017 31 6     MCHC 10/12/2017 34 9     RDW 10/12/2017 12 9     MPV 10/12/2017 10 1     Platelets 87/76/5428 261     nRBC 10/12/2017 0     Neutrophils Relative 10/12/2017 80*    Lymphocytes Relative 10/12/2017 14     Monocytes Relative 10/12/2017 5     Eosinophils Relative 10/12/2017 0     Basophils Relative 10/12/2017 1     Neutrophils Absolute 10/12/2017 6 45     Lymphocytes Absolute 10/12/2017 1 16     Monocytes Absolute 10/12/2017 0 41     Eosinophils Absolute 10/12/2017 0 02     Basophils Absolute 10/12/2017 0 04     Sodium 10/12/2017 134*    Potassium 10/12/2017 4 1     Chloride 10/12/2017 100     CO2 10/12/2017 28     Anion Gap 10/12/2017 6     BUN 10/12/2017 14     Creatinine 10/12/2017 0 70     Glucose 10/12/2017 122     Calcium 10/12/2017 9 7     AST 10/12/2017 20     ALT 10/12/2017 24     Alkaline Phosphatase 10/12/2017 61     Total Protein 10/12/2017 7 8     Albumin 10/12/2017 4 1     Total Bilirubin 10/12/2017 0 44     eGFR 10/12/2017 96     CRP 10/12/2017 <3 0     Sed Rate 10/12/2017 20*     Imaging: No results found  Review of Systems:  Review of Systems   Respiratory: Positive for chest tightness  Physical Exam:  Physical Exam   Constitutional: He is oriented to person, place, and time  He appears well-developed and well-nourished  HENT:   Head: Normocephalic and atraumatic  Eyes: Pupils are equal, round, and reactive to light  Neck: Normal range of motion  Neck supple  No JVD present  Cardiovascular: Regular rhythm, S1 normal, S2 normal and normal pulses  Murmur heard  Pulses:       Carotid pulses are 2+ on the right side, and 2+ on the left side  3/6 systolic murmur with a preserved second heart sound  Pulmonary/Chest: Effort normal and breath sounds normal  He has no wheezes  He has no rales  Abdominal: Soft  Bowel sounds are normal  There is no tenderness  Musculoskeletal: Normal range of motion  He exhibits no edema or tenderness  Neurological: He is alert and oriented to person, place, and time  He has normal reflexes  No cranial nerve deficit  Skin: Skin is warm  Psychiatric: He has a normal mood and affect  complains of pain/discomfort

## 2022-12-10 NOTE — PROGRESS NOTES
Assessment/Plan:    No problem-specific Assessment & Plan notes found for this encounter  Diagnoses and all orders for this visit:    Nonrheumatic aortic valve stenosis    S/P TAVR (transcatheter aortic valve replacement)          PHQ-9 Depression Screening    PHQ-9:    Frequency of the following problems over the past two weeks:       Little interest or pleasure in doing things:  0 - not at all  Feeling down, depressed, or hopeless:  0 - not at all  PHQ-2 Score:  0          TCM Call (since 10/20/2019)     Date and time call was made  11/14/2019  3:26 PM    Hospital care reviewed  Records reviewed    Patient was hospitialized at  [facility]    Date of Admission  11/12/19    Date of discharge  11/14/19    Diagnosis  Aortic valve stenosis    Disposition  Home; Home health services    Were the patients medications reviewed and updated  Yes    Current Symptoms  None      TCM Call (since 10/20/2019)     Post hospital issues  None    Should patient be enrolled in anticoag monitoring? No    Did you obtain your prescribed medications  Yes    Do you need help managing your prescriptions or medications  No    Is transportation to your appointment needed  No    I have advised the patient to call PCP with any new or worsening symptoms  AS      Subjective:      Patient ID: Tricia Tuttle is a 68 y o  male  Hospital follow up s/p aortic valve repair for severe aortic stenosis, the operation was successful but he does have numbness and tingling in the right leg, pt is set up for cardiac rehab, pt is on plavix and aspirin, the new valve is bovine      The following portions of the patient's history were reviewed and updated as appropriate: allergies, current medications, past family history, past medical history, past social history, past surgical history and problem list     Review of Systems   Constitutional: Negative for fever  Respiratory: Negative for shortness of breath and wheezing      Cardiovascular: Negative for chest pain and palpitations  Objective:    /54   Pulse 78   Temp 97 9 °F (36 6 °C)   Ht 5' 6" (1 676 m)   Wt 77 6 kg (171 lb)   SpO2 99%   BMI 27 60 kg/m²      Physical Exam   Constitutional: He is oriented to person, place, and time  He appears well-developed and well-nourished  No distress  HENT:   Head: Normocephalic and atraumatic  Eyes: Pupils are equal, round, and reactive to light  Conjunctivae and EOM are normal  No scleral icterus  Neck: Normal range of motion  Neck supple  Cardiovascular: Normal rate, regular rhythm and normal heart sounds  No murmur heard  Pulmonary/Chest: Effort normal and breath sounds normal  No respiratory distress  He has no wheezes  He has no rales  Abdominal: Soft  Bowel sounds are normal  He exhibits no distension and no mass  There is no tenderness  There is no rebound and no guarding  Musculoskeletal: He exhibits no edema  Lymphadenopathy:     He has no cervical adenopathy  Neurological: He is alert and oriented to person, place, and time  Skin: Skin is warm and dry  He is not diaphoretic  Psychiatric: He has a normal mood and affect  His behavior is normal  Judgment and thought content normal    Nursing note and vitals reviewed  History/Exam/Medical Decision Making

## 2024-12-19 NOTE — TELEPHONE ENCOUNTER
Can you call patient back and let him know I sent a weekly dose in for him to take, thanks  Population Health Chart Review & Patient Outreach Details      Additional [facility] Notes:               Updates Requested / Reviewed:      Updated Care Coordination Note, Care Everywhere, , and Immunizations Reconciliation Completed or Queried: [facility] Topics Overdue:      [facility] Score: 4     Cervical Cancer Screening  Urine Screening  Eye Exam  Foot Exam                       Health Maintenance Topic(s) Outreach Outcomes & Actions Taken:    Cervical Cancer Screening - Outreach Outcomes & Actions Taken  : External Records Requested & Care Team Updated if Applicable         Render Note In Bullet Format When Appropriate: No Post-Care Instructions: I reviewed with the patient in detail post-care instructions. Patient is to wear sunprotection, and avoid picking at any of the treated lesions. Pt may apply Vaseline to crusted or scabbing areas. Show Applicator Variable?: Yes Detail Level: Detailed Consent: The patient's consent was obtained including but not limited to risks of crusting, scabbing, blistering, scarring, darker or lighter pigmentary change, recurrence, incomplete removal and infection. Duration Of Freeze Thaw-Cycle (Seconds): 0

## (undated) DEVICE — INTENDED FOR TISSUE SEPARATION, AND OTHER PROCEDURES THAT REQUIRE A SHARP SURGICAL BLADE TO PUNCTURE OR CUT.: Brand: BARD-PARKER ® CARBON RIB-BACK BLADES

## (undated) DEVICE — LIGACLIP MCA MULTIPLE CLIP APPLIERS, 20 MEDIUM CLIPS: Brand: LIGACLIP

## (undated) DEVICE — FLUID MANAGEMENT KIT - IR

## (undated) DEVICE — SGW STORQ .035 180CM ANGLE STD: Brand: STORQ

## (undated) DEVICE — 4F TEMPO AQUA .038 INCHES 125CM VERT: Brand: TEMPO AQUA

## (undated) DEVICE — 3M™ TEGADERM™ TRANSPARENT FILM DRESSING FRAME STYLE, 1626W, 4 IN X 4-3/4 IN (10 CM X 12 CM), 50/CT 4CT/CASE: Brand: 3M™ TEGADERM™

## (undated) DEVICE — Device

## (undated) DEVICE — IV SET 15 DROP STERILE 0/Y GRAVITY

## (undated) DEVICE — ANTIBACTERIAL UNDYED BRAIDED (POLYGLACTIN 910), SYNTHETIC ABSORBABLE SUTURE: Brand: COATED VICRYL

## (undated) DEVICE — SUT PROLENE 4-0 BB 36 IN 8581H

## (undated) DEVICE — GLOVE SRG BIOGEL 8

## (undated) DEVICE — GLOVE INDICATOR PI UNDERGLOVE SZ 8.5 BLUE

## (undated) DEVICE — PRESTO™ INFLATION DEVICE: Brand: PRESTO

## (undated) DEVICE — FLEXCIL HIGH PRESSURE CONTRAST INJECTION LINE: Brand: NAMIC

## (undated) DEVICE — MICROPUNCTURE INTRODUCER SET SILHOUETTE TRANSITIONLESS PUSH-PLUS DESIGN - STIFFENED CANNULA WITH NITINOL WIRE GUIDE: Brand: MICROPUNCTURE

## (undated) DEVICE — STERILE ICS CARDIOVASCULAR PK: Brand: CARDINAL HEALTH

## (undated) DEVICE — CATH DIAG 5FR .035 65CM 6S OMMI-FLUSH

## (undated) DEVICE — PAD GROUNDING ADULT

## (undated) DEVICE — CARDIOVASCULAR SPLIT DRAPE: Brand: CONVERTORS

## (undated) DEVICE — MEDI-VAC YANK SUCT HNDL W/TPRD BULBOUS TIP: Brand: CARDINAL HEALTH

## (undated) DEVICE — DEFIB ADULT ELECTRODE CARDINAL

## (undated) DEVICE — GAUZE SPONGES,16 PLY: Brand: CURITY

## (undated) DEVICE — GLOVE SRG BIOGEL ECLIPSE 7

## (undated) DEVICE — SNAP KOVER: Brand: UNBRANDED

## (undated) DEVICE — RADIFOCUS TORQUE DEVICE MULTI-TORQUE VISE: Brand: RADIFOCUS TORQUE DEVICE

## (undated) DEVICE — PLEDGET CARDIO PTFE 9.5 X 4.8 SOFT LF (6EA/PK)

## (undated) DEVICE — TRAY FOLEY 16FR SURESTEP TEMP SENS URIMETER STAT LOK

## (undated) DEVICE — HEAVY DUTY TABLE COVER: Brand: CONVERTORS

## (undated) DEVICE — LIGACLIP MCA MULTIPLE CLIP APPLIERS, 20 SMALL CLIPS: Brand: LIGACLIP

## (undated) DEVICE — SGW STORQ .035 300CM ANGLE STD: Brand: STORQ

## (undated) DEVICE — SYRINGE KIT,PACKAGED,,150FT,MK 7(ANGIO-ARTERION, 150ML SYR KIT W/QFT,MC)(60729385): Brand: MEDRAD® MARK 7 ARTERION DISPOSABLE SYRINGE 150 ML WITH QUICK FILL TUBE

## (undated) DEVICE — SURGICEL 4 X 8

## (undated) DEVICE — PINNACLE R/O II INTRODUCER SHEATH WITH RADIOPAQUE MARKER: Brand: PINNACLE

## (undated) DEVICE — SILVER-COATED ANTIBACTERIAL BARRIER DRESSING: Brand: ACTICOAT SURGIC 10X12CM 5PK US

## (undated) DEVICE — NON-DEHP HIGH FLOW RATE EXTENSION SET, MALE LUER LOCK ADAPTER

## (undated) DEVICE — SUTURE BOOTS YELLOW

## (undated) DEVICE — INFUSER BAG 500ML

## (undated) DEVICE — GAUZE SPONGES,USP TYPE VII GAUZE, 12 PLY: Brand: CURITY

## (undated) DEVICE — RADIFOCUS GLIDEWIRE ADVANTAGE GUIDEWIRE: Brand: GLIDEWIRE ADVANTAGE

## (undated) DEVICE — SUT SILK 0 CT-1 30 IN 424H

## (undated) DEVICE — ADHESIVE SKN CLSR HISTOACRYL FLEX 0.5ML LF

## (undated) DEVICE — RADIFOCUS GLIDEWIRE: Brand: GLIDEWIRE

## (undated) DEVICE — RADIFOCUS GLIDECATH: Brand: GLIDECATH

## (undated) DEVICE — DESTINATION PERIPHERAL GUIDING SHEATH: Brand: DESTINATION

## (undated) DEVICE — ADHESIVE SKIN HIGH VISCOSITY EXOFIN 1ML

## (undated) DEVICE — 3000CC GUARDIAN II: Brand: GUARDIAN

## (undated) DEVICE — VESSEL LOOPS X-RAY DETECTABLE: Brand: DEROYAL

## (undated) DEVICE — COVER PROBE INTRAOPERATIVE 6 X 96 IN

## (undated) DEVICE — PENCIL ELECTROSURG E-Z CLEAN -0035H

## (undated) DEVICE — ANGIOGRAPHIC CATHETER: Brand: IMAGER™ II

## (undated) DEVICE — SUT VICRYL 3-0 SH 27 IN J416H

## (undated) DEVICE — SUT PROLENE 5-0 C-1/C-1 36 IN 8321H

## (undated) DEVICE — CHLORAPREP HI-LITE 10.5ML ORANGE

## (undated) DEVICE — SET DILATOR HYDRO .035 16MM & 18MM X 45CM

## (undated) DEVICE — CATH DIAG 5FR .035 70CM PIG CSC 20

## (undated) DEVICE — THERMOFLECT BLANKET, L, 25EA                               TS THERMOFLECT BLANKET, 48" X 84", SILVER, 5/BG, 5 BG/CS NW: Brand: THERMOFLECT

## (undated) DEVICE — PERCLOSE PROGLIDE™ SUTURE-MEDIATED CLOSURE SYSTEM
Type: IMPLANTABLE DEVICE | Site: ARTERIAL | Status: NON-FUNCTIONAL
Brand: PERCLOSE PROGLIDE™

## (undated) DEVICE — CARDIO PERI-GROIN: Brand: CONVERTORS

## (undated) DEVICE — GLOVE RADIATION SZ 7

## (undated) DEVICE — CATH DIAG CXI 2.3FR 2.3FR 0.014 IN 150CM ANG

## (undated) DEVICE — CATH BAL CHARGER 6 X 60MM X 75CM

## (undated) DEVICE — BETHLEHEM MAJOR GENERAL PACK: Brand: CARDINAL HEALTH

## (undated) DEVICE — SET DILATOR HYDRO .035 20MM & 22MM X 45CM

## (undated) DEVICE — CHLORAPREP HI-LITE 26ML ORANGE

## (undated) DEVICE — GLOVE INDICATOR PI UNDERGLOVE SZ 7 BLUE

## (undated) DEVICE — SUT MONOCRYL PLUS 4-0 PS-2 18 IN MCP496G